# Patient Record
Sex: MALE | Race: WHITE | NOT HISPANIC OR LATINO | Employment: OTHER | URBAN - METROPOLITAN AREA
[De-identification: names, ages, dates, MRNs, and addresses within clinical notes are randomized per-mention and may not be internally consistent; named-entity substitution may affect disease eponyms.]

---

## 2017-03-15 ENCOUNTER — HOSPITAL ENCOUNTER (OUTPATIENT)
Facility: MEDICAL CENTER | Age: 82
End: 2017-03-15
Payer: COMMERCIAL

## 2017-03-15 LAB
ALBUMIN SERPL BCP-MCNC: 4.4 G/DL (ref 3.2–4.9)
ALBUMIN/GLOB SERPL: 1.7 G/DL
ALP SERPL-CCNC: 54 U/L (ref 30–99)
ALT SERPL-CCNC: 20 U/L (ref 2–50)
ANION GAP SERPL CALC-SCNC: 6 MMOL/L (ref 0–11.9)
AST SERPL-CCNC: 25 U/L (ref 12–45)
BILIRUB SERPL-MCNC: 0.9 MG/DL (ref 0.1–1.5)
BUN SERPL-MCNC: 18 MG/DL (ref 8–22)
CALCIUM SERPL-MCNC: 9.4 MG/DL (ref 8.5–10.5)
CHLORIDE SERPL-SCNC: 103 MMOL/L (ref 96–112)
CHOLEST SERPL-MCNC: 119 MG/DL (ref 100–199)
CO2 SERPL-SCNC: 31 MMOL/L (ref 20–33)
CREAT SERPL-MCNC: 0.85 MG/DL (ref 0.5–1.4)
EST. AVERAGE GLUCOSE BLD GHB EST-MCNC: 134 MG/DL
GLOBULIN SER CALC-MCNC: 2.6 G/DL (ref 1.9–3.5)
GLUCOSE SERPL-MCNC: 108 MG/DL (ref 65–99)
HBA1C MFR BLD: 6.3 % (ref 0–5.6)
HDLC SERPL-MCNC: 33 MG/DL
LDLC SERPL CALC-MCNC: 61 MG/DL
POTASSIUM SERPL-SCNC: 4.2 MMOL/L (ref 3.6–5.5)
PROT SERPL-MCNC: 7 G/DL (ref 6–8.2)
SODIUM SERPL-SCNC: 140 MMOL/L (ref 135–145)
TRIGL SERPL-MCNC: 125 MG/DL (ref 0–149)

## 2017-07-19 ENCOUNTER — HOSPITAL ENCOUNTER (OUTPATIENT)
Facility: MEDICAL CENTER | Age: 82
End: 2017-07-19
Payer: COMMERCIAL

## 2017-07-19 LAB
ALBUMIN SERPL BCP-MCNC: 4.3 G/DL (ref 3.2–4.9)
ALBUMIN/GLOB SERPL: 1.7 G/DL
ALP SERPL-CCNC: 43 U/L (ref 30–99)
ALT SERPL-CCNC: 19 U/L (ref 2–50)
ANION GAP SERPL CALC-SCNC: 6 MMOL/L (ref 0–11.9)
AST SERPL-CCNC: 21 U/L (ref 12–45)
BASOPHILS # BLD AUTO: 0.7 % (ref 0–1.8)
BASOPHILS # BLD: 0.04 K/UL (ref 0–0.12)
BILIRUB SERPL-MCNC: 1.9 MG/DL (ref 0.1–1.5)
BUN SERPL-MCNC: 22 MG/DL (ref 8–22)
CALCIUM SERPL-MCNC: 9.9 MG/DL (ref 8.5–10.5)
CHLORIDE SERPL-SCNC: 100 MMOL/L (ref 96–112)
CHOLEST SERPL-MCNC: 92 MG/DL (ref 100–199)
CO2 SERPL-SCNC: 31 MMOL/L (ref 20–33)
CREAT SERPL-MCNC: 0.86 MG/DL (ref 0.5–1.4)
EOSINOPHIL # BLD AUTO: 0.2 K/UL (ref 0–0.51)
EOSINOPHIL NFR BLD: 3.4 % (ref 0–6.9)
ERYTHROCYTE [DISTWIDTH] IN BLOOD BY AUTOMATED COUNT: 39.7 FL (ref 35.9–50)
EST. AVERAGE GLUCOSE BLD GHB EST-MCNC: 131 MG/DL
GFR SERPL CREATININE-BSD FRML MDRD: >60 ML/MIN/1.73 M 2
GLOBULIN SER CALC-MCNC: 2.5 G/DL (ref 1.9–3.5)
GLUCOSE SERPL-MCNC: 106 MG/DL (ref 65–99)
HBA1C MFR BLD: 6.2 % (ref 0–5.6)
HCT VFR BLD AUTO: 43 % (ref 42–52)
HDLC SERPL-MCNC: 31 MG/DL
HGB BLD-MCNC: 14.6 G/DL (ref 14–18)
IMM GRANULOCYTES # BLD AUTO: 0.01 K/UL (ref 0–0.11)
IMM GRANULOCYTES NFR BLD AUTO: 0.2 % (ref 0–0.9)
LDLC SERPL CALC-MCNC: 46 MG/DL
LYMPHOCYTES # BLD AUTO: 1.7 K/UL (ref 1–4.8)
LYMPHOCYTES NFR BLD: 28.6 % (ref 22–41)
MCH RBC QN AUTO: 29.4 PG (ref 27–33)
MCHC RBC AUTO-ENTMCNC: 34 G/DL (ref 33.7–35.3)
MCV RBC AUTO: 86.7 FL (ref 81.4–97.8)
MONOCYTES # BLD AUTO: 0.45 K/UL (ref 0–0.85)
MONOCYTES NFR BLD AUTO: 7.6 % (ref 0–13.4)
NEUTROPHILS # BLD AUTO: 3.54 K/UL (ref 1.82–7.42)
NEUTROPHILS NFR BLD: 59.5 % (ref 44–72)
NRBC # BLD AUTO: 0 K/UL
NRBC BLD AUTO-RTO: 0 /100 WBC
PLATELET # BLD AUTO: 163 K/UL (ref 164–446)
PMV BLD AUTO: 10.1 FL (ref 9–12.9)
POTASSIUM SERPL-SCNC: 4 MMOL/L (ref 3.6–5.5)
PROT SERPL-MCNC: 6.8 G/DL (ref 6–8.2)
RBC # BLD AUTO: 4.96 M/UL (ref 4.7–6.1)
SODIUM SERPL-SCNC: 137 MMOL/L (ref 135–145)
TRIGL SERPL-MCNC: 77 MG/DL (ref 0–149)
WBC # BLD AUTO: 5.9 K/UL (ref 4.8–10.8)

## 2017-10-18 ENCOUNTER — HOSPITAL ENCOUNTER (OUTPATIENT)
Facility: MEDICAL CENTER | Age: 82
End: 2017-10-18
Payer: COMMERCIAL

## 2017-10-18 LAB
ALBUMIN SERPL BCP-MCNC: 4.4 G/DL (ref 3.2–4.9)
ALBUMIN/GLOB SERPL: 1.7 G/DL
ALP SERPL-CCNC: 49 U/L (ref 30–99)
ALT SERPL-CCNC: 25 U/L (ref 2–50)
ANION GAP SERPL CALC-SCNC: 9 MMOL/L (ref 0–11.9)
AST SERPL-CCNC: 22 U/L (ref 12–45)
BASOPHILS # BLD AUTO: 0.8 % (ref 0–1.8)
BASOPHILS # BLD: 0.05 K/UL (ref 0–0.12)
BILIRUB SERPL-MCNC: 1.1 MG/DL (ref 0.1–1.5)
BUN SERPL-MCNC: 23 MG/DL (ref 8–22)
CALCIUM SERPL-MCNC: 9.4 MG/DL (ref 8.5–10.5)
CHLORIDE SERPL-SCNC: 103 MMOL/L (ref 96–112)
CHOLEST SERPL-MCNC: 103 MG/DL (ref 100–199)
CO2 SERPL-SCNC: 29 MMOL/L (ref 20–33)
CREAT SERPL-MCNC: 0.86 MG/DL (ref 0.5–1.4)
EOSINOPHIL # BLD AUTO: 0.23 K/UL (ref 0–0.51)
EOSINOPHIL NFR BLD: 3.9 % (ref 0–6.9)
ERYTHROCYTE [DISTWIDTH] IN BLOOD BY AUTOMATED COUNT: 41.8 FL (ref 35.9–50)
EST. AVERAGE GLUCOSE BLD GHB EST-MCNC: 134 MG/DL
GFR SERPL CREATININE-BSD FRML MDRD: >60 ML/MIN/1.73 M 2
GLOBULIN SER CALC-MCNC: 2.6 G/DL (ref 1.9–3.5)
GLUCOSE SERPL-MCNC: 111 MG/DL (ref 65–99)
HBA1C MFR BLD: 6.3 % (ref 0–5.6)
HCT VFR BLD AUTO: 43.9 % (ref 42–52)
HDLC SERPL-MCNC: 38 MG/DL
HGB BLD-MCNC: 14.6 G/DL (ref 14–18)
IMM GRANULOCYTES # BLD AUTO: 0.01 K/UL (ref 0–0.11)
IMM GRANULOCYTES NFR BLD AUTO: 0.2 % (ref 0–0.9)
LDLC SERPL CALC-MCNC: 50 MG/DL
LYMPHOCYTES # BLD AUTO: 1.64 K/UL (ref 1–4.8)
LYMPHOCYTES NFR BLD: 27.7 % (ref 22–41)
MCH RBC QN AUTO: 29.6 PG (ref 27–33)
MCHC RBC AUTO-ENTMCNC: 33.3 G/DL (ref 33.7–35.3)
MCV RBC AUTO: 89 FL (ref 81.4–97.8)
MONOCYTES # BLD AUTO: 0.47 K/UL (ref 0–0.85)
MONOCYTES NFR BLD AUTO: 7.9 % (ref 0–13.4)
NEUTROPHILS # BLD AUTO: 3.53 K/UL (ref 1.82–7.42)
NEUTROPHILS NFR BLD: 59.5 % (ref 44–72)
NRBC # BLD AUTO: 0 K/UL
NRBC BLD AUTO-RTO: 0 /100 WBC
PLATELET # BLD AUTO: 191 K/UL (ref 164–446)
PMV BLD AUTO: 10.4 FL (ref 9–12.9)
POTASSIUM SERPL-SCNC: 4.4 MMOL/L (ref 3.6–5.5)
PROT SERPL-MCNC: 7 G/DL (ref 6–8.2)
RBC # BLD AUTO: 4.93 M/UL (ref 4.7–6.1)
SODIUM SERPL-SCNC: 141 MMOL/L (ref 135–145)
TRIGL SERPL-MCNC: 77 MG/DL (ref 0–149)
WBC # BLD AUTO: 5.9 K/UL (ref 4.8–10.8)

## 2018-01-12 ENCOUNTER — HOSPITAL ENCOUNTER (OUTPATIENT)
Dept: LAB | Facility: MEDICAL CENTER | Age: 83
End: 2018-01-12
Attending: INTERNAL MEDICINE
Payer: MEDICARE

## 2018-01-12 LAB
ALBUMIN SERPL BCP-MCNC: 4.4 G/DL (ref 3.2–4.9)
ALBUMIN/GLOB SERPL: 1.7 G/DL
ALP SERPL-CCNC: 44 U/L (ref 30–99)
ALT SERPL-CCNC: 17 U/L (ref 2–50)
ANION GAP SERPL CALC-SCNC: 5 MMOL/L (ref 0–11.9)
AST SERPL-CCNC: 20 U/L (ref 12–45)
BASOPHILS # BLD AUTO: 0.8 % (ref 0–1.8)
BASOPHILS # BLD: 0.05 K/UL (ref 0–0.12)
BILIRUB SERPL-MCNC: 1.2 MG/DL (ref 0.1–1.5)
BUN SERPL-MCNC: 19 MG/DL (ref 8–22)
CALCIUM SERPL-MCNC: 9 MG/DL (ref 8.5–10.5)
CHLORIDE SERPL-SCNC: 103 MMOL/L (ref 96–112)
CHOLEST SERPL-MCNC: 96 MG/DL (ref 100–199)
CO2 SERPL-SCNC: 30 MMOL/L (ref 20–33)
CREAT SERPL-MCNC: 0.8 MG/DL (ref 0.5–1.4)
EOSINOPHIL # BLD AUTO: 0.2 K/UL (ref 0–0.51)
EOSINOPHIL NFR BLD: 3.2 % (ref 0–6.9)
ERYTHROCYTE [DISTWIDTH] IN BLOOD BY AUTOMATED COUNT: 40.4 FL (ref 35.9–50)
GLOBULIN SER CALC-MCNC: 2.6 G/DL (ref 1.9–3.5)
GLUCOSE SERPL-MCNC: 114 MG/DL (ref 65–99)
HCT VFR BLD AUTO: 43.8 % (ref 42–52)
HDLC SERPL-MCNC: 32 MG/DL
HGB BLD-MCNC: 14.7 G/DL (ref 14–18)
IMM GRANULOCYTES # BLD AUTO: 0.02 K/UL (ref 0–0.11)
IMM GRANULOCYTES NFR BLD AUTO: 0.3 % (ref 0–0.9)
LDLC SERPL CALC-MCNC: 45 MG/DL
LYMPHOCYTES # BLD AUTO: 1.27 K/UL (ref 1–4.8)
LYMPHOCYTES NFR BLD: 20 % (ref 22–41)
MCH RBC QN AUTO: 29.5 PG (ref 27–33)
MCHC RBC AUTO-ENTMCNC: 33.6 G/DL (ref 33.7–35.3)
MCV RBC AUTO: 87.8 FL (ref 81.4–97.8)
MONOCYTES # BLD AUTO: 0.49 K/UL (ref 0–0.85)
MONOCYTES NFR BLD AUTO: 7.7 % (ref 0–13.4)
NEUTROPHILS # BLD AUTO: 4.31 K/UL (ref 1.82–7.42)
NEUTROPHILS NFR BLD: 68 % (ref 44–72)
NRBC # BLD AUTO: 0 K/UL
NRBC BLD-RTO: 0 /100 WBC
PLATELET # BLD AUTO: 202 K/UL (ref 164–446)
PMV BLD AUTO: 10.2 FL (ref 9–12.9)
POTASSIUM SERPL-SCNC: 4.3 MMOL/L (ref 3.6–5.5)
PROT SERPL-MCNC: 7 G/DL (ref 6–8.2)
RBC # BLD AUTO: 4.99 M/UL (ref 4.7–6.1)
SODIUM SERPL-SCNC: 138 MMOL/L (ref 135–145)
T4 FREE SERPL-MCNC: 0.88 NG/DL (ref 0.53–1.43)
TRIGL SERPL-MCNC: 93 MG/DL (ref 0–149)
TSH SERPL DL<=0.005 MIU/L-ACNC: 1.25 UIU/ML (ref 0.38–5.33)
WBC # BLD AUTO: 6.3 K/UL (ref 4.8–10.8)

## 2018-01-12 PROCEDURE — 84439 ASSAY OF FREE THYROXINE: CPT

## 2018-01-12 PROCEDURE — 80053 COMPREHEN METABOLIC PANEL: CPT

## 2018-01-12 PROCEDURE — 80061 LIPID PANEL: CPT

## 2018-01-12 PROCEDURE — 36415 COLL VENOUS BLD VENIPUNCTURE: CPT

## 2018-01-12 PROCEDURE — 85025 COMPLETE CBC W/AUTO DIFF WBC: CPT

## 2018-01-12 PROCEDURE — 84443 ASSAY THYROID STIM HORMONE: CPT

## 2018-01-16 DIAGNOSIS — I34.0 NON-RHEUMATIC MITRAL REGURGITATION: ICD-10-CM

## 2018-01-17 ENCOUNTER — HOSPITAL ENCOUNTER (OUTPATIENT)
Facility: MEDICAL CENTER | Age: 83
End: 2018-01-17
Payer: COMMERCIAL

## 2018-01-18 LAB — PSA SERPL-MCNC: 0.01 NG/ML (ref 0–4)

## 2018-01-22 ENCOUNTER — HOSPITAL ENCOUNTER (OUTPATIENT)
Dept: CARDIOLOGY | Facility: MEDICAL CENTER | Age: 83
End: 2018-01-22
Attending: INTERNAL MEDICINE
Payer: MEDICARE

## 2018-01-22 DIAGNOSIS — I34.0 NON-RHEUMATIC MITRAL REGURGITATION: ICD-10-CM

## 2018-01-22 PROCEDURE — 93306 TTE W/DOPPLER COMPLETE: CPT

## 2018-01-23 LAB
LV EJECT FRACT  99904: 60
LV EJECT FRACT MOD 2C 99903: 62.47
LV EJECT FRACT MOD 4C 99902: 65.07
LV EJECT FRACT MOD BP 99901: 62.33

## 2018-02-14 ENCOUNTER — OFFICE VISIT (OUTPATIENT)
Dept: CARDIOLOGY | Facility: MEDICAL CENTER | Age: 83
End: 2018-02-14
Payer: MEDICARE

## 2018-02-14 VITALS
WEIGHT: 152 LBS | BODY MASS INDEX: 24.43 KG/M2 | DIASTOLIC BLOOD PRESSURE: 72 MMHG | SYSTOLIC BLOOD PRESSURE: 128 MMHG | HEART RATE: 72 BPM | HEIGHT: 66 IN | OXYGEN SATURATION: 94 %

## 2018-02-14 DIAGNOSIS — I10 ESSENTIAL HYPERTENSION: ICD-10-CM

## 2018-02-14 DIAGNOSIS — I34.0 NON-RHEUMATIC MITRAL REGURGITATION: ICD-10-CM

## 2018-02-14 PROCEDURE — 99204 OFFICE O/P NEW MOD 45 MIN: CPT | Performed by: INTERNAL MEDICINE

## 2018-02-14 RX ORDER — ASPIRIN 325 MG
325 TABLET ORAL EVERY 6 HOURS PRN
COMMUNITY
End: 2019-08-14

## 2018-02-14 RX ORDER — METFORMIN HYDROCHLORIDE 500 MG/1
500 TABLET, EXTENDED RELEASE ORAL 3 TIMES DAILY
Status: ON HOLD | COMMUNITY
Start: 2018-02-03 | End: 2019-11-01

## 2018-02-14 RX ORDER — CHLORTHALIDONE 25 MG/1
25 TABLET ORAL DAILY
Qty: 90 TAB | Refills: 3 | Status: ON HOLD | OUTPATIENT
Start: 2018-02-14 | End: 2019-11-01

## 2018-02-14 RX ORDER — LOSARTAN POTASSIUM 100 MG/1
100 TABLET ORAL DAILY
COMMUNITY
Start: 2018-02-03

## 2018-02-14 RX ORDER — ATORVASTATIN CALCIUM 10 MG/1
10 TABLET, FILM COATED ORAL DAILY
Status: ON HOLD | COMMUNITY
Start: 2018-02-03 | End: 2019-12-04

## 2018-02-14 RX ORDER — HYDROCHLOROTHIAZIDE 12.5 MG/1
CAPSULE, GELATIN COATED ORAL
COMMUNITY
Start: 2018-01-11 | End: 2018-02-14

## 2018-02-14 ASSESSMENT — ENCOUNTER SYMPTOMS
MYALGIAS: 0
BLURRED VISION: 0
COUGH: 0
FEVER: 0
DIZZINESS: 0
NERVOUS/ANXIOUS: 0
HEARTBURN: 0
PND: 0
SHORTNESS OF BREATH: 0
BRUISES/BLEEDS EASILY: 0
PALPITATIONS: 0
DEPRESSION: 0
CHILLS: 0
NAUSEA: 0
HEADACHES: 0
EYE DISCHARGE: 0
BACK PAIN: 1

## 2018-02-14 NOTE — PROGRESS NOTES
Subjective:   Filiberto Jauregui is a 84 y.o. 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  Activity level is limited only by some mild sciatica which comes and goes.  He denies effort dyspnea and chest discomfort or palpitations.  Recent echocardiogram demonstrates at least moderate mitral regurgitation possibly severe but other parameters such as pulmonary artery pressure, left atrial size and left ventricular systolic function and size are all normal.    He reports that her systolic blood pressures running between 130-140.    In 2011 and a stress echo was normal.  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 No alcohol abuse.    No past medical history on file.  No past surgical history on file.  No family history on file.  History   Smoking Status   • Never Smoker   Smokeless Tobacco   • Not on file     Allergies   Allergen Reactions   • Penicillins      Outpatient Encounter Prescriptions as of 2/14/2018   Medication Sig Dispense Refill   • atorvastatin (LIPITOR) 40 MG Tab      • losartan (COZAAR) 100 MG Tab      • metFORMIN ER (GLUCOPHAGE XR) 500 MG TABLET SR 24 HR      • aspirin (ASA) 325 MG Tab Take 325 mg by mouth every 6 hours as needed.     • chlorthalidone (HYGROTON) 25 MG Tab Take 1 Tab by mouth every day. 90 Tab 3   • [DISCONTINUED] hydrochlorothiazide (MICROZIDE) 12.5 MG capsule        No facility-administered encounter medications on file as of 2/14/2018.      Review of Systems   Constitutional: Negative for chills, fever and malaise/fatigue.   Eyes: Negative for blurred vision and discharge.   Respiratory: Negative for cough and shortness of breath.    Cardiovascular: Negative for chest pain, palpitations, leg swelling and PND.   Gastrointestinal:  "Negative for heartburn and nausea.   Genitourinary: Negative for dysuria and urgency.   Musculoskeletal: Positive for back pain. Negative for myalgias.   Skin: Negative for itching and rash.   Neurological: Negative for dizziness and headaches.   Endo/Heme/Allergies: Negative for environmental allergies. Does not bruise/bleed easily.   Psychiatric/Behavioral: Negative for depression. The patient is not nervous/anxious.         Objective:   /72   Pulse 72   Ht 1.676 m (5' 6\")   Wt 68.9 kg (152 lb)   SpO2 94%   BMI 24.53 kg/m²     Physical Exam   Constitutional: He is oriented to person, place, and time. He appears well-developed and well-nourished.   HENT:   Head: Normocephalic and atraumatic.   Eyes: Conjunctivae and EOM are normal. No scleral icterus.   Neck: Neck supple. No JVD present. No thyromegaly present.   Cardiovascular: Normal rate and regular rhythm.  Exam reveals no gallop and no friction rub.    Murmur heard.   Systolic murmur is present with a grade of 3/6   Pulses:       Carotid pulses are 2+ on the right side, and 2+ on the left side.       Radial pulses are 2+ on the right side, and 2+ on the left side.        Femoral pulses are 2+ on the right side, and 2+ on the left side.       Popliteal pulses are 2+ on the right side, and 2+ on the left side.        Dorsalis pedis pulses are 2+ on the right side, and 2+ on the left side.        Posterior tibial pulses are 2+ on the right side, and 2+ on the left side.   Pulmonary/Chest: Effort normal and breath sounds normal. No respiratory distress. He has no wheezes. He has no rales. He exhibits no tenderness.   Abdominal: Soft. Bowel sounds are normal. He exhibits no distension and no mass. There is no tenderness.   Neurological: He is alert and oriented to person, place, and time. Coordination normal.   Skin: Skin is warm and dry. No rash noted. No pallor.   Psychiatric: He has a normal mood and affect. His behavior is normal. Judgment and " thought content normal.       Assessment:     1. Non-rheumatic mitral regurgitation     2. Essential hypertension  BASIC METABOLIC PANEL       Medical Decision Making:  Today's Assessment / Status / Plan:   With respect to mitral regurgitation, continue observation annually or sooner if any symptoms develop.  With respect to hypertension which is marginally controlled, discontinue hydrochlorothiazide.  Prescribed chlorthalidone 25 mg per day.  Nonfasting basic metabolic panel in 2-3 weeks.  Return when he wishes to be seen or annually.

## 2018-02-14 NOTE — LETTER
[facility]   [address]   [address]  [place] [address]  Phone: [phone]  Fax: [phone]              Filiberto Jauregui  7/29/1933    Encounter Date: 2/14/2018    Gee Johnson M.D.          PROGRESS NOTE:  Subjective:   Filiberto Jauregui is a 84 y.o. 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  Activity level is limited only by some mild sciatica which comes and goes.  He denies effort dyspnea and chest discomfort or palpitations.  Recent echocardiogram demonstrates at least moderate mitral regurgitation possibly severe but other parameters such as pulmonary artery pressure, left atrial size and left ventricular systolic function and size are all normal.    He reports that her systolic blood pressures running between 130-140.    In 2011 and a stress echo was normal.  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 No alcohol abuse.    No past medical history on file.  No past surgical history on file.  No family history on file.  History   Smoking Status   • Never Smoker   Smokeless Tobacco   • Not on file     Allergies   Allergen Reactions   • Penicillins      Outpatient Encounter Prescriptions as of 2/14/2018   Medication Sig Dispense Refill   • atorvastatin (LIPITOR) 40 MG Tab      • losartan (COZAAR) 100 MG Tab      • metFORMIN ER (GLUCOPHAGE XR) 500 MG TABLET SR 24 HR      • aspirin (ASA) 325 MG Tab Take 325 mg by mouth every 6 hours as needed.     • chlorthalidone (HYGROTON) 25 MG Tab Take 1 Tab by mouth every day. 90 Tab 3   • [DISCONTINUED] hydrochlorothiazide (MICROZIDE) 12.5 MG capsule        No facility-administered encounter medications on file as of 2/14/2018.      Review  "of Systems   Constitutional: Negative for chills, fever and malaise/fatigue.   Eyes: Negative for blurred vision and discharge.   Respiratory: Negative for cough and shortness of breath.    Cardiovascular: Negative for chest pain, palpitations, leg swelling and PND.   Gastrointestinal: Negative for heartburn and nausea.   Genitourinary: Negative for dysuria and urgency.   Musculoskeletal: Positive for back pain. Negative for myalgias.   Skin: Negative for itching and rash.   Neurological: Negative for dizziness and headaches.   Endo/Heme/Allergies: Negative for environmental allergies. Does not bruise/bleed easily.   Psychiatric/Behavioral: Negative for depression. The patient is not nervous/anxious.         Objective:   /72   Pulse 72   Ht 1.676 m (5' 6\")   Wt 68.9 kg (152 lb)   SpO2 94%   BMI 24.53 kg/m²      Physical Exam   Constitutional: He is oriented to person, place, and time. He appears well-developed and well-nourished.   HENT:   Head: Normocephalic and atraumatic.   Eyes: Conjunctivae and EOM are normal. No scleral icterus.   Neck: Neck supple. No JVD present. No thyromegaly present.   Cardiovascular: Normal rate and regular rhythm.  Exam reveals no gallop and no friction rub.    Murmur heard.   Systolic murmur is present with a grade of 3/6   Pulses:       Carotid pulses are 2+ on the right side, and 2+ on the left side.       Radial pulses are 2+ on the right side, and 2+ on the left side.        Femoral pulses are 2+ on the right side, and 2+ on the left side.       Popliteal pulses are 2+ on the right side, and 2+ on the left side.        Dorsalis pedis pulses are 2+ on the right side, and 2+ on the left side.        Posterior tibial pulses are 2+ on the right side, and 2+ on the left side.   Pulmonary/Chest: Effort normal and breath sounds normal. No respiratory distress. He has no wheezes. He has no rales. He exhibits no tenderness.   Abdominal: Soft. Bowel sounds are normal. He exhibits " no distension and no mass. There is no tenderness.   Neurological: He is alert and oriented to person, place, and time. Coordination normal.   Skin: Skin is warm and dry. No rash noted. No pallor.   Psychiatric: He has a normal mood and affect. His behavior is normal. Judgment and thought content normal.       Assessment:     1. Non-rheumatic mitral regurgitation     2. Essential hypertension  BASIC METABOLIC PANEL       Medical Decision Making:  Today's Assessment / Status / Plan:   With respect to mitral regurgitation, continue observation annually or sooner if any symptoms develop.  With respect to hypertension which is marginally controlled, discontinue hydrochlorothiazide.  Prescribed chlorthalidone 25 mg per day.  Nonfasting basic metabolic panel in 2-3 weeks.  Return when he wishes to be seen or annually.      Mac Miller M.D.  [address]  [address]  [place] [address]  VIA Facsimile: [phone]

## 2018-06-13 ENCOUNTER — HOSPITAL ENCOUNTER (OUTPATIENT)
Dept: LAB | Facility: MEDICAL CENTER | Age: 83
End: 2018-06-13
Attending: INTERNAL MEDICINE
Payer: MEDICARE

## 2018-06-13 LAB
ALBUMIN SERPL BCP-MCNC: 4.5 G/DL (ref 3.2–4.9)
ALBUMIN/GLOB SERPL: 1.7 G/DL
ALP SERPL-CCNC: 40 U/L (ref 30–99)
ALT SERPL-CCNC: 18 U/L (ref 2–50)
ANION GAP SERPL CALC-SCNC: 6 MMOL/L (ref 0–11.9)
AST SERPL-CCNC: 20 U/L (ref 12–45)
BILIRUB SERPL-MCNC: 1.4 MG/DL (ref 0.1–1.5)
BUN SERPL-MCNC: 20 MG/DL (ref 8–22)
CALCIUM SERPL-MCNC: 9.5 MG/DL (ref 8.5–10.5)
CHLORIDE SERPL-SCNC: 103 MMOL/L (ref 96–112)
CO2 SERPL-SCNC: 31 MMOL/L (ref 20–33)
CREAT SERPL-MCNC: 0.91 MG/DL (ref 0.5–1.4)
GLOBULIN SER CALC-MCNC: 2.6 G/DL (ref 1.9–3.5)
GLUCOSE SERPL-MCNC: 97 MG/DL (ref 65–99)
POTASSIUM SERPL-SCNC: 4.1 MMOL/L (ref 3.6–5.5)
PROT SERPL-MCNC: 7.1 G/DL (ref 6–8.2)
SODIUM SERPL-SCNC: 140 MMOL/L (ref 135–145)

## 2018-06-13 PROCEDURE — 36415 COLL VENOUS BLD VENIPUNCTURE: CPT

## 2018-06-13 PROCEDURE — 80053 COMPREHEN METABOLIC PANEL: CPT

## 2018-08-25 ENCOUNTER — HOSPITAL ENCOUNTER (OUTPATIENT)
Dept: LAB | Facility: MEDICAL CENTER | Age: 83
End: 2018-08-25
Attending: INTERNAL MEDICINE
Payer: MEDICARE

## 2018-08-25 LAB
ALBUMIN SERPL BCP-MCNC: 4.6 G/DL (ref 3.2–4.9)
ALBUMIN/GLOB SERPL: 1.8 G/DL
ALP SERPL-CCNC: 50 U/L (ref 30–99)
ALT SERPL-CCNC: 22 U/L (ref 2–50)
ANION GAP SERPL CALC-SCNC: 6 MMOL/L (ref 0–11.9)
APPEARANCE UR: CLEAR
AST SERPL-CCNC: 22 U/L (ref 12–45)
BACTERIA #/AREA URNS HPF: NEGATIVE /HPF
BILIRUB SERPL-MCNC: 1.3 MG/DL (ref 0.1–1.5)
BILIRUB UR QL STRIP.AUTO: NEGATIVE
BUN SERPL-MCNC: 22 MG/DL (ref 8–22)
CALCIUM SERPL-MCNC: 9.3 MG/DL (ref 8.5–10.5)
CHLORIDE SERPL-SCNC: 101 MMOL/L (ref 96–112)
CHOLEST SERPL-MCNC: 104 MG/DL (ref 100–199)
CO2 SERPL-SCNC: 31 MMOL/L (ref 20–33)
COLOR UR: YELLOW
CREAT SERPL-MCNC: 0.98 MG/DL (ref 0.5–1.4)
EPI CELLS #/AREA URNS HPF: ABNORMAL /HPF
EST. AVERAGE GLUCOSE BLD GHB EST-MCNC: 137 MG/DL
GLOBULIN SER CALC-MCNC: 2.5 G/DL (ref 1.9–3.5)
GLUCOSE SERPL-MCNC: 115 MG/DL (ref 65–99)
GLUCOSE UR STRIP.AUTO-MCNC: NEGATIVE MG/DL
HBA1C MFR BLD: 6.4 % (ref 0–5.6)
HDLC SERPL-MCNC: 38 MG/DL
HYALINE CASTS #/AREA URNS LPF: ABNORMAL /LPF
KETONES UR STRIP.AUTO-MCNC: NEGATIVE MG/DL
LDLC SERPL CALC-MCNC: 53 MG/DL
LEUKOCYTE ESTERASE UR QL STRIP.AUTO: ABNORMAL
MICRO URNS: ABNORMAL
NITRITE UR QL STRIP.AUTO: NEGATIVE
PH UR STRIP.AUTO: 6 [PH]
POTASSIUM SERPL-SCNC: 4.3 MMOL/L (ref 3.6–5.5)
PROT SERPL-MCNC: 7.1 G/DL (ref 6–8.2)
PROT UR QL STRIP: NEGATIVE MG/DL
RBC # URNS HPF: ABNORMAL /HPF
RBC UR QL AUTO: NEGATIVE
RENAL EPI CELLS #/AREA URNS HPF: ABNORMAL /HPF
SODIUM SERPL-SCNC: 138 MMOL/L (ref 135–145)
SP GR UR STRIP.AUTO: 1.02
TRIGL SERPL-MCNC: 65 MG/DL (ref 0–149)
UROBILINOGEN UR STRIP.AUTO-MCNC: 0.2 MG/DL
WBC #/AREA URNS HPF: ABNORMAL /HPF

## 2018-08-25 PROCEDURE — 80061 LIPID PANEL: CPT

## 2018-08-25 PROCEDURE — 83036 HEMOGLOBIN GLYCOSYLATED A1C: CPT | Mod: GA

## 2018-08-25 PROCEDURE — 36415 COLL VENOUS BLD VENIPUNCTURE: CPT

## 2018-08-25 PROCEDURE — 81001 URINALYSIS AUTO W/SCOPE: CPT

## 2018-08-25 PROCEDURE — 80053 COMPREHEN METABOLIC PANEL: CPT

## 2018-11-03 ENCOUNTER — HOSPITAL ENCOUNTER (OUTPATIENT)
Dept: LAB | Facility: MEDICAL CENTER | Age: 83
End: 2018-11-03
Attending: INTERNAL MEDICINE
Payer: MEDICARE

## 2018-11-03 LAB
ANION GAP SERPL CALC-SCNC: 5 MMOL/L (ref 0–11.9)
BUN SERPL-MCNC: 20 MG/DL (ref 8–22)
CALCIUM SERPL-MCNC: 10 MG/DL (ref 8.5–10.5)
CHLORIDE SERPL-SCNC: 102 MMOL/L (ref 96–112)
CO2 SERPL-SCNC: 32 MMOL/L (ref 20–33)
CREAT SERPL-MCNC: 0.89 MG/DL (ref 0.5–1.4)
FASTING STATUS PATIENT QL REPORTED: NORMAL
GLUCOSE SERPL-MCNC: 104 MG/DL (ref 65–99)
POTASSIUM SERPL-SCNC: 4.1 MMOL/L (ref 3.6–5.5)
SODIUM SERPL-SCNC: 139 MMOL/L (ref 135–145)

## 2018-11-03 PROCEDURE — 36415 COLL VENOUS BLD VENIPUNCTURE: CPT

## 2018-11-03 PROCEDURE — 80048 BASIC METABOLIC PNL TOTAL CA: CPT

## 2018-12-15 RX ORDER — RIVASTIGMINE TARTRATE 1.5 MG/1
1.5 CAPSULE ORAL 2 TIMES DAILY
Qty: 60 CAP | Refills: 11 | Status: SHIPPED | OUTPATIENT
Start: 2018-12-15 | End: 2019-08-29

## 2019-03-01 ENCOUNTER — HOSPITAL ENCOUNTER (OUTPATIENT)
Dept: LAB | Facility: MEDICAL CENTER | Age: 84
End: 2019-03-01
Attending: INTERNAL MEDICINE
Payer: MEDICARE

## 2019-03-01 LAB
ALBUMIN SERPL BCP-MCNC: 4.4 G/DL (ref 3.2–4.9)
ALBUMIN/GLOB SERPL: 1.6 G/DL
ALP SERPL-CCNC: 42 U/L (ref 30–99)
ALT SERPL-CCNC: 22 U/L (ref 2–50)
ANION GAP SERPL CALC-SCNC: 8 MMOL/L (ref 0–11.9)
AST SERPL-CCNC: 23 U/L (ref 12–45)
BILIRUB SERPL-MCNC: 1.6 MG/DL (ref 0.1–1.5)
BUN SERPL-MCNC: 16 MG/DL (ref 8–22)
CALCIUM SERPL-MCNC: 9.4 MG/DL (ref 8.5–10.5)
CHLORIDE SERPL-SCNC: 102 MMOL/L (ref 96–112)
CHOLEST SERPL-MCNC: 97 MG/DL (ref 100–199)
CO2 SERPL-SCNC: 31 MMOL/L (ref 20–33)
CREAT SERPL-MCNC: 0.92 MG/DL (ref 0.5–1.4)
GLOBULIN SER CALC-MCNC: 2.7 G/DL (ref 1.9–3.5)
GLUCOSE SERPL-MCNC: 109 MG/DL (ref 65–99)
HDLC SERPL-MCNC: 33 MG/DL
LDLC SERPL CALC-MCNC: 41 MG/DL
POTASSIUM SERPL-SCNC: 4.1 MMOL/L (ref 3.6–5.5)
PROT SERPL-MCNC: 7.1 G/DL (ref 6–8.2)
SODIUM SERPL-SCNC: 141 MMOL/L (ref 135–145)
TRIGL SERPL-MCNC: 113 MG/DL (ref 0–149)

## 2019-03-01 PROCEDURE — 36415 COLL VENOUS BLD VENIPUNCTURE: CPT

## 2019-03-01 PROCEDURE — 80053 COMPREHEN METABOLIC PANEL: CPT

## 2019-03-01 PROCEDURE — 80061 LIPID PANEL: CPT

## 2019-03-01 PROCEDURE — 83036 HEMOGLOBIN GLYCOSYLATED A1C: CPT | Mod: GA

## 2019-03-02 LAB
EST. AVERAGE GLUCOSE BLD GHB EST-MCNC: 140 MG/DL
HBA1C MFR BLD: 6.5 % (ref 0–5.6)

## 2019-06-26 ENCOUNTER — HOSPITAL ENCOUNTER (OUTPATIENT)
Dept: LAB | Facility: MEDICAL CENTER | Age: 84
End: 2019-06-26
Attending: INTERNAL MEDICINE
Payer: MEDICARE

## 2019-06-26 LAB
ALBUMIN SERPL BCP-MCNC: 4.4 G/DL (ref 3.2–4.9)
ALBUMIN/GLOB SERPL: 1.8 G/DL
ALP SERPL-CCNC: 39 U/L (ref 30–99)
ALT SERPL-CCNC: 19 U/L (ref 2–50)
ANION GAP SERPL CALC-SCNC: 9 MMOL/L (ref 0–11.9)
AST SERPL-CCNC: 22 U/L (ref 12–45)
BILIRUB SERPL-MCNC: 1.9 MG/DL (ref 0.1–1.5)
BUN SERPL-MCNC: 23 MG/DL (ref 8–22)
CALCIUM SERPL-MCNC: 9.6 MG/DL (ref 8.5–10.5)
CHLORIDE SERPL-SCNC: 102 MMOL/L (ref 96–112)
CHOLEST SERPL-MCNC: 89 MG/DL (ref 100–199)
CO2 SERPL-SCNC: 30 MMOL/L (ref 20–33)
CREAT SERPL-MCNC: 0.89 MG/DL (ref 0.5–1.4)
EST. AVERAGE GLUCOSE BLD GHB EST-MCNC: 140 MG/DL
FASTING STATUS PATIENT QL REPORTED: NORMAL
GLOBULIN SER CALC-MCNC: 2.4 G/DL (ref 1.9–3.5)
GLUCOSE SERPL-MCNC: 102 MG/DL (ref 65–99)
HBA1C MFR BLD: 6.5 % (ref 0–5.6)
HDLC SERPL-MCNC: 29 MG/DL
LDLC SERPL CALC-MCNC: 36 MG/DL
POTASSIUM SERPL-SCNC: 4.2 MMOL/L (ref 3.6–5.5)
PROT SERPL-MCNC: 6.8 G/DL (ref 6–8.2)
SODIUM SERPL-SCNC: 141 MMOL/L (ref 135–145)
TRIGL SERPL-MCNC: 118 MG/DL (ref 0–149)

## 2019-06-26 PROCEDURE — 80061 LIPID PANEL: CPT

## 2019-06-26 PROCEDURE — 36415 COLL VENOUS BLD VENIPUNCTURE: CPT

## 2019-06-26 PROCEDURE — 80053 COMPREHEN METABOLIC PANEL: CPT

## 2019-06-26 PROCEDURE — 83036 HEMOGLOBIN GLYCOSYLATED A1C: CPT

## 2019-07-16 ENCOUNTER — HOSPITAL ENCOUNTER (OUTPATIENT)
Dept: LAB | Facility: MEDICAL CENTER | Age: 84
End: 2019-07-16
Attending: INTERNAL MEDICINE
Payer: MEDICARE

## 2019-07-16 LAB
BASOPHILS # BLD AUTO: 0.6 % (ref 0–1.8)
BASOPHILS # BLD: 0.04 K/UL (ref 0–0.12)
EOSINOPHIL # BLD AUTO: 0.18 K/UL (ref 0–0.51)
EOSINOPHIL NFR BLD: 2.8 % (ref 0–6.9)
ERYTHROCYTE [DISTWIDTH] IN BLOOD BY AUTOMATED COUNT: 42.2 FL (ref 35.9–50)
HCT VFR BLD AUTO: 43.6 % (ref 42–52)
HGB BLD-MCNC: 14.2 G/DL (ref 14–18)
IMM GRANULOCYTES # BLD AUTO: 0.01 K/UL (ref 0–0.11)
IMM GRANULOCYTES NFR BLD AUTO: 0.2 % (ref 0–0.9)
LYMPHOCYTES # BLD AUTO: 2.07 K/UL (ref 1–4.8)
LYMPHOCYTES NFR BLD: 32.2 % (ref 22–41)
MCH RBC QN AUTO: 29 PG (ref 27–33)
MCHC RBC AUTO-ENTMCNC: 32.6 G/DL (ref 33.7–35.3)
MCV RBC AUTO: 89 FL (ref 81.4–97.8)
MONOCYTES # BLD AUTO: 0.55 K/UL (ref 0–0.85)
MONOCYTES NFR BLD AUTO: 8.6 % (ref 0–13.4)
NEUTROPHILS # BLD AUTO: 3.57 K/UL (ref 1.82–7.42)
NEUTROPHILS NFR BLD: 55.6 % (ref 44–72)
NRBC # BLD AUTO: 0 K/UL
NRBC BLD-RTO: 0 /100 WBC
PLATELET # BLD AUTO: 191 K/UL (ref 164–446)
PMV BLD AUTO: 10.2 FL (ref 9–12.9)
RBC # BLD AUTO: 4.9 M/UL (ref 4.7–6.1)
T4 FREE SERPL-MCNC: 0.86 NG/DL (ref 0.53–1.43)
TSH SERPL DL<=0.005 MIU/L-ACNC: 1.39 UIU/ML (ref 0.38–5.33)
WBC # BLD AUTO: 6.4 K/UL (ref 4.8–10.8)

## 2019-07-16 PROCEDURE — 84439 ASSAY OF FREE THYROXINE: CPT

## 2019-07-16 PROCEDURE — 36415 COLL VENOUS BLD VENIPUNCTURE: CPT

## 2019-07-16 PROCEDURE — 84443 ASSAY THYROID STIM HORMONE: CPT

## 2019-07-16 PROCEDURE — 85025 COMPLETE CBC W/AUTO DIFF WBC: CPT

## 2019-07-17 ENCOUNTER — TELEPHONE (OUTPATIENT)
Dept: CARDIOLOGY | Facility: MEDICAL CENTER | Age: 84
End: 2019-07-17

## 2019-07-17 DIAGNOSIS — I34.0 MITRAL VALVE INSUFFICIENCY, UNSPECIFIED ETIOLOGY: ICD-10-CM

## 2019-07-17 NOTE — TELEPHONE ENCOUNTER
Per Dr. Johnson:  Order an echo and get the patient in to see me.    Echo ordered, appt scheduled and patient notified.

## 2019-08-14 ENCOUNTER — HOSPITAL ENCOUNTER (OUTPATIENT)
Dept: CARDIOLOGY | Facility: MEDICAL CENTER | Age: 84
End: 2019-08-14
Attending: INTERNAL MEDICINE
Payer: MEDICARE

## 2019-08-14 ENCOUNTER — OFFICE VISIT (OUTPATIENT)
Dept: CARDIOLOGY | Facility: MEDICAL CENTER | Age: 84
End: 2019-08-14
Payer: MEDICARE

## 2019-08-14 VITALS
HEART RATE: 68 BPM | DIASTOLIC BLOOD PRESSURE: 60 MMHG | HEIGHT: 66 IN | SYSTOLIC BLOOD PRESSURE: 132 MMHG | WEIGHT: 156 LBS | BODY MASS INDEX: 25.07 KG/M2

## 2019-08-14 DIAGNOSIS — I34.0 MITRAL VALVE INSUFFICIENCY, UNSPECIFIED ETIOLOGY: ICD-10-CM

## 2019-08-14 DIAGNOSIS — I10 ESSENTIAL HYPERTENSION: ICD-10-CM

## 2019-08-14 LAB
LV EJECT FRACT  99904: 55
LV EJECT FRACT MOD 2C 99903: 55.87
LV EJECT FRACT MOD 4C 99902: 56.56
LV EJECT FRACT MOD BP 99901: 55.82

## 2019-08-14 PROCEDURE — 93306 TTE W/DOPPLER COMPLETE: CPT | Mod: 26 | Performed by: INTERNAL MEDICINE

## 2019-08-14 PROCEDURE — 99213 OFFICE O/P EST LOW 20 MIN: CPT | Performed by: INTERNAL MEDICINE

## 2019-08-14 PROCEDURE — 93306 TTE W/DOPPLER COMPLETE: CPT

## 2019-08-14 ASSESSMENT — ENCOUNTER SYMPTOMS
DEPRESSION: 0
HEADACHES: 0
FEVER: 0
NAUSEA: 0
BLURRED VISION: 0
NERVOUS/ANXIOUS: 0
HEARTBURN: 0
CHILLS: 0
COUGH: 0
DIZZINESS: 0
MYALGIAS: 0
BACK PAIN: 1
EYE DISCHARGE: 0
SHORTNESS OF BREATH: 0
PND: 0
BRUISES/BLEEDS EASILY: 0
PALPITATIONS: 0

## 2019-08-14 NOTE — LETTER
[facility]   [address]  [place] [address]  Phone: [phone]  Fax: [phone]              Filiberto Jauregui  7/29/1933    Encounter Date: 8/14/2019    Gee Johnson M.D.          PROGRESS NOTE:  No chief complaint on file.      Subjective:   Filiberto Jauregui is a 86 y.o. male who presents today in follow-up for mitral regurgitation due to mitral valve prolapse.    Clinically doing well.  He does physical exertion and is limited by sciatica and chronic low back pain.  He specifically denies any palpitations or shortness of breath.  He does have varicose veins to lower extremities and wears compression hose but has no progressive edema.    Overall doing well.  Recent LDL was 36 with Lipitor therapy.  He is on metformin for prediabetes.    Recent echo continues to demonstrate severe mitral regurgitation with a possible ruptured cord.  Pulmonary artery pressure now is at least 45 mmHg and possibly higher.  Right ventricle is slightly enlarged.  These changes in pulmonary artery pressure and right ventricular diameter were not noted a year and a half ago.    No past medical history on file.  No past surgical history on file.  No family history on file.  Social History     Socioeconomic History   • Marital status:      Spouse name: Not on file   • Number of children: Not on file   • Years of education: Not on file   • Highest education level: Not on file   Occupational History   • Not on file   Social Needs   • Financial resource strain: Not on file   • Food insecurity:     Worry: Not on file     Inability: Not on file   • Transportation needs:     Medical: Not on file     Non-medical: Not on file   Tobacco Use   • Smoking status: Never Smoker   Substance and Sexual Activity   • Alcohol use: Yes     Comment: OCCASIONALLY   • Drug use: No   • Sexual activity: Not on file   Lifestyle   • Physical activity:     Days per week: Not on file    Minutes per session: Not on file   • Stress: Not on file   Relationships   • Social connections:     Talks on phone: Not on file     Gets together: Not on file     Attends Restorationism service: Not on file     Active member of club or organization: Not on file     Attends meetings of clubs or organizations: Not on file     Relationship status: Not on file   • Intimate partner violence:     Fear of current or ex partner: Not on file     Emotionally abused: Not on file     Physically abused: Not on file     Forced sexual activity: Not on file   Other Topics Concern   • Not on file   Social History Narrative   • Not on file     Allergies   Allergen Reactions   • Penicillins      Outpatient Encounter Medications as of 8/14/2019   Medication Sig Dispense Refill   • rivastigmine (EXELON) 1.5 MG Cap Take 1 Cap by mouth 2 times a day. 60 Cap 11   • atorvastatin (LIPITOR) 40 MG Tab      • losartan (COZAAR) 100 MG Tab      • metFORMIN ER (GLUCOPHAGE XR) 500 MG TABLET SR 24 HR      • chlorthalidone (HYGROTON) 25 MG Tab Take 1 Tab by mouth every day. 90 Tab 3   • [DISCONTINUED] aspirin (ASA) 325 MG Tab Take 325 mg by mouth every 6 hours as needed.       No facility-administered encounter medications on file as of 8/14/2019.      Review of Systems   Constitutional: Negative for chills, fever and malaise/fatigue.   Eyes: Negative for blurred vision and discharge.   Respiratory: Negative for cough and shortness of breath.    Cardiovascular: Negative for chest pain, palpitations, leg swelling and PND.   Gastrointestinal: Negative for heartburn and nausea.   Genitourinary: Negative for dysuria and urgency.   Musculoskeletal: Positive for back pain. Negative for myalgias.   Skin: Negative for itching and rash.   Neurological: Negative for dizziness and headaches.   Endo/Heme/Allergies: Negative for environmental allergies. Does not bruise/bleed easily.   Psychiatric/Behavioral: Negative for depression. The patient is not  "nervous/anxious.         Objective:   /60 (BP Location: Left arm, Patient Position: Sitting, BP Cuff Size: Adult)   Pulse 68   Ht 1.676 m (5' 6\")   Wt 70.8 kg (156 lb)   BMI 25.18 kg/m²      Physical Exam   Constitutional: He is oriented to person, place, and time. He appears well-developed and well-nourished.   Neck: No JVD present.   Cardiovascular: Normal rate and regular rhythm.   Murmur heard.   Systolic murmur is present with a grade of 3/6.  Pulmonary/Chest: Effort normal and breath sounds normal.   Abdominal: Bowel sounds are normal.   Musculoskeletal: He exhibits edema.   Neurological: He is alert and oriented to person, place, and time.       Assessment:     1. Mitral valve insufficiency, unspecified etiology  REFERRAL TO CARDIOLOGY   2. Essential hypertension         Medical Decision Making:  Today's Assessment / Status / Plan:   He remains asymptomatic but appears to have a moderate pulmonary hypertension, not noted a year and a half ago.    I will refer to our valve program      Mac Miller M.D.  [address]  [address]  [place] [address]  VIA Facsimile: [phone]                 "

## 2019-08-14 NOTE — PROGRESS NOTES
No chief complaint on file.      Subjective:   Filiberto Jauregui is a 86 y.o. male who presents today in follow-up for mitral regurgitation due to mitral valve prolapse.    Clinically doing well.  He does physical exertion and is limited by sciatica and chronic low back pain.  He specifically denies any palpitations or shortness of breath.  He does have varicose veins to lower extremities and wears compression hose but has no progressive edema.    Overall doing well.  Recent LDL was 36 with Lipitor therapy.  He is on metformin for prediabetes.    Recent echo continues to demonstrate severe mitral regurgitation with a possible ruptured cord.  Pulmonary artery pressure now is at least 45 mmHg and possibly higher.  Right ventricle is slightly enlarged.  These changes in pulmonary artery pressure and right ventricular diameter were not noted a year and a half ago.    No past medical history on file.  No past surgical history on file.  No family history on file.  Social History     Socioeconomic History   • Marital status:      Spouse name: Not on file   • Number of children: Not on file   • Years of education: Not on file   • Highest education level: Not on file   Occupational History   • Not on file   Social Needs   • Financial resource strain: Not on file   • Food insecurity:     Worry: Not on file     Inability: Not on file   • Transportation needs:     Medical: Not on file     Non-medical: Not on file   Tobacco Use   • Smoking status: Never Smoker   Substance and Sexual Activity   • Alcohol use: Yes     Comment: OCCASIONALLY   • Drug use: No   • Sexual activity: Not on file   Lifestyle   • Physical activity:     Days per week: Not on file     Minutes per session: Not on file   • Stress: Not on file   Relationships   • Social connections:     Talks on phone: Not on file     Gets together: Not on file     Attends Anabaptist service: Not on file     Active member of club or organization: Not on file     Attends  "meetings of clubs or organizations: Not on file     Relationship status: Not on file   • Intimate partner violence:     Fear of current or ex partner: Not on file     Emotionally abused: Not on file     Physically abused: Not on file     Forced sexual activity: Not on file   Other Topics Concern   • Not on file   Social History Narrative   • Not on file     Allergies   Allergen Reactions   • Penicillins      Outpatient Encounter Medications as of 8/14/2019   Medication Sig Dispense Refill   • rivastigmine (EXELON) 1.5 MG Cap Take 1 Cap by mouth 2 times a day. 60 Cap 11   • atorvastatin (LIPITOR) 40 MG Tab      • losartan (COZAAR) 100 MG Tab      • metFORMIN ER (GLUCOPHAGE XR) 500 MG TABLET SR 24 HR      • chlorthalidone (HYGROTON) 25 MG Tab Take 1 Tab by mouth every day. 90 Tab 3   • [DISCONTINUED] aspirin (ASA) 325 MG Tab Take 325 mg by mouth every 6 hours as needed.       No facility-administered encounter medications on file as of 8/14/2019.      Review of Systems   Constitutional: Negative for chills, fever and malaise/fatigue.   Eyes: Negative for blurred vision and discharge.   Respiratory: Negative for cough and shortness of breath.    Cardiovascular: Negative for chest pain, palpitations, leg swelling and PND.   Gastrointestinal: Negative for heartburn and nausea.   Genitourinary: Negative for dysuria and urgency.   Musculoskeletal: Positive for back pain. Negative for myalgias.   Skin: Negative for itching and rash.   Neurological: Negative for dizziness and headaches.   Endo/Heme/Allergies: Negative for environmental allergies. Does not bruise/bleed easily.   Psychiatric/Behavioral: Negative for depression. The patient is not nervous/anxious.         Objective:   /60 (BP Location: Left arm, Patient Position: Sitting, BP Cuff Size: Adult)   Pulse 68   Ht 1.676 m (5' 6\")   Wt 70.8 kg (156 lb)   BMI 25.18 kg/m²     Physical Exam   Constitutional: He is oriented to person, place, and time. He appears " well-developed and well-nourished.   Neck: No JVD present.   Cardiovascular: Normal rate and regular rhythm.   Murmur heard.   Systolic murmur is present with a grade of 3/6.  Pulmonary/Chest: Effort normal and breath sounds normal.   Abdominal: Bowel sounds are normal.   Musculoskeletal: He exhibits edema.   Neurological: He is alert and oriented to person, place, and time.       Assessment:     1. Mitral valve insufficiency, unspecified etiology  REFERRAL TO CARDIOLOGY   2. Essential hypertension         Medical Decision Making:  Today's Assessment / Status / Plan:   He remains asymptomatic but appears to have a moderate pulmonary hypertension, not noted a year and a half ago.    I will refer to our valve program

## 2019-08-23 ENCOUNTER — TELEPHONE (OUTPATIENT)
Dept: CARDIOLOGY | Facility: MEDICAL CENTER | Age: 84
End: 2019-08-23

## 2019-08-23 NOTE — TELEPHONE ENCOUNTER
Left voice message explaining we have received referral to valve clinic and have apt available 8/29 @ 1100. Encouraged return call to discuss such in detail and confirm apt scheduling. Provided direct contact information. Awaiting return call.

## 2019-08-26 ENCOUNTER — TELEPHONE (OUTPATIENT)
Dept: CARDIOLOGY | Facility: MEDICAL CENTER | Age: 84
End: 2019-08-26

## 2019-08-26 NOTE — TELEPHONE ENCOUNTER
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 She received voice message left requesting return call to discuss scheduling. 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     Assured patient's wife that Dr. Wilson has apt available 8/29 @1100. Confirmed patient for this time/date. Reviewed location with patient's wife. She reports that she is familiar with the location as the patient's office used to be in the same building prior to him retiring. Reviewed patient will need to bring updated list of medications, including OTCs and supplements to apt 8/29.     Patients wife states no further questions at this time, and thankful for assistance.   "

## 2019-08-29 ENCOUNTER — TELEPHONE (OUTPATIENT)
Dept: CARDIOLOGY | Facility: MEDICAL CENTER | Age: 84
End: 2019-08-29

## 2019-08-29 ENCOUNTER — OFFICE VISIT (OUTPATIENT)
Dept: CARDIOLOGY | Facility: MEDICAL CENTER | Age: 84
End: 2019-08-29
Payer: MEDICARE

## 2019-08-29 VITALS
HEIGHT: 66 IN | DIASTOLIC BLOOD PRESSURE: 80 MMHG | OXYGEN SATURATION: 94 % | HEART RATE: 63 BPM | SYSTOLIC BLOOD PRESSURE: 152 MMHG | BODY MASS INDEX: 23.53 KG/M2 | WEIGHT: 146.39 LBS

## 2019-08-29 DIAGNOSIS — I34.0 MITRAL VALVE INSUFFICIENCY, UNSPECIFIED ETIOLOGY: ICD-10-CM

## 2019-08-29 DIAGNOSIS — I34.1 MVP (MITRAL VALVE PROLAPSE): ICD-10-CM

## 2019-08-29 LAB — EKG IMPRESSION: NORMAL

## 2019-08-29 PROCEDURE — 93000 ELECTROCARDIOGRAM COMPLETE: CPT | Performed by: INTERNAL MEDICINE

## 2019-08-29 PROCEDURE — 99204 OFFICE O/P NEW MOD 45 MIN: CPT | Performed by: INTERNAL MEDICINE

## 2019-08-29 RX ORDER — DONEPEZIL HYDROCHLORIDE 10 MG/1
10 TABLET, FILM COATED ORAL EVERY EVENING
Refills: 3 | COMMUNITY
Start: 2019-07-03 | End: 2021-02-10

## 2019-08-29 ASSESSMENT — ENCOUNTER SYMPTOMS
ABDOMINAL PAIN: 0
SHORTNESS OF BREATH: 0
MYALGIAS: 0
FEVER: 0
ORTHOPNEA: 0
DIZZINESS: 0
PND: 0
WEIGHT LOSS: 1
CONSTIPATION: 1
PALPITATIONS: 0
CLAUDICATION: 1
LOSS OF CONSCIOUSNESS: 0
MEMORY LOSS: 1
BLURRED VISION: 0
INSOMNIA: 0
CHILLS: 0
BACK PAIN: 1

## 2019-08-29 NOTE — TELEPHONE ENCOUNTER
----- Message from Waleska Yoder R.N. sent at 8/26/2019  1:15 PM PDT -----  Regarding: FW: Valve - ASAP per Raecelia  This patient is coming in 8/29 at 1100. Were you informed about him and the need for cath/JOSÉ?    ----- Message -----  From: GABBY Anderson  Sent: 8/15/2019   9:25 AM PDT  To: Waleska Yoder R.N., Damaris Guevara  Subject: FW: Valve - ASAP per Ganleacelia                     We can put him in with JI on 8/29, I held a spot for potential patients needing a spot. He can schedule his cath and JOSÉ then?     The question will be when can we get him to procedure? 9/23?    ----- Message -----  From: Genesis Cooper, Steven Ass't  Sent: 8/14/2019   4:47 PM PDT  To: GABBY Anderson  Subject: Valve - ASAP per TonnyDr. Elizabeth Barba brought this patient (manpreet Mak) and said that he wanted him to be seen in the Valve clinic ASAP - (just an fyi)  Thank you,  Genesis

## 2019-08-29 NOTE — PROGRESS NOTES
Chief Complaint   Patient presents with   • Mitral Valve Prolapse       Subjective:   Filiberto Jauregui is a 86 y.o. male who presents today for interventional consult/MitraClip evaluation requested by Gee Coello for severe symptomatic mitral regurgitation.    Thank you for allowing me to evaluate Dr. Juventino, who as you know is a 86 year old male with mitral valve prolapse with severe mitral regurgitation. He is clinically doing well.He denies fatigue, shortness of breath, dyspnea on exertion, chest pain, dizziness or syncope. 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    Past Medical History:   Diagnosis Date   • Mitral regurgitation    • MVP (mitral valve prolapse)    • Valvular heart disease      History reviewed. No pertinent surgical history.  Family History   Problem Relation Age of Onset   • Heart Attack Father      Social History     Socioeconomic History   • Marital status:      Spouse name: Not on file   • Number of children: Not on file   • Years of education: Not on file   • Highest education level: Not on file   Occupational History   • Not on file   Social Needs   • Financial resource strain: Not on file   • Food insecurity:     Worry: Not on file     Inability: Not on file   • Transportation needs:     Medical: Not on file     Non-medical: Not on file   Tobacco Use   • Smoking status: Never Smoker   • Smokeless tobacco: Never Used   Substance and Sexual Activity   • Alcohol use: Yes     Comment: OCCASIONALLY   • Drug use: No   • Sexual activity: Not on file   Lifestyle   • Physical activity:     Days per week: Not on file     Minutes per session: Not on file   • Stress: Not on file   Relationships   • Social connections:     Talks on phone: Not on file     Gets together: Not on file     Attends Scientologist service: Not on file     Active member of club or organization: Not on file     Attends meetings of clubs or organizations: Not on file     Relationship status: Not on  file   • Intimate partner violence:     Fear of current or ex partner: Not on file     Emotionally abused: Not on file     Physically abused: Not on file     Forced sexual activity: Not on file   Other Topics Concern   • Not on file   Social History Narrative   • Not on file     Allergies   Allergen Reactions   • Penicillins      Medications reviewed.    Outpatient Encounter Medications as of 8/29/2019   Medication Sig Dispense Refill   • donepezil (ARICEPT) 10 MG tablet Take 10 mg by mouth.  3   • aspirin EC (ECOTRIN) 81 MG Tablet Delayed Response Take 81 mg by mouth every day.     • NAPROXEN PO Take  by mouth.     • atorvastatin (LIPITOR) 40 MG Tab 20 mg every day.     • losartan (COZAAR) 100 MG Tab      • metFORMIN ER (GLUCOPHAGE XR) 500 MG TABLET SR 24  mg 3 times a day.     • chlorthalidone (HYGROTON) 25 MG Tab Take 1 Tab by mouth every day. (Patient taking differently: Take 12.5 mg by mouth every day.) 90 Tab 3   • rivastigmine (EXELON) 1.5 MG Cap Take 1 Cap by mouth 2 times a day. (Patient not taking: Reported on 8/29/2019) 60 Cap 11     No facility-administered encounter medications on file as of 8/29/2019.      Review of Systems   Constitutional: Positive for weight loss. Negative for chills and fever.   HENT: Negative for congestion.    Eyes: Negative for blurred vision.   Respiratory: Negative for shortness of breath.    Cardiovascular: Positive for claudication. Negative for chest pain, palpitations, orthopnea, leg swelling and PND.   Gastrointestinal: Positive for constipation. Negative for abdominal pain.   Genitourinary: Positive for dysuria.   Musculoskeletal: Positive for back pain. Negative for joint pain and myalgias.   Skin: Negative for rash.   Neurological: Negative for dizziness and loss of consciousness.   Endo/Heme/Allergies: Positive for environmental allergies.   Psychiatric/Behavioral: Positive for memory loss. The patient does not have insomnia.         Objective:   /80 (BP  "Location: Left arm, Patient Position: Sitting, BP Cuff Size: Adult)   Pulse 63   Ht 1.676 m (5' 6\")   Wt 66.4 kg (146 lb 6.2 oz)   SpO2 94%   BMI 23.63 kg/m²     Physical Exam   Constitutional: He is oriented to person, place, and time. He appears well-developed and well-nourished.   Thin.   HENT:   Head: Normocephalic and atraumatic.   Eyes: EOM are normal.   Neck: No JVD present.   Cardiovascular: Normal rate and regular rhythm.   Murmur heard.  Pulmonary/Chest: Effort normal and breath sounds normal.   Abdominal: Soft. Bowel sounds are normal.   No hepatosplenomegaly.   Musculoskeletal: Normal range of motion.   Lymphadenopathy:     He has no cervical adenopathy.   Neurological: He is alert and oriented to person, place, and time.   Skin: Skin is warm and dry.   Psychiatric: He has a normal mood and affect.     CARDIAC STUDIES/PROCEDURES:    ECHOCARDIOGRAM CONCLUSIONS (08/14/19)  Compared to the images of the prior study done on 01/22/18, MR is   severe, RV further dilated.  Left ventricular ejection fraction is visually estimated to be 55%.  Prolapse of the posterior mitral leaflet was present.  Probably severe, highly eccentric mitral regurgitation due to   generative MV disease, multiple jets.  Possible mobile echo density flowing into the left atrium, best seen in apical 2 C view.  Unable to estimate accurate RVSP, appears to be > 45 mmHg, RAP normal.  Mildly dilated right ventricle.  Findings DW Dr. Johnson at time of report.  Consider MitraClip image JOSÉ if clinically relevant.   (study result reviewed)    ECHOCARDIOGRAM CONCLUSIONS (01/22/19)  Normal left ventricular size, thickness, systolic function, and diastolic function.  Left ventricular ejection fraction is visually estimated to be 60%.  Normal inferior vena cava size and inspiratory collapse.  Prolapse of the posterior mitral leaflet was present. Significant   turbulence is noted across the mitral valve. difficult to accurately   quantify " mitral regurgitation severity. overall appears to have   moderate mitral regurgitation.  Aortic sclerosis without stenosis.  (study result reviewed)    EKG was ordered for mitral valve prolapse and mitral regurgitation, performed on (08/29/19) and reviewed: EKG shows sinus rhythm.    Laboratory results of (06/26/19) were reviewed. Bun of 23 mg/dl, creatinine levels of 0.89 mg/dl noted.    Assessment:     1. MVP (mitral valve prolapse)  EKG   2. Mitral valve insufficiency, unspecified etiology       Medical Decision Making:  Today's Assessment / Status / Plan:     1. Mitral valve prolapse with mitral regurgitation: He is clinically doing well and remains asymptomatic. We will perform an transesophageal echocardiogram.  2. Memory loss: His memory loss has improved on medication.    The risks, benefits, and alternatives to transesophageal echocardiogram (JOSÉ) with IV sedation were discussed with the patient in specific detail, including oropharyngeal and esophageal traumas including hoarseness and dysphagia after the procedure. Rare cases demonstrating serious or fatal complications associated with JOSÉ have been reported in the adult population, including cardiac, pulmonary and bleeding complications in less than 1% of people. Patients with an identified intracardiac thrombus are at increased risk for embolic events (absolute risk uncertain, range 0%-38%), and this appears to be reduced with anticoagulation therapy (absolute risk reduction uncertain). The patient verbalized understanding about these possible complications, and wishes to proceed with this procedure.    We will follow up in two months in valve clinic.    CC Mac Fox

## 2019-08-29 NOTE — TELEPHONE ENCOUNTER
Patient is scheduled on 10-24-19 for a JOSÉ w/Dr. Nelson per Dr. Wilson's request. Patient was told to hold metformin am day of procedure and to check in at 9:00 for an 11:00 procedure. Updated H&P to be done on admit by NP.

## 2019-10-22 ENCOUNTER — TELEPHONE (OUTPATIENT)
Dept: CARDIOLOGY | Facility: MEDICAL CENTER | Age: 84
End: 2019-10-22

## 2019-10-22 DIAGNOSIS — I34.0 MITRAL VALVE INSUFFICIENCY, UNSPECIFIED ETIOLOGY: ICD-10-CM

## 2019-10-22 DIAGNOSIS — R06.02 SHORTNESS OF BREATH: ICD-10-CM

## 2019-10-22 DIAGNOSIS — Z01.810 PRE-OPERATIVE CARDIOVASCULAR EXAMINATION: ICD-10-CM

## 2019-10-22 DIAGNOSIS — I34.1 MVP (MITRAL VALVE PROLAPSE): ICD-10-CM

## 2019-10-22 NOTE — TELEPHONE ENCOUNTER
Left voice message encouraging return call to discuss scheduling valve clinic apts 10/30 and 10/31, in anticipation of JOSÉ results scheduled 10/24. Provided direct contact information and encouraged return call to discuss such in detail. Awaiting return call.

## 2019-10-23 RX ORDER — COVID-19 ANTIGEN TEST
1 KIT MISCELLANEOUS 2 TIMES DAILY PRN
COMMUNITY
End: 2019-11-21

## 2019-10-23 RX ORDER — HYDROCHLOROTHIAZIDE 12.5 MG/1
12.5 TABLET ORAL DAILY
COMMUNITY
End: 2019-12-02

## 2019-10-23 NOTE — PROGRESS NOTES
REFERRING PHYSICIAN: Gary Wilson MD.     CONSULTING PHYSICIAN: Alvaro Robbins MD, FACS.    CHIEF COMPLAINT: Fatigue.    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  He has a history of hypertension, prostate cancer status post prostatectomy, hyperlipidemia, mitral valve prolapse and severe mitral regurgitation.  Today he states that he has been a little more fatigued than normal over the last couple of months.  He is most concerned with his sciatica because he is unable to be active when it is flared.  He has an exercise capacity of 2 to 3 miles when he does not have sciatica.  He denies chest pain/pressure, dyspnea, orthopnea, dizziness, lower extremity edema, syncope, or near syncope.      PAST MEDICAL HISTORY:   Past Medical History:   Diagnosis Date   • Cancer (HCC)     prostate   • Diabetes (HCC)    • High cholesterol    • Hypertension 10/23/2019   • Mitral regurgitation    • MVP (mitral valve prolapse)    • Pneumonia     hx approx 30 years ago   • Valvular heart disease        PAST SURGICAL HISTORY:   Past Surgical History:   Procedure Laterality Date   • PROSTATECTOMY, RADIAL         FAMILY HISTORY:   Family History   Problem Relation Age of Onset   • Heart Attack Father    • Lung Disease Mother         SOCIAL HISTORY:   Social History     Socioeconomic History   • Marital status:      Spouse name: Not on file   • Number of children: Not on file   • Years of education: Not on file   • Highest education level: Not on file   Occupational History   • Not on file   Social Needs   • Financial resource strain: Not on file   • Food insecurity:     Worry: Not on file     Inability: Not on file   • Transportation needs:     Medical: Not on file     Non-medical: Not on file   Tobacco Use   • Smoking status: Former Smoker     Types: Pipe   • Smokeless tobacco: Never Used   Substance and Sexual Activity   • Alcohol use: Yes     Comment: OCCASIONALLY   • Drug  use: No   • Sexual activity: Not on file   Lifestyle   • Physical activity:     Days per week: Not on file     Minutes per session: Not on file   • Stress: Not on file   Relationships   • Social connections:     Talks on phone: Not on file     Gets together: Not on file     Attends Nondenominational service: Not on file     Active member of club or organization: Not on file     Attends meetings of clubs or organizations: Not on file     Relationship status: Not on file   • Intimate partner violence:     Fear of current or ex partner: Not on file     Emotionally abused: Not on file     Physically abused: Not on file     Forced sexual activity: Not on file   Other Topics Concern   • Not on file   Social History Narrative   • Not on file       ALLERGIES:   Allergies   Allergen Reactions   • Penicillins         CURRENT MEDICATIONS:     Current Outpatient Medications:   •  hydroCHLOROthiazide (HYDRODIURIL) 12.5 MG tablet, Take 12.5 mg by mouth every day., Disp: , Rfl:   •  Naproxen Sodium (ALEVE PO), Take  by mouth as needed., Disp: , Rfl:   •  donepezil (ARICEPT) 10 MG tablet, Take 10 mg by mouth., Disp: , Rfl: 3  •  atorvastatin (LIPITOR) 40 MG Tab, 10 mg every day., Disp: , Rfl:   •  losartan (COZAAR) 100 MG Tab, , Disp: , Rfl:   •  metFORMIN ER (GLUCOPHAGE XR) 500 MG TABLET SR 24 HR, 500 mg 3 times a day., Disp: , Rfl:   •  chlorthalidone (HYGROTON) 25 MG Tab, Take 1 Tab by mouth every day. (Patient taking differently: Take 12.5 mg by mouth every day.), Disp: 90 Tab, Rfl: 3     LABS REVIEWED:  Lab Results   Component Value Date/Time    SODIUM 141 06/26/2019 10:42 AM    POTASSIUM 4.2 06/26/2019 10:42 AM    CHLORIDE 102 06/26/2019 10:42 AM    CO2 30 06/26/2019 10:42 AM    GLUCOSE 102 (H) 06/26/2019 10:42 AM    BUN 23 (H) 06/26/2019 10:42 AM    CREATININE 0.89 06/26/2019 10:42 AM      No results found for: PROTHROMBTM, INR   Lab Results   Component Value Date/Time    WBC 6.4 07/16/2019 09:30 AM    RBC 4.90 07/16/2019 09:30 AM     HEMOGLOBIN 14.2 07/16/2019 09:30 AM    HEMATOCRIT 43.6 07/16/2019 09:30 AM    MCV 89.0 07/16/2019 09:30 AM    MCH 29.0 07/16/2019 09:30 AM    MCHC 32.6 (L) 07/16/2019 09:30 AM    MPV 10.2 07/16/2019 09:30 AM    NEUTSPOLYS 55.60 07/16/2019 09:30 AM    LYMPHOCYTES 32.20 07/16/2019 09:30 AM    MONOCYTES 8.60 07/16/2019 09:30 AM    EOSINOPHILS 2.80 07/16/2019 09:30 AM    BASOPHILS 0.60 07/16/2019 09:30 AM    HYPOCHROMIA 1+ 01/22/2014 08:00 AM        IMAGING REVIEWED AND INTERPRETED:    ECHOCARDIOGRAM: 8/14/2019  Compared to the images of the prior study done on 01/22/18, MR is   severe, RV further dilated.  Left ventricular ejection fraction is visually estimated to be 55%.  Prolapse of the posterior mitral leaflet was present.  Probably severe, highly eccentric mitral regurgitation due to   generative MV disease, multiple jets.  Possible mobile echo density flowing into the left atrium, best seen in   apical 2 C view.  Unable to estimate accurate RVSP, appears to be > 45 mmHg, RAP normal.  Mildly dilated right ventricle.  Findings DW Dr. Johnson at time of report.  Consider MitraClip image JOSÉ if clinically relevant.    JOSÉ: 10/24/2019  LV EF visually estimated to be 60%.  Biatrial enlargement.  Neetu class II severe mitral regurgitation, P2, P3 prolapse,   possible very small A3 chordae in the left atrium.  Multiple MR jets, partially central, partially anteriorly directed.  Pulmonary vein systolic flow reversal present.    ANGIOGRAM: none available.     REVIEW OF SYSTEMS:   Review of Systems   Constitutional: Positive for malaise/fatigue.   HENT: Positive for hearing loss.    Eyes: Positive for blurred vision.   Respiratory: Negative.    Cardiovascular: Negative.    Gastrointestinal: Negative.    Genitourinary: Negative.    Musculoskeletal: Positive for back pain.   Skin: Negative.    Neurological: Negative.    Endo/Heme/Allergies: Negative.    Psychiatric/Behavioral: Negative.      PHYSICAL EXAMINATION:  "  Physical Exam  CONSTITUTIONAL:  /74 (BP Location: Left arm, Patient Position: Sitting, BP Cuff Size: Adult)   Pulse 67   Temp 36.9 °C (98.4 °F) (Temporal)   Ht 1.676 m (5' 6\")   Wt 67 kg (147 lb 11.3 oz)   SpO2 95% .    General appearance: No apparent distress, normal development.  HEENT:  Anicteric sclerae, moist conjunctivae, moist mucous membranes, good dentition.  NECK:  Supple without jugular venous distention, without lymphadenopathy.  SKIN:   Normal skin turgor, no stasis dermatitis or ulcers noted.  RESPIRATORY:  Clear to auscultation bilaterally, respirations are regular, symmetrical and unlabored.   CARDIAC:  Regular rate and rhythm, 2/6 systolic ejection murmur. No carotid bruits. Peripheral pulses palpable.   GASTROINTESTINAL:  Soft, non-distended, non-tender with bowel sounds present, no hepatosplenomegaly.  EXTREMITIES:  No clubbing, cyanosis, or changes consistent with peripheral vascular disease. No peripheral edema or varicosities.  NEUROLOGIC/ PSYCHIATRIC: Alert and oriented times three. Mood and affect normal.  MUSCULOSKELETAL:  Normal gait and station.      IMPRESSION:  Severe symptomatic mitral regurgitation (4+, degenerative), posterior mitral leaflet prolapse, frailty, history of prostate cancer.      PLAN:  I do not recommend surgical mitral valve repair or replacement due to excessive operative risk.  I estimate this to be 5 to 8%. The STS mortality risk score is 2.5% and the morbidity and mortality risk score is 11% mitral valve repair.  The STS mortality risk score is 3% and the morbidity and mortality risk score is 16% mitral valve repair.The scores were discussed with patient.  Additionally, I am concerned about his recovery due to his age, frailty and ambulation issues.    He may be a candidate for transcatheter mitral valve repair (MitraClip) and I would recommend proceeding with work-up. The procedure, its risks, benefits, potential complications and alternative " treatments were discussed with the patient in detail.    Findings and recommendations have been discussed with the patient’s cardiologist, Gary Wilson MD.  Thank you for this challenging consultation and participation in the patient’s care.  I will keep you apprised of all future developments.      Sincerely,      Alvaro Robbins MD, FACS.

## 2019-10-24 ENCOUNTER — ANESTHESIA EVENT (OUTPATIENT)
Dept: CARDIOLOGY | Facility: MEDICAL CENTER | Age: 84
End: 2019-10-24
Payer: MEDICARE

## 2019-10-24 ENCOUNTER — TELEPHONE (OUTPATIENT)
Dept: CARDIOLOGY | Facility: MEDICAL CENTER | Age: 84
End: 2019-10-24

## 2019-10-24 ENCOUNTER — APPOINTMENT (OUTPATIENT)
Dept: CARDIOLOGY | Facility: MEDICAL CENTER | Age: 84
End: 2019-10-24
Attending: INTERNAL MEDICINE
Payer: MEDICARE

## 2019-10-24 ENCOUNTER — ANESTHESIA (OUTPATIENT)
Dept: CARDIOLOGY | Facility: MEDICAL CENTER | Age: 84
End: 2019-10-24
Payer: MEDICARE

## 2019-10-24 ENCOUNTER — HOSPITAL ENCOUNTER (OUTPATIENT)
Facility: MEDICAL CENTER | Age: 84
End: 2019-10-24
Attending: INTERNAL MEDICINE | Admitting: INTERNAL MEDICINE
Payer: MEDICARE

## 2019-10-24 VITALS
WEIGHT: 144.84 LBS | DIASTOLIC BLOOD PRESSURE: 58 MMHG | SYSTOLIC BLOOD PRESSURE: 123 MMHG | OXYGEN SATURATION: 97 % | RESPIRATION RATE: 16 BRPM | TEMPERATURE: 98 F | HEART RATE: 50 BPM | HEIGHT: 66 IN | BODY MASS INDEX: 23.28 KG/M2

## 2019-10-24 DIAGNOSIS — I34.1 MVP (MITRAL VALVE PROLAPSE): ICD-10-CM

## 2019-10-24 DIAGNOSIS — I34.0 MITRAL VALVE INSUFFICIENCY, UNSPECIFIED ETIOLOGY: ICD-10-CM

## 2019-10-24 LAB — LV EJECT FRACT  99904: 60

## 2019-10-24 PROCEDURE — 99213 OFFICE O/P EST LOW 20 MIN: CPT | Performed by: INTERNAL MEDICINE

## 2019-10-24 PROCEDURE — 700111 HCHG RX REV CODE 636 W/ 250 OVERRIDE (IP): Performed by: ANESTHESIOLOGY

## 2019-10-24 PROCEDURE — 160002 HCHG RECOVERY MINUTES (STAT)

## 2019-10-24 PROCEDURE — 700105 HCHG RX REV CODE 258: Performed by: ANESTHESIOLOGY

## 2019-10-24 PROCEDURE — 93325 DOPPLER ECHO COLOR FLOW MAPG: CPT

## 2019-10-24 PROCEDURE — 93312 ECHO TRANSESOPHAGEAL: CPT | Mod: 26 | Performed by: INTERNAL MEDICINE

## 2019-10-24 RX ORDER — HALOPERIDOL 5 MG/ML
1 INJECTION INTRAMUSCULAR
Status: DISCONTINUED | OUTPATIENT
Start: 2019-10-24 | End: 2019-10-24 | Stop reason: HOSPADM

## 2019-10-24 RX ORDER — SODIUM CHLORIDE 9 MG/ML
INJECTION, SOLUTION INTRAVENOUS
Status: DISCONTINUED | OUTPATIENT
Start: 2019-10-24 | End: 2019-10-24 | Stop reason: SURG

## 2019-10-24 RX ORDER — SODIUM CHLORIDE 9 MG/ML
INJECTION, SOLUTION INTRAVENOUS CONTINUOUS
Status: DISCONTINUED | OUTPATIENT
Start: 2019-10-24 | End: 2019-10-24 | Stop reason: HOSPADM

## 2019-10-24 RX ORDER — DIPHENHYDRAMINE HYDROCHLORIDE 50 MG/ML
12.5 INJECTION INTRAMUSCULAR; INTRAVENOUS
Status: DISCONTINUED | OUTPATIENT
Start: 2019-10-24 | End: 2019-10-24 | Stop reason: HOSPADM

## 2019-10-24 RX ORDER — PHENYLEPHRINE HYDROCHLORIDE 10 MG/ML
INJECTION, SOLUTION INTRAMUSCULAR; INTRAVENOUS; SUBCUTANEOUS PRN
Status: DISCONTINUED | OUTPATIENT
Start: 2019-10-24 | End: 2019-10-24 | Stop reason: SURG

## 2019-10-24 RX ORDER — ONDANSETRON 2 MG/ML
4 INJECTION INTRAMUSCULAR; INTRAVENOUS
Status: DISCONTINUED | OUTPATIENT
Start: 2019-10-24 | End: 2019-10-24 | Stop reason: HOSPADM

## 2019-10-24 RX ADMIN — PROPOFOL 20 MG: 10 INJECTION, EMULSION INTRAVENOUS at 11:38

## 2019-10-24 RX ADMIN — PROPOFOL 20 MG: 10 INJECTION, EMULSION INTRAVENOUS at 11:32

## 2019-10-24 RX ADMIN — PROPOFOL 20 MG: 10 INJECTION, EMULSION INTRAVENOUS at 11:16

## 2019-10-24 RX ADMIN — PHENYLEPHRINE HYDROCHLORIDE 50 MCG: 10 INJECTION INTRAVENOUS at 11:18

## 2019-10-24 RX ADMIN — SODIUM CHLORIDE: 9 INJECTION, SOLUTION INTRAVENOUS at 11:05

## 2019-10-24 RX ADMIN — PROPOFOL 20 MG: 10 INJECTION, EMULSION INTRAVENOUS at 11:11

## 2019-10-24 RX ADMIN — PROPOFOL 20 MG: 10 INJECTION, EMULSION INTRAVENOUS at 11:22

## 2019-10-24 ASSESSMENT — PAIN SCALES - GENERAL: PAIN_LEVEL: 0

## 2019-10-24 NOTE — TELEPHONE ENCOUNTER
Spoke with patient's wife Phoebe regarding upcoming valve clinic apts. Phoebe states she has written information for such apts and agrees with the plan. Assured that an appointment letter will be sent to home address on file as well. Phoebe states understanding and no further questions at this time.

## 2019-10-24 NOTE — ANESTHESIA PREPROCEDURE EVALUATION
86M hx HTN with pEF but MVP/MR on echo here for JOSÉ for mitraclip eval.  Denies smoking or RAD.  Denies FHX or PHx of anesthesia cxs.  Denies motion sickness.   Denies symptomatic GERD.  Discussed risks/benefits MAC with GA backup. Questions answered.      Relevant Problems   CARDIAC   (+) Essential hypertension   (+) MVP (mitral valve prolapse)   (+) Mitral regurgitation       Physical Exam    Airway   Mallampati: I  TM distance: >3 FB  Neck ROM: full       Cardiovascular - normal exam  Rhythm: regular  Rate: normal  (-) murmur     Dental - normal exam         Pulmonary - normal exam  Breath sounds clear to auscultation     Abdominal    Neurological - normal exam               Anesthesia Plan    ASA 2       Plan - MAC             Induction: intravenous    Postoperative Plan: Postoperative administration of opioids is intended.    Pertinent diagnostic labs and testing reviewed    Informed Consent:    Anesthetic plan and risks discussed with patient.    Use of blood products discussed with: whom consented to blood products.

## 2019-10-24 NOTE — ANESTHESIA POSTPROCEDURE EVALUATION
Patient: Filiberto Jauregui    Procedure Summary     Date:  10/24/19 Room / Location:  [facility] - ECHOCARDIOLOGY [facility]    Anesthesia Start:  1105 Anesthesia Stop:  1202    Procedure:  EC-JOSÉ W/O CONT Diagnosis:       MVP (mitral valve prolapse)      Mitral valve insufficiency, unspecified etiology      Nonrheumatic mitral (valve) prolapse    Scheduled Providers:  Laverne Nelson M.D. Responsible Provider:  Rachel Sosa M.D.    Anesthesia Type:  MAC ASA Status:  2          Final Anesthesia Type: MAC  Last vitals  BP   Blood Pressure : 120/49    Temp   36.7 °C (98 °F)    Pulse   Pulse: (!) 54   Resp   (!) 11    SpO2   100 %      Anesthesia Post Evaluation    Patient location during evaluation: PACU  Patient participation: complete - patient participated  Level of consciousness: awake and alert  Pain score: 0    Airway patency: patent  Anesthetic complications: no  Cardiovascular status: hemodynamically stable  Respiratory status: acceptable  Hydration status: euvolemic    PONV: none

## 2019-10-24 NOTE — DISCHARGE INSTRUCTIONS
ACTIVITY: Rest and take it easy for the first 24 hours.  A responsible adult is recommended to remain with you during that time.  It is normal to feel sleepy.  We encourage you to not do anything that requires balance, judgment or coordination.    MILD FLU-LIKE SYMPTOMS ARE NORMAL. YOU MAY EXPERIENCE GENERALIZED MUSCLE ACHES, THROAT IRRITATION, HEADACHE AND/OR SOME NAUSEA.    FOR 24 HOURS DO NOT:  Drive, operate machinery or run household appliances.  Drink beer or alcoholic beverages.   Make important decisions or sign legal documents.    SPECIAL INSTRUCTIONS: may resume previous activities and medications.     DIET: To avoid nausea, slowly advance diet as tolerated, avoiding spicy or greasy foods for the first day.  Add more substantial food to your diet according to your physician's instructions.  Babies can be fed formula or breast milk as soon as they are hungry.  INCREASE FLUIDS AND FIBER TO AVOID CONSTIPATION.    SURGICAL DRESSING/BATHING: n/a    FOLLOW-UP APPOINTMENT:  A follow-up appointment should be arranged with your doctor ; call to schedule.    You should CALL YOUR PHYSICIAN if you develop:  Fever greater than 101 degrees F.  Pain not relieved by medication, or persistent nausea or vomiting.  Excessive bleeding (blood soaking through dressing) or unexpected drainage from the wound.  Extreme redness or swelling around the incision site, drainage of pus or foul smelling drainage.  Inability to urinate or empty your bladder within 8 hours.  Problems with breathing or chest pain.    You should call 911 if you develop problems with breathing or chest pain.  If you are unable to contact your doctor or surgical center, you should go to the nearest emergency room or urgent care center.      Physician's telephone #: [phone]    If any questions arise, call your doctor.  If your doctor is not available, please feel free to call the Surgical Center at [phone].  The Center is open Monday through Friday  from 7AM to 7PM.  You can also call the HEALTH HOTLINE open 24 hours/day, 7 days/week and speak to a nurse at [phone], or toll free at [phone].    A registered nurse may call you a few days after your surgery to see how you are doing after your procedure.    MEDICATIONS: Resume taking daily medication.  Take prescribed pain medication with food.  If no medication is prescribed, you may take non-aspirin pain medication if needed.  PAIN MEDICATION CAN BE VERY CONSTIPATING.  Take a stool softener or laxative such as senokot, pericolace, or milk of magnesia if needed.      If your physician has prescribed pain medication that includes Acetaminophen (Tylenol), do not take additional Acetaminophen (Tylenol) while taking the prescribed medication.    Depression / Suicide Risk    As you are discharged from this [facility] facility, it is important to learn how to keep safe from harming yourself.    Recognize the warning signs:  · Abrupt changes in personality, positive or negative- including increase in energy   · Giving away possessions  · Change in eating patterns- significant weight changes-  positive or negative  · Change in sleeping patterns- unable to sleep or sleeping all the time   · Unwillingness or inability to communicate  · Depression  · Unusual sadness, discouragement and loneliness  · Talk of wanting to die  · Neglect of personal appearance   · Rebelliousness- reckless behavior  · Withdrawal from people/activities they love  · Confusion- inability to concentrate     If you or a loved one observes any of these behaviors or has concerns about self-harm, here's what you can do:  · Talk about it- your feelings and reasons for harming yourself  · Remove any means that you might use to hurt yourself (examples: pills, rope, extension cords, firearm)  · Get professional help from the community (Mental Health, Substance Abuse, psychological counseling)  · Do not be alone:Call your Safe Contact- someone  whom you trust who will be there for you.  · Call your local CRISIS HOTLINE 110-0435 or 229-317-4182  · Call your local Children's Mobile Crisis Response Team [place] (516) 533-7191 or www.[employer]  · Call the toll free National Suicide Prevention Hotlines   · National Suicide Prevention Lifeline 672-719-JZUN (9590)  · [place] Line Network 800-SUICIDE (822-4463)                                  Transesophageal Echocardiogram    Transesophageal echocardiography (JOSÉ) is a picture test of your heart using sound waves. The pictures taken can give very detailed pictures of your heart. This can help your doctor see if there are problems with your heart. JOSÉ can check:  · If your heart has blood clots in it.  · How well your heart valves are working.  · If you have an infection on the inside of your heart.  · Some of the major arteries of your heart.  · If your heart valve is working after a repair.  · Your heart before a procedure that uses a shock to your heart to get the rhythm back to normal.  What happens before the procedure?  · Do not eat or drink for 6 hours before the procedure or as told by your doctor.  · Make plans to have someone drive you home after the procedure. Do not drive yourself home.  · An IV tube will be put in your arm.  What happens during the procedure?  · You will be given a medicine to help you relax (sedative). It will be given through the IV tube.  · A numbing medicine will be sprayed or gargled in the back of your throat to help numb it.  · The tip of the probe is placed into the back of your mouth. You will be asked to swallow. This helps to pass the probe into your esophagus.  · Once the tip of the probe is in the right place, your doctor can take pictures of your heart.  · You may feel pressure at the back of your throat.  What happens after the procedure?  · You will be taken to a recovery area so the sedative can wear off.  · Your throat may be sore and scratchy.  This will go away slowly over time.  · You will go home when you are fully awake and able to swallow liquids.  · You should have someone stay with you for the next 24 hours.  · Do not drive or operate machinery for the next 24 hours.  This information is not intended to replace advice given to you by your health care provider. Make sure you discuss any questions you have with your health care provider.  Document Released: 10/15/2010 Document Revised: 05/25/2017 Document Reviewed: 06/19/2014  Elsevier Interactive Patient Education © 2017 [employer]

## 2019-10-24 NOTE — H&P
Physician H&P    Patient ID:  Filiberto Jauregui  [id number]  86 y.o. male  7/29/1933    History:  Primary Diagnosis: Severe mitral regurgitation.    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  He is here for evaluation of possible mitral clip.    Denies significant shortness of breath, orthopnea or lower extremity edema but his functional status is limited by sciatica.  He would like to be able to ride his exercise bicycle.    Past Medical History:  has a past medical history of Cancer (HCC), Diabetes (HCC), High cholesterol, Hypertension (10/23/2019), Mitral regurgitation, MVP (mitral valve prolapse), Pneumonia, and Valvular heart disease.  Past Surgical History:  has a past surgical history that includes prostatectomy, radial.  Past Social History:  reports that he has quit smoking. His smoking use included pipe. He has never used smokeless tobacco. He reports that he drinks alcohol. He reports that he does not use drugs.  Past Family History:   Family History   Problem Relation Age of Onset   • Heart Attack Father      Allergies: Penicillins    Current Medications:  Prior to Admission medications    Medication Sig Start Date End Date Taking? Authorizing Provider   hydroCHLOROthiazide (HYDRODIURIL) 12.5 MG tablet Take 12.5 mg by mouth every day.    Physician Outpatient   Naproxen Sodium (ALEVE PO) Take  by mouth as needed.    Physician Outpatient   donepezil (ARICEPT) 10 MG tablet Take 10 mg by mouth. 7/3/19   Physician Outpatient   atorvastatin (LIPITOR) 40 MG Tab 10 mg every day. 2/3/18   Physician Outpatient   losartan (COZAAR) 100 MG Tab  2/3/18   Physician Outpatient   metFORMIN ER (GLUCOPHAGE XR) 500 MG TABLET SR 24  mg 3 times a day. 2/3/18   Physician Outpatient   chlorthalidone (HYGROTON) 25 MG Tab Take 1 Tab by mouth every day.  Patient taking differently: Take 12.5 mg by mouth every day. 2/14/18    "Gee Johnson M.D.       Review of Systems:  Review of Systems   All other systems reviewed and are negative.    BP (!) 174/76   Pulse 68   Temp 36.7 °C (98 °F) (Temporal)   Resp 16   Ht 1.676 m (5' 6\")   Wt 65.7 kg (144 lb 13.5 oz)   SpO2 99%     Physical Examination:  Physical Exam     General: No acute distress. Well nourished.  HEENT: EOM grossly intact, no scleral icterus, no pharyngeal erythema.   Neck:  No JVD, no bruits, trachea midline  CVS: RRR. Normal S1, S2.  3/6 systolic murmur at the apex.  No LE edema.  2+ radial pulses, 2+ PT pulses  Resp: CTAB. No wheezing or crackles/rhonchi. Normal respiratory effort.  Abdomen: Soft, NT, no cally hepatomegaly.  MSK/Ext: No clubbing or cyanosis.  Skin: Warm and dry, no rashes.  Neurological: CN III-XII grossly intact. No focal deficits.   Psych: A&O x 3, appropriate affect, good judgement      Impression:  Severe mitral regurgitation    Plan:  Proceed with JOSÉ for consideration of MitraClip.    Pre Procedure Assessment:  <EXAMSHORT2>      Pre Procedure update:   No changes.    Laverne Nelson  10/24/2019  "

## 2019-10-24 NOTE — ANESTHESIA QCDR
2019 [facility] Clinical Data Registry (for Quality Improvement)     Postoperative nausea/vomiting risk protocol (Adult = 18 yrs and Pediatric 3-17 yrs)- (430 and 463)  General inhalation anesthetic (NOT TIVA) with PONV risk factors: No  Provision of anti-emetic therapy with at least 2 different classes of agents: N/A  Patient DID NOT receive anti-emetic therapy and reason is documented in Medical Record: N/A    Multimodal Pain Management- (AQI59)  Patient undergoing Elective Surgery (i.e. Outpatient, or ASC, or Prescheduled Surgery prior to Hospital Admission): No  Use of Multimodal Pain Management, two or more drugs and/or interventions, NOT including systemic opioids: N/A  Exception: Documented allergy to multiple classes of analgesics: N/A    PACU assessment of acute postoperative pain prior to Anesthesia Care End- Applies to Patients Age = 18- (ABG7)  Initial PACU pain score is which of the following: < 7/10  Patient unable to report pain score: N/A    Post-anesthetic transfer of care checklist/protocol to PACU/ICU- (426 and 427)  Upon conclusion of case, patient transferred to which of the following locations: PACU/Non-ICU  Use of transfer checklist/protocol: Yes  Exclusion: Service Performed in Patient Hospital Room (and thus did not require transfer): N/A    PACU Reintubation- (AQI31)  General anesthesia requiring endotracheal intubation (ETT) along with subsequent extubation in OR or PACU: No  Required reintubation in the PACU: N/A  Extubation was a planned trial documented in the medical record prior to removal of the original airway device: N/A    Unplanned admission to ICU related to anesthesia service up through end of PACU care- (MD51)  Unplanned admission to ICU (not initially anticipated at anesthesia start time): No

## 2019-10-24 NOTE — OR NURSING
1204: Patient from cath lab to PPU via gurney s/p JOSÉ. Patient is sleeping. VSS. Will monitor closely. Vital signs per MD order. Patient's respiration spontaneous and non-labored.  1220: Patient is wide awake. Tolerating sips of water. Denies pain or nausea at this time.   1310: DC instructions given. Questions answered. Family at bedside.. No c/o pain or nausea. Patient wide awake. VSS. Patient met criteria for discharge.

## 2019-10-28 ENCOUNTER — HOSPITAL ENCOUNTER (OUTPATIENT)
Facility: MEDICAL CENTER | Age: 84
End: 2019-10-28
Attending: INTERNAL MEDICINE | Admitting: INTERNAL MEDICINE
Payer: MEDICARE

## 2019-10-30 ENCOUNTER — OFFICE VISIT (OUTPATIENT)
Dept: CARDIOTHORACIC SURGERY | Facility: MEDICAL CENTER | Age: 84
End: 2019-10-30
Payer: MEDICARE

## 2019-10-30 VITALS
OXYGEN SATURATION: 95 % | DIASTOLIC BLOOD PRESSURE: 74 MMHG | BODY MASS INDEX: 23.74 KG/M2 | HEIGHT: 66 IN | TEMPERATURE: 98.4 F | HEART RATE: 67 BPM | SYSTOLIC BLOOD PRESSURE: 124 MMHG | WEIGHT: 147.71 LBS

## 2019-10-30 DIAGNOSIS — I34.0 MITRAL VALVE INSUFFICIENCY, UNSPECIFIED ETIOLOGY: ICD-10-CM

## 2019-10-30 PROCEDURE — 99205 OFFICE O/P NEW HI 60 MIN: CPT | Performed by: THORACIC SURGERY (CARDIOTHORACIC VASCULAR SURGERY)

## 2019-10-30 ASSESSMENT — ENCOUNTER SYMPTOMS
BACK PAIN: 1
GASTROINTESTINAL NEGATIVE: 1
PSYCHIATRIC NEGATIVE: 1
BLURRED VISION: 1
RESPIRATORY NEGATIVE: 1
CARDIOVASCULAR NEGATIVE: 1
NEUROLOGICAL NEGATIVE: 1

## 2019-10-31 ENCOUNTER — APPOINTMENT (OUTPATIENT)
Dept: RADIOLOGY | Facility: MEDICAL CENTER | Age: 84
End: 2019-10-31
Attending: INTERNAL MEDICINE
Payer: MEDICARE

## 2019-10-31 ENCOUNTER — APPOINTMENT (OUTPATIENT)
Dept: RADIOLOGY | Facility: MEDICAL CENTER | Age: 84
End: 2019-10-31
Payer: MEDICARE

## 2019-10-31 ENCOUNTER — TELEPHONE (OUTPATIENT)
Dept: CARDIOLOGY | Facility: MEDICAL CENTER | Age: 84
End: 2019-10-31

## 2019-10-31 NOTE — TELEPHONE ENCOUNTER
Call transferred directly by .  Spoke with Dr. Jones who requested that an urgent message be given to Dr. Wilson.  Patient is symptomatic and is requesting to speak with Dr. Wilson directly about patient's cardiac to undergo surgery for possible bladder tumor removal.  Dr. Jones provided his cell number ([phone]).  Message will be given to Dr. Wilson ASAP when he is done with his patient.  Dr. Jones verbalized understanding.    Message given to Dr. Wilson who stated that he will return Dr. Jones's call immediately.

## 2019-11-01 ENCOUNTER — ANESTHESIA EVENT (OUTPATIENT)
Dept: SURGERY | Facility: MEDICAL CENTER | Age: 84
End: 2019-11-01
Payer: MEDICARE

## 2019-11-01 ENCOUNTER — HOSPITAL ENCOUNTER (OUTPATIENT)
Facility: MEDICAL CENTER | Age: 84
End: 2019-11-01
Attending: UROLOGY | Admitting: UROLOGY
Payer: MEDICARE

## 2019-11-01 ENCOUNTER — ANESTHESIA (OUTPATIENT)
Dept: SURGERY | Facility: MEDICAL CENTER | Age: 84
End: 2019-11-01
Payer: MEDICARE

## 2019-11-01 ENCOUNTER — APPOINTMENT (OUTPATIENT)
Dept: RADIOLOGY | Facility: MEDICAL CENTER | Age: 84
End: 2019-11-01
Attending: UROLOGY
Payer: MEDICARE

## 2019-11-01 VITALS
OXYGEN SATURATION: 91 % | SYSTOLIC BLOOD PRESSURE: 156 MMHG | HEIGHT: 67 IN | BODY MASS INDEX: 22.77 KG/M2 | WEIGHT: 145.06 LBS | TEMPERATURE: 97.1 F | RESPIRATION RATE: 16 BRPM | DIASTOLIC BLOOD PRESSURE: 62 MMHG | HEART RATE: 72 BPM

## 2019-11-01 LAB
ALBUMIN SERPL BCP-MCNC: 4.6 G/DL (ref 3.2–4.9)
ALBUMIN/GLOB SERPL: 2.1 G/DL
ALP SERPL-CCNC: 45 U/L (ref 30–99)
ALT SERPL-CCNC: 18 U/L (ref 2–50)
ANION GAP SERPL CALC-SCNC: 10 MMOL/L (ref 0–11.9)
AST SERPL-CCNC: 22 U/L (ref 12–45)
BILIRUB SERPL-MCNC: 1.9 MG/DL (ref 0.1–1.5)
BUN SERPL-MCNC: 22 MG/DL (ref 8–22)
CALCIUM SERPL-MCNC: 9.2 MG/DL (ref 8.5–10.5)
CHLORIDE SERPL-SCNC: 102 MMOL/L (ref 96–112)
CO2 SERPL-SCNC: 29 MMOL/L (ref 20–33)
CREAT SERPL-MCNC: 0.89 MG/DL (ref 0.5–1.4)
ERYTHROCYTE [DISTWIDTH] IN BLOOD BY AUTOMATED COUNT: 42.3 FL (ref 35.9–50)
GLOBULIN SER CALC-MCNC: 2.2 G/DL (ref 1.9–3.5)
GLUCOSE BLD-MCNC: 104 MG/DL (ref 65–99)
GLUCOSE SERPL-MCNC: 115 MG/DL (ref 65–99)
HCT VFR BLD AUTO: 42.6 % (ref 42–52)
HGB BLD-MCNC: 13.9 G/DL (ref 14–18)
MCH RBC QN AUTO: 29.3 PG (ref 27–33)
MCHC RBC AUTO-ENTMCNC: 32.6 G/DL (ref 33.7–35.3)
MCV RBC AUTO: 89.9 FL (ref 81.4–97.8)
PATHOLOGY CONSULT NOTE: NORMAL
PLATELET # BLD AUTO: 172 K/UL (ref 164–446)
PMV BLD AUTO: 9.8 FL (ref 9–12.9)
POTASSIUM SERPL-SCNC: 3.9 MMOL/L (ref 3.6–5.5)
PROT SERPL-MCNC: 6.8 G/DL (ref 6–8.2)
RBC # BLD AUTO: 4.74 M/UL (ref 4.7–6.1)
SODIUM SERPL-SCNC: 141 MMOL/L (ref 135–145)
WBC # BLD AUTO: 6.6 K/UL (ref 4.8–10.8)

## 2019-11-01 PROCEDURE — 700105 HCHG RX REV CODE 258: Performed by: UROLOGY

## 2019-11-01 PROCEDURE — 160009 HCHG ANES TIME/MIN: Performed by: UROLOGY

## 2019-11-01 PROCEDURE — 160028 HCHG SURGERY MINUTES - 1ST 30 MINS LEVEL 3: Performed by: UROLOGY

## 2019-11-01 PROCEDURE — A9270 NON-COVERED ITEM OR SERVICE: HCPCS | Performed by: UROLOGY

## 2019-11-01 PROCEDURE — 160025 RECOVERY II MINUTES (STATS): Performed by: UROLOGY

## 2019-11-01 PROCEDURE — 85027 COMPLETE CBC AUTOMATED: CPT

## 2019-11-01 PROCEDURE — 501329 HCHG SET, CYSTO IRRIG Y TUR: Performed by: UROLOGY

## 2019-11-01 PROCEDURE — C1758 CATHETER, URETERAL: HCPCS | Performed by: UROLOGY

## 2019-11-01 PROCEDURE — A4338 INDWELLING CATHETER LATEX: HCPCS | Performed by: UROLOGY

## 2019-11-01 PROCEDURE — 160046 HCHG PACU - 1ST 60 MINS PHASE II: Performed by: UROLOGY

## 2019-11-01 PROCEDURE — 500042 HCHG BAG, URINARY DRAINAGE (CLOSED): Performed by: UROLOGY

## 2019-11-01 PROCEDURE — 501138 HCHG PLUG, FOLEY CATH: Performed by: UROLOGY

## 2019-11-01 PROCEDURE — 500879 HCHG PACK, CYSTO: Performed by: UROLOGY

## 2019-11-01 PROCEDURE — 160039 HCHG SURGERY MINUTES - EA ADDL 1 MIN LEVEL 3: Performed by: UROLOGY

## 2019-11-01 PROCEDURE — 160035 HCHG PACU - 1ST 60 MINS PHASE I: Performed by: UROLOGY

## 2019-11-01 PROCEDURE — 700117 HCHG RX CONTRAST REV CODE 255: Performed by: UROLOGY

## 2019-11-01 PROCEDURE — 700111 HCHG RX REV CODE 636 W/ 250 OVERRIDE (IP): Performed by: UROLOGY

## 2019-11-01 PROCEDURE — 88307 TISSUE EXAM BY PATHOLOGIST: CPT | Mod: 59

## 2019-11-01 PROCEDURE — 160048 HCHG OR STATISTICAL LEVEL 1-5: Performed by: UROLOGY

## 2019-11-01 PROCEDURE — 700111 HCHG RX REV CODE 636 W/ 250 OVERRIDE (IP): Performed by: ANESTHESIOLOGY

## 2019-11-01 PROCEDURE — 160002 HCHG RECOVERY MINUTES (STAT): Performed by: UROLOGY

## 2019-11-01 PROCEDURE — 700101 HCHG RX REV CODE 250: Performed by: ANESTHESIOLOGY

## 2019-11-01 PROCEDURE — 80053 COMPREHEN METABOLIC PANEL: CPT

## 2019-11-01 PROCEDURE — 160047 HCHG PACU  - EA ADDL 30 MINS PHASE II: Performed by: UROLOGY

## 2019-11-01 PROCEDURE — 700102 HCHG RX REV CODE 250 W/ 637 OVERRIDE(OP): Performed by: UROLOGY

## 2019-11-01 PROCEDURE — 82962 GLUCOSE BLOOD TEST: CPT

## 2019-11-01 PROCEDURE — 160036 HCHG PACU - EA ADDL 30 MINS PHASE I: Performed by: UROLOGY

## 2019-11-01 RX ORDER — ATROPA BELLADONNA AND OPIUM 16.2; 3 MG/1; MG/1
SUPPOSITORY RECTAL
Status: DISCONTINUED | OUTPATIENT
Start: 2019-11-01 | End: 2019-11-01 | Stop reason: HOSPADM

## 2019-11-01 RX ORDER — CEFAZOLIN SODIUM 1 G/3ML
INJECTION, POWDER, FOR SOLUTION INTRAMUSCULAR; INTRAVENOUS PRN
Status: DISCONTINUED | OUTPATIENT
Start: 2019-11-01 | End: 2019-11-01 | Stop reason: SURG

## 2019-11-01 RX ORDER — HYDROMORPHONE HYDROCHLORIDE 1 MG/ML
0.1 INJECTION, SOLUTION INTRAMUSCULAR; INTRAVENOUS; SUBCUTANEOUS
Status: DISCONTINUED | OUTPATIENT
Start: 2019-11-01 | End: 2019-11-01 | Stop reason: HOSPADM

## 2019-11-01 RX ORDER — SODIUM CHLORIDE, SODIUM LACTATE, POTASSIUM CHLORIDE, CALCIUM CHLORIDE 600; 310; 30; 20 MG/100ML; MG/100ML; MG/100ML; MG/100ML
INJECTION, SOLUTION INTRAVENOUS CONTINUOUS
Status: DISCONTINUED | OUTPATIENT
Start: 2019-11-01 | End: 2019-11-01 | Stop reason: HOSPADM

## 2019-11-01 RX ORDER — DIPHENHYDRAMINE HYDROCHLORIDE 50 MG/ML
12.5 INJECTION INTRAMUSCULAR; INTRAVENOUS
Status: DISCONTINUED | OUTPATIENT
Start: 2019-11-01 | End: 2019-11-01 | Stop reason: HOSPADM

## 2019-11-01 RX ORDER — HYDROMORPHONE HYDROCHLORIDE 1 MG/ML
0.2 INJECTION, SOLUTION INTRAMUSCULAR; INTRAVENOUS; SUBCUTANEOUS
Status: DISCONTINUED | OUTPATIENT
Start: 2019-11-01 | End: 2019-11-01 | Stop reason: HOSPADM

## 2019-11-01 RX ORDER — ONDANSETRON 2 MG/ML
INJECTION INTRAMUSCULAR; INTRAVENOUS PRN
Status: DISCONTINUED | OUTPATIENT
Start: 2019-11-01 | End: 2019-11-01 | Stop reason: SURG

## 2019-11-01 RX ORDER — DOCUSATE SODIUM 100 MG/1
100-200 CAPSULE, LIQUID FILLED ORAL 2 TIMES DAILY PRN
COMMUNITY
End: 2021-01-01

## 2019-11-01 RX ORDER — ONDANSETRON 2 MG/ML
4 INJECTION INTRAMUSCULAR; INTRAVENOUS
Status: DISCONTINUED | OUTPATIENT
Start: 2019-11-01 | End: 2019-11-01 | Stop reason: HOSPADM

## 2019-11-01 RX ORDER — OXYCODONE HCL 5 MG/5 ML
10 SOLUTION, ORAL ORAL
Status: DISCONTINUED | OUTPATIENT
Start: 2019-11-01 | End: 2019-11-01 | Stop reason: HOSPADM

## 2019-11-01 RX ORDER — HALOPERIDOL 5 MG/ML
1 INJECTION INTRAMUSCULAR
Status: DISCONTINUED | OUTPATIENT
Start: 2019-11-01 | End: 2019-11-01 | Stop reason: HOSPADM

## 2019-11-01 RX ORDER — MEPERIDINE HYDROCHLORIDE 25 MG/ML
12.5 INJECTION INTRAMUSCULAR; INTRAVENOUS; SUBCUTANEOUS
Status: DISCONTINUED | OUTPATIENT
Start: 2019-11-01 | End: 2019-11-01 | Stop reason: HOSPADM

## 2019-11-01 RX ORDER — DEXAMETHASONE SODIUM PHOSPHATE 4 MG/ML
INJECTION, SOLUTION INTRA-ARTICULAR; INTRALESIONAL; INTRAMUSCULAR; INTRAVENOUS; SOFT TISSUE PRN
Status: DISCONTINUED | OUTPATIENT
Start: 2019-11-01 | End: 2019-11-01 | Stop reason: SURG

## 2019-11-01 RX ORDER — OXYCODONE HCL 5 MG/5 ML
5 SOLUTION, ORAL ORAL
Status: DISCONTINUED | OUTPATIENT
Start: 2019-11-01 | End: 2019-11-01 | Stop reason: HOSPADM

## 2019-11-01 RX ADMIN — FENTANYL CITRATE 50 MCG: 50 INJECTION, SOLUTION INTRAMUSCULAR; INTRAVENOUS at 14:36

## 2019-11-01 RX ADMIN — ROCURONIUM BROMIDE 30 MG: 10 INJECTION, SOLUTION INTRAVENOUS at 14:23

## 2019-11-01 RX ADMIN — GEMCITABINE HYDROCHLORIDE 1000 MG: 1 INJECTION, POWDER, LYOPHILIZED, FOR SOLUTION INTRAVENOUS at 15:01

## 2019-11-01 RX ADMIN — FENTANYL CITRATE 50 MCG: 50 INJECTION, SOLUTION INTRAMUSCULAR; INTRAVENOUS at 14:13

## 2019-11-01 RX ADMIN — SUGAMMADEX 200 MG: 100 INJECTION, SOLUTION INTRAVENOUS at 15:01

## 2019-11-01 RX ADMIN — DEXAMETHASONE SODIUM PHOSPHATE 4 MG: 4 INJECTION, SOLUTION INTRA-ARTICULAR; INTRALESIONAL; INTRAMUSCULAR; INTRAVENOUS; SOFT TISSUE at 14:28

## 2019-11-01 RX ADMIN — SODIUM CHLORIDE, POTASSIUM CHLORIDE, SODIUM LACTATE AND CALCIUM CHLORIDE: 600; 310; 30; 20 INJECTION, SOLUTION INTRAVENOUS at 14:09

## 2019-11-01 RX ADMIN — ONDANSETRON 4 MG: 2 INJECTION INTRAMUSCULAR; INTRAVENOUS at 14:29

## 2019-11-01 RX ADMIN — EPHEDRINE SULFATE 5 MG: 50 INJECTION INTRAMUSCULAR; INTRAVENOUS; SUBCUTANEOUS at 14:14

## 2019-11-01 RX ADMIN — PROPOFOL 100 MG: 10 INJECTION, EMULSION INTRAVENOUS at 14:13

## 2019-11-01 RX ADMIN — CEFAZOLIN 2 G: 330 INJECTION, POWDER, FOR SOLUTION INTRAMUSCULAR; INTRAVENOUS at 14:09

## 2019-11-01 SDOH — HEALTH STABILITY: MENTAL HEALTH: HOW MANY STANDARD DRINKS CONTAINING ALCOHOL DO YOU HAVE ON A TYPICAL DAY?: 1 OR 2

## 2019-11-01 SDOH — HEALTH STABILITY: MENTAL HEALTH: HOW OFTEN DO YOU HAVE A DRINK CONTAINING ALCOHOL?: MONTHLY OR LESS

## 2019-11-01 ASSESSMENT — PAIN SCALES - GENERAL: PAIN_LEVEL: 1

## 2019-11-01 NOTE — ANESTHESIA PREPROCEDURE EVALUATION
Relevant Problems   CARDIAC   (+) Essential hypertension   (+) MVP (mitral valve prolapse)   (+) Mitral regurgitation       Physical Exam    Airway   Mallampati: II  TM distance: >3 FB  Neck ROM: full       Cardiovascular - normal exam  Rhythm: regular  Rate: normal  (-) murmur     Dental - normal exam         Pulmonary - normal exam  Breath sounds clear to auscultation     Abdominal    Neurological - normal exam                 Anesthesia Plan    ASA 4   ASA physical status 4 criteria: severe valve dysfunction    Plan - general       Airway plan will be LMA                  Informed Consent:

## 2019-11-01 NOTE — PROGRESS NOTES
DX: Bladder Cancer     Cycle 1  Previous treatment = N/A     Regimen: Intravesical Gemcitabine  Gemcitabine 2000 mg in  ml instilled intravesically within 3 hours after TURBT  gemcitabine 1,000 mg in NS 50 mL syringe x 2  Label comments: **Please send in CATH TIPPED SYRINGE** 2 Syringes      Yarelis GOMEZ, et al. Effect of Intravesical Instillation of Gemcitabine vs Saline Immediately Following Resection of Suspected Low-Grade Non-Muscle-Invasive Bladder Cancer on Tumor Recurrence SWOG  Randomized Clinical Trial. BRIDGETTE. 2018;319(18):0255-1844     LABS: not required      Gemcitabine 1000 mg in NS 50ml intravesically via cath tipped syringe x 2 syringes              Fixed dose, no calculation required. To be administered by MD. Allison Martinez, PharmD, BCOP, BCPS

## 2019-11-01 NOTE — OR NURSING
PT from OR w/ LMA in place, d/c LMA by PACU RN at 1525 and placed on2L O2 via NC.  PT w/ two way scott in place, Gemcitabine 2000mg/200ml in place w/ plug, remained in PT bladder till 1600 per order, scott was drained till clear, drainage bag replaced w/ new, resh bag, old one placed in chemo bag on chemo cart.  PT denied pain, nausea, tolerated sips of water.  Stat lock placed by PACU RN.  Report called to phase II once PT fully awake and VS back to baseline.

## 2019-11-01 NOTE — OR SURGEON
Immediate Post OP Note    PreOp Diagnosis: Gross Hematuria                                Bladder Mass    PostOp Diagnosis:As above     Procedure(s):  CYSTOSCOPY  BILATERAL RETROGRADE PYELOGRAMS  TRANSURETHRAL RESECTION OF 5cm BLADDER TUMOR  INTRAVESICAL GEMCITABINE CHEMOTHERAPY - Wound Class: Clean Contaminated    Surgeon(s):  Brad Jones M.D.    Anesthesiologist/Type of Anesthesia:  Anesthesiologist: Chandler Fan M.D./General LMA    Surgical Staff:  Circulator: Rafaela Almeida R.N.; Tamra Salmon R.N.  Relief Scrub: Verónica Liz  Scrub Person: Giancarlo Leon  Radiology Technologist: Catalina Munson    Specimens removed if any:  ID Type Source Tests Collected by Time Destination   A : BLADDER TUMOR Tissue Bladder PATHOLOGY SPECIMEN Brad Jones M.D. 11/1/2019  2:35 PM    B : DEEP BIOPSY Tissue Bladder PATHOLOGY SPECIMEN Brad Jones M.D. 11/1/2019  2:36 PM        Estimated Blood Loss: N/A    Findings: Normal retrograde pyelograms                  5 cm bladder tumor on the floor of the bladder    Complications: None  Drains: 22 Portuguese scott with Gemcitabine placed at 1500.        11/1/2019 3:34 PM Brad Jones M.D.

## 2019-11-01 NOTE — PROGRESS NOTES
"Pharmacy Chemotherapy Verification    Patient Name: Filiberto Jauregui   Dx: Bladder CA  Protocol: Gemcitabine     *Dosing Reference*  Gemcitabine 2000 mg in  ml instilled intravesically within 3 hours after TURBT  Yarelis GOMEZ, et al. Effect of Intravesical Instillation of Gemcitabine vs Saline Immediately Following Resection of Suspected Low-Grade Non-Muscle-Invasive Bladder Cancer on Tumor Recurrence SWOG  Randomized Clinical Trial. BRIDGETTE. 2018;319(18):8131-0116    Allergies:  Penicillins     /66   Pulse 70   Temp 36.9 °C (98.4 °F) (Temporal)   Resp 18   Ht 1.689 m (5' 6.5\")   Wt 65.8 kg (145 lb 1 oz)   SpO2 98%   BMI 23.06 kg/m²  Body surface area is 1.76 meters squared.    No labs required    Drug Order   (Drug name, dose, route, IV Fluid & volume, frequency, number of doses) Cycle: C1      Previous treatment: N/A      Medication = Gemcitabine  Base Dose = 2000 mg total  Calc Dose: No calculation required  Final Dose = 2000 mg total split into 1000 mg x 2 in cath tip syringe  Route = Intravesical  Fluid & Volume = NS 50 mL in cath tip syringe each  Administered by physician    To be administered by MD        <10% difference, okay to treat with final dose     By my signature below, I confirm this process was performed independently with the BSA and all final chemotherapy dosing calculations congruent. I have reviewed the above chemotherapy order and that my calculation of the final dose and BSA (when applicable) corroborate those calculations of the  pharmacist. Discrepancies of 5% or greater in the written dose have been addressed and documented within the [facility] Progress notes.    Signature: Tamra Camara, PharmD, BCOP        "

## 2019-11-01 NOTE — ANESTHESIA POSTPROCEDURE EVALUATION
Patient: Filiberto Jauregui    Procedure Summary     Date:  11/01/19 Room / Location:  [place] [address] / SURGERY [facility]    Anesthesia Start:  1409 Anesthesia Stop:  1513    Procedure:  CYSTOSCOPY, WITH BILATERAL RETROGRADE PYELOGRAM-AND 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 (Bilateral Bladder) Diagnosis:  (HEMATURIA; BLADDER MASS)    Surgeon:  Brad Jones M.D. Responsible Provider:  Chandler Fan M.D.    Anesthesia Type:  general ASA Status:  4          Final Anesthesia Type: general  Last vitals  BP   Blood Pressure : 159/66    Temp   36.9 °C (98.4 °F)    Pulse   Pulse: 70   Resp   18    SpO2   98 %      Anesthesia Post Evaluation    Patient participation: complete - patient participated  Level of consciousness: awake and alert  Pain score: 1    Airway patency: patent  Anesthetic complications: no  Cardiovascular status: adequate and hemodynamically stable  Respiratory status: acceptable  Hydration status: acceptable    PONV: none           Nurse Pain Score: 0 (NPRS)

## 2019-11-01 NOTE — ANESTHESIA PROCEDURE NOTES
Airway  Date/Time: 11/1/2019 2:16 PM  Performed by: Chandler Fan M.D.  Authorized by: Chandler Fan M.D.     Location:  OR  Urgency:  Elective  Indications for Airway Management:  Anesthesia  Spontaneous Ventilation: absent    Sedation Level:  Deep  Preoxygenated: Yes    Final Airway Type:  Supraglottic airway  Final Supraglottic Airway:  Standard LMA  SGA Size:  4  Number of Attempts at Approach:  1

## 2019-11-01 NOTE — DISCHARGE INSTRUCTIONS
ACTIVITY: Rest and take it easy for the first 24 hours.  A responsible adult is recommended to remain with you during that time.  It is normal to feel sleepy.  We encourage you to not do anything that requires balance, judgment or coordination.    MILD FLU-LIKE SYMPTOMS ARE NORMAL. YOU MAY EXPERIENCE GENERALIZED MUSCLE ACHES, THROAT IRRITATION, HEADACHE AND/OR SOME NAUSEA.    FOR 24 HOURS DO NOT:  Drive, operate machinery or run household appliances.  Drink beer or alcoholic beverages.   Make important decisions or sign legal documents.    SPECIAL INSTRUCTIONS:     Diet: Clears ADAT to 2000 ADA diabetic diet.  Activity: No restrictions.  Teach scott catheter care and leg bag use prior to discharge.      DIET: To avoid nausea, slowly advance diet as tolerated, avoiding spicy or greasy foods for the first day.  Add more substantial food to your diet according to your physician's instructions.  INCREASE FLUIDS AND FIBER TO AVOID CONSTIPATION.    SURGICAL DRESSING/BATHING: ok to shower, keep catheter insertion site clean and dry    FOLLOW-UP APPOINTMENT:  A follow-up appointment should be arranged with your doctor in 1-2 weeks or as previously scheduled; call to schedule/confirm.    You should CALL YOUR PHYSICIAN if you develop:  Fever greater than 101 degrees F.  Pain not relieved by medication, or persistent nausea or vomiting.  Excessive bleeding (blood soaking through dressing) or unexpected drainage from the wound.  Extreme redness or swelling around the incision site, drainage of pus or foul smelling drainage.  Inability to urinate or empty your bladder within 8 hours.  Problems with breathing or chest pain.    You should call 911 if you develop problems with breathing or chest pain.  If you are unable to contact your doctor or surgical center, you should go to the nearest emergency room or urgent care center.  Physician's telephone #: Dr. Jones: [phone]    If any questions arise, call your doctor.  If your  doctor is not available, please feel free to call the Surgical Center at [phone].  The Center is open Monday through Friday from 7AM to 7PM.  You can also call the HEALTH HOTLINE open 24 hours/day, 7 days/week and speak to a nurse at [phone], or toll free at [phone].    A registered nurse may call you a few days after your surgery to see how you are doing after your procedure.    MEDICATIONS: Resume taking daily medication.  Take prescribed pain medication with food.  If no medication is prescribed, you may take non-aspirin pain medication if needed.  PAIN MEDICATION CAN BE VERY CONSTIPATING.  Take a stool softener or laxative such as senokot, pericolace, or milk of magnesia if needed.    Prescription given for N/A.  Last pain medication given at N/A.    If your physician has prescribed pain medication that includes Acetaminophen (Tylenol), do not take additional Acetaminophen (Tylenol) while taking the prescribed medication.    Depression / Suicide Risk    As you are discharged from this [facility] facility, it is important to learn how to keep safe from harming yourself.    Recognize the warning signs:  · Abrupt changes in personality, positive or negative- including increase in energy   · Giving away possessions  · Change in eating patterns- significant weight changes-  positive or negative  · Change in sleeping patterns- unable to sleep or sleeping all the time   · Unwillingness or inability to communicate  · Depression  · Unusual sadness, discouragement and loneliness  · Talk of wanting to die  · Neglect of personal appearance   · Rebelliousness- reckless behavior  · Withdrawal from people/activities they love  · Confusion- inability to concentrate     If you or a loved one observes any of these behaviors or has concerns about self-harm, here's what you can do:  · Talk about it- your feelings and reasons for harming yourself  · Remove any means that you might use to hurt yourself  (examples: pills, rope, extension cords, firearm)  · Get professional help from the community (Mental Health, Substance Abuse, psychological counseling)  · Do not be alone:Call your Safe Contact- someone whom you trust who will be there for you.  · Call your local CRISIS HOTLINE 457-1924 or 373-909-2433  · Call your local Children's Mobile Crisis Response Team [place] (183) 548-5456 or www.[employer]  · Call the toll free National Suicide Prevention Hotlines   · National Suicide Prevention Lifeline 265-701-QQBY (2944)  · National Hope Line Network 800-SUICIDE (954-5983)

## 2019-11-02 NOTE — OR NURSING
Pt VSS, pain controlled, tolerating po intake. Pt and family given discharge education/instructions, all questions answered. IV discontinued, site wnl. No surgical incisions. Scott in place, education provided on scott bag and leg bag, all questions answered. Pt discharged home in stable condition with all belongings.

## 2019-11-02 NOTE — OP REPORT
DATE OF SERVICE:  11/01/2019    PREOPERATIVE DIAGNOSES:  1.  Gross hematuria.  2.  Bladder mass.  3.  History of radical retropubic prostatectomy for prostate cancer.    OPERATIONS AND PROCEDURES PERFORMED:  1.  Rigid cystourethroscopy.  2.  Bilateral retrograde ureteropyelograms.  3.  Transurethral resection of 5 cm bladder tumor.  4.  Fulguration of anterior bladder wall dysplasia or low-grade tumor.  5.  Intravesical gemcitabine placement.    SURGEON:  Brad Jones MD.    ANESTHESIA:  General laryngeal mask.    ANESTHESIOLOGIST:  Chandler Fan MD.    POSTOPERATIVE DIAGNOSES:  As above.    COMPLICATIONS:  None.    DRAINS:  A 22-Portuguese Crenshaw with gemcitabine placed with catheter plug at 1500.    SPECIMENS:  A.  Bladder tumor.  B.  Deep bladder wall biopsy.    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  He recently developed gross hematuria, has a history of mitral   regurgitation, and bicuspid mitral valve.  The patient was evaluated yesterday   in the office and found to have a bladder tumor or inflammation of the   posterior bladder wall involving over 5 cm of the bladder floor.  He also has   a large right bladder diverticulum, and with the findings, I recommended   surgical intervention.  He is in the process of a workup for a mitral valve   clipping procedure with Dr. Wilson and Dr. Robbins. Prior to surgery, I   discussed with the patient the plan for cystoscopy, retrograde pyelograms,   transurethral resection of bladder tumor and intravesical gemcitabine.  He is   aware of the risks of the procedure including, but not limited to a risk of   luis-procedural urinary tract infection, risk of urosepsis, risk of urethral   stricture as a delayed complication of the procedure.  He is also aware that   at cystoscopy in the office, he was found to have a bulbar urethral stricture.    I  have also discussed the potential risk of bladder perforation, flank pain   with retrograde, and possible need for ureteroscopy and diagnostic biopsy.    With gemcitabine placement, there is a risk of significant ileus if there is a   perforation as well as significant cystitis and he is aware  after the   procedure that he may experience transient urgency incontinence as he has had   a radical prostate in the past and has a large volume of tumor in the   posterior bladder wall.  In addition, we discussed the perioperative risk of   deep vein thrombosis, pulmonary embolism, aspiration pneumonia, heart attack,   stroke and death.  Informed consent was given to me by the patient to proceed.    DESCRIPTION OF PROCEDURE IN DETAIL:  After informed consent was obtained, the   patient was brought to the operating room and placed supine.  Bilateral   sequential compression devices were in place and operational and a general   laryngeal mask anesthetic was administered by Dr. Chandler Fan in a balanced   fashion.  Patient was positioned in modified lithotomy.  The operative area   was Betadine prepped and draped in usual sterile fashion.  A surgical time-out   was called and all members of the operative team agree as the patient's name   and procedure to be performed without objections and attention was directed   towards procedure.    I calibrated the meatus with Blanca sounds from Anguillan to 26-Anguillan then   passed a 22-Anguillan rigid cystoscope with 30-degree lens per urethra.  The   anterior urethra was normal.  At the level of the bulbar urethra, he has 2   small areas of narrowing and he also has 2 small penile urethral areas of   narrowing, which were less than 1 mm in size each.  The scope dilated to use   without difficulty.  I was able to move through the vesicourethral   anastomosis, which was probably about 22-Anguillan in diameter.  Once I entered   the bladder, serial evaluation showed large right bladder  diverticulum, a   small left bladder diverticulum, some low-grade tumor over the left ureteral   orifice and a large tumor in the posterior bladder wall and some dysplasia   anteriorly.  At this point in time, I cannulated the left ureteral orifice   with a 6-Kyr[place] Rutner catheter and performed a retrograde ureteral pyelogram,   which was performed showing complete opacification of the ureter, caliceal   anatomy, and no evidence of filling defect.  The right side was performed.  A   couple of air bubbles were evaluated with real time fluoroscopy.  These were   noted to move and drain.  There were no filling defects.  At this point in   time, I removed the cystoscope, drained the bladder, and advanced a   resectoscope with a 30-degree lens.  Utilizing monopolar current, I resected   the posterior bladder wall tumor and then did deep biopsies.  Using a   rollerball, I fulgurated the base and surrounding areas, so that the area   treated was about 5.5 cm in diameter and deep biopsies were taken in the   muscle.  After fulgurating the base and normal tissue, attention was directed   towards superficial fulguration of what appeared to be a low-grade tumor over   the left ureteral orifice, approximately a 0.75 cm area was treated.  Care was   taken to avoid involvement of the orifice.  On the left bladder wall   diverticulum, which was small,  there was some low-grade tumor which was   fulgurated and this did extend into the diverticulum.  It was fulgurated with   a rollerball and then entire diverticulum was fulgurated.  The larger   diverticulum on the right side did not have evidence of any significant tumor.    There was a small area of erythema which was fulgurated, and on the dome,   there was an area of what looked to be early dysplasia which was fulgurated   probably 1.5 cm in diameter.  At the end of the fulguration, reevaluation of   all sites showed excellent hemostasis.  Left and right ureters were draining    and patent, and at this point in time, I drained the bladder completely, and   at which point, I went ahead and passed a 22-Citizen of [place] Crenshaw.  I inflated the   balloon with 10 mL of sterile water, left this to gravity, and then I placed 2   g of gemcitabine into the bladder.  After the gemcitabine was positioned, a   catheter plug was placed.  Catheter plug was left in place at 1500 hours.  At   the end the case, the patient tolerated the procedure well without   complication, was awakened in the operating room and transferred to recovery   room where he arrived in stable condition with disposal of the chemotherapy   per protocol orders having been written.       ____________________________________     MD STEPAN Venegas / KIRSTEN    DD:  11/01/2019 17:27:08  DT:  11/01/2019 20:07:34    D#:  [id number]  Job#:  [id number]

## 2019-11-10 ENCOUNTER — HOSPITAL ENCOUNTER (OUTPATIENT)
Dept: RADIOLOGY | Facility: MEDICAL CENTER | Age: 84
End: 2019-11-10
Attending: UROLOGY
Payer: MEDICARE

## 2019-11-10 DIAGNOSIS — R31.0 GROSS HEMATURIA: ICD-10-CM

## 2019-11-10 PROCEDURE — 74178 CT ABD&PLV WO CNTR FLWD CNTR: CPT

## 2019-11-10 PROCEDURE — 700117 HCHG RX CONTRAST REV CODE 255: Performed by: UROLOGY

## 2019-11-10 RX ADMIN — IOHEXOL 100 ML: 350 INJECTION, SOLUTION INTRAVENOUS at 14:04

## 2019-11-12 ENCOUNTER — HOSPITAL ENCOUNTER (OUTPATIENT)
Dept: RADIOLOGY | Facility: MEDICAL CENTER | Age: 84
End: 2019-11-12
Attending: NEUROLOGICAL SURGERY
Payer: MEDICARE

## 2019-11-12 DIAGNOSIS — M54.50 LOW BACK PAIN, UNSPECIFIED BACK PAIN LATERALITY, UNSPECIFIED CHRONICITY, UNSPECIFIED WHETHER SCIATICA PRESENT: ICD-10-CM

## 2019-11-12 PROCEDURE — 72110 X-RAY EXAM L-2 SPINE 4/>VWS: CPT

## 2019-11-18 ENCOUNTER — TELEPHONE (OUTPATIENT)
Dept: CARDIOLOGY | Facility: MEDICAL CENTER | Age: 84
End: 2019-11-18

## 2019-11-18 NOTE — TELEPHONE ENCOUNTER
Wife explains that pt was recommended for MitraClip but then was diagnosed with bladder cancer and subsequent bladder procedures. Since the procedures his back pain and sciatica have flared up. Dr. Melgoza prescribed gabapentin and meloxicam but he has not started taking these meds as he is concerned about his valve and he wants okay from Dr. Wilson first. An appt with Dr. Wilson was scheduled during call on Thursday 11/21 to review plan of care but would like to get advice about new meds ASAP.     To Dr. Wilson

## 2019-11-18 NOTE — TELEPHONE ENCOUNTER
These are okay to take. We will stop the meloxicam pre and post everardo clip but in the interim this is fine. Noted that meloxicam is an NSAID and he should clear this with urologist as well. SC

## 2019-11-18 NOTE — TELEPHONE ENCOUNTER
[employer]/elizabeth  Pt's wife Phoebe calling to ask [employer] to approve meds prescribed by Dr Melgoza for sciatica stenosis.  Please call Phoebe for details

## 2019-11-21 ENCOUNTER — TELEPHONE (OUTPATIENT)
Dept: CARDIOLOGY | Facility: MEDICAL CENTER | Age: 84
End: 2019-11-21

## 2019-11-21 ENCOUNTER — OFFICE VISIT (OUTPATIENT)
Dept: CARDIOLOGY | Facility: MEDICAL CENTER | Age: 84
End: 2019-11-21
Payer: MEDICARE

## 2019-11-21 VITALS
HEIGHT: 66 IN | WEIGHT: 144.4 LBS | HEART RATE: 71 BPM | BODY MASS INDEX: 23.21 KG/M2 | SYSTOLIC BLOOD PRESSURE: 124 MMHG | DIASTOLIC BLOOD PRESSURE: 70 MMHG | OXYGEN SATURATION: 95 %

## 2019-11-21 DIAGNOSIS — I34.1 MVP (MITRAL VALVE PROLAPSE): ICD-10-CM

## 2019-11-21 DIAGNOSIS — Z01.810 PRE-OPERATIVE CARDIOVASCULAR EXAMINATION: ICD-10-CM

## 2019-11-21 DIAGNOSIS — I10 ESSENTIAL HYPERTENSION: ICD-10-CM

## 2019-11-21 DIAGNOSIS — I34.0 NONRHEUMATIC MITRAL VALVE REGURGITATION: ICD-10-CM

## 2019-11-21 PROCEDURE — 99215 OFFICE O/P EST HI 40 MIN: CPT | Performed by: INTERNAL MEDICINE

## 2019-11-21 RX ORDER — GABAPENTIN 100 MG/1
100-300 CAPSULE ORAL DAILY
Refills: 5 | COMMUNITY
Start: 2019-11-14 | End: 2021-01-01

## 2019-11-21 RX ORDER — MELOXICAM 15 MG/1
15 TABLET ORAL
Refills: 3 | Status: ON HOLD | COMMUNITY
Start: 2019-11-14 | End: 2019-12-03

## 2019-11-21 ASSESSMENT — ENCOUNTER SYMPTOMS
NAUSEA: 0
RESPIRATORY NEGATIVE: 1
EYES NEGATIVE: 1
NEUROLOGICAL NEGATIVE: 1
HEADACHES: 0
DEPRESSION: 0
VOMITING: 0
DIZZINESS: 0
BRUISES/BLEEDS EASILY: 0
CHILLS: 0
BLURRED VISION: 0
CARDIOVASCULAR NEGATIVE: 1
PALPITATIONS: 0
MYALGIAS: 0
WEIGHT LOSS: 0
MUSCULOSKELETAL NEGATIVE: 1
FOCAL WEAKNESS: 0
GASTROINTESTINAL NEGATIVE: 1
PSYCHIATRIC NEGATIVE: 1
WEAKNESS: 0
CLAUDICATION: 0
DOUBLE VISION: 0
SHORTNESS OF BREATH: 0
FEVER: 0
CONSTITUTIONAL NEGATIVE: 1
COUGH: 0
ABDOMINAL PAIN: 0
NERVOUS/ANXIOUS: 0

## 2019-11-21 NOTE — PROGRESS NOTES
Chief Complaint   Patient presents with   • Mitral Valve Prolapse       Subjective:   Filiberto Jauregui is a 86 y.o. male who presents today for follow up of mitral valve prolapse with severe mitral regurgitation.    Since the patient's last visit on 08/29/19, he has been doing well clinically. He denies fatigue, shortness of breath, dyspnea on exertion, chest pain, dizziness or syncope. His bladder tumor maybe all resolved.    Past Medical History:   Diagnosis Date   • Cancer (HCC)     prostate   • Diabetes (HCC)    • High cholesterol    • Hypertension 10/23/2019   • Mitral regurgitation    • MVP (mitral valve prolapse)    • Pneumonia     hx approx 30 years ago   • Valvular heart disease      Past Surgical History:   Procedure Laterality Date   • PB CYSTOURETHROSCOPY,URETER CATHETER Bilateral 11/1/2019    Procedure: CYSTOSCOPY, WITH BILATERAL RETROGRADE PYELOGRAM-AND 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;  Surgeon: Brad Jones M.D.;  Location: SURGERY [facility];  Service: Urology   • PROSTATECTOMY, RADIAL       Family History   Problem Relation Age of Onset   • Heart Attack Father    • Lung Disease Mother      Social History     Socioeconomic History   • Marital status:      Spouse name: Not on file   • Number of children: Not on file   • Years of education: Not on file   • Highest education level: Not on file   Occupational History   • Not on file   Social Needs   • Financial resource strain: Not on file   • Food insecurity:     Worry: Not on file     Inability: Not on file   • Transportation needs:     Medical: Not on file     Non-medical: Not on file   Tobacco Use   • Smoking status: Former Smoker     Types: Pipe   • Smokeless tobacco: Never Used   Substance and Sexual Activity   • Alcohol use: Yes     Alcohol/week: 0.6 oz     Types: 1 Glasses of wine per week     Frequency: Monthly or less     Drinks per session: 1 or 2     Comment: OCCASIONALLY   • Drug use: No   • Sexual  activity: Not on file   Lifestyle   • Physical activity:     Days per week: Not on file     Minutes per session: Not on file   • Stress: Not on file   Relationships   • Social connections:     Talks on phone: Not on file     Gets together: Not on file     Attends [employer] service: Not on file     Active member of club or organization: Not on file     Attends meetings of clubs or organizations: Not on file     Relationship status: Not on file   • Intimate partner violence:     Fear of current or ex partner: Not on file     Emotionally abused: Not on file     Physically abused: Not on file     Forced sexual activity: Not on file   Other Topics Concern   • Not on file   Social History Narrative   • Not on file     Allergies   Allergen Reactions   • Iodide      Per pt broke out in rash   • Penicillins Rash     .     (Medications reviewed.)  Outpatient Encounter Medications as of 11/21/2019   Medication Sig Dispense Refill   • gabapentin (NEURONTIN) 100 MG Cap TAKE 1 CAPSULE BY MOUTH TWICE DAILY MAY INCREASE AS NEEDED EVERY 5 DAYS UP TO MAXIMUM OF 18 CAPSULES PER DAY  5   • meloxicam (MOBIC) 15 MG tablet Take 15 mg by mouth.  3   • metFORMIN (GLUCOPHAGE) 500 MG Tab Take 500-1,000 mg by mouth 2 times a day, with meals. 1000 mg in am and 500 mg in pm  3   • docusate sodium (COLACE) 100 MG Cap Take 100 mg by mouth 2 times a day as needed for Constipation.     • hydroCHLOROthiazide (HYDRODIURIL) 12.5 MG tablet Take 12.5 mg by mouth every day.     • donepezil (ARICEPT) 10 MG tablet Take 10 mg by mouth every evening.  3   • atorvastatin (LIPITOR) 10 MG Tab Take 10 mg by mouth every day.     • losartan (COZAAR) 100 MG Tab Take 100 mg by mouth every day.     • Naproxen Sodium (ALEVE) 220 MG Cap Take 1 Cap by mouth 2 times a day as needed.       No facility-administered encounter medications on file as of 11/21/2019.      Review of Systems   Constitutional: Negative.  Negative for chills, fever, malaise/fatigue and weight loss.  "  HENT: Negative.  Negative for hearing loss.    Eyes: Negative.  Negative for blurred vision and double vision.   Respiratory: Negative.  Negative for cough and shortness of breath.    Cardiovascular: Negative.  Negative for chest pain, palpitations, claudication and leg swelling.   Gastrointestinal: Negative.  Negative for abdominal pain, nausea and vomiting.   Genitourinary: Negative.  Negative for dysuria and urgency.   Musculoskeletal: Negative.  Negative for joint pain and myalgias.   Skin: Negative.  Negative for itching and rash.   Neurological: Negative.  Negative for dizziness, focal weakness, weakness and headaches.   Endo/Heme/Allergies: Negative.  Does not bruise/bleed easily.   Psychiatric/Behavioral: Negative.  Negative for depression. The patient is not nervous/anxious.         Objective:   /70 (BP Location: Right arm, Patient Position: Sitting, BP Cuff Size: Adult)   Pulse 71   Ht 1.676 m (5' 6\")   Wt 65.5 kg (144 lb 6.4 oz)   SpO2 95%   BMI 23.31 kg/m²     Physical Exam   Constitutional: He is oriented to person, place, and time. He appears well-developed and well-nourished.   HENT:   Head: Normocephalic and atraumatic.   Eyes: EOM are normal.   Neck: No JVD present.   Cardiovascular: Normal rate and regular rhythm.   Murmur heard.  Pulmonary/Chest: Effort normal and breath sounds normal.   Abdominal: Soft. Bowel sounds are normal.   No hepatosplenomegaly.   Musculoskeletal: Normal range of motion.   Lymphadenopathy:     He has no cervical adenopathy.   Neurological: He is alert and oriented to person, place, and time.   Skin: Skin is warm and dry.   Psychiatric: He has a normal mood and affect.     CARDIAC STUDIES/PROCEDURES:     ECHOCARDIOGRAM CONCLUSIONS (08/14/19)  Compared to the images of the prior study done on 01/22/18, MR is   severe, RV further dilated.  Left ventricular ejection fraction is visually estimated to be 55%.  Prolapse of the posterior mitral leaflet was " present.  Probably severe, highly eccentric mitral regurgitation due to   generative MV disease, multiple jets.  Possible mobile echo density flowing into the left atrium, best seen in apical 2 C view.  Unable to estimate accurate RVSP, appears to be > 45 mmHg, RAP normal.  Mildly dilated right ventricle.  Findings DW Dr. Johnson at time of report.  Consider MitraClip image JOSÉ if clinically relevant.   (study result reviewed)     ECHOCARDIOGRAM CONCLUSIONS (01/22/19)  Normal left ventricular size, thickness, systolic function, and diastolic function.  Left ventricular ejection fraction is visually estimated to be 60%.  Normal inferior vena cava size and inspiratory collapse.  Prolapse of the posterior mitral leaflet was present. Significant   turbulence is noted across the mitral valve. difficult to accurately   quantify mitral regurgitation severity. overall appears to have   moderate mitral regurgitation.  Aortic sclerosis without stenosis.     EKG performed on (08/29/19) EKG shows sinus rhythm.     Laboratory results of (11/01/19) were reviewed. Bun of 22 mg/dl, creatinine levels of 0.89 mg/dl noted.    TRANSESOPHAGEAL ECHOCARDIOGRAM CONCLUSIONS by Laverne Garces (10/24/19)  LV EF visually estimated to be 60%.  Biatrial enlargement.  Neetu class II severe mitral regurgitation, P2, P3 prolapse, possible very small A3 chordae in the left atrium.  Multiple MR jets, partially central, partially anteriorly directed.  Pulmonary vein systolic flow reversal present.  (study result reviewed)    Assessment:     1. MVP (mitral valve prolapse)     2. Nonrheumatic mitral valve regurgitation     3. Essential hypertension       Medical Decision Making:  Today's Assessment / Status / Plan:     1. Mitral regurgitation: His mitral regurgitation has reached severe status on his recent echocardiogram and he is  relatively asymptomatic, however, he is inactive, NYHA class I. He is excessive risk per Dr. Robbins with STS  score of 5-8%. We will proceed with MitraClip. He understands the risks and benefits and agrees with plan.  2. Memory loss: His memory loss has improved on medication.  3. Hematuria: His hematuria has resolved.    The risks, benefits, and alternatives to coronary angiography with IV sedation were discussed in great detail. Specific risks mentioned include bleeding, infection, kidney damage, allergic reaction, cardiac perforation with possible tamponade requiring luis-cardiocentesis or possible open heart surgery. In addition, we discussed that 10% of patients will experience small to moderate bruising at the side of the arterial puncture. Lastly the risks of heart attack, stroke, and death were discussed; the risks of major complications such as heart attack or stroke caused by the angiogram is less than 1%; the risk of death is approximately 1 in 1000. The patient verbalized understanding of these potential complications and wishes to proceed with this procedure.    The risks, benefits, and alternatives to MitraClip, general anesthesia and transesophageal echocardiogram were discussed in great detail. Specific risks mentioned include bleeding, infection, kidney damage, allergic reaction, cardiac perforation with possible tamponade requiring luis-cardiocentesis or possible open heart surgery if the patient is a candidate. Lastly the risks of heart attack, stroke, and death were discussed; the risks of major complications including  all cause mortality of 2.2%, stroke 0.9%, major bleeding complication is 7.4, pericardial tamponade 1.9%, clip specific complications (embolization 0%, partial clip detachment was 1.9%). The patient verbalized understanding of these potential complications and wishes to proceed with this procedure. (TRAMI registry 2015).  The procedure will be performed completely in collaboration with cardiac surgery.    CC Mac Fox

## 2019-11-21 NOTE — TELEPHONE ENCOUNTER
Patient is scheduled on 12-3-19 for a Pre mitral clip R&L hrt cath w/poss with Dr. Wilson. Patient was told to hold metformin day of procedure and 48hrs after. Patient to check in at 11:30 for a 1:30 procedure. H&P was done on 11-21-19 by .Pre admit is scheduled on 12-2-19 at 2:15.

## 2019-11-22 DIAGNOSIS — Z88.8 ALLERGY TO IODINE: ICD-10-CM

## 2019-11-22 RX ORDER — PREDNISONE 20 MG/1
TABLET ORAL
Qty: 3 TAB | Refills: 0 | Status: SHIPPED
Start: 2019-11-22 | End: 2019-12-30

## 2019-11-22 RX ORDER — DIPHENHYDRAMINE HCL 25 MG
50 TABLET ORAL
Qty: 2 TABLET | Refills: 0 | Status: ON HOLD
Start: 2019-11-22 | End: 2020-01-08

## 2019-11-22 NOTE — PROGRESS NOTES
Patient has upcoming cath procedure.  Has allergy to iodine.   Reviewed protocol with Dr. Wilson.  Pre-med orders sent to preferred pharmacy.    Called patient and spoke to spouse.  Spouse verbalized understanding of instructions.  Reviewed dates and times for pre-admit and day of procedure.  Spouse verbalized appreciation for the phone call.

## 2019-11-25 ENCOUNTER — HOSPITAL ENCOUNTER (OUTPATIENT)
Dept: LAB | Facility: MEDICAL CENTER | Age: 84
End: 2019-11-25
Attending: UROLOGY
Payer: MEDICARE

## 2019-11-25 PROCEDURE — 87086 URINE CULTURE/COLONY COUNT: CPT

## 2019-11-27 ENCOUNTER — HOSPITAL ENCOUNTER (OUTPATIENT)
Dept: RADIOLOGY | Facility: MEDICAL CENTER | Age: 84
End: 2019-11-27
Attending: NEUROLOGICAL SURGERY
Payer: MEDICARE

## 2019-11-27 DIAGNOSIS — M48.062 SPINAL STENOSIS, LUMBAR REGION, WITH NEUROGENIC CLAUDICATION: ICD-10-CM

## 2019-11-27 DIAGNOSIS — M48.00 SPINAL STENOSIS, UNSPECIFIED SPINAL REGION: ICD-10-CM

## 2019-11-27 DIAGNOSIS — M48.062 SPINAL STENOSIS OF LUMBAR REGION WITH NEUROGENIC CLAUDICATION: ICD-10-CM

## 2019-11-27 PROCEDURE — 72148 MRI LUMBAR SPINE W/O DYE: CPT

## 2019-11-28 LAB
BACTERIA UR CULT: NORMAL
SIGNIFICANT IND 70042: NORMAL
SITE SITE: NORMAL
SOURCE SOURCE: NORMAL

## 2019-12-02 ENCOUNTER — TELEPHONE (OUTPATIENT)
Dept: CARDIOLOGY | Facility: MEDICAL CENTER | Age: 84
End: 2019-12-02

## 2019-12-02 DIAGNOSIS — Z01.810 PRE-OPERATIVE CARDIOVASCULAR EXAMINATION: ICD-10-CM

## 2019-12-02 DIAGNOSIS — Z01.812 PRE-OPERATIVE LABORATORY EXAMINATION: ICD-10-CM

## 2019-12-02 LAB
ALBUMIN SERPL BCP-MCNC: 4.4 G/DL (ref 3.2–4.9)
ALBUMIN/GLOB SERPL: 1.8 G/DL
ALP SERPL-CCNC: 60 U/L (ref 30–99)
ALT SERPL-CCNC: 20 U/L (ref 2–50)
ANION GAP SERPL CALC-SCNC: 7 MMOL/L (ref 0–11.9)
APTT PPP: 28.3 SEC (ref 24.7–36)
AST SERPL-CCNC: 20 U/L (ref 12–45)
BILIRUB SERPL-MCNC: 1.3 MG/DL (ref 0.1–1.5)
BUN SERPL-MCNC: 19 MG/DL (ref 8–22)
CALCIUM SERPL-MCNC: 9.2 MG/DL (ref 8.5–10.5)
CHLORIDE SERPL-SCNC: 104 MMOL/L (ref 96–112)
CO2 SERPL-SCNC: 30 MMOL/L (ref 20–33)
CREAT SERPL-MCNC: 1.02 MG/DL (ref 0.5–1.4)
EKG IMPRESSION: NORMAL
ERYTHROCYTE [DISTWIDTH] IN BLOOD BY AUTOMATED COUNT: 43.3 FL (ref 35.9–50)
GLOBULIN SER CALC-MCNC: 2.4 G/DL (ref 1.9–3.5)
GLUCOSE SERPL-MCNC: 129 MG/DL (ref 65–99)
HCT VFR BLD AUTO: 42.4 % (ref 42–52)
HGB BLD-MCNC: 13.9 G/DL (ref 14–18)
INR PPP: 0.95 (ref 0.87–1.13)
MCH RBC QN AUTO: 29.6 PG (ref 27–33)
MCHC RBC AUTO-ENTMCNC: 32.8 G/DL (ref 33.7–35.3)
MCV RBC AUTO: 90.4 FL (ref 81.4–97.8)
PLATELET # BLD AUTO: 176 K/UL (ref 164–446)
PMV BLD AUTO: 9.8 FL (ref 9–12.9)
POTASSIUM SERPL-SCNC: 4.2 MMOL/L (ref 3.6–5.5)
PROT SERPL-MCNC: 6.8 G/DL (ref 6–8.2)
PROTHROMBIN TIME: 12.9 SEC (ref 12–14.6)
RBC # BLD AUTO: 4.69 M/UL (ref 4.7–6.1)
SODIUM SERPL-SCNC: 141 MMOL/L (ref 135–145)
WBC # BLD AUTO: 6.6 K/UL (ref 4.8–10.8)

## 2019-12-02 PROCEDURE — 80053 COMPREHEN METABOLIC PANEL: CPT

## 2019-12-02 PROCEDURE — 85730 THROMBOPLASTIN TIME PARTIAL: CPT

## 2019-12-02 PROCEDURE — 85027 COMPLETE CBC AUTOMATED: CPT

## 2019-12-02 PROCEDURE — 93010 ELECTROCARDIOGRAM REPORT: CPT | Performed by: INTERNAL MEDICINE

## 2019-12-02 PROCEDURE — 93005 ELECTROCARDIOGRAM TRACING: CPT

## 2019-12-02 PROCEDURE — 36415 COLL VENOUS BLD VENIPUNCTURE: CPT

## 2019-12-02 PROCEDURE — 85610 PROTHROMBIN TIME: CPT

## 2019-12-02 NOTE — TELEPHONE ENCOUNTER
Called patient, now answer.     Left voice message explaining that GABBY Longoria would like to have patient arranged for follow up in valve clinic with her 12/30/19 in preparation for tentative TMVR 1/7/19. Privided direct contact information and encouraged return call to discuss such in detail. Awaiting return call.

## 2019-12-03 ENCOUNTER — HOSPITAL ENCOUNTER (OUTPATIENT)
Facility: MEDICAL CENTER | Age: 84
End: 2019-12-04
Attending: INTERNAL MEDICINE | Admitting: INTERNAL MEDICINE
Payer: MEDICARE

## 2019-12-03 ENCOUNTER — APPOINTMENT (OUTPATIENT)
Dept: CARDIOLOGY | Facility: MEDICAL CENTER | Age: 84
End: 2019-12-03
Attending: INTERNAL MEDICINE
Payer: MEDICARE

## 2019-12-03 ENCOUNTER — TELEPHONE (OUTPATIENT)
Dept: CARDIOLOGY | Facility: MEDICAL CENTER | Age: 84
End: 2019-12-03

## 2019-12-03 DIAGNOSIS — I34.1 MVP (MITRAL VALVE PROLAPSE): ICD-10-CM

## 2019-12-03 DIAGNOSIS — Z01.810 PRE-OPERATIVE CARDIOVASCULAR EXAMINATION: ICD-10-CM

## 2019-12-03 LAB
GLUCOSE BLD-MCNC: 124 MG/DL (ref 65–99)
GLUCOSE BLD-MCNC: 191 MG/DL (ref 65–99)

## 2019-12-03 PROCEDURE — 96372 THER/PROPH/DIAG INJ SC/IM: CPT | Mod: XU

## 2019-12-03 PROCEDURE — 700101 HCHG RX REV CODE 250

## 2019-12-03 PROCEDURE — G0378 HOSPITAL OBSERVATION PER HR: HCPCS

## 2019-12-03 PROCEDURE — 700111 HCHG RX REV CODE 636 W/ 250 OVERRIDE (IP)

## 2019-12-03 PROCEDURE — 700105 HCHG RX REV CODE 258: Performed by: INTERNAL MEDICINE

## 2019-12-03 PROCEDURE — 92928 PRQ TCAT PLMT NTRAC ST 1 LES: CPT | Mod: LC | Performed by: INTERNAL MEDICINE

## 2019-12-03 PROCEDURE — 700117 HCHG RX CONTRAST REV CODE 255: Performed by: INTERNAL MEDICINE

## 2019-12-03 PROCEDURE — 99153 MOD SED SAME PHYS/QHP EA: CPT

## 2019-12-03 PROCEDURE — 700102 HCHG RX REV CODE 250 W/ 637 OVERRIDE(OP): Performed by: NURSE PRACTITIONER

## 2019-12-03 PROCEDURE — 700102 HCHG RX REV CODE 250 W/ 637 OVERRIDE(OP)

## 2019-12-03 PROCEDURE — A9270 NON-COVERED ITEM OR SERVICE: HCPCS | Performed by: NURSE PRACTITIONER

## 2019-12-03 PROCEDURE — 160002 HCHG RECOVERY MINUTES (STAT)

## 2019-12-03 PROCEDURE — A9270 NON-COVERED ITEM OR SERVICE: HCPCS

## 2019-12-03 PROCEDURE — 99152 MOD SED SAME PHYS/QHP 5/>YRS: CPT | Performed by: INTERNAL MEDICINE

## 2019-12-03 PROCEDURE — 82962 GLUCOSE BLOOD TEST: CPT

## 2019-12-03 PROCEDURE — 93460 R&L HRT ART/VENTRICLE ANGIO: CPT | Mod: 26,59 | Performed by: INTERNAL MEDICINE

## 2019-12-03 RX ORDER — HEPARIN SODIUM,PORCINE 1000/ML
VIAL (ML) INJECTION
Status: COMPLETED
Start: 2019-12-03 | End: 2019-12-03

## 2019-12-03 RX ORDER — LIDOCAINE HYDROCHLORIDE 20 MG/ML
INJECTION, SOLUTION INFILTRATION; PERINEURAL
Status: DISPENSED
Start: 2019-12-03 | End: 2019-12-04

## 2019-12-03 RX ORDER — ASPIRIN 81 MG/1
TABLET, CHEWABLE ORAL
Status: COMPLETED
Start: 2019-12-03 | End: 2019-12-03

## 2019-12-03 RX ORDER — SODIUM CHLORIDE 9 MG/ML
1000 INJECTION, SOLUTION INTRAVENOUS
Status: DISCONTINUED | OUTPATIENT
Start: 2019-12-03 | End: 2019-12-04 | Stop reason: HOSPADM

## 2019-12-03 RX ORDER — DOCUSATE SODIUM 100 MG/1
100 CAPSULE, LIQUID FILLED ORAL 2 TIMES DAILY PRN
Status: DISCONTINUED | OUTPATIENT
Start: 2019-12-03 | End: 2019-12-04 | Stop reason: HOSPADM

## 2019-12-03 RX ORDER — VERAPAMIL HYDROCHLORIDE 2.5 MG/ML
INJECTION, SOLUTION INTRAVENOUS
Status: DISPENSED
Start: 2019-12-03 | End: 2019-12-04

## 2019-12-03 RX ORDER — POLYETHYLENE GLYCOL 3350 17 G/17G
1 POWDER, FOR SOLUTION ORAL
Status: DISCONTINUED | OUTPATIENT
Start: 2019-12-03 | End: 2019-12-04 | Stop reason: HOSPADM

## 2019-12-03 RX ORDER — DIPHENHYDRAMINE HYDROCHLORIDE 50 MG/ML
INJECTION INTRAMUSCULAR; INTRAVENOUS
Status: DISPENSED
Start: 2019-12-03 | End: 2019-12-04

## 2019-12-03 RX ORDER — ONDANSETRON 2 MG/ML
4 INJECTION INTRAMUSCULAR; INTRAVENOUS EVERY 4 HOURS PRN
Status: DISCONTINUED | OUTPATIENT
Start: 2019-12-03 | End: 2019-12-04 | Stop reason: HOSPADM

## 2019-12-03 RX ORDER — CLOPIDOGREL 300 MG/1
TABLET, FILM COATED ORAL
Status: COMPLETED
Start: 2019-12-03 | End: 2019-12-03

## 2019-12-03 RX ORDER — HYDROCODONE BITARTRATE AND ACETAMINOPHEN 5; 325 MG/1; MG/1
1-2 TABLET ORAL EVERY 6 HOURS PRN
Status: DISCONTINUED | OUTPATIENT
Start: 2019-12-03 | End: 2019-12-04 | Stop reason: HOSPADM

## 2019-12-03 RX ORDER — AMOXICILLIN 250 MG
2 CAPSULE ORAL 2 TIMES DAILY
Status: DISCONTINUED | OUTPATIENT
Start: 2019-12-03 | End: 2019-12-04 | Stop reason: HOSPADM

## 2019-12-03 RX ORDER — BIVALIRUDIN 250 MG/5ML
INJECTION, POWDER, LYOPHILIZED, FOR SOLUTION INTRAVENOUS
Status: COMPLETED
Start: 2019-12-03 | End: 2019-12-03

## 2019-12-03 RX ORDER — LOSARTAN POTASSIUM 50 MG/1
100 TABLET ORAL DAILY
Status: DISCONTINUED | OUTPATIENT
Start: 2019-12-04 | End: 2019-12-04 | Stop reason: HOSPADM

## 2019-12-03 RX ORDER — HEPARIN SODIUM,PORCINE 1000/ML
VIAL (ML) INJECTION
Status: DISPENSED
Start: 2019-12-03 | End: 2019-12-04

## 2019-12-03 RX ORDER — BISACODYL 10 MG
10 SUPPOSITORY, RECTAL RECTAL
Status: DISCONTINUED | OUTPATIENT
Start: 2019-12-03 | End: 2019-12-04 | Stop reason: HOSPADM

## 2019-12-03 RX ORDER — MIDAZOLAM HYDROCHLORIDE 1 MG/ML
INJECTION INTRAMUSCULAR; INTRAVENOUS
Status: COMPLETED
Start: 2019-12-03 | End: 2019-12-03

## 2019-12-03 RX ORDER — DONEPEZIL HYDROCHLORIDE 5 MG/1
10 TABLET, FILM COATED ORAL EVERY EVENING
Status: DISCONTINUED | OUTPATIENT
Start: 2019-12-03 | End: 2019-12-04 | Stop reason: HOSPADM

## 2019-12-03 RX ORDER — DIPHENHYDRAMINE HYDROCHLORIDE 50 MG/ML
INJECTION INTRAMUSCULAR; INTRAVENOUS
Status: COMPLETED
Start: 2019-12-03 | End: 2019-12-03

## 2019-12-03 RX ORDER — CLOPIDOGREL BISULFATE 75 MG/1
75 TABLET ORAL DAILY
Status: DISCONTINUED | OUTPATIENT
Start: 2019-12-04 | End: 2019-12-04 | Stop reason: HOSPADM

## 2019-12-03 RX ORDER — ATORVASTATIN CALCIUM 40 MG/1
40 TABLET, FILM COATED ORAL DAILY
Status: DISCONTINUED | OUTPATIENT
Start: 2019-12-03 | End: 2019-12-04 | Stop reason: HOSPADM

## 2019-12-03 RX ORDER — ENALAPRILAT 1.25 MG/ML
1.25 INJECTION INTRAVENOUS EVERY 6 HOURS PRN
Status: DISCONTINUED | OUTPATIENT
Start: 2019-12-03 | End: 2019-12-04 | Stop reason: HOSPADM

## 2019-12-03 RX ORDER — LIDOCAINE HYDROCHLORIDE 20 MG/ML
INJECTION, SOLUTION INFILTRATION; PERINEURAL
Status: COMPLETED
Start: 2019-12-03 | End: 2019-12-03

## 2019-12-03 RX ORDER — ONDANSETRON 4 MG/1
4 TABLET, ORALLY DISINTEGRATING ORAL EVERY 4 HOURS PRN
Status: DISCONTINUED | OUTPATIENT
Start: 2019-12-03 | End: 2019-12-04 | Stop reason: HOSPADM

## 2019-12-03 RX ORDER — ACETAMINOPHEN 325 MG/1
650 TABLET ORAL EVERY 6 HOURS PRN
Status: DISCONTINUED | OUTPATIENT
Start: 2019-12-03 | End: 2019-12-04 | Stop reason: HOSPADM

## 2019-12-03 RX ORDER — GABAPENTIN 100 MG/1
100 CAPSULE ORAL 2 TIMES DAILY
Status: DISCONTINUED | OUTPATIENT
Start: 2019-12-03 | End: 2019-12-04 | Stop reason: HOSPADM

## 2019-12-03 RX ADMIN — DIPHENHYDRAMINE HYDROCHLORIDE 50 MG: 50 INJECTION INTRAMUSCULAR; INTRAVENOUS at 14:29

## 2019-12-03 RX ADMIN — BIVALIRUDIN 250 MG: 250 INJECTION, POWDER, LYOPHILIZED, FOR SOLUTION INTRAVENOUS at 14:44

## 2019-12-03 RX ADMIN — SENNOSIDES AND DOCUSATE SODIUM 2 TABLET: 8.6; 5 TABLET ORAL at 18:01

## 2019-12-03 RX ADMIN — ATORVASTATIN CALCIUM 40 MG: 40 TABLET, FILM COATED ORAL at 18:00

## 2019-12-03 RX ADMIN — GABAPENTIN 100 MG: 100 CAPSULE ORAL at 18:00

## 2019-12-03 RX ADMIN — IOHEXOL 165 ML: 350 INJECTION, SOLUTION INTRAVENOUS at 14:58

## 2019-12-03 RX ADMIN — HEPARIN SODIUM: 1000 INJECTION, SOLUTION INTRAVENOUS; SUBCUTANEOUS at 14:44

## 2019-12-03 RX ADMIN — SODIUM CHLORIDE 1000 ML: 9 INJECTION, SOLUTION INTRAVENOUS at 13:00

## 2019-12-03 RX ADMIN — MIDAZOLAM HYDROCHLORIDE 2 MG: 1 INJECTION, SOLUTION INTRAMUSCULAR; INTRAVENOUS at 14:59

## 2019-12-03 RX ADMIN — FENTANYL CITRATE 75 MCG: 0.05 INJECTION, SOLUTION INTRAMUSCULAR; INTRAVENOUS at 14:59

## 2019-12-03 RX ADMIN — DONEPEZIL HYDROCHLORIDE 10 MG: 5 TABLET, FILM COATED ORAL at 18:01

## 2019-12-03 RX ADMIN — CLOPIDOGREL BISULFATE 600 MG: 300 TABLET, FILM COATED ORAL at 14:58

## 2019-12-03 RX ADMIN — NITROGLYCERIN 10 ML: 20 INJECTION INTRAVENOUS at 14:44

## 2019-12-03 RX ADMIN — ASPIRIN 81 MG 324 MG: 81 TABLET ORAL at 14:58

## 2019-12-03 RX ADMIN — LIDOCAINE HYDROCHLORIDE: 20 INJECTION, SOLUTION INFILTRATION; PERINEURAL at 14:35

## 2019-12-03 RX ADMIN — INSULIN HUMAN 1 UNITS: 100 INJECTION, SOLUTION PARENTERAL at 20:46

## 2019-12-03 ASSESSMENT — LIFESTYLE VARIABLES
TOTAL SCORE: 0
HAVE PEOPLE ANNOYED YOU BY CRITICIZING YOUR DRINKING: NO
HAVE YOU EVER FELT YOU SHOULD CUT DOWN ON YOUR DRINKING: NO
EVER_SMOKED: NEVER
AVERAGE NUMBER OF DAYS PER WEEK YOU HAVE A DRINK CONTAINING ALCOHOL: 0
CONSUMPTION TOTAL: NEGATIVE
TOTAL SCORE: 0
EVER FELT BAD OR GUILTY ABOUT YOUR DRINKING: NO
EVER HAD A DRINK FIRST THING IN THE MORNING TO STEADY YOUR NERVES TO GET RID OF A HANGOVER: NO
HOW MANY TIMES IN THE PAST YEAR HAVE YOU HAD 5 OR MORE DRINKS IN A DAY: 0
DOES PATIENT WANT TO STOP DRINKING: NO
TOTAL SCORE: 0
ALCOHOL_USE: YES
ON A TYPICAL DAY WHEN YOU DRINK ALCOHOL HOW MANY DRINKS DO YOU HAVE: 1

## 2019-12-03 ASSESSMENT — PATIENT HEALTH QUESTIONNAIRE - PHQ9
2. FEELING DOWN, DEPRESSED, IRRITABLE, OR HOPELESS: NOT AT ALL
SUM OF ALL RESPONSES TO PHQ9 QUESTIONS 1 AND 2: 0
1. LITTLE INTEREST OR PLEASURE IN DOING THINGS: NOT AT ALL

## 2019-12-03 ASSESSMENT — COGNITIVE AND FUNCTIONAL STATUS - GENERAL
MOVING FROM LYING ON BACK TO SITTING ON SIDE OF FLAT BED: A LITTLE
HELP NEEDED FOR BATHING: A LITTLE
CLIMB 3 TO 5 STEPS WITH RAILING: A LITTLE
SUGGESTED CMS G CODE MODIFIER DAILY ACTIVITY: CI
DAILY ACTIVITIY SCORE: 23
MOBILITY SCORE: 21
SUGGESTED CMS G CODE MODIFIER MOBILITY: CJ
TURNING FROM BACK TO SIDE WHILE IN FLAT BAD: A LITTLE

## 2019-12-03 NOTE — PROCEDURES
DATE OF SERVICE:  12/03/2019    REFERRING PHYSICIAN:  Gee Johnson MD    PROCEDURES:  1.  Right heart catheterization.  2.  Left heart catheterization.  3.  Coronary angiography.  4.  PTCA/stent placement of the proximal circumflex artery.  5.  Left ventriculogram.  6.  Monitor of conscious sedation.    PREPROCEDURE DIAGNOSIS:  Mitral valve prolapse with severe mitral   regurgitation, pending MitraClip.    POSTPROCEDURE DIAGNOSES:  1.  A 4+ mitral regurgitation.  2.  Single-vessel coronary artery disease with high-grade proximal circumflex   artery stenosis.  3.  Successful percutaneous transluminal coronary angioplasty/stent placement   of the proximal circumflex artery with 3.0x16 mm Synergy drug-eluting stent.  4.  Normal left ventricular systolic function with ejection fraction of 60%.  5.  Elevated left ventricular end-diastolic pressure.  6.  Elevated right heart pressure with pulmonary artery systolic pressure of   55 mmHg.    INDICATION:  The patient is an 86-year-old male with past medical history   significant for mitral valve prolapse and severe mitral regurgitation.  He is   pending transcatheter mitral valve repair.  He was scheduled for cardiac   catheterization.    DESCRIPTION OF PROCEDURE:  After informed consent was signed by the   patient, the patient was brought to the cardiac catheterization laboratory.  He   was prepped and draped in usual sterile manner.  The right brachial area was   anesthetized with 2% Xylocaine.  A 6-Yemeni sheath was inserted into the right   brachial vein over an existent IV.  A [place] catheter was positioned at   the pulmonary artery.  Right heart pressure measurements were obtained.    [place] catheter was removed.  The right inguinal area was anesthetized   with 2% Xylocaine.  A 4-Yemeni sheath was inserted into the right femoral artery   using the modified Seldinger technique under ultrasound guidance.  A 4-Yemeni   pigtail catheter was positioned into the  left ventricle.  Left angiography was   performed.  This was exchanged for a JL4 catheter, which was positioned into   the left main coronary artery.  Coronary angiography was performed.  This was   exchanged for a 4-[place]an 3DRC catheter, which was positioned into right   coronary artery.  Coronary angiography was performed.  This catheter was   removed.  The sheath was upgraded to 6-V[place]an.  IV Angiomax was started.    An EBU 3.5 guide catheter was positioned into the left main coronary artery.    Prowater wire was used to cross the identified stenosis.  The stenosis was   predilated with 2.5x12 mm TREK balloon.  A 3.0x16 mm Synergy drug-eluting   stent was successfully positioned and deployed.  The stent was postdilated   with 3.0x8 mm NC Emerge balloon.  The patient tolerated the procedure well.    At the end of procedure, all wires, balloons, guide, and sheaths were removed.    The right femoral arteriotomy site was closed via the Angio-Seal system.  He   was given oral Plavix and transferred to PPU in stable condition.    HEMODYNAMIC DATA:  Hemodynamic data shows aortic pressures of 100/70 with mean   of 85 mmHg and /0 with LVEDP of 20 mmHg.    AORTIC VALVE:  There is no significant gradient noted.    LEFT VENTRICULOGRAM:  A 10 mL of contrast was delivered for 3 seconds.    Ejection fraction was estimated to be 60%.  There was 4+ mitral regurgitation   noted.    ANGIOGRAM:  1.  Left main coronary artery:  Left main coronary artery is a moderate   length, very large-caliber vessel free of disease.  2.  Left anterior descending artery:  Left anterior descending artery is a   long moderate-caliber vessel, which wraps around the apex.  There is a long   moderate-caliber diagonal branches noted.  Left anterior descending artery and   its branches contain diffuse luminal irregularities of 10% to 20%.  3.  Left circumflex artery:  Left circumflex artery is a nondominant   large-caliber vessel with proximal  eccentric moderately calcified 80%   stenosis.  It then becomes a moderate caliber bifurcating first obtuse   marginal branch and large trifurcating second obtuse marginal branch.  The   obtuse marginal branches are free of disease.  4.  Right coronary artery:  Right coronary artery is a dominant   moderate-caliber vessel with luminal irregularities of 10% to 20%.  There is a   moderate length, small-caliber posterior descending artery and short   small-caliber posterolateral branches noted free of disease.    PERCUTANEOUS INTERVENTION:    Proximal eccentric 80% stenosis with 0% residual.   ISRRAEL-3 flow pre and post-PCI.    Predilatation with 2.5x20 mm TREK balloon.    Stent with 3.0x16 mm Synergy drug-eluting stent.  Postdilatation with 3.0x8 mm NC Emerge balloon.    IMPRESSION:  1.  A 4+ mitral regurgitation.  2.  Single-vessel coronary artery disease with high-grade proximal circumflex   artery stenosis.  3.  Successful percutaneous transluminal coronary angioplasty/stent placement   of the proximal circumflex artery with 3.0x16 mm Synergy drug-eluting stent.  4.  Normal left ventricular systolic function with ejection fraction of 60%.  5.  Elevated left ventricular end-diastolic pressure.  6.  Elevated right heart pressure with pulmonary artery systolic pressure of   55 mmHg.    RECOMMENDATIONS:  Recommend to treat coronary artery disease with additional   Plavix and proceed with MitraClip procedure.    SEDATION TIME: The patient's sedation was managed by myself with continuous   face to face time with the patient for 15 minutes from 14:12 to 14:27.           ____________________________________     MD KAM SANTOS / KIRSTEN    DD:  12/03/2019 15:12:34  DT:  12/03/2019 15:29:25    D#:  [id number]  Job#:  [id number]

## 2019-12-03 NOTE — TELEPHONE ENCOUNTER
Spoke with patient's wife, whom reports that she received voice message regarding follow up visit with GABBY Longoria prior to [facility] tentative for 1/7/19.     Assisted in arranging patient 12/30 f/u with [facility]. Assured patient's wife that appointment information will be printed on AVS prior to hospital discharge, as well as notification via [employer]. Wife states understanding and very thankful for assistance.

## 2019-12-04 ENCOUNTER — TELEPHONE (OUTPATIENT)
Dept: VASCULAR LAB | Facility: MEDICAL CENTER | Age: 84
End: 2019-12-04

## 2019-12-04 VITALS
WEIGHT: 146.39 LBS | HEIGHT: 66 IN | RESPIRATION RATE: 17 BRPM | SYSTOLIC BLOOD PRESSURE: 155 MMHG | TEMPERATURE: 97.5 F | DIASTOLIC BLOOD PRESSURE: 65 MMHG | HEART RATE: 60 BPM | OXYGEN SATURATION: 95 % | BODY MASS INDEX: 23.53 KG/M2

## 2019-12-04 DIAGNOSIS — Z98.890 HISTORY OF PERCUTANEOUS ANGIOPLASTY: ICD-10-CM

## 2019-12-04 PROBLEM — I25.10 CORONARY ARTERY DISEASE INVOLVING NATIVE CORONARY ARTERY OF NATIVE HEART WITHOUT ANGINA PECTORIS: Status: ACTIVE | Noted: 2019-12-04

## 2019-12-04 PROBLEM — Z95.820 S/P ANGIOPLASTY WITH STENT: Status: ACTIVE | Noted: 2019-12-04

## 2019-12-04 LAB
ANION GAP SERPL CALC-SCNC: 6 MMOL/L (ref 0–11.9)
BUN SERPL-MCNC: 17 MG/DL (ref 8–22)
CALCIUM SERPL-MCNC: 9.5 MG/DL (ref 8.5–10.5)
CHLORIDE SERPL-SCNC: 105 MMOL/L (ref 96–112)
CO2 SERPL-SCNC: 30 MMOL/L (ref 20–33)
CREAT SERPL-MCNC: 1.03 MG/DL (ref 0.5–1.4)
EKG IMPRESSION: NORMAL
ERYTHROCYTE [DISTWIDTH] IN BLOOD BY AUTOMATED COUNT: 43.4 FL (ref 35.9–50)
GLUCOSE BLD-MCNC: 107 MG/DL (ref 65–99)
GLUCOSE SERPL-MCNC: 113 MG/DL (ref 65–99)
HCT VFR BLD AUTO: 41.4 % (ref 42–52)
HGB BLD-MCNC: 13.5 G/DL (ref 14–18)
MCH RBC QN AUTO: 29.3 PG (ref 27–33)
MCHC RBC AUTO-ENTMCNC: 32.6 G/DL (ref 33.7–35.3)
MCV RBC AUTO: 89.8 FL (ref 81.4–97.8)
PLATELET # BLD AUTO: 170 K/UL (ref 164–446)
PMV BLD AUTO: 9.7 FL (ref 9–12.9)
POTASSIUM SERPL-SCNC: 4.5 MMOL/L (ref 3.6–5.5)
RBC # BLD AUTO: 4.61 M/UL (ref 4.7–6.1)
SODIUM SERPL-SCNC: 141 MMOL/L (ref 135–145)
WBC # BLD AUTO: 9.5 K/UL (ref 4.8–10.8)

## 2019-12-04 PROCEDURE — 85027 COMPLETE CBC AUTOMATED: CPT

## 2019-12-04 PROCEDURE — 93010 ELECTROCARDIOGRAM REPORT: CPT | Performed by: INTERNAL MEDICINE

## 2019-12-04 PROCEDURE — 93005 ELECTROCARDIOGRAM TRACING: CPT | Performed by: INTERNAL MEDICINE

## 2019-12-04 PROCEDURE — A9270 NON-COVERED ITEM OR SERVICE: HCPCS | Performed by: INTERNAL MEDICINE

## 2019-12-04 PROCEDURE — G0378 HOSPITAL OBSERVATION PER HR: HCPCS

## 2019-12-04 PROCEDURE — 99217 PR OBSERVATION CARE DISCHARGE: CPT | Performed by: NURSE PRACTITIONER

## 2019-12-04 PROCEDURE — 80048 BASIC METABOLIC PNL TOTAL CA: CPT

## 2019-12-04 PROCEDURE — A9270 NON-COVERED ITEM OR SERVICE: HCPCS | Performed by: NURSE PRACTITIONER

## 2019-12-04 PROCEDURE — 700102 HCHG RX REV CODE 250 W/ 637 OVERRIDE(OP): Performed by: NURSE PRACTITIONER

## 2019-12-04 PROCEDURE — 36415 COLL VENOUS BLD VENIPUNCTURE: CPT

## 2019-12-04 PROCEDURE — 82962 GLUCOSE BLOOD TEST: CPT

## 2019-12-04 PROCEDURE — 700102 HCHG RX REV CODE 250 W/ 637 OVERRIDE(OP): Performed by: INTERNAL MEDICINE

## 2019-12-04 RX ORDER — CLOPIDOGREL BISULFATE 75 MG/1
75 TABLET ORAL DAILY
Qty: 30 TAB | Refills: 11 | Status: SHIPPED
Start: 2019-12-04 | End: 2020-01-02 | Stop reason: SDUPTHER

## 2019-12-04 RX ORDER — ASPIRIN 81 MG/1
81 TABLET ORAL DAILY
Qty: 30 TAB | Refills: 11 | Status: SHIPPED | OUTPATIENT
Start: 2019-12-05 | End: 2021-01-12

## 2019-12-04 RX ORDER — ATORVASTATIN CALCIUM 40 MG/1
40 TABLET, FILM COATED ORAL DAILY
Qty: 30 TAB | Refills: 11 | Status: SHIPPED | OUTPATIENT
Start: 2019-12-05 | End: 2021-04-05

## 2019-12-04 RX ADMIN — SENNOSIDES AND DOCUSATE SODIUM 2 TABLET: 8.6; 5 TABLET ORAL at 05:59

## 2019-12-04 RX ADMIN — GABAPENTIN 100 MG: 100 CAPSULE ORAL at 05:55

## 2019-12-04 RX ADMIN — LOSARTAN POTASSIUM 100 MG: 50 TABLET ORAL at 05:55

## 2019-12-04 RX ADMIN — ATORVASTATIN CALCIUM 40 MG: 40 TABLET, FILM COATED ORAL at 05:55

## 2019-12-04 RX ADMIN — CLOPIDOGREL BISULFATE 75 MG: 75 TABLET ORAL at 05:54

## 2019-12-04 RX ADMIN — ASPIRIN 81 MG: 81 TABLET, COATED ORAL at 05:55

## 2019-12-04 NOTE — DISCHARGE INSTRUCTIONS
Discharge Instructions    Discharged to home by car with relative. Discharged via walking, hospital escort: Refused.  Special equipment needed: Not Applicable    Be sure to schedule a follow-up appointment with your primary care doctor or any specialists as instructed.     Discharge Plan:   Influenza Vaccine Indication: Not indicated: Previously immunized this influenza season and > 8 years of age    I understand that a diet low in cholesterol, fat, and sodium is recommended for good health. Unless I have been given specific instructions below for another diet, I accept this instruction as my diet prescription.   Other diet: DM cardiac    Special Instructions: None    · Is patient discharged on Warfarin / Coumadin?   No     Depression / Suicide Risk    As you are discharged from this [facility] facility, it is important to learn how to keep safe from harming yourself.    Recognize the warning signs:  · Abrupt changes in personality, positive or negative- including increase in energy   · Giving away possessions  · Change in eating patterns- significant weight changes-  positive or negative  · Change in sleeping patterns- unable to sleep or sleeping all the time   · Unwillingness or inability to communicate  · Depression  · Unusual sadness, discouragement and loneliness  · Talk of wanting to die  · Neglect of personal appearance   · Rebelliousness- reckless behavior  · Withdrawal from people/activities they love  · Confusion- inability to concentrate     If you or a loved one observes any of these behaviors or has concerns about self-harm, here's what you can do:  · Talk about it- your feelings and reasons for harming yourself  · Remove any means that you might use to hurt yourself (examples: pills, rope, extension cords, firearm)  · Get professional help from the community (Mental Health, Substance Abuse, psychological counseling)  · Do not be alone:Call your Safe Contact- someone whom you trust who will be there  "for you.  · Call your local CRISIS HOTLINE 718-5505 or 356-864-3133  · Call your local Children's Mobile Crisis Response Team [place] (100) 100-3964 or www.FORMTEK  · Call the toll free National Suicide Prevention Hotlines   · National Suicide Prevention Lifeline 935-369-TIJI (9058)  · [facility] Line Network 800-SUICIDE (032-4032)    Post Angiogram Groin Care Instructions     INSTRUCTIONS  2. Examine (look and feel) the site of your incision site TODAY so you can recognize changes that should be called to your doctor (see below).  3. Avoid straining either by lifting or pulling objects for 4-5 days. Avoid lifting over 5 pounds.   4. For at least 72 hours, if you should sneeze or cough, please hold pressure over your groin area.  5. If you should begin to have oozing from the catheterization site, please hold firm pressure and call your doctor's office immediately.  6. If profuse bleeding occurs from the catheterization site, hold firm pressure and call \"[id number]\" immediately for assistance.  7. Remove bandage after 24 hours.     ACTIVITY  2. Limit activity as instructed by your doctor.  3. No driving or very limited driving with frequent stops for one week.   4. If you must take a long car ride, stop every hour and walk around the car.   5. Warm showers or baths are permitted after the bandage is removed. Avoid hot showers, baths, hot tubs, and swimming for one week.    PLEASE CALL YOUR DOCTOR IF:  2. Temperature elevation occurs.  3. Catheterization site becomes reddened or begins to drain.   4. Bruising appears to be new or not resolving. The bruise may move down your leg. This is normal.  5. The small round lump in the groin increases in size.  6. Any leg numbness, aching, or discomfort (immediately).  7. Increasing discomfort in the leg at the insertion site.  8. Chest pains, even if relieved by Nitroglycerin.    MISCELLANEOUS INSTRUCTIONS  1. Bruising may occur as a result of heart " catheterization. Some of the discoloration may travel down the leg, going from blue to green in color.  2. A small round lump under the catheterization site will remain for up to six weeks.  3. If any questions arise call your physician's office. You can also call the HEALTH HOTLINE open 24 hours/day, 7 days/week and speak to a nurse at [phone], or toll free at [phone].   4. You should call 911 if you develop problems with breathing or chest pain.    FOR PROBLEMS CALL BERKLEY Wilson AT: [phone]    I acknowledge receipt and understanding of these Home Care instructions.      Mitral Valve Regurgitation  Mitral valve regurgitation, also called mitral regurgitation, is a condition in which blood leaks from the mitral valve in the heart. The mitral valve is located between the upper left chamber (left atrium) and the lower left chamber (left ventricle) of the heart. Normally, this valve opens when the atrium pumps blood into the ventricle, and it closes when the ventricle pumps blood out to the body.  Mitral valve regurgitation happens when the mitral valve does not close properly. As a result, blood in the ventricle leaks back into the atrium. Mitral valve regurgitation causes the heart to work harder to pump blood. If the condition is mild, a person may not have symptoms. However, over time, this can lead to heart failure.  What are the causes?  This condition may be caused by:  · A condition in which the mitral valves do not close completely when the heart pumps blood (mitral valve prolapse).  · Infection, such as endocarditis or rheumatic fever.  · Damage to the mitral valve, such as from injury (trauma) to the heart, a problem present at birth (birth defect), or a heart attack.  · Certain medicines.  What increases the risk?  This condition is more likely to develop in people who have:  · Certain forms of heart disease.  · A family history of heart valve disease.  · Certain conditions that are present at  birth (congenital).  You are also more likely to develop this condition if you have taken certain diet pills in the past.  What are the signs or symptoms?  Symptoms of this condition include:  · Shortness of breath with physical activity, like climbing stairs.  · Fast or irregular heartbeat.  · Cough.  · Suddenly waking up at night with difficulty breathing or needing to urinate.  · Heavy breathing.  · Extreme tiredness.  · Swelling in the lower legs, ankles, and feet.  In some cases of mild to moderate mitral regurgitation, there are no symptoms.  How is this diagnosed?  This condition may be diagnosed based on the results of a physical exam. Your health care provider will listen to your heart for an abnormal heart sound (murmur). You may also have other tests, including:  · An echocardiogram. This test creates ultrasound images of the heart that allow your health care provider to see how the heart valves work while your heart is beating.  · Chest X-ray.  · Electrocardiogram (ECG). This is a test that records the electrical impulses of the heart.  · Cardiac catheterization. This test is used to look at the structure and function of the heart. A thin tube (catheter) is passed through the blood vessels and into the heart. Dye is injected into the blood vessels so the cardiac system can be seen on images that are taken.  How is this treated?  This condition may be treated with:  · Medicines. These may be given to treat symptoms and prevent complications.  · Surgery to repair or replace the mitral valve in severe, long-term (chronic) cases.  Follow these instructions at home:  Lifestyle  · Limit alcohol intake to no more than 1 drink a day for nonpregnant women and 2 drinks a day for men. One drink equals 12 oz of beer, 5 oz of wine, or 1½ oz of hard liquor.  · Do not use any products that contain nicotine or tobacco, such as cigarettes and e-cigarettes. If you need help quitting, ask your health care provider.  · Eat  a heart-healthy diet that includes plenty of fresh fruits and vegetables, whole grains, low-fat (lean) protein, and low-fat dairy products. Consider working with a diet and nutrition specialist (dietitian) to help you make healthy food choices.  · Limit the amount of salt (sodium) in your diet. Avoid adding salt to foods, and avoid foods that are high in salt, such as:  ¨ Pickles.  ¨ Smoked and cured meats.  ¨ Processed foods.  · Maintain a healthy weight and stay physically active. Ask your health care provider to recommend activities that are safe for you.  · Try to get 7 or more hours of sleep each night.  · Find ways to manage stress. If you need help with this, ask your health care provider.  General instructions  · Take over-the-counter and prescription medicines only as told by your health care provider.  · Work closely with your health care provider to manage any other health conditions you have, such as diabetes or high blood pressure.  · If you plan to become pregnant, talk with your health care provider first.  · Keep all follow-up visits as told by your health care provider. This is important.  Contact a health care provider if:  · You have a fever.  · You feel more tired than usual when doing physical activity.  · You have a dry cough.  Get help right away if:  · You have shortness of breath.  · You develop chest pain.  · You have swelling in your hands, feet, ankles, or abdomen that is getting worse.  · You have trouble staying awake or you faint.  · You feel dizzy or unsteady.  · You suddenly gain weight.  · You feel confused.  · Any of your symptoms begin to get worse.  These symptoms may represent a serious problem that is an emergency. Do not wait to see if the symptoms will go away. Get medical help right away. Call your local emergency services (911 in the [place] Do not drive yourself to the hospital.   Summary  · Mitral valve regurgitation, also called mitral regurgitation, is a condition in  which blood leaks from a valve between two chambers of the heart (mitral valve).  · Depending on how severe your condition is, you may be treated with medicines or surgery.  · Practice heart-healthy habits to manage this condition. These include limiting alcohol, avoiding nicotine and tobacco, and eating a balanced diet that is low in salt (sodium).  This information is not intended to replace advice given to you by your health care provider. Make sure you discuss any questions you have with your health care provider.  Document Released: 03/07/2006 Document Revised: 09/29/2017 Document Reviewed: 09/29/2017  Elsevier Interactive Patient Education © 2017 [employer]vier Inc.

## 2019-12-04 NOTE — PROGRESS NOTES
Received report from ERIK Chan, PPU. Patient slid onto bed with slide board. Patient placed on monitor, monitor room notified. Patient SB 52. Patient oriented to room and call light. Assessment completed. No complaints of pain at this time. Groin site soft, no signs on bleeding.

## 2019-12-04 NOTE — PROGRESS NOTES
· 2 RN skin check complete with ERIK Carter.  · Devices in place: None  · Skin assessed under devices: NA  · Confirmed pressure ulcers found on: None  · New potential pressure ulcers noted on:   ·  elbows pink and blanching, white scattered spots on feet, pt says this is normal per him, sacrum pink and blanching, will continue to monitor   · The following interventions in place: Encouraged frequent turning

## 2019-12-04 NOTE — PROGRESS NOTES
Bedside report received. Patient A&O x4. No complaints of pain. Pt's groin site is clean dry and intact, intact peripheral CSM's, soft around the site, will continue to monitor. Pt is no longer on bedrest.      POC discussed with patient. Patient verbalized understanding. Call light and belongings within reach. Bed locked and in lowest position, alarm and fall precautions in place.

## 2019-12-04 NOTE — OR NURSING
1527 Pt report received from cath lab RN, pt brought over on a gurney by cath lab RN, pt placed on Tele monitor, VSS, no C/O pain at this. Right going site is clean, dry and soft, dressing intact. Family at beside, pt given water and a snack.     1545 Pt groin site clean, dry and soft, no C/O pain     1600 Pt groin site clean, dry and soft, no C/O pain     1615 Pt groin site clean, dry and soft, no C/O pain     1630 Pt groin site clean, dry and soft, no C/O pain     1640 Pt report given to accepting RN on tele floor, pt and family updated on transfer to room. Pt taken up to tele floor with RN on monitor. Groin site reviewed with accepting RN.

## 2019-12-04 NOTE — PROGRESS NOTES
Patient being discharged. Pt educated on discharge instructions and new prescriptions, verbalized understanding. Follow up appointment made with cardiology. PIV removed, monitor checked in. Patient going home via car with wife. Patient sent with meds to beds and all belongings.

## 2019-12-04 NOTE — PROGRESS NOTES
Bedside report received from night RN, pt care assumed. Pt is A&Ox4, pain 0/10. Updated on POC, questions answered. Bed in lowest, locked position, treaded socks on, call light and belongings within reach.

## 2019-12-04 NOTE — CARE PLAN
Problem: Safety  Goal: Will remain free from injury  Outcome: PROGRESSING AS EXPECTED     Problem: Communication  Goal: The ability to communicate needs accurately and effectively will improve  Outcome: PROGRESSING AS EXPECTED   Pt's whiteboard updated. Pt has been updated on POC. All questions have been answered.

## 2019-12-04 NOTE — DISCHARGE SUMMARY
Discharge Summary    CHIEF COMPLAINT ON ADMISSION  No chief complaint on file.      Reason for Admission  Elective cardiac catheterization for preoperative work up for non-rheumatic mitral (valve) prolapse.     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 Past medical history significant for hypertension, DM, HLD and prostatectomy.      Cardiac catheterization showed single vessel coronary artery disease with high-grade proximal circumflex artery stenosis, he underwent successful percutaneous transluminal coronary angioplasty/stent placement of the proximal circumflex artery with 3.0x16 mm Synergy drug-eluting stent    Post-cath, the patient recovered well with no complications.The right brachial cath site was clean, dry, and intact with no signs of hematoma, bleeding, or infection. Discharge instructions related to lifting precautions with brachial site for one week was discussed. Patient was able to ambulate the halls without any exertional angina. Vitals and laboratory workup remained unremarkable. DAPT and statin adherence was also discussed in detail and he was reminded to hold Metformin for 48 hours post cath. Patient was discharged to home with family with no further questions/concerns, their outpatient follow up has been made by our office as below.       PROCEDURES  LEFT CARDIAC CATH on 12/4/19  POST-OPERATIVE DIAGNOSES:   1.  A 4+ mitral regurgitation.  2.  Single-vessel coronary artery disease with high-grade proximal circumflex artery stenosis.  3.  Successful percutaneous transluminal coronary angioplasty/stent placement of the proximal circumflex artery with 3.0x16 mm Synergy drug-eluting stent.  4.  Normal left ventricular systolic function with ejection fraction of 60%.  5.  Elevated left  ventricular end-diastolic pressure.  6.  Elevated right heart pressure with pulmonary artery systolic pressure of 55 mmHg.    FOLLOW UP  Future Appointments   Date Time Provider Department Center   12/30/2019 11:15 AM GABBY Anderson [facility] None       MEDICATIONS ON DISCHARGE     Medication List      START taking these medications      Instructions   aspirin 81 MG EC tablet  Start taking on:  December 5, 2019   Take 1 Tab by mouth every day.  Dose:  81 mg     clopidogrel 75 MG Tabs  Commonly known as:  PLAVIX   Doctor's comments:  meds to bed, discharge today. This rx was submitted by a pharmacist working under a collaborative practice agreement.  Take 1 Tab by mouth every day.  Dose:  75 mg        CHANGE how you take these medications      Instructions   atorvastatin 40 MG Tabs  Start taking on:  December 5, 2019  What changed:    · medication strength  · how much to take  Commonly known as:  LIPITOR   Take 1 Tab by mouth every day.  Dose:  40 mg        CONTINUE taking these medications      Instructions   diphenhydrAMINE 25 MG Tabs  Commonly known as:  BENADRYL ALLERGY   Take 2 tablet by mouth 1 time daily as needed (Pre-procedural.  Take Benadryl if instructed by cath lab.).  Dose:  50 mg     docusate sodium 100 MG Caps  Commonly known as:  COLACE   Take 100 mg by mouth 2 times a day as needed for Constipation.  Dose:  100 mg     donepezil 10 MG tablet  Commonly known as:  ARICEPT   Take 10 mg by mouth every evening.  Dose:  10 mg     gabapentin 100 MG Caps  Commonly known as:  NEURONTIN   TAKE 1 CAPSULE BY MOUTH TWICE DAILY MAY INCREASE AS NEEDED EVERY 5 DAYS UP TO MAXIMUM OF 18 CAPSULES PER DAY     losartan 100 MG Tabs  Commonly known as:  COZAAR   Take 100 mg by mouth every day.  Dose:  100 mg     metFORMIN 500 MG Tabs  Commonly known as:  GLUCOPHAGE   Take 500-1,000 mg by mouth 2 times a day, with meals. 1000 mg in am and 500 mg in pm  Dose:  500-1,000 mg     predniSONE 20 MG Tabs  Commonly known  as:  DELTASONE   Doctor's comments:  Pre-procedural medication  Take first dose approximately 20 hours before procedure.  Take last dose two hours prior to procedure.            LABORATORY  Lab Results   Component Value Date    SODIUM 141 12/04/2019    POTASSIUM 4.5 12/04/2019    CHLORIDE 105 12/04/2019    CO2 30 12/04/2019    GLUCOSE 113 (H) 12/04/2019    BUN 17 12/04/2019    CREATININE 1.03 12/04/2019        Lab Results   Component Value Date    WBC 9.5 12/04/2019    HEMOGLOBIN 13.5 (L) 12/04/2019    HEMATOCRIT 41.4 (L) 12/04/2019    PLATELETCT 170 12/04/2019

## 2019-12-04 NOTE — TELEPHONE ENCOUNTER
Meds to Bed  Per Dr. Pam Best plavix 75mg take 1 tab a day. #30 RF11  Pt to be discharged today.   Sent to  pharmacy for processing.     Verónica Browning, PharmD

## 2019-12-04 NOTE — DISCHARGE PLANNING
[facility]    Delivered clopidogrel 75 mg #30t at pt's bedside via [facility] Meds-to-Beds program. Provided counseling and informed pt importance of compliance. Pt understands this medication is for use only after discharge from the hospital. Answered pt's questions.    Sarah Chapa, MarichuyD

## 2019-12-09 ENCOUNTER — HOSPITAL ENCOUNTER (OUTPATIENT)
Dept: LAB | Facility: MEDICAL CENTER | Age: 84
End: 2019-12-09
Attending: UROLOGY
Payer: MEDICARE

## 2019-12-09 PROCEDURE — 87086 URINE CULTURE/COLONY COUNT: CPT

## 2019-12-09 PROCEDURE — 87186 SC STD MICRODIL/AGAR DIL: CPT

## 2019-12-17 DIAGNOSIS — Z00.6 EXAMINATION OF PARTICIPANT IN CLINICAL TRIAL: ICD-10-CM

## 2019-12-17 DIAGNOSIS — I34.0 NONRHEUMATIC MITRAL VALVE REGURGITATION: ICD-10-CM

## 2019-12-17 LAB
BACTERIA UR CULT: ABNORMAL
BACTERIA UR CULT: ABNORMAL
SIGNIFICANT IND 70042: ABNORMAL
SITE SITE: ABNORMAL
SOURCE SOURCE: ABNORMAL

## 2019-12-27 ENCOUNTER — HOSPITAL ENCOUNTER (OUTPATIENT)
Dept: RADIOLOGY | Facility: MEDICAL CENTER | Age: 84
End: 2019-12-27
Attending: NEUROLOGICAL SURGERY
Payer: MEDICARE

## 2019-12-27 DIAGNOSIS — I73.9 PERIPHERAL VASCULAR DISEASE, UNSPECIFIED (HCC): ICD-10-CM

## 2019-12-27 PROCEDURE — 93922 UPR/L XTREMITY ART 2 LEVELS: CPT

## 2019-12-27 PROCEDURE — 93922 UPR/L XTREMITY ART 2 LEVELS: CPT | Mod: 26 | Performed by: INTERNAL MEDICINE

## 2019-12-30 ENCOUNTER — TELEPHONE (OUTPATIENT)
Dept: CARDIOLOGY | Facility: MEDICAL CENTER | Age: 84
End: 2019-12-30

## 2019-12-30 ENCOUNTER — OFFICE VISIT (OUTPATIENT)
Dept: CARDIOLOGY | Facility: MEDICAL CENTER | Age: 84
End: 2019-12-30
Payer: MEDICARE

## 2019-12-30 VITALS
HEIGHT: 66 IN | DIASTOLIC BLOOD PRESSURE: 72 MMHG | OXYGEN SATURATION: 95 % | HEART RATE: 66 BPM | SYSTOLIC BLOOD PRESSURE: 142 MMHG | BODY MASS INDEX: 23.93 KG/M2 | WEIGHT: 148.92 LBS

## 2019-12-30 DIAGNOSIS — Z88.8 ALLERGY TO IODINE: ICD-10-CM

## 2019-12-30 DIAGNOSIS — I34.0 SEVERE MITRAL REGURGITATION: ICD-10-CM

## 2019-12-30 DIAGNOSIS — I25.10 CORONARY ARTERY DISEASE INVOLVING NATIVE CORONARY ARTERY OF NATIVE HEART WITHOUT ANGINA PECTORIS: ICD-10-CM

## 2019-12-30 DIAGNOSIS — I10 ESSENTIAL HYPERTENSION: ICD-10-CM

## 2019-12-30 DIAGNOSIS — Z95.820 S/P ANGIOPLASTY WITH STENT: ICD-10-CM

## 2019-12-30 PROBLEM — I34.1 MVP (MITRAL VALVE PROLAPSE): Status: RESOLVED | Noted: 2019-08-29 | Resolved: 2019-12-30

## 2019-12-30 PROCEDURE — 99214 OFFICE O/P EST MOD 30 MIN: CPT | Performed by: NURSE PRACTITIONER

## 2019-12-30 RX ORDER — PREDNISONE 50 MG/1
TABLET ORAL
Qty: 4 TAB | Refills: 0 | Status: ON HOLD
Start: 2019-12-30 | End: 2020-01-08

## 2019-12-30 ASSESSMENT — ENCOUNTER SYMPTOMS
FEVER: 0
DIZZINESS: 0
ORTHOPNEA: 0
CLAUDICATION: 0
SHORTNESS OF BREATH: 0
COUGH: 0
PALPITATIONS: 0
PND: 0
ABDOMINAL PAIN: 0
MYALGIAS: 0

## 2019-12-30 NOTE — PROGRESS NOTES
"Chief Complaint   Patient presents with   • Follow-Up     Subjective:   Duy Jauregui is a 86 y.o. male who presents today for pre-everardo clip procedure instructions.    He is a patient of Dr. Wilson in our office.    Hx of prostate CA, DM (oral medications), HLD, HTN, and now with mitral valve regurgitation requiring everardo clip procedure.    He presents today with his wife. He is glad to finally be ready for this procedure.     We went over pre-operative instructions today.    He is slightly fatigued but otherwise feels good.    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;  Surgeon: Brad Jones M.D.;  Location: SURGERY [facility];  Service: Urology   • PROSTATECTOMY, RADIAL     • TONSILLECTOMY       Family History   Problem Relation Age of Onset   • Heart Attack Father    • Lung Disease Mother      Social History     Socioeconomic History   • Marital status:      Spouse name: Not on file   • Number of children: Not on file   • Years of education: Not on file   • Highest education level: Not on file   Occupational History   • Not on file   Social Needs   • Financial resource strain: Not on file   • Food insecurity:     Worry: Not on file     Inability: Not on file   • Transportation needs:     Medical: Not on file     Non-medical: Not on file   Tobacco Use   • Smoking status: Former Smoker     Packs/day: 0.00     Types: Pipe   • Smokeless tobacco: Never Used   Substance and Sexual Activity   • Alcohol use: Yes     Alcohol/week: 0.6 " oz     Types: 1 Glasses of wine per week     Frequency: Monthly or less     Drinks per session: 1 or 2     Comment: OCCASIONALLY   • Drug use: No   • Sexual activity: Not on file   Lifestyle   • Physical activity:     Days per week: Not on file     Minutes per session: Not on file   • Stress: Not on file   Relationships   • Social connections:     Talks on phone: Not on file     Gets together: Not on file     Attends Hoahaoism service: Not on file     Active member of club or organization: Not on file     Attends meetings of clubs or organizations: Not on file     Relationship status: Not on file   • Intimate partner violence:     Fear of current or ex partner: Not on file     Emotionally abused: Not on file     Physically abused: Not on file     Forced sexual activity: Not on file   Other Topics Concern   • Not on file   Social History Narrative   • Not on file     Allergies   Allergen Reactions   • Iodide      Per pt broke out in rash   • Penicillins Rash     .     Outpatient Encounter Medications as of 12/30/2019   Medication Sig Dispense Refill   • predniSONE (DELTASONE) 50 MG Tab Take pre-procedurally as noted. 4 Tab 0   • clopidogrel (PLAVIX) 75 MG Tab Take 1 Tab by mouth every day. 30 Tab 11   • atorvastatin (LIPITOR) 40 MG Tab Take 1 Tab by mouth every day. 30 Tab 11   • aspirin EC 81 MG EC tablet Take 1 Tab by mouth every day. 30 Tab 11   • diphenhydrAMINE (BENADRYL ALLERGY) 25 MG Tab Take 2 tablet by mouth 1 time daily as needed (Pre-procedural.  Take Benadryl if instructed by cath lab.). 2 tablet 0   • gabapentin (NEURONTIN) 100 MG Cap TAKE 1 CAPSULE BY MOUTH TWICE DAILY MAY INCREASE AS NEEDED EVERY 5 DAYS UP TO MAXIMUM OF 18 CAPSULES PER DAY  5   • metFORMIN (GLUCOPHAGE) 500 MG Tab Take 500-1,000 mg by mouth 2 times a day, with meals. 1000 mg in am and 500 mg in pm  3   • docusate sodium (COLACE) 100 MG Cap Take 100 mg by mouth 2 times a day as needed for Constipation.     • donepezil (ARICEPT) 10 MG  "tablet Take 10 mg by mouth every evening.  3   • losartan (COZAAR) 100 MG Tab Take 100 mg by mouth every day.     • [DISCONTINUED] predniSONE (DELTASONE) 20 MG Tab Take first dose approximately 20 hours before procedure.  Take last dose two hours prior to procedure. (Patient not taking: Reported on 12/30/2019) 3 Tab 0     No facility-administered encounter medications on file as of 12/30/2019.      Review of Systems   Constitutional: Positive for malaise/fatigue. Negative for fever.   Respiratory: Negative for cough and shortness of breath.    Cardiovascular: Negative for chest pain, palpitations, orthopnea, claudication, leg swelling and PND.   Gastrointestinal: Negative for abdominal pain.   Musculoskeletal: Negative for myalgias.   Neurological: Negative for dizziness.        Objective:   /72 (BP Location: Left arm, Patient Position: Sitting, BP Cuff Size: Adult)   Pulse 66   Ht 1.676 m (5' 6\")   Wt 67.5 kg (148 lb 14.7 oz)   SpO2 95%   BMI 24.04 kg/m²     Physical Exam   Constitutional: He appears well-developed and well-nourished.   Eyes: EOM are normal.   Pulmonary/Chest: Effort normal.   Musculoskeletal: Normal range of motion.   Nursing note and vitals reviewed.      Assessment:     1. Severe mitral regurgitation  US-CAROTID DOPPLER BILAT   2. Essential hypertension  US-CAROTID DOPPLER BILAT   3. S/P angioplasty with stent  US-CAROTID DOPPLER BILAT   4. Coronary artery disease involving native coronary artery of native heart without angina pectoris  US-CAROTID DOPPLER BILAT   5. 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 Severe MR  -pending everardo clip on 1/7/20  -pre-op instructions given to patient today  -hold metformin 5 days prior  -benadryl/prednisone course prior to procedure for iodine allergy, sent to pharmacy  -needs carotid duplex prior to procedure, ordered today  -cont asa    2. CAD with recent stent " placement  -no angina or TAVERSA  -cont asa, plavix (don't stop prior to procedure)  -cont statin    3. HTN  -good control at home but white coat HTN in office  -follow at home prior to procedure, call for SBP >140  -cont losartan 100 mg QD    FU in clinic in 1 week post everardo clip    Patient verbalizes understanding and agrees with the plan of care.     Collaborating MD: Jean Paul PARISH

## 2020-01-02 ENCOUNTER — HOSPITAL ENCOUNTER (OUTPATIENT)
Dept: RADIOLOGY | Facility: MEDICAL CENTER | Age: 85
End: 2020-01-02
Attending: NURSE PRACTITIONER
Payer: MEDICARE

## 2020-01-02 DIAGNOSIS — I10 ESSENTIAL HYPERTENSION: ICD-10-CM

## 2020-01-02 DIAGNOSIS — Z95.820 S/P ANGIOPLASTY WITH STENT: ICD-10-CM

## 2020-01-02 DIAGNOSIS — I34.0 SEVERE MITRAL REGURGITATION: ICD-10-CM

## 2020-01-02 DIAGNOSIS — I25.10 CORONARY ARTERY DISEASE INVOLVING NATIVE CORONARY ARTERY OF NATIVE HEART WITHOUT ANGINA PECTORIS: ICD-10-CM

## 2020-01-02 DIAGNOSIS — Z98.890 HISTORY OF PERCUTANEOUS ANGIOPLASTY: ICD-10-CM

## 2020-01-02 PROCEDURE — 93880 EXTRACRANIAL BILAT STUDY: CPT

## 2020-01-02 PROCEDURE — 93880 EXTRACRANIAL BILAT STUDY: CPT | Mod: 26 | Performed by: INTERNAL MEDICINE

## 2020-01-02 RX ORDER — CLOPIDOGREL BISULFATE 75 MG/1
75 TABLET ORAL DAILY
Qty: 90 TAB | Refills: 3 | Status: SHIPPED | OUTPATIENT
Start: 2020-01-02 | End: 2021-01-05

## 2020-01-03 ENCOUNTER — TELEPHONE (OUTPATIENT)
Dept: CARDIOLOGY | Facility: MEDICAL CENTER | Age: 85
End: 2020-01-03

## 2020-01-03 DIAGNOSIS — Z88.8 ALLERGY TO IODINE: ICD-10-CM

## 2020-01-03 DIAGNOSIS — Z29.9 NEED FOR PROPHYLACTIC MEASURE: ICD-10-CM

## 2020-01-03 RX ORDER — PREDNISONE 50 MG/1
50 TABLET ORAL 2 TIMES DAILY
Qty: 4 TAB | Refills: 0 | Status: SHIPPED | OUTPATIENT
Start: 2020-01-05 | End: 2020-01-07

## 2020-01-03 NOTE — TELEPHONE ENCOUNTER
Spoke with patient and wife calling to request APRN SC to complete a letter for patient's updated living will saying that patient is mentally competent to make his own medical and personal decisions.     Explained to patient that APRN SC is out of office, however this type of request would be best to facilitate via primary care provide. Verified that patient does have primary care provider, and patient agrees to follow up with PCP for such request.     Reviewed pre-op instructions for TMVR 1/7/19 including: continue holding metformin, NPO midnight prior, and check in 0530 at [facility] check in desk.     Reviewed also that due to patient's iodide allergy premedication will be ordered. Per JI patient to take prednisone 50 mg PO and benadryl 50 mg PO x4 doses pre-op. First dose 1/5 PM, second 1/6 AM, third 1/6 PM, and last 1/7 one hour prior to procedure. Verified patient's preferred pharmacy and Rx sent to such.     Patient and wife state understanding of all instructions and agree with the plan. Patient and wife state no further questions at this time and very thankful for assistance today.

## 2020-01-06 ENCOUNTER — TELEPHONE (OUTPATIENT)
Dept: CARDIOLOGY | Facility: MEDICAL CENTER | Age: 85
End: 2020-01-06

## 2020-01-06 NOTE — TELEPHONE ENCOUNTER
Spoke with patient's wife who has questions regarding patient's urology procedure.     She states that Thursday patient underwent bladder irrigation. She states that patient is having scant hematuria and is wanting to inform our heart team to assure this will no pose any risks with proceeding with TMVR tomorrow 1/7/20.     Reviewed also that per our conversation on Friday, patient's carotid results are in. Reviewed per APRN SC, carotid results look good with only minimal plaques.     Wife states understanding of all information. Patient and wife state no further questions at this time and thankful for assistance today.

## 2020-01-06 NOTE — TELEPHONE ENCOUNTER
SC      Patient is scheduled for a mitral valve repair tomorrow, his wife Phoebe has a few questions. She is asking for a call back from Keke. She can be reached at [phone].

## 2020-01-07 ENCOUNTER — APPOINTMENT (OUTPATIENT)
Dept: RADIOLOGY | Facility: MEDICAL CENTER | Age: 85
DRG: 267 | End: 2020-01-07
Attending: NURSE PRACTITIONER
Payer: MEDICARE

## 2020-01-07 ENCOUNTER — HOSPITAL ENCOUNTER (INPATIENT)
Facility: MEDICAL CENTER | Age: 85
LOS: 1 days | DRG: 267 | End: 2020-01-08
Attending: INTERNAL MEDICINE | Admitting: INTERNAL MEDICINE
Payer: MEDICARE

## 2020-01-07 ENCOUNTER — APPOINTMENT (OUTPATIENT)
Dept: CARDIOLOGY | Facility: MEDICAL CENTER | Age: 85
DRG: 267 | End: 2020-01-07
Attending: ANESTHESIOLOGY
Payer: MEDICARE

## 2020-01-07 ENCOUNTER — ANESTHESIA (OUTPATIENT)
Dept: SURGERY | Facility: MEDICAL CENTER | Age: 85
DRG: 267 | End: 2020-01-07
Payer: MEDICARE

## 2020-01-07 ENCOUNTER — ANESTHESIA EVENT (OUTPATIENT)
Dept: SURGERY | Facility: MEDICAL CENTER | Age: 85
DRG: 267 | End: 2020-01-07
Payer: MEDICARE

## 2020-01-07 PROBLEM — Z95.4: Status: ACTIVE | Noted: 2019-07-17

## 2020-01-07 PROBLEM — Z95.818 S/P MITRAL VALVE CLIP IMPLANTATION: Status: ACTIVE | Noted: 2019-07-17

## 2020-01-07 PROBLEM — Z98.890 S/P MITRAL VALVE CLIP IMPLANTATION: Status: ACTIVE | Noted: 2019-07-17

## 2020-01-07 LAB
ABO + RH BLD: NORMAL
ABO GROUP BLD: NORMAL
ACT BLD: 241 SEC (ref 74–137)
ACT BLD: 252 SEC (ref 74–137)
ACT BLD: 252 SEC (ref 74–137)
ALBUMIN SERPL BCP-MCNC: 3.6 G/DL (ref 3.2–4.9)
ALBUMIN SERPL BCP-MCNC: 4.2 G/DL (ref 3.2–4.9)
ALBUMIN/GLOB SERPL: 1.6 G/DL
ALBUMIN/GLOB SERPL: 1.6 G/DL
ALP SERPL-CCNC: 49 U/L (ref 30–99)
ALP SERPL-CCNC: 58 U/L (ref 30–99)
ALT SERPL-CCNC: 17 U/L (ref 2–50)
ALT SERPL-CCNC: 21 U/L (ref 2–50)
ANION GAP SERPL CALC-SCNC: 8 MMOL/L (ref 0–11.9)
ANION GAP SERPL CALC-SCNC: 9 MMOL/L (ref 0–11.9)
AST SERPL-CCNC: 17 U/L (ref 12–45)
AST SERPL-CCNC: 19 U/L (ref 12–45)
BASOPHILS # BLD AUTO: 0.1 % (ref 0–1.8)
BASOPHILS # BLD: 0.01 K/UL (ref 0–0.12)
BILIRUB SERPL-MCNC: 0.8 MG/DL (ref 0.1–1.5)
BILIRUB SERPL-MCNC: 0.9 MG/DL (ref 0.1–1.5)
BLD GP AB SCN SERPL QL: NORMAL
BUN SERPL-MCNC: 13 MG/DL (ref 8–22)
BUN SERPL-MCNC: 15 MG/DL (ref 8–22)
CALCIUM SERPL-MCNC: 8.5 MG/DL (ref 8.5–10.5)
CALCIUM SERPL-MCNC: 9.1 MG/DL (ref 8.5–10.5)
CHLORIDE SERPL-SCNC: 106 MMOL/L (ref 96–112)
CHLORIDE SERPL-SCNC: 109 MMOL/L (ref 96–112)
CO2 SERPL-SCNC: 25 MMOL/L (ref 20–33)
CO2 SERPL-SCNC: 30 MMOL/L (ref 20–33)
CREAT SERPL-MCNC: 0.8 MG/DL (ref 0.5–1.4)
CREAT SERPL-MCNC: 0.86 MG/DL (ref 0.5–1.4)
EKG IMPRESSION: NORMAL
EOSINOPHIL # BLD AUTO: 0 K/UL (ref 0–0.51)
EOSINOPHIL NFR BLD: 0 % (ref 0–6.9)
ERYTHROCYTE [DISTWIDTH] IN BLOOD BY AUTOMATED COUNT: 42.2 FL (ref 35.9–50)
ERYTHROCYTE [DISTWIDTH] IN BLOOD BY AUTOMATED COUNT: 42.5 FL (ref 35.9–50)
GLOBULIN SER CALC-MCNC: 2.3 G/DL (ref 1.9–3.5)
GLOBULIN SER CALC-MCNC: 2.6 G/DL (ref 1.9–3.5)
GLUCOSE BLD-MCNC: 117 MG/DL (ref 65–99)
GLUCOSE BLD-MCNC: 153 MG/DL (ref 65–99)
GLUCOSE BLD-MCNC: 183 MG/DL (ref 65–99)
GLUCOSE SERPL-MCNC: 156 MG/DL (ref 65–99)
GLUCOSE SERPL-MCNC: 158 MG/DL (ref 65–99)
HCT VFR BLD AUTO: 34.4 % (ref 42–52)
HCT VFR BLD AUTO: 36.7 % (ref 42–52)
HGB BLD-MCNC: 11.5 G/DL (ref 14–18)
HGB BLD-MCNC: 12.4 G/DL (ref 14–18)
IMM GRANULOCYTES # BLD AUTO: 0.06 K/UL (ref 0–0.11)
IMM GRANULOCYTES NFR BLD AUTO: 0.6 % (ref 0–0.9)
INR PPP: 1.01 (ref 0.87–1.13)
LYMPHOCYTES # BLD AUTO: 0.73 K/UL (ref 1–4.8)
LYMPHOCYTES NFR BLD: 7.5 % (ref 22–41)
MCH RBC QN AUTO: 30 PG (ref 27–33)
MCH RBC QN AUTO: 30 PG (ref 27–33)
MCHC RBC AUTO-ENTMCNC: 33.4 G/DL (ref 33.7–35.3)
MCHC RBC AUTO-ENTMCNC: 33.8 G/DL (ref 33.7–35.3)
MCV RBC AUTO: 88.9 FL (ref 81.4–97.8)
MCV RBC AUTO: 89.8 FL (ref 81.4–97.8)
MONOCYTES # BLD AUTO: 0.38 K/UL (ref 0–0.85)
MONOCYTES NFR BLD AUTO: 3.9 % (ref 0–13.4)
NEUTROPHILS # BLD AUTO: 8.51 K/UL (ref 1.82–7.42)
NEUTROPHILS NFR BLD: 87.9 % (ref 44–72)
NRBC # BLD AUTO: 0 K/UL
NRBC BLD-RTO: 0 /100 WBC
NT-PROBNP SERPL IA-MCNC: 245 PG/ML (ref 0–125)
NT-PROBNP SERPL IA-MCNC: 289 PG/ML (ref 0–125)
PLATELET # BLD AUTO: 171 K/UL (ref 164–446)
PLATELET # BLD AUTO: 200 K/UL (ref 164–446)
PMV BLD AUTO: 9.5 FL (ref 9–12.9)
PMV BLD AUTO: 9.7 FL (ref 9–12.9)
POTASSIUM SERPL-SCNC: 4.1 MMOL/L (ref 3.6–5.5)
POTASSIUM SERPL-SCNC: 4.6 MMOL/L (ref 3.6–5.5)
PROT SERPL-MCNC: 5.9 G/DL (ref 6–8.2)
PROT SERPL-MCNC: 6.8 G/DL (ref 6–8.2)
PROTHROMBIN TIME: 13.5 SEC (ref 12–14.6)
RBC # BLD AUTO: 3.83 M/UL (ref 4.7–6.1)
RBC # BLD AUTO: 4.13 M/UL (ref 4.7–6.1)
RH BLD: NORMAL
SODIUM SERPL-SCNC: 143 MMOL/L (ref 135–145)
SODIUM SERPL-SCNC: 144 MMOL/L (ref 135–145)
WBC # BLD AUTO: 11.3 K/UL (ref 4.8–10.8)
WBC # BLD AUTO: 9.7 K/UL (ref 4.8–10.8)

## 2020-01-07 PROCEDURE — 160002 HCHG RECOVERY MINUTES (STAT): Performed by: INTERNAL MEDICINE

## 2020-01-07 PROCEDURE — 700111 HCHG RX REV CODE 636 W/ 250 OVERRIDE (IP): Performed by: INTERNAL MEDICINE

## 2020-01-07 PROCEDURE — 82962 GLUCOSE BLOOD TEST: CPT | Mod: 91

## 2020-01-07 PROCEDURE — 71045 X-RAY EXAM CHEST 1 VIEW: CPT

## 2020-01-07 PROCEDURE — 700105 HCHG RX REV CODE 258: Performed by: NURSE PRACTITIONER

## 2020-01-07 PROCEDURE — 85027 COMPLETE CBC AUTOMATED: CPT

## 2020-01-07 PROCEDURE — 700111 HCHG RX REV CODE 636 W/ 250 OVERRIDE (IP): Performed by: ANESTHESIOLOGY

## 2020-01-07 PROCEDURE — 85025 COMPLETE CBC W/AUTO DIFF WBC: CPT

## 2020-01-07 PROCEDURE — 770020 HCHG ROOM/CARE - TELE (206)

## 2020-01-07 PROCEDURE — C1725 CATH, TRANSLUMIN NON-LASER: HCPCS | Performed by: INTERNAL MEDICINE

## 2020-01-07 PROCEDURE — 85610 PROTHROMBIN TIME: CPT

## 2020-01-07 PROCEDURE — 700105 HCHG RX REV CODE 258: Performed by: INTERNAL MEDICINE

## 2020-01-07 PROCEDURE — B24BZZZ ULTRASONOGRAPHY OF HEART WITH AORTA: ICD-10-PCS | Performed by: ANESTHESIOLOGY

## 2020-01-07 PROCEDURE — 700101 HCHG RX REV CODE 250: Performed by: ANESTHESIOLOGY

## 2020-01-07 PROCEDURE — 33418 REPAIR TCAT MITRAL VALVE: CPT | Mod: 62,Q0 | Performed by: INTERNAL MEDICINE

## 2020-01-07 PROCEDURE — 160009 HCHG ANES TIME/MIN: Performed by: INTERNAL MEDICINE

## 2020-01-07 PROCEDURE — C1894 INTRO/SHEATH, NON-LASER: HCPCS | Performed by: INTERNAL MEDICINE

## 2020-01-07 PROCEDURE — 36415 COLL VENOUS BLD VENIPUNCTURE: CPT

## 2020-01-07 PROCEDURE — 33419 REPAIR TCAT MITRAL VALVE: CPT | Mod: 62,Q0 | Performed by: INTERNAL MEDICINE

## 2020-01-07 PROCEDURE — 502240 HCHG MISC OR SUPPLY RC 0272: Performed by: INTERNAL MEDICINE

## 2020-01-07 PROCEDURE — 86850 RBC ANTIBODY SCREEN: CPT

## 2020-01-07 PROCEDURE — A6402 STERILE GAUZE <= 16 SQ IN: HCPCS | Performed by: INTERNAL MEDICINE

## 2020-01-07 PROCEDURE — 160031 HCHG SURGERY MINUTES - 1ST 30 MINS LEVEL 5: Performed by: INTERNAL MEDICINE

## 2020-01-07 PROCEDURE — 501789 HCHG NRG RF TRANSSEPTAL NEEDLE BAYLIS: Performed by: INTERNAL MEDICINE

## 2020-01-07 PROCEDURE — 33418 REPAIR TCAT MITRAL VALVE: CPT | Mod: 62,Q0 | Performed by: THORACIC SURGERY (CARDIOTHORACIC VASCULAR SURGERY)

## 2020-01-07 PROCEDURE — 93005 ELECTROCARDIOGRAM TRACING: CPT | Performed by: NURSE PRACTITIONER

## 2020-01-07 PROCEDURE — 86901 BLOOD TYPING SEROLOGIC RH(D): CPT

## 2020-01-07 PROCEDURE — 160036 HCHG PACU - EA ADDL 30 MINS PHASE I: Performed by: INTERNAL MEDICINE

## 2020-01-07 PROCEDURE — 160035 HCHG PACU - 1ST 60 MINS PHASE I: Performed by: INTERNAL MEDICINE

## 2020-01-07 PROCEDURE — 160048 HCHG OR STATISTICAL LEVEL 1-5: Performed by: INTERNAL MEDICINE

## 2020-01-07 PROCEDURE — 93010 ELECTROCARDIOGRAM REPORT: CPT | Performed by: INTERNAL MEDICINE

## 2020-01-07 PROCEDURE — 93355 ECHO TRANSESOPHAGEAL (TEE): CPT

## 2020-01-07 PROCEDURE — 86900 BLOOD TYPING SEROLOGIC ABO: CPT

## 2020-01-07 PROCEDURE — 33419 REPAIR TCAT MITRAL VALVE: CPT | Mod: 62,Q0 | Performed by: THORACIC SURGERY (CARDIOTHORACIC VASCULAR SURGERY)

## 2020-01-07 PROCEDURE — 700102 HCHG RX REV CODE 250 W/ 637 OVERRIDE(OP): Performed by: NURSE PRACTITIONER

## 2020-01-07 PROCEDURE — 502000 HCHG MISC OR IMPLANTS RC 0278: Performed by: INTERNAL MEDICINE

## 2020-01-07 PROCEDURE — A9270 NON-COVERED ITEM OR SERVICE: HCPCS | Performed by: NURSE PRACTITIONER

## 2020-01-07 PROCEDURE — 700101 HCHG RX REV CODE 250: Performed by: INTERNAL MEDICINE

## 2020-01-07 PROCEDURE — 02UG3JZ SUPPLEMENT MITRAL VALVE WITH SYNTHETIC SUBSTITUTE, PERCUTANEOUS APPROACH: ICD-10-PCS | Performed by: INTERNAL MEDICINE

## 2020-01-07 PROCEDURE — 80053 COMPREHEN METABOLIC PANEL: CPT | Mod: 91

## 2020-01-07 PROCEDURE — 700111 HCHG RX REV CODE 636 W/ 250 OVERRIDE (IP)

## 2020-01-07 PROCEDURE — 83880 ASSAY OF NATRIURETIC PEPTIDE: CPT | Mod: 91

## 2020-01-07 PROCEDURE — 160042 HCHG SURGERY MINUTES - EA ADDL 1 MIN LEVEL 5: Performed by: INTERNAL MEDICINE

## 2020-01-07 PROCEDURE — 85347 COAGULATION TIME ACTIVATED: CPT | Mod: 91

## 2020-01-07 PROCEDURE — C1893 INTRO/SHEATH, FIXED,NON-PEEL: HCPCS | Performed by: INTERNAL MEDICINE

## 2020-01-07 PROCEDURE — 500002 HCHG ADHESIVE, DERMABOND: Performed by: INTERNAL MEDICINE

## 2020-01-07 DEVICE — IMPLANTABLE DEVICE: Type: IMPLANTABLE DEVICE | Status: FUNCTIONAL

## 2020-01-07 RX ORDER — SODIUM CHLORIDE, SODIUM LACTATE, POTASSIUM CHLORIDE, CALCIUM CHLORIDE 600; 310; 30; 20 MG/100ML; MG/100ML; MG/100ML; MG/100ML
INJECTION, SOLUTION INTRAVENOUS CONTINUOUS
Status: DISCONTINUED | OUTPATIENT
Start: 2020-01-07 | End: 2020-01-07 | Stop reason: HOSPADM

## 2020-01-07 RX ORDER — HYDROMORPHONE HYDROCHLORIDE 1 MG/ML
0.1 INJECTION, SOLUTION INTRAMUSCULAR; INTRAVENOUS; SUBCUTANEOUS
Status: DISCONTINUED | OUTPATIENT
Start: 2020-01-07 | End: 2020-01-07 | Stop reason: HOSPADM

## 2020-01-07 RX ORDER — BISACODYL 10 MG
10 SUPPOSITORY, RECTAL RECTAL
Status: DISCONTINUED | OUTPATIENT
Start: 2020-01-07 | End: 2020-01-08 | Stop reason: HOSPADM

## 2020-01-07 RX ORDER — ACETAMINOPHEN 325 MG/1
650 TABLET ORAL EVERY 6 HOURS PRN
Status: DISCONTINUED | OUTPATIENT
Start: 2020-01-07 | End: 2020-01-08 | Stop reason: HOSPADM

## 2020-01-07 RX ORDER — ONDANSETRON 2 MG/ML
INJECTION INTRAMUSCULAR; INTRAVENOUS PRN
Status: DISCONTINUED | OUTPATIENT
Start: 2020-01-07 | End: 2020-01-07 | Stop reason: SURG

## 2020-01-07 RX ORDER — OXYCODONE HCL 5 MG/5 ML
5 SOLUTION, ORAL ORAL
Status: DISCONTINUED | OUTPATIENT
Start: 2020-01-07 | End: 2020-01-07 | Stop reason: HOSPADM

## 2020-01-07 RX ORDER — HYDROMORPHONE HYDROCHLORIDE 1 MG/ML
0.4 INJECTION, SOLUTION INTRAMUSCULAR; INTRAVENOUS; SUBCUTANEOUS
Status: DISCONTINUED | OUTPATIENT
Start: 2020-01-07 | End: 2020-01-07 | Stop reason: HOSPADM

## 2020-01-07 RX ORDER — SODIUM CHLORIDE, SODIUM LACTATE, POTASSIUM CHLORIDE, CALCIUM CHLORIDE 600; 310; 30; 20 MG/100ML; MG/100ML; MG/100ML; MG/100ML
INJECTION, SOLUTION INTRAVENOUS CONTINUOUS
Status: ACTIVE | OUTPATIENT
Start: 2020-01-07 | End: 2020-01-07

## 2020-01-07 RX ORDER — DEXAMETHASONE SODIUM PHOSPHATE 4 MG/ML
INJECTION, SOLUTION INTRA-ARTICULAR; INTRALESIONAL; INTRAMUSCULAR; INTRAVENOUS; SOFT TISSUE PRN
Status: DISCONTINUED | OUTPATIENT
Start: 2020-01-07 | End: 2020-01-07 | Stop reason: SURG

## 2020-01-07 RX ORDER — CLOPIDOGREL BISULFATE 75 MG/1
75 TABLET ORAL DAILY
Status: DISCONTINUED | OUTPATIENT
Start: 2020-01-07 | End: 2020-01-08 | Stop reason: HOSPADM

## 2020-01-07 RX ORDER — DIPHENHYDRAMINE HCL 25 MG
25 TABLET ORAL NIGHTLY PRN
Status: DISCONTINUED | OUTPATIENT
Start: 2020-01-07 | End: 2020-01-08 | Stop reason: HOSPADM

## 2020-01-07 RX ORDER — LOSARTAN POTASSIUM 50 MG/1
100 TABLET ORAL DAILY
Status: DISCONTINUED | OUTPATIENT
Start: 2020-01-07 | End: 2020-01-08 | Stop reason: HOSPADM

## 2020-01-07 RX ORDER — ROCURONIUM BROMIDE 10 MG/ML
INJECTION, SOLUTION INTRAVENOUS PRN
Status: DISCONTINUED | OUTPATIENT
Start: 2020-01-07 | End: 2020-01-07 | Stop reason: SURG

## 2020-01-07 RX ORDER — CEFAZOLIN SODIUM 1 G/3ML
INJECTION, POWDER, FOR SOLUTION INTRAMUSCULAR; INTRAVENOUS PRN
Status: DISCONTINUED | OUTPATIENT
Start: 2020-01-07 | End: 2020-01-07 | Stop reason: SURG

## 2020-01-07 RX ORDER — ONDANSETRON 2 MG/ML
4 INJECTION INTRAMUSCULAR; INTRAVENOUS
Status: DISCONTINUED | OUTPATIENT
Start: 2020-01-07 | End: 2020-01-07 | Stop reason: HOSPADM

## 2020-01-07 RX ORDER — MEPERIDINE HYDROCHLORIDE 25 MG/ML
6.25 INJECTION INTRAMUSCULAR; INTRAVENOUS; SUBCUTANEOUS
Status: DISCONTINUED | OUTPATIENT
Start: 2020-01-07 | End: 2020-01-07 | Stop reason: HOSPADM

## 2020-01-07 RX ORDER — LIDOCAINE HYDROCHLORIDE 20 MG/ML
INJECTION, SOLUTION INFILTRATION; PERINEURAL
Status: DISCONTINUED | OUTPATIENT
Start: 2020-01-07 | End: 2020-01-07 | Stop reason: HOSPADM

## 2020-01-07 RX ORDER — DIPHENHYDRAMINE HYDROCHLORIDE 50 MG/ML
25 INJECTION INTRAMUSCULAR; INTRAVENOUS EVERY 6 HOURS PRN
Status: DISCONTINUED | OUTPATIENT
Start: 2020-01-07 | End: 2020-01-08 | Stop reason: HOSPADM

## 2020-01-07 RX ORDER — OXYCODONE HCL 5 MG/5 ML
10 SOLUTION, ORAL ORAL
Status: DISCONTINUED | OUTPATIENT
Start: 2020-01-07 | End: 2020-01-07 | Stop reason: HOSPADM

## 2020-01-07 RX ORDER — GABAPENTIN 100 MG/1
200 CAPSULE ORAL 2 TIMES DAILY
Status: DISCONTINUED | OUTPATIENT
Start: 2020-01-07 | End: 2020-01-08 | Stop reason: HOSPADM

## 2020-01-07 RX ORDER — ATORVASTATIN CALCIUM 40 MG/1
40 TABLET, FILM COATED ORAL DAILY
Status: DISCONTINUED | OUTPATIENT
Start: 2020-01-07 | End: 2020-01-08 | Stop reason: HOSPADM

## 2020-01-07 RX ORDER — DONEPEZIL HYDROCHLORIDE 5 MG/1
10 TABLET, FILM COATED ORAL EVERY EVENING
Status: DISCONTINUED | OUTPATIENT
Start: 2020-01-07 | End: 2020-01-08 | Stop reason: HOSPADM

## 2020-01-07 RX ORDER — AMOXICILLIN 250 MG
2 CAPSULE ORAL 2 TIMES DAILY PRN
Status: DISCONTINUED | OUTPATIENT
Start: 2020-01-07 | End: 2020-01-08 | Stop reason: HOSPADM

## 2020-01-07 RX ORDER — PROTAMINE SULFATE 10 MG/ML
INJECTION, SOLUTION INTRAVENOUS PRN
Status: DISCONTINUED | OUTPATIENT
Start: 2020-01-07 | End: 2020-01-07 | Stop reason: SURG

## 2020-01-07 RX ORDER — BUPIVACAINE HYDROCHLORIDE 2.5 MG/ML
INJECTION, SOLUTION INFILTRATION; PERINEURAL
Status: DISCONTINUED | OUTPATIENT
Start: 2020-01-07 | End: 2020-01-07 | Stop reason: HOSPADM

## 2020-01-07 RX ORDER — HYDRALAZINE HYDROCHLORIDE 20 MG/ML
10 INJECTION INTRAMUSCULAR; INTRAVENOUS
Status: DISCONTINUED | OUTPATIENT
Start: 2020-01-07 | End: 2020-01-08 | Stop reason: HOSPADM

## 2020-01-07 RX ORDER — DIPHENHYDRAMINE HYDROCHLORIDE 50 MG/ML
12.5 INJECTION INTRAMUSCULAR; INTRAVENOUS
Status: DISCONTINUED | OUTPATIENT
Start: 2020-01-07 | End: 2020-01-07 | Stop reason: HOSPADM

## 2020-01-07 RX ORDER — SODIUM CHLORIDE 9 MG/ML
INJECTION, SOLUTION INTRAVENOUS CONTINUOUS
Status: DISPENSED | OUTPATIENT
Start: 2020-01-07 | End: 2020-01-07

## 2020-01-07 RX ORDER — HEPARIN SODIUM,PORCINE 1000/ML
VIAL (ML) INJECTION PRN
Status: DISCONTINUED | OUTPATIENT
Start: 2020-01-07 | End: 2020-01-07 | Stop reason: SURG

## 2020-01-07 RX ORDER — HYDROMORPHONE HYDROCHLORIDE 1 MG/ML
0.2 INJECTION, SOLUTION INTRAMUSCULAR; INTRAVENOUS; SUBCUTANEOUS
Status: DISCONTINUED | OUTPATIENT
Start: 2020-01-07 | End: 2020-01-07 | Stop reason: HOSPADM

## 2020-01-07 RX ORDER — LIDOCAINE HYDROCHLORIDE 40 MG/ML
SOLUTION TOPICAL PRN
Status: DISCONTINUED | OUTPATIENT
Start: 2020-01-07 | End: 2020-01-07 | Stop reason: SURG

## 2020-01-07 RX ORDER — DOCUSATE SODIUM 100 MG/1
100 CAPSULE, LIQUID FILLED ORAL 2 TIMES DAILY PRN
Status: DISCONTINUED | OUTPATIENT
Start: 2020-01-07 | End: 2020-01-08 | Stop reason: HOSPADM

## 2020-01-07 RX ORDER — HALOPERIDOL 5 MG/ML
1 INJECTION INTRAMUSCULAR
Status: DISCONTINUED | OUTPATIENT
Start: 2020-01-07 | End: 2020-01-07 | Stop reason: HOSPADM

## 2020-01-07 RX ORDER — LIDOCAINE HYDROCHLORIDE 10 MG/ML
INJECTION, SOLUTION EPIDURAL; INFILTRATION; INTRACAUDAL; PERINEURAL
Status: DISPENSED
Start: 2020-01-07 | End: 2020-01-07

## 2020-01-07 RX ORDER — ONDANSETRON 2 MG/ML
4 INJECTION INTRAMUSCULAR; INTRAVENOUS EVERY 4 HOURS PRN
Status: DISCONTINUED | OUTPATIENT
Start: 2020-01-07 | End: 2020-01-08 | Stop reason: HOSPADM

## 2020-01-07 RX ORDER — POLYETHYLENE GLYCOL 3350 17 G/17G
1 POWDER, FOR SOLUTION ORAL
Status: DISCONTINUED | OUTPATIENT
Start: 2020-01-07 | End: 2020-01-08 | Stop reason: HOSPADM

## 2020-01-07 RX ADMIN — SODIUM CHLORIDE: 9 INJECTION, SOLUTION INTRAVENOUS at 15:45

## 2020-01-07 RX ADMIN — DONEPEZIL HYDROCHLORIDE 10 MG: 5 TABLET, FILM COATED ORAL at 17:00

## 2020-01-07 RX ADMIN — HEPARIN SODIUM 7000 UNITS: 1000 INJECTION, SOLUTION INTRAVENOUS; SUBCUTANEOUS at 10:35

## 2020-01-07 RX ADMIN — DEXAMETHASONE SODIUM PHOSPHATE 4 MG: 4 INJECTION, SOLUTION INTRA-ARTICULAR; INTRALESIONAL; INTRAMUSCULAR; INTRAVENOUS; SOFT TISSUE at 10:10

## 2020-01-07 RX ADMIN — LIDOCAINE HYDROCHLORIDE 4 ML: 40 SOLUTION TOPICAL at 10:03

## 2020-01-07 RX ADMIN — CEFAZOLIN 2 G: 330 INJECTION, POWDER, FOR SOLUTION INTRAMUSCULAR; INTRAVENOUS at 10:09

## 2020-01-07 RX ADMIN — HEPARIN SODIUM 4000 UNITS: 1000 INJECTION, SOLUTION INTRAVENOUS; SUBCUTANEOUS at 11:01

## 2020-01-07 RX ADMIN — ASPIRIN 81 MG: 81 TABLET, COATED ORAL at 16:58

## 2020-01-07 RX ADMIN — SODIUM CHLORIDE, POTASSIUM CHLORIDE, SODIUM LACTATE AND CALCIUM CHLORIDE: 600; 310; 30; 20 INJECTION, SOLUTION INTRAVENOUS at 11:34

## 2020-01-07 RX ADMIN — PROTAMINE SULFATE 30 MG: 10 INJECTION, SOLUTION INTRAVENOUS at 12:15

## 2020-01-07 RX ADMIN — CLOPIDOGREL BISULFATE 75 MG: 75 TABLET ORAL at 16:57

## 2020-01-07 RX ADMIN — ONDANSETRON 4 MG: 2 INJECTION INTRAMUSCULAR; INTRAVENOUS at 10:10

## 2020-01-07 RX ADMIN — LOSARTAN POTASSIUM 100 MG: 50 TABLET, FILM COATED ORAL at 16:54

## 2020-01-07 RX ADMIN — ROCURONIUM BROMIDE 60 MG: 10 INJECTION, SOLUTION INTRAVENOUS at 10:01

## 2020-01-07 RX ADMIN — SODIUM CHLORIDE, POTASSIUM CHLORIDE, SODIUM LACTATE AND CALCIUM CHLORIDE: 600; 310; 30; 20 INJECTION, SOLUTION INTRAVENOUS at 09:57

## 2020-01-07 RX ADMIN — HEPARIN SODIUM 1000 UNITS: 1000 INJECTION, SOLUTION INTRAVENOUS; SUBCUTANEOUS at 11:33

## 2020-01-07 RX ADMIN — PROPOFOL 150 MG: 10 INJECTION, EMULSION INTRAVENOUS at 10:01

## 2020-01-07 RX ADMIN — HEPARIN SODIUM 3000 UNITS: 1000 INJECTION, SOLUTION INTRAVENOUS; SUBCUTANEOUS at 10:27

## 2020-01-07 RX ADMIN — PROPOFOL 50 MG: 10 INJECTION, EMULSION INTRAVENOUS at 12:06

## 2020-01-07 RX ADMIN — GABAPENTIN 200 MG: 100 CAPSULE ORAL at 16:59

## 2020-01-07 RX ADMIN — ATORVASTATIN CALCIUM 40 MG: 40 TABLET, FILM COATED ORAL at 16:56

## 2020-01-07 ASSESSMENT — COGNITIVE AND FUNCTIONAL STATUS - GENERAL
SUGGESTED CMS G CODE MODIFIER MOBILITY: CH
MOBILITY SCORE: 24
DAILY ACTIVITIY SCORE: 24
SUGGESTED CMS G CODE MODIFIER DAILY ACTIVITY: CH

## 2020-01-07 ASSESSMENT — ENCOUNTER SYMPTOMS
PALPITATIONS: 0
VOMITING: 0
NAUSEA: 0
MUSCULOSKELETAL NEGATIVE: 1
NEUROLOGICAL NEGATIVE: 1
FEVER: 0
SHORTNESS OF BREATH: 1
DIZZINESS: 0
ABDOMINAL PAIN: 0
GASTROINTESTINAL NEGATIVE: 1
DOUBLE VISION: 0
PSYCHIATRIC NEGATIVE: 1
EYES NEGATIVE: 1
FOCAL WEAKNESS: 0
BLURRED VISION: 0
CLAUDICATION: 0
WEIGHT LOSS: 0
COUGH: 0
WEAKNESS: 0
DEPRESSION: 0
MYALGIAS: 0
NERVOUS/ANXIOUS: 0
HEADACHES: 0
CARDIOVASCULAR NEGATIVE: 1
BRUISES/BLEEDS EASILY: 0
CHILLS: 0

## 2020-01-07 ASSESSMENT — LIFESTYLE VARIABLES
TOTAL SCORE: 0
TOTAL SCORE: 0
HAVE PEOPLE ANNOYED YOU BY CRITICIZING YOUR DRINKING: NO
HOW MANY TIMES IN THE PAST YEAR HAVE YOU HAD 5 OR MORE DRINKS IN A DAY: 0
HAVE YOU EVER FELT YOU SHOULD CUT DOWN ON YOUR DRINKING: NO
EVER FELT BAD OR GUILTY ABOUT YOUR DRINKING: NO
AVERAGE NUMBER OF DAYS PER WEEK YOU HAVE A DRINK CONTAINING ALCOHOL: 0
EVER_SMOKED: NEVER
EVER HAD A DRINK FIRST THING IN THE MORNING TO STEADY YOUR NERVES TO GET RID OF A HANGOVER: NO
ALCOHOL_USE: NO
TOTAL SCORE: 0
CONSUMPTION TOTAL: NEGATIVE
ON A TYPICAL DAY WHEN YOU DRINK ALCOHOL HOW MANY DRINKS DO YOU HAVE: 0

## 2020-01-07 ASSESSMENT — PAIN SCALES - GENERAL: PAIN_LEVEL: 0

## 2020-01-07 ASSESSMENT — PATIENT HEALTH QUESTIONNAIRE - PHQ9
1. LITTLE INTEREST OR PLEASURE IN DOING THINGS: NOT AT ALL
2. FEELING DOWN, DEPRESSED, IRRITABLE, OR HOPELESS: NOT AT ALL
SUM OF ALL RESPONSES TO PHQ9 QUESTIONS 1 AND 2: 0

## 2020-01-07 NOTE — H&P
Physician H&P    Patient ID:  Filiberto Jauregui  [id number]  86 y.o. male  7/29/1933    History:  Primary Diagnosis: Outpatient MitraClip    HPI:  86 year old male with mitral valve prolapse with severe mitral regurgitation, coronary artery disease status post stent placement, hematuria. Due to his symptoms he was scheduled for MitraClip.      Past Medical History:  has a past medical history of Cancer (HCC), Diabetes (HCC), High cholesterol, Hypertension (10/23/2019), Mitral regurgitation, MVP (mitral valve prolapse), Pneumonia, and Valvular heart disease.  Past Surgical History:  has a past surgical history that includes prostatectomy, radial; pr cystourethroscopy,ureter catheter (Bilateral, 11/1/2019); and tonsillectomy.  Past Social History:  reports that he has quit smoking. His smoking use included pipe. He smoked 0.00 packs per day. He has never used smokeless tobacco. He reports current alcohol use of about 0.6 oz of alcohol per week. He reports that he does not use drugs.  Past Family History:   Family History   Problem Relation Age of Onset   • Heart Attack Father    • Lung Disease Mother      Allergies: Iodide and Penicillins    (Medications reviewed.)  Current Medications:  Prior to Admission medications    Medication Sig Start Date End Date Taking? Authorizing Provider   diphenhydrAMINE (BENADRYL) 50 MG tablet Take 1 Tab by mouth 2 times a day for 4 doses. First dose 1/5 PM, second 1/6 AM, third 1/6 PM, and last 1/7 one hour prior to procedure. 1/5/20 1/7/20  Gary Wilson M.D.   predniSONE (DELTASONE) 50 MG Tab Take 1 Tab by mouth 2 times a day for 4 doses. First dose 1/5 PM, second 1/6 AM, third 1/6 PM, and last 1/7 one hour prior to procedure. 1/5/20 1/7/20  Gary Wilson M.D.   clopidogrel (PLAVIX) 75 MG Tab Take 1 Tab by mouth every day. 1/2/20   ASHLEY AndersonP.R.NBarb   predniSONE (DELTASONE) 50 MG Tab Take pre-procedurally as noted. 12/30/19   YANELIS Anderson.P.R.N.   atorvastatin  (LIPITOR) 40 MG Tab Take 1 Tab by mouth every day. 12/5/19   CHELSI DiazRDAVID   aspirin EC 81 MG EC tablet Take 1 Tab by mouth every day. 12/5/19   ASHLEY DiazP.RROGER.   diphenhydrAMINE (BENADRYL ALLERGY) 25 MG Tab Take 2 tablet by mouth 1 time daily as needed (Pre-procedural.  Take Benadryl if instructed by cath lab.). 11/22/19   Gary Wilson M.D.   gabapentin (NEURONTIN) 100 MG Cap TAKE 1 CAPSULE BY MOUTH TWICE DAILY MAY INCREASE AS NEEDED EVERY 5 DAYS UP TO MAXIMUM OF 18 CAPSULES PER DAY 11/14/19   Physician Outpatient   metFORMIN (GLUCOPHAGE) 500 MG Tab Take 500-1,000 mg by mouth 2 times a day, with meals. 1000 mg in am and 500 mg in pm 10/7/19   Physician Outpatient   docusate sodium (COLACE) 100 MG Cap Take 100 mg by mouth 2 times a day as needed for Constipation.    Physician Outpatient   donepezil (ARICEPT) 10 MG tablet Take 10 mg by mouth every evening. 7/3/19   Physician Outpatient   losartan (COZAAR) 100 MG Tab Take 100 mg by mouth every day. 2/3/18   Physician Outpatient       Review of Systems:  Review of Systems   Constitutional: Positive for malaise/fatigue. Negative for chills, fever and weight loss.   HENT: Negative.  Negative for hearing loss.    Eyes: Negative.  Negative for blurred vision and double vision.   Respiratory: Positive for shortness of breath. Negative for cough.    Cardiovascular: Negative.  Negative for chest pain, palpitations, claudication and leg swelling.   Gastrointestinal: Negative.  Negative for abdominal pain, nausea and vomiting.   Genitourinary: Positive for hematuria. Negative for dysuria and urgency.   Musculoskeletal: Negative.  Negative for joint pain and myalgias.   Skin: Negative.  Negative for itching and rash.   Neurological: Negative.  Negative for dizziness, focal weakness, weakness and headaches.   Endo/Heme/Allergies: Negative.  Does not bruise/bleed easily.   Psychiatric/Behavioral: Negative.  Negative for depression. The patient is  not nervous/anxious.      /70   Pulse 65   Temp 36.3 °C (97.4 °F) (Temporal)   Resp 18   SpO2 95%     Physical Examination:  Physical Exam  Constitutional:       Appearance: He is well-developed.   HENT:      Head: Normocephalic and atraumatic.   Eyes:      Conjunctiva/sclera: Conjunctivae normal.      Pupils: Pupils are equal, round, and reactive to light.   Neck:      Musculoskeletal: Normal range of motion and neck supple.   Cardiovascular:      Rate and Rhythm: Normal rate and regular rhythm.      Heart sounds: Murmur present.   Pulmonary:      Effort: Pulmonary effort is normal.      Breath sounds: Normal breath sounds.   Abdominal:      General: Bowel sounds are normal.      Palpations: Abdomen is soft.   Musculoskeletal: Normal range of motion.   Skin:     General: Skin is warm and dry.   Neurological:      Mental Status: He is alert and oriented to person, place, and time.       CARDIAC STUDIES/PROCEDURES:    CARDIAC CATHETERIZATION CONCLUSIONS (12/03/19)  1.  A 4+ mitral regurgitation.  2.  Single-vessel coronary artery disease with high-grade proximal circumflex   artery stenosis.  3.  Successful percutaneous transluminal coronary angioplasty/stent placement   of the proximal circumflex artery with 3.0x16 mm Synergy drug-eluting stent.  4.  Normal left ventricular systolic function with ejection fraction of 60%.  5.  Elevated left ventricular end-diastolic pressure.  6.  Elevated right heart pressure with pulmonary artery systolic pressure of   55 mmHg.  (study result reviewed)    CAROTID ULTRASOUND (01/02/20)  Mild bilateral internal carotid artery stenosis (<50%).   Antegrade flow, bilateral vertebral arteries.   (study result reviewed)     ECHOCARDIOGRAM CONCLUSIONS (08/14/19)  Compared to the images of the prior study done on 01/22/18, MR is   severe, RV further dilated.  Left ventricular ejection fraction is visually estimated to be 55%.  Prolapse of the posterior mitral leaflet was  present.  Probably severe, highly eccentric mitral regurgitation due to   generative MV disease, multiple jets.  Possible mobile echo density flowing into the left atrium, best seen in apical 2 C view.  Unable to estimate accurate RVSP, appears to be > 45 mmHg, RAP normal.  Mildly dilated right ventricle.  Findings DW Dr. Johnson at time of report.  Consider MitraClip image JOSÉ if clinically relevant.      ECHOCARDIOGRAM CONCLUSIONS (01/22/19)  Normal left ventricular size, thickness, systolic function, and diastolic function.  Left ventricular ejection fraction is visually estimated to be 60%.  Normal inferior vena cava size and inspiratory collapse.  Prolapse of the posterior mitral leaflet was present. Significant   turbulence is noted across the mitral valve. difficult to accurately   quantify mitral regurgitation severity. overall appears to have   moderate mitral regurgitation.  Aortic sclerosis without stenosis.     EKG performed on (12/04/19) was reviewed: EKG personally interpreted shows sinus bradycardia.  EKG performed on (08/29/19) EKG shows sinus rhythm.     Laboratory results of (12/04/19) were reviewed. Bun of 17 mg/dl, creatinine levels of 1.03 mg/dl noted.     TRANSESOPHAGEAL ECHOCARDIOGRAM CONCLUSIONS by Laverne Garces (10/24/19)  LV EF visually estimated to be 60%.  Biatrial enlargement.  Neetu class II severe mitral regurgitation, P2, P3 prolapse, possible very small A3 chordae in the left atrium.  Multiple MR jets, partially central, partially anteriorly directed.  Pulmonary vein systolic flow reversal present.    Impression/Plan:    1. Mitral regurgitation: His mitral regurgitation has reached severe status on his recent echocardiogram and he is  relatively asymptomatic, however, he is inactive, NYHA class I. He is excessive risk per Dr. Robbins with STS score of 5-8%. We will proceed with MitraClip. He understands the risks and benefits and agrees with plan.  2. Coronary artery  disease with stent placement: He is clinically doing well without recurrence of his angina.   3. Memory loss: His memory loss has improved on medication.  4. Hematuria: His hematuria has resolved.    CC Mac Fox       Pre Procedure Assessment:  <EXAMSHORT2>      Pre Procedure update:   No changes.    Gary Wilson  1/7/2020

## 2020-01-07 NOTE — ANESTHESIA PROCEDURE NOTES
Arterial Line  Performed by: Fred Dupont M.D.  Authorized by: Fred Dupont M.D.     Start Time:  1/7/2020 10:05 AM  End Time:  1/7/2020 10:07 AM  Localization: surface landmarks    Patient Location:  OR  Indication: continuous blood pressure monitoring    Catheter Size:  20 G  Seldinger Technique?: Yes    Laterality:  Right  Site:  Radial artery  Line Secured:  Antimicrobial disc, tape and transparent dressing  Events: patient tolerated procedure well with no complications

## 2020-01-07 NOTE — ANESTHESIA POSTPROCEDURE EVALUATION
Patient: Filiberto Jauregui    Procedure Summary     Date:  01/07/20 Room / Location:  [facility] 19 / SURGERY [facility]    Anesthesia Start:  0957 Anesthesia Stop:  1224    Procedures:       REPAIR, MITRAL VALVE, TRANSCATHETER (Groin)      ECHOCARDIOGRAM, TRANSESOPHAGEAL- POTENTIALLY (Mouth) Diagnosis:  (SEVERE MITRAL REGURGITATION)    Surgeon:  Gary Wilson M.D. Responsible Provider:  Fred Dupont M.D.    Anesthesia Type:  general ASA Status:  4          Final Anesthesia Type: general  Last vitals  BP   Blood Pressure : 153/65, Arterial BP: (!) 168/66    Temp   36.2 °C (97.2 °F)    Pulse   Pulse: 60   Resp   (!) 21    SpO2   98 %      Anesthesia Post Evaluation    Patient location during evaluation: PACU  Patient participation: complete - patient participated  Level of consciousness: awake and alert  Pain score: 0    Airway patency: patent  Anesthetic complications: no  Cardiovascular status: hemodynamically stable  Respiratory status: acceptable  Hydration status: euvolemic    PONV: none           Nurse Pain Score: 0 (NPRS)

## 2020-01-07 NOTE — OR SURGEON
Immediate Post OP Note    PreOp Diagnosis: Severe symptomatic mitral regurgitation, coronary artery disease    PostOp Diagnosis: Same    Procedure(s):  REPAIR, MITRAL VALVE, TRANSCATHETER (MitraClip XTR x2)  JOSÉ    Surgeon(s):  TEOFILO Babb M.D.    Assistant:  Benny Briceño M.D.    Anesthesiologist/Type of Anesthesia:  Anesthesiologist: Fred Dupont M.D.  Anesthesia Technician: John Soriano/General    Surgical Staff:  Circulator: Viviane Robertson R.N.; Vladimir Brooks R.N.  Relief Circulator: Cielo Lee R.N.  Scrub Person: Silvano Moncada  Radiology Technologist: Luigi Ramires    Specimens removed if any: None    Estimated Blood Loss: Minimal    Findings: Severe mitral regurgitation, posterior mitral leaflet prolapse    Complications: None        1/7/2020 12:30 PM Alvaro Robbins M.D.

## 2020-01-07 NOTE — OP REPORT
DATE OF PROCEDURE: 01/07/20    REFERRING PHYSICIAN: Gee Coello    PROCEDURES:  1. Transcatheter mitral valve repair (MitraClip) with 2 clips.  2. Transseptal puncture.  3. Ultrasound guided femoral vein access.  4. PerClose closure.    PRE PROCEDURAL DIAGNOSIS:  1. Severe symptomatic mitral regurgitation, NYHA III.    POST PROCEDURAL DIAGNOSIS:  1. Successful transcatheter percutaneous mitral valve repair (MitraClip) with 2 XTr clips, under general anesthesia.  2. Successful Preclose closure.    INDICATION:    86 year old male with mitral valve prolapse with severe mitral regurgitation, coronary artery disease status post stent placement, hematuria. Due to his symptoms he was scheduled for MitraClip.      DESCRIPTION OF PROCEDURE:  After informed consent was signed by the   patient, the patient was brought into the OR 19.  He was prepped and draped in   usual sterile manner.  Prior to the procedure, right radial arterial insertion, intubation   and transesophageal echocardiogram were performed by Fred Tamayo.    The initial timeout was performed.     A 6-Beninese venous sheath was placed in the right femoral vein by Modified Seldinger   technique under ultrasound guidance by Benny Anderson. A PerClose devices   was delivered.An 8.5-Beninese TorFlex venous sheath was advanced. IV heparin was   given. A [place] needle was inserted into the catheter and both the needle and the sheath   were placed in the right atrium. Under transesophageal echocardiogram guidance, the  [place] needle was used to puncture the septum and the catheter was advanced into the   left atrium by Dr. Briceño. IV Heparin was readjusted. A Protrack pigtail wire was positioned  into the left atrium. The sheath was removed and the femoral access site was dilated with   multiple dialators. The 22/24-Beninese steerable guide catheter handle was inserted into  the left atrium under transesophageal echocardiogram guidance and the  dilator was   removed by Alvaro Doll.  The Clip attached to the delivery catheter handle was   inserted in to the left atrium. Again, under transesophageal echocardiogram guidance, the   clip was opened, advanced into the left ventricle and pulled up to the mitral valve leaflets at   the A2/P2 position. The leaflets were grasped and the clip was partially closed. The steerable   device was manipulated by Dr. Briceño. The severity of mitral regurgitation and the mitral   valve gradient were measured. The clip was then closed fully and released by Dr. Briceño.  The delivery catheter was removed.    Due to residual mitral regurgitation a second clip was deployed lateral to the first clip at A2/P2  position.    The mitral regurgitation was reduced from severe to mild, the mean gradient was 3 mmHg.    The patient tolerated the procedure well. At the end of procedure hemodynamics were   again obtained. The steerable guide catheter was removed and the right femoral venous   access site was closed via PerClose closure.  The patient was then extubated and transferred to PACU in stable condition.    The entire procedure was performed with collaboration with Alvaro Doll and Benny Anderson.    IMPRESSION:  1. Successful transcatheter percutaneous mitral valve repair (MitraClip) with 2 XTr clips, under general anesthesia.  2. Successful Preclose closure.    RECOMMENDATION: Medical therapy with continuation of aspirin.

## 2020-01-07 NOTE — ANESTHESIA PREPROCEDURE EVALUATION
Relevant Problems   CARDIAC   (+) Coronary artery disease involving native coronary artery of native heart without angina pectoris   (+) Essential hypertension   (+) Severe mitral regurgitation     Vitals:    01/07/20 0559   BP: 155/70   Pulse:    Resp:    Temp:    SpO2:          Physical Exam    Airway   Mallampati: II  TM distance: >3 FB  Neck ROM: full       Cardiovascular - normal exam  Rhythm: regular  Rate: normal  (-) murmur     Dental - normal exam         Pulmonary - normal exam  Breath sounds clear to auscultation     Abdominal    Neurological - normal exam                 Anesthesia Plan    ASA 4   ASA physical status 4 criteria: severe valve dysfunction    Plan - general       Airway plan will be ETT        Induction: intravenous    Postoperative Plan: Postoperative administration of opioids is intended.    Pertinent diagnostic labs and testing reviewed    Informed Consent:    Anesthetic plan and risks discussed with patient.    Use of blood products discussed with: patient whom consented to blood products.

## 2020-01-07 NOTE — ANESTHESIA QCDR
2019 [facility] Clinical Data Registry (for Quality Improvement)     Postoperative nausea/vomiting risk protocol (Adult = 18 yrs and Pediatric 3-17 yrs)- (430 and 463)  General inhalation anesthetic (NOT TIVA) with PONV risk factors: Yes  Provision of anti-emetic therapy with at least 2 different classes of agents: Yes   Patient DID NOT receive anti-emetic therapy and reason is documented in Medical Record:  N/A    Multimodal Pain Management- (477)  Non-emergent surgery AND patient age >= 18: No  Use of Multimodal Pain Management, two or more drugs and/or interventions, NOT including systemic opioids:   Exception: Documented allergy to multiple classes of analgesics:     Smoking Abstinence (404)  Patient is current smoker (cigarette, pipe, e-cig, marijuanna): No  Elective Surgery:   Abstinence instructions provided prior to day of surgery:   Patient abstained from smoking on day of surgery:     Pre-Op Beta-Blocker in Isolated CABG (44)  Isolated CABG AND patient age >= 18: No  Beta-blocker admin within 24 hours of surgical incision:   Exception:of medical reason(s) for not administering beta blocker within 24 hours prior to surgical incision (e.g., not  indicated,other medical reason):     PACU assessment of acute postoperative pain prior to Anesthesia Care End- Applies to Patients Age = 18- (ABG7)  Initial PACU pain score is which of the following: < 7/10  Patient unable to report pain score: N/A    Post-anesthetic transfer of care checklist/protocol to PACU/ICU- (426 and 427)  Upon conclusion of case, patient transferred to which of the following locations: PACU/Non-ICU  Use of transfer checklist/protocol: Yes  Exclusion: Service Performed in Patient Hospital Room (and thus did not require transfer): N/A  Unplanned admission to ICU related to anesthesia service up through end of PACU care- (MD51)  Unplanned admission to ICU (not initially anticipated at anesthesia start time): No

## 2020-01-07 NOTE — OP REPORT
DATE OF SERVICE:  01/07/2020    REFERRING PHYSICIAN:  Mac Miller MD    PREOPERATIVE DIAGNOSES:  1.  Severe symptomatic mitral regurgitation (4+, degenerative).  2.  Posterior mitral valve leaflet prolapse.  3.  Coronary artery disease.    POSTOPERATIVE DIAGNOSES:  1.  Severe symptomatic mitral regurgitation (4+, degenerative).  2.  Posterior mitral valve leaflet prolapse.  3.  Coronary artery disease.    PROCEDURES:  1.  Transcatheter mitral valve repair (MitraClip XTR x2).  2.  Intraoperative transesophageal echocardiography.    SURGEONS:  1.  Alvaro Robbins MD   2.  Gary Wilson MD    ASSISTANT:  Benny Briceño MD    ANESTHESIOLOGIST:  Fred Dupont MD    ANESTHESIA:  General endotracheal.    ESTIMATED BLOOD LOSS:  Minimal.    COMPLICATIONS:  None.    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  Echocardiography showed   severe mitral regurgitation and prolapse of the posterior mitral valve   leaflet.  His left ventricular ejection fraction was normal at approximately   60%.    DESCRIPTION OF PROCEDURE:  The  patient was brought to the operating room and   placed on the operating room table in the supine position.  After successful   induction of general anesthesia and endotracheal intubation, the patient was   prepped and draped in the usual sterile fashion.  Dr. Briceño assisted   during the operation.  In collaboration with Dr. Wilson, right femoral   ultrasound-guided venous access was obtained.  A Perclose suture was deployed.    A small 1-cm incision was made in the right groin.  A Wabasso catheter was   placed in the superior vena cava and slowly pulled down in the superior   posterior position in the interatrial septum.  Transseptal puncture was   performed and a coil tip wire was introduced into the left atrium.  The Wabasso   catheter was removed.  A steerable guide catheter was then placed into the   left  atrium over the wire, which was then removed.  A clip delivery system was   then placed through the steerable guide into the left atrium and aimed   towards the mitral valve.  It was then steered into a proper position on the   medial side of A2 P2.  The leaflets were grasped.    Intraoperative transesophageal echocardiography showed significant reduction   of the mitral regurgitant jet.  There was a mild regurgitant jet more lateral,   but the main more medial regurgitant jet was significantly reduced in severity.  We   felt that the clip positioning was appropriate and it was released.  A second   XTR clip was then placed in the usual 5 fashion slightly more lateral to the   original clip and this completely eliminated the medial regurgitant jets.  The   final outcome was a mild regurgitant jet and a mean mitral valve gradient of   3 mmHg.  The clip delivery system and guide handle were removed.  The Perclose   suture was tightened down.  Dermabond was applied over the right groin   incision.    The remainder of the operation will be dictated by Dr. Wilson.    There were no apparent complications.  The patient tolerated the procedure   well and left the operating room in stable condition.       ____________________________________     Alvaro CURTIS    DD:  01/07/2020 12:40:50  DT:  01/07/2020 12:58:47    D#:  [id number]  Job#:  [id number]    cc: DIANA ALCANTAR MD

## 2020-01-07 NOTE — ANESTHESIA TIME REPORT
Anesthesia Start and Stop Event Times     Date Time Event    1/7/2020 0933 Ready for Procedure     0957 Anesthesia Start     1224 Anesthesia Stop        Responsible Staff  01/07/20    Name Role Begin End    Fred Dupont M.D. Anesth 0957 1224        Preop Diagnosis (Free Text):  Pre-op Diagnosis     SEVERE MITRAL REGURGITATION        Preop Diagnosis (Codes):    Post op Diagnosis  Severe mitral regurgitation      Premium Reason  Non-Premium    Comments: art line, catina

## 2020-01-07 NOTE — ANESTHESIA PROCEDURE NOTES
Airway  Date/Time: 1/7/2020 10:03 AM  Performed by: Fred Dupont M.D.  Authorized by: Fred Dupont M.D.     Location:  OR  Urgency:  Elective  Difficult Airway: No    Indications for Airway Management:  Anesthesia  Spontaneous Ventilation: absent    Sedation Level:  Deep  Preoxygenated: Yes    Patient Position:  Sniffing  Mask Difficulty Assessment:  2 - vent by mask + OA or adjuvant +/- NMBA  Final Airway Type:  Endotracheal airway  Final Endotracheal Airway:  ETT  Cuffed: Yes    Technique Used for Successful ETT Placement:  Direct laryngoscopy  Insertion Site:  Oral  Blade Type:  Mavis  Laryngoscope Blade/Videolaryngoscope Blade Size:  3  ETT Size (mm):  7.0  Measured from:  Teeth  ETT to Teeth (cm):  22  Placement Verified by: auscultation and capnometry    Cormack-Lehane Classification:  Grade I - full view of glottis  Number of Attempts at Approach:  1

## 2020-01-08 ENCOUNTER — APPOINTMENT (OUTPATIENT)
Dept: CARDIOLOGY | Facility: MEDICAL CENTER | Age: 85
DRG: 267 | End: 2020-01-08
Attending: NURSE PRACTITIONER
Payer: MEDICARE

## 2020-01-08 VITALS
SYSTOLIC BLOOD PRESSURE: 129 MMHG | OXYGEN SATURATION: 91 % | HEIGHT: 66 IN | TEMPERATURE: 98.2 F | DIASTOLIC BLOOD PRESSURE: 55 MMHG | HEART RATE: 65 BPM | RESPIRATION RATE: 15 BRPM | BODY MASS INDEX: 23.84 KG/M2 | WEIGHT: 148.37 LBS

## 2020-01-08 LAB
ALBUMIN SERPL BCP-MCNC: 3.5 G/DL (ref 3.2–4.9)
ALBUMIN/GLOB SERPL: 1.7 G/DL
ALP SERPL-CCNC: 43 U/L (ref 30–99)
ALT SERPL-CCNC: 18 U/L (ref 2–50)
ANION GAP SERPL CALC-SCNC: 7 MMOL/L (ref 0–11.9)
AST SERPL-CCNC: 19 U/L (ref 12–45)
BILIRUB SERPL-MCNC: 0.9 MG/DL (ref 0.1–1.5)
BUN SERPL-MCNC: 16 MG/DL (ref 8–22)
CALCIUM SERPL-MCNC: 8.5 MG/DL (ref 8.5–10.5)
CHLORIDE SERPL-SCNC: 108 MMOL/L (ref 96–112)
CO2 SERPL-SCNC: 30 MMOL/L (ref 20–33)
CREAT SERPL-MCNC: 0.82 MG/DL (ref 0.5–1.4)
EKG IMPRESSION: NORMAL
ERYTHROCYTE [DISTWIDTH] IN BLOOD BY AUTOMATED COUNT: 42.8 FL (ref 35.9–50)
GLOBULIN SER CALC-MCNC: 2.1 G/DL (ref 1.9–3.5)
GLUCOSE BLD-MCNC: 166 MG/DL (ref 65–99)
GLUCOSE BLD-MCNC: 97 MG/DL (ref 65–99)
GLUCOSE SERPL-MCNC: 115 MG/DL (ref 65–99)
HCT VFR BLD AUTO: 33.4 % (ref 42–52)
HGB BLD-MCNC: 11.1 G/DL (ref 14–18)
LV EJECT FRACT  99904: 55
LV EJECT FRACT  99904: 65
LV EJECT FRACT MOD 2C 99903: 66.93
LV EJECT FRACT MOD 4C 99902: 69.87
LV EJECT FRACT MOD BP 99901: 67.98
MCH RBC QN AUTO: 29.9 PG (ref 27–33)
MCHC RBC AUTO-ENTMCNC: 33.2 G/DL (ref 33.7–35.3)
MCV RBC AUTO: 90 FL (ref 81.4–97.8)
NT-PROBNP SERPL IA-MCNC: 664 PG/ML (ref 0–125)
PLATELET # BLD AUTO: 168 K/UL (ref 164–446)
PMV BLD AUTO: 9.9 FL (ref 9–12.9)
POTASSIUM SERPL-SCNC: 4.1 MMOL/L (ref 3.6–5.5)
PROT SERPL-MCNC: 5.6 G/DL (ref 6–8.2)
RBC # BLD AUTO: 3.71 M/UL (ref 4.7–6.1)
SODIUM SERPL-SCNC: 145 MMOL/L (ref 135–145)
WBC # BLD AUTO: 10.1 K/UL (ref 4.8–10.8)

## 2020-01-08 PROCEDURE — 82962 GLUCOSE BLOOD TEST: CPT

## 2020-01-08 PROCEDURE — 93005 ELECTROCARDIOGRAM TRACING: CPT | Performed by: NURSE PRACTITIONER

## 2020-01-08 PROCEDURE — 36415 COLL VENOUS BLD VENIPUNCTURE: CPT

## 2020-01-08 PROCEDURE — 99024 POSTOP FOLLOW-UP VISIT: CPT | Performed by: INTERNAL MEDICINE

## 2020-01-08 PROCEDURE — A9270 NON-COVERED ITEM OR SERVICE: HCPCS | Performed by: NURSE PRACTITIONER

## 2020-01-08 PROCEDURE — 93010 ELECTROCARDIOGRAM REPORT: CPT | Performed by: INTERNAL MEDICINE

## 2020-01-08 PROCEDURE — 93306 TTE W/DOPPLER COMPLETE: CPT

## 2020-01-08 PROCEDURE — 700102 HCHG RX REV CODE 250 W/ 637 OVERRIDE(OP): Performed by: NURSE PRACTITIONER

## 2020-01-08 PROCEDURE — 97161 PT EVAL LOW COMPLEX 20 MIN: CPT

## 2020-01-08 PROCEDURE — 83880 ASSAY OF NATRIURETIC PEPTIDE: CPT

## 2020-01-08 PROCEDURE — 93306 TTE W/DOPPLER COMPLETE: CPT | Mod: 26 | Performed by: INTERNAL MEDICINE

## 2020-01-08 PROCEDURE — 80053 COMPREHEN METABOLIC PANEL: CPT

## 2020-01-08 PROCEDURE — 85027 COMPLETE CBC AUTOMATED: CPT

## 2020-01-08 RX ADMIN — GABAPENTIN 200 MG: 100 CAPSULE ORAL at 04:42

## 2020-01-08 ASSESSMENT — COGNITIVE AND FUNCTIONAL STATUS - GENERAL
SUGGESTED CMS G CODE MODIFIER MOBILITY: CH
MOBILITY SCORE: 24

## 2020-01-08 ASSESSMENT — ENCOUNTER SYMPTOMS
PSYCHIATRIC NEGATIVE: 1
MYALGIAS: 0
DIZZINESS: 0
NEUROLOGICAL NEGATIVE: 1
CHILLS: 0
NAUSEA: 0
SHORTNESS OF BREATH: 0
PALPITATIONS: 0
EYES NEGATIVE: 1
RESPIRATORY NEGATIVE: 1
WEAKNESS: 0
BRUISES/BLEEDS EASILY: 0
COUGH: 0
NERVOUS/ANXIOUS: 0
CONSTITUTIONAL NEGATIVE: 1
HEADACHES: 0
MUSCULOSKELETAL NEGATIVE: 1
GASTROINTESTINAL NEGATIVE: 1
ABDOMINAL PAIN: 0
FEVER: 0
VOMITING: 0
CARDIOVASCULAR NEGATIVE: 1

## 2020-01-08 ASSESSMENT — GAIT ASSESSMENTS
DISTANCE (FEET): 300
GAIT LEVEL OF ASSIST: SUPERVISED

## 2020-01-08 NOTE — PROGRESS NOTES
Cardiology Follow Up Progress Note    Date of Service  1/8/2020    Attending Physician  Gary Wilson M.D.    Chief Complaint   Status post MitraClip    HPI  Filiberto Jauregui is a 86 y.o. male admitted 1/7/2020 with above.    Interim Events  No significant changes noted from cardiac standpoint within the past 24 hours.    Review of Systems  Review of Systems   Constitutional: Negative.  Negative for chills and fever.   HENT: Negative.  Negative for hearing loss.    Eyes: Negative.    Respiratory: Negative.  Negative for cough and shortness of breath.    Cardiovascular: Negative.  Negative for chest pain, palpitations and leg swelling.   Gastrointestinal: Negative.  Negative for abdominal pain, nausea and vomiting.   Genitourinary: Negative.  Negative for dysuria and urgency.   Musculoskeletal: Negative.  Negative for myalgias.   Skin: Negative.  Negative for rash.   Neurological: Negative.  Negative for dizziness, weakness and headaches.   Hematological: Does not bruise/bleed easily.   Psychiatric/Behavioral: Negative.  The patient is not nervous/anxious.        Vital signs in last 24 hours  Temp:  [36.2 °C (97.2 °F)-37.4 °C (99.3 °F)] 36.7 °C (98 °F)  Pulse:  [51-70] 60  Resp:  [12-29] 16  BP: (115-168)/(49-77) 127/59  SpO2:  [92 %-100 %] 98 %    Physical Exam  Physical Exam  Constitutional:       Appearance: He is well-developed.   HENT:      Head: Normocephalic and atraumatic.   Eyes:      Conjunctiva/sclera: Conjunctivae normal.      Pupils: Pupils are equal, round, and reactive to light.   Neck:      Musculoskeletal: Normal range of motion and neck supple.   Cardiovascular:      Rate and Rhythm: Normal rate and regular rhythm.   Pulmonary:      Effort: Pulmonary effort is normal.      Breath sounds: Normal breath sounds.   Abdominal:      General: Bowel sounds are normal.      Palpations: Abdomen is soft.   Musculoskeletal: Normal range of motion.   Skin:     General: Skin is warm and dry.   Neurological:       Mental Status: He is alert and oriented to person, place, and time.         Lab Review  Lab Results   Component Value Date/Time    WBC 10.1 01/08/2020 02:37 AM    RBC 3.71 (L) 01/08/2020 02:37 AM    HEMOGLOBIN 11.1 (L) 01/08/2020 02:37 AM    HEMATOCRIT 33.4 (L) 01/08/2020 02:37 AM    MCV 90.0 01/08/2020 02:37 AM    MCH 29.9 01/08/2020 02:37 AM    MCHC 33.2 (L) 01/08/2020 02:37 AM    MPV 9.9 01/08/2020 02:37 AM      Lab Results   Component Value Date/Time    SODIUM 145 01/08/2020 02:37 AM    POTASSIUM 4.1 01/08/2020 02:37 AM    CHLORIDE 108 01/08/2020 02:37 AM    CO2 30 01/08/2020 02:37 AM    GLUCOSE 115 (H) 01/08/2020 02:37 AM    BUN 16 01/08/2020 02:37 AM    CREATININE 0.82 01/08/2020 02:37 AM      Lab Results   Component Value Date/Time    ASTSGOT 19 01/08/2020 02:37 AM    ALTSGPT 18 01/08/2020 02:37 AM     Lab Results   Component Value Date/Time    CHOLSTRLTOT 89 (L) 06/26/2019 10:42 AM    LDL 36 06/26/2019 10:42 AM    HDL 29 (A) 06/26/2019 10:42 AM    TRIGLYCERIDE 118 06/26/2019 10:42 AM       Recent Labs     01/07/20  0745 01/07/20  1242 01/08/20  0237   NTPROBNP 245* 289* 664*   (Above labs reviewed.)       Current Facility-Administered Medications:   •  aspirin EC (ECOTRIN) tablet 81 mg, 81 mg, Oral, DAILY, Keke Longoria, A.P.R.N., Stopped at 01/08/20 0600  •  atorvastatin (LIPITOR) tablet 40 mg, 40 mg, Oral, DAILY, Keke Longoria, A.P.R.N., Stopped at 01/08/20 0600  •  clopidogrel (PLAVIX) tablet 75 mg, 75 mg, Oral, DAILY, Keke Longoria, A.P.R.N., Stopped at 01/08/20 0600  •  docusate sodium (COLACE) capsule 100 mg, 100 mg, Oral, BID PRN, Keke Longoria, A.P.R.N.  •  donepezil (ARICEPT) tablet 10 mg, 10 mg, Oral, Q EVENING, Keke Longoria, A.P.R.N., 10 mg at 01/07/20 1700  •  gabapentin (NEURONTIN) capsule 200 mg, 200 mg, Oral, BID, Keke Longoria, A.P.R.N., 200 mg at 01/08/20 0442  •  losartan (COZAAR) tablet 100 mg, 100 mg, Oral, DAILY, Keke Longoria, A.P.R.N.,  Stopped at 01/08/20 0600  •  ondansetron (ZOFRAN) syringe/vial injection 4 mg, 4 mg, Intravenous, Q4HRS PRN, Keke Longoria, A.P.R.N.  •  diphenhydrAMINE (BENADRYL) injection 25 mg, 25 mg, Intravenous, Q6HRS PRN, Keke Longoria, A.P.R.N.  •  hydrALAZINE (APRESOLINE) injection 10 mg, 10 mg, Intravenous, Q30 MIN PRN, Keke Longoria, A.P.R.N.  •  acetaminophen (TYLENOL) tablet 650 mg, 650 mg, Oral, Q6HRS PRN, Keke Longoria, A.P.R.N.  •  diphenhydrAMINE (BENADRYL) tablet/capsule 25 mg, 25 mg, Oral, HS PRN, Keke Longoria, A.P.R.N.  •  senna-docusate (PERICOLACE or SENOKOT S) 8.6-50 MG per tablet 2 Tab, 2 Tab, Oral, BID PRN **AND** polyethylene glycol/lytes (MIRALAX) PACKET 1 Packet, 1 Packet, Oral, QDAY PRN **AND** magnesium hydroxide (MILK OF MAGNESIA) suspension 30 mL, 30 mL, Oral, QDAY PRN **AND** bisacodyl (DULCOLAX) suppository 10 mg, 10 mg, Rectal, QDAY PRN, Keke Longoria, A.P.R.N.  •  insulin regular (HUMULIN R) injection 2-9 Units, 2-9 Units, Subcutaneous, 4X/DAY ACHS, Stopped at 01/07/20 1700 **AND** Accu-Chek ACHS, , , Q AC AND BEDTIME(S) **AND** NOTIFY MD and PharmD, , , Once **AND** glucose 4 g chewable tablet 16 g, 16 g, Oral, Q15 MIN PRN **AND** DEXTROSE 10% BOLUS 250 mL, 250 mL, Intravenous, Q15 MIN PRN, Keke Longoria, A.P.R.N.  (Medications reviewed.)    Cardiac Imaging and Procedures Review  CARDIAC STUDIES/PROCEDURES:    CARDIAC CATHETERIZATION CONCLUSIONS (12/03/19)  1.  A 4+ mitral regurgitation.  2.  Single-vessel coronary artery disease with high-grade proximal circumflex   artery stenosis.  3.  Successful percutaneous transluminal coronary angioplasty/stent placement   of the proximal circumflex artery with 3.0x16 mm Synergy drug-eluting stent.  4.  Normal left ventricular systolic function with ejection fraction of 60%.  5.  Elevated left ventricular end-diastolic pressure.  6.  Elevated right heart pressure with pulmonary artery systolic pressure of   55  mmHg.    CAROTID ULTRASOUND (01/02/20)  Mild bilateral internal carotid artery stenosis (<50%).   Antegrade flow, bilateral vertebral arteries.      ECHOCARDIOGRAM CONCLUSIONS (08/14/19)  Compared to the images of the prior study done on 01/22/18, MR is   severe, RV further dilated.  Left ventricular ejection fraction is visually estimated to be 55%.  Prolapse of the posterior mitral leaflet was present.  Probably severe, highly eccentric mitral regurgitation due to   generative MV disease, multiple jets.  Possible mobile echo density flowing into the left atrium, best seen in apical 2 C view.  Unable to estimate accurate RVSP, appears to be > 45 mmHg, RAP normal.  Mildly dilated right ventricle.  Findings DW Dr. Johnson at time of report.  Consider MitraClip image JOSÉ if clinically relevant.      ECHOCARDIOGRAM CONCLUSIONS (01/22/19)  Normal left ventricular size, thickness, systolic function, and diastolic function.  Left ventricular ejection fraction is visually estimated to be 60%.  Normal inferior vena cava size and inspiratory collapse.  Prolapse of the posterior mitral leaflet was present. Significant   turbulence is noted across the mitral valve. difficult to accurately   quantify mitral regurgitation severity. overall appears to have   moderate mitral regurgitation.  Aortic sclerosis without stenosis.     EKG performed on (01/08/20) was reviewed: EKG personally interpreted shows sinus bradycardia.  EKG performed on (12/04/19) EKG personally interpreted shows sinus bradycardia.  EKG performed on (08/29/19) EKG shows sinus rhythm.     Laboratory results of (12/04/19) were reviewed. Bun of 17 mg/dl, creatinine levels of 1.03 mg/dl noted.     TRANSESOPHAGEAL ECHOCARDIOGRAM CONCLUSIONS by Laverne Garces (10/24/19)  LV EF visually estimated to be 60%.  Biatrial enlargement.  Neetu class II severe mitral regurgitation, P2, P3 prolapse, possible very small A3 chordae in the left atrium.  Multiple MR jets,  partially central, partially anteriorly directed.  Pulmonary vein systolic flow reversal present.    TRANSESOPHAGEAL ECHOCARDIOGRAM CONCLUSIONS by Dr. Dupont (01/07/20)  Results pending.  (study result reviewed)    Assessment/Plan    1. Successful transcatheter percutaneous mitral valve repair (MitraClip) with 2 XTr clips, under general anesthesia (01/07/20): He is clinically doing well. I started discussions of discharge instructions including but not excluded to limitation of activity, medications, follow ups as well as answering questions or concerns.  2. Coronary artery disease with stent placement: He is clinically doing well without recurrence of his angina.   3. Memory loss: His memory loss has improved on medication.  4. Hematuria: His hematuria has resolved.    CC Mac Fox       Thank you for allowing me to participate in the care of this patient.  I will continue to follow this patient    Please contact me with any questions.    Gary Wilson M.D.   Cardiologist, [facility]  [phone]

## 2020-01-08 NOTE — PROGRESS NOTES
2 RN skin check completed with Cole VALENCIA   Patient has a right femoral cath site CDI   Otherwise skin is intact.

## 2020-01-08 NOTE — PROGRESS NOTES
Received pt from PACU. Patient A&O x 4. VS'S. RA. SB with a fist degree heart block on telemetry monitor. Right groin site CDI with small firm hematoma. Right pedal pulse 2 +. Patient instructed on importance of bedrest until 1630. POC discussed with patient. Pt verbalizes understanding. Call light and belongings with in reach. Bed locked and in lowest position, alarm and fall precautions in place.

## 2020-01-08 NOTE — PROGRESS NOTES
Pt dc'dajuan tello. IV and monitor removed; monitor room notified. Pt left unit via wheelchair with Lubna VALENCIA and spouse. Personal belongings with pt when leaving unit including mitral clip card. Pt given discharge instructions prior to leaving unit including where to  prescription and when to visit with physician; verbalizes understanding. Copy of discharge instructions with pt and in the chart.

## 2020-01-08 NOTE — NON-PROVIDER
[facility] Registry ID for MitraClip procedure  ID #[id number]

## 2020-01-08 NOTE — PROGRESS NOTES
Monitor Summary: .24/.22/.36 SB / BBB/ 1st degree heart block in the 50's   Frequent couplets   5-6 bts of V.tach

## 2020-01-08 NOTE — THERAPY
"Pt s/p mitral clip.  He was indep PTA.  Today, he is able to mobilize w/o need of physical/mechanical assist.  No TAVERAS.  No loss of balance.  No acute PT needs.  Recommend oob/amb prn per nsg OK.  No acute PT needs.     Physical Therapy Evaluation completed.   Bed Mobility:   spv  Transfers:  spv  Gait:   with No Equipment Needed       Plan of Care: Patient with no further skilled PT needs in the acute care setting at this time  Discharge Recommendations: Equipment: No Equipment Needed. Post-acute therapy   Anticipate that the patient will have no further physical therapy needs after discharge from the hospital.    See \"Rehab Therapy-Acute\" Patient Summary Report for complete documentation.     "

## 2020-01-08 NOTE — DISCHARGE SUMMARY
PRIMARY DISCHARGE DIAGNOSIS: Status post MitraClip    PROCEDURES:    1. Successful MitraClip X2, transfemoral approach under general anesthesia on 1/7/20  2. Intraoperative transesophageal echocardiogram showing results pending  3. Echocardiogram on 1/8/20 showing:  Compared to the images of the study done 8/14/19 - there has been   interval placement of MITRACLIP.  Normal left ventricular systolic function.  Left ventricular ejection fraction is visually estimated to be 65%.  The right ventricle was normal in size and function.  Status post MITRACLIP x2.  Moderate, highly eccentric mitral regurgitation.  Flow noted across the interatrial septum with color Doppler indicative   of left-to-right shunt.  4. CXR on 1/8/20 showing no acute disease noted  5. EKG on 1/8/20 showing sinus bradycardia rate of 55    HOSPITAL COURSE: The patient is a pleasant 86 year old male with severe symptomatic mitral regurgitation, coronary artery disease with recent coronary stent placement, HLD, HTN, neuropathy, memory loss, type II diabetes on oral medications, and bladder cancer with current treatment. Due to the patient's symptoms, the patient underwent successful MitraClip described as above. Post-procedure, the patient did well. They didn't require IV diuresis during their stay. They were able to ambulate without difficulty. No further events were noted during their stay. They are now off oxygen and are to be discharged to home with his wife.    DISCHARGE MEDICATIONS:      Medication List      CONTINUE taking these medications      Instructions   aspirin 81 MG EC tablet   Take 1 Tab by mouth every day.  Dose:  81 mg     atorvastatin 40 MG Tabs  Commonly known as:  LIPITOR   Take 1 Tab by mouth every day.  Dose:  40 mg     clopidogrel 75 MG Tabs  Commonly known as:  PLAVIX   Take 1 Tab by mouth every day.  Dose:  75 mg     docusate sodium 100 MG Caps  Commonly known as:  COLACE   Take 100 mg by mouth 2 times a day as needed for  Constipation.  Dose:  100 mg     donepezil 10 MG tablet  Commonly known as:  ARICEPT   Take 10 mg by mouth every evening.  Dose:  10 mg     gabapentin 100 MG Caps  Commonly known as:  NEURONTIN  Notes to patient:  Please pay special attention to administration instructions.    TAKE 1 CAPSULE BY MOUTH TWICE DAILY MAY INCREASE AS NEEDED EVERY 5 DAYS UP TO MAXIMUM OF 18 CAPSULES PER DAY     losartan 100 MG Tabs  Commonly known as:  COZAAR   Take 100 mg by mouth every day.  Dose:  100 mg     metFORMIN 500 MG Tabs  Commonly known as:  GLUCOPHAGE   Doctor's comments:  Restart Friday morning.  Take 1-2 Tabs by mouth 2 times a day, with meals. 1000 mg in am and 500 mg in pm  Dose:  500-1,000 mg        STOP taking these medications    diphenhydrAMINE 25 MG Tabs  Commonly known as:  BENADRYL ALLERGY  Notes to patient:  DO NOT TAKE     diphenhydrAMINE 50 MG tablet  Commonly known as:  BENADRYL  Notes to patient:  DO NOT TAKE     predniSONE 50 MG Tabs  Commonly known as:  DELTASONE  Notes to patient:  DO NOT TAKE        ASK your doctor about these medications      Instructions   predniSONE 50 MG Tabs  Commonly known as:  DELTASONE  Ask about: Should I take this medication?  Notes to patient:  DO NOT TAKE   Take 1 Tab by mouth 2 times a day for 4 doses. First dose 1/5 PM, second 1/6 AM, third 1/6 PM, and last 1/7 one hour prior to procedure.  Dose:  50 mg          DISCHARGE INSTRUCTIONS: They are given discharge instructions on potential post-operative complications and symptoms to watch out for. Their groin sites were checked and were clean, dry, and intact. Patient or family to notify us for any complications noted on the discharge instructions. They will follow up with myself, Keke PIERRE, on Friday in our cardiology office. They will not get labs before their follow up appointment. They will then follow up with Dr. Wilson with a repeat echocardiogram in one month for post MitraClip assessment.     FOLLOW  UP  Future Appointments   Date Time Provider Department Center   1/10/2020 10:45 AM GABBY [facility] None

## 2020-01-08 NOTE — DISCHARGE INSTRUCTIONS
Discharge Instructions    Discharged to home by car with relative. Discharged via walking, hospital escort: Yes.  Special equipment needed: Not Applicable    Be sure to schedule a follow-up appointment with your primary care doctor or any specialists as instructed.     Discharge Plan:   Diet Plan: Discussed  Confirmed Follow up Appointment: Appointment Scheduled  Confirmed Symptoms Management: Discussed  Medication Reconciliation Updated: Yes  Influenza Vaccine Indication: Patient Refuses    I understand that a diet low in cholesterol, fat, and sodium is recommended for good health. Unless I have been given specific instructions below for another diet, I accept this instruction as my diet prescription.   Other diet: Heart Healthy    Special Instructions: None    · Is patient discharged on Warfarin / Coumadin?   No     Depression / Suicide Risk    As you are discharged from this [facility] facility, it is important to learn how to keep safe from harming yourself.    Recognize the warning signs:  · Abrupt changes in personality, positive or negative- including increase in energy   · Giving away possessions  · Change in eating patterns- significant weight changes-  positive or negative  · Change in sleeping patterns- unable to sleep or sleeping all the time   · Unwillingness or inability to communicate  · Depression  · Unusual sadness, discouragement and loneliness  · Talk of wanting to die  · Neglect of personal appearance   · Rebelliousness- reckless behavior  · Withdrawal from people/activities they love  · Confusion- inability to concentrate     If you or a loved one observes any of these behaviors or has concerns about self-harm, here's what you can do:  · Talk about it- your feelings and reasons for harming yourself  · Remove any means that you might use to hurt yourself (examples: pills, rope, extension cords, firearm)  · Get professional help from the community (Mental Health, Substance Abuse, psychological  counseling)  · Do not be alone:Call your Safe Contact- someone whom you trust who will be there for you.  · Call your local CRISIS HOTLINE 175-2772 or 752-584-0999  · Call your local Children's Mobile Crisis Response Team [place] (149) 140-0070 or www.Monaeo  · Call the toll free National Suicide Prevention Hotlines   · National Suicide Prevention Lifeline 356-236-TDHT (8546)  · [employer] 800-SUICIDE (996-6560)      Transcatheter Mitral Valve Repair (TMVR)    General Instructions:  · Take medication as directed. Do not ever stop taking any medications without talking to your doctor first.     Incision Care Instructions:  · Look and feel the site(s) of your TMVR TODAY, so you can recognize changes that should be called to your doctor (see below).  · It's normal for your incision to be bruised, itchy, or sore while it's healing. Your incision may take a week or more to heal.   · Bruising may occur.  Some of the discoloration may travel down the leg. As it resolves, the color should change from blue to green to yellow-brown.  · A small, round lump under the TMVR site may remain for up to 6 weeks  · Wash your incision site every day with warm water and soap. Gently pat it dry. Don't put powder, lotion, or ointment on the incision until it's healed.     Activity:  · No driving for one week.  · No lifting or pulling objects over 10 pounds for 5 days.  · Warm showers are permitted after the bandage is removed.  · Walk regularly. One of the best ways to get stronger is to walk. Walk a little more each day.      When to call your health care provider:  · You develop a fever.  · Redness, swelling, bleeding, warmth, or fluid draining at the incision site.  · Bruising appears to be new or does not look like it is getting better.  · The small, round lump in the groin in the area of the TMVR site increases in size.     Seek immediate medical help if you have any of the following symptoms:  · You  experience any leg numbness, aching, or discomfort.  · Chest pain or trouble breathing (call 911).  · Sudden numbness or weakness in your face, arms, or legs (call 911).  · Bowel movement that is bright red.   · Dizziness or fainting.  · Weight gain of more than 2 pounds in 24 hours or more than 5 pounds in 1 week.  · Swelling in your hands, feet, or ankles.  · Shortness of breath that doesn't get better when you rest.  · Pain that gets worse or doesn't go away.  · Fast or irregular pulse.

## 2020-01-10 ENCOUNTER — OFFICE VISIT (OUTPATIENT)
Dept: CARDIOLOGY | Facility: MEDICAL CENTER | Age: 85
End: 2020-01-10
Payer: MEDICARE

## 2020-01-10 VITALS
SYSTOLIC BLOOD PRESSURE: 130 MMHG | HEART RATE: 78 BPM | DIASTOLIC BLOOD PRESSURE: 62 MMHG | OXYGEN SATURATION: 96 % | BODY MASS INDEX: 23.88 KG/M2 | WEIGHT: 148.59 LBS | HEIGHT: 66 IN

## 2020-01-10 DIAGNOSIS — I25.10 CORONARY ARTERY DISEASE INVOLVING NATIVE CORONARY ARTERY OF NATIVE HEART WITHOUT ANGINA PECTORIS: ICD-10-CM

## 2020-01-10 DIAGNOSIS — I10 ESSENTIAL HYPERTENSION: ICD-10-CM

## 2020-01-10 DIAGNOSIS — Z95.820 S/P ANGIOPLASTY WITH STENT: ICD-10-CM

## 2020-01-10 DIAGNOSIS — I34.0 MITRAL VALVE INSUFFICIENCY, UNSPECIFIED ETIOLOGY: ICD-10-CM

## 2020-01-10 DIAGNOSIS — Z95.818 S/P MITRAL VALVE CLIP IMPLANTATION: ICD-10-CM

## 2020-01-10 DIAGNOSIS — Z98.890 S/P MITRAL VALVE CLIP IMPLANTATION: ICD-10-CM

## 2020-01-10 LAB — EKG IMPRESSION: NORMAL

## 2020-01-10 PROCEDURE — 93000 ELECTROCARDIOGRAM COMPLETE: CPT | Performed by: INTERNAL MEDICINE

## 2020-01-10 PROCEDURE — 99214 OFFICE O/P EST MOD 30 MIN: CPT | Mod: 24 | Performed by: NURSE PRACTITIONER

## 2020-01-10 RX ORDER — PREDNISONE 50 MG/1
50 TABLET ORAL DAILY
Status: ON HOLD | COMMUNITY
End: 2020-03-20

## 2020-01-10 RX ORDER — CLOPIDOGREL BISULFATE 75 MG/1
TABLET ORAL
COMMUNITY
End: 2020-01-10

## 2020-01-10 ASSESSMENT — ENCOUNTER SYMPTOMS
MYALGIAS: 0
PALPITATIONS: 0
DIZZINESS: 0
ABDOMINAL PAIN: 0
COUGH: 0
SHORTNESS OF BREATH: 0
ORTHOPNEA: 0
PND: 0
FEVER: 0
CLAUDICATION: 0

## 2020-01-10 NOTE — PROGRESS NOTES
"Chief Complaint   Patient presents with   • Mitral Valve Prolapse     Subjective:   Duy Jauregui is a 86 y.o. male who presents today for hospital follow up S/P Susanne Clip X2.    He is a patient of Dr. Wilson in our office.    Hx of prostate CA, DM (oral medications), HLD, HTN, and now S/P Susanne Clip X2 for severe MR.    He presents today with his wife.     He is doing well post-operatively.    His groin site is intact with only mild bruising.    He is interested in cardiac rehab.    Past Medical History:   Diagnosis Date   • Cancer (HCC)     prostate   • Diabetes (HCC)     oral medication \"borderline\"    • High cholesterol    • Hypertension 10/23/2019   • Mitral regurgitation    • MVP (mitral valve prolapse)    • Pneumonia     hx x1    • Valvular heart disease      Past Surgical History:   Procedure Laterality Date   • TRANSCATHETER MITRAL VALVE REPAIR  1/7/2020    Procedure: REPAIR, MITRAL VALVE, TRANSCATHETER;  Surgeon: Gary Wilson M.D.;  Location: [facility];  Service: Cardiac   • JOSÉ  1/7/2020    Procedure: ECHOCARDIOGRAM, TRANSESOPHAGEAL- POTENTIALLY;  Surgeon: Gary Wilson M.D.;  Location: [facility];  Service: Cardiac   • PB CYSTOURETHROSCOPY,URETER CATHETER Bilateral 11/1/2019    Procedure: CYSTOSCOPY, WITH BILATERAL RETROGRADE PYELOGRAM-AND 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;  Surgeon: Brad Jones M.D.;  Location: [facility];  Service: Urology   • PROSTATECTOMY, RADIAL     • TONSILLECTOMY       Family History   Problem Relation Age of Onset   • Heart Attack Father    • Lung Disease Mother      Social History     Socioeconomic History   • Marital status:      Spouse name: Not on file   • Number of children: Not on file   • Years of education: Not on file   • Highest education level: Not on file   Occupational History   • Not on file   Social Needs   • Financial resource strain: Not on file   • Food insecurity:     Worry: Not on file "     Inability: Not on file   • Transportation needs:     Medical: Not on file     Non-medical: Not on file   Tobacco Use   • Smoking status: Never Smoker   • Smokeless tobacco: Never Used   Substance and Sexual Activity   • Alcohol use: Yes     Alcohol/week: 0.6 oz     Types: 1 Glasses of wine per week     Frequency: Monthly or less     Drinks per session: 1 or 2     Comment: OCCASIONALLY   • Drug use: No   • Sexual activity: Not on file   Lifestyle   • Physical activity:     Days per week: Not on file     Minutes per session: Not on file   • Stress: Not on file   Relationships   • Social connections:     Talks on phone: Not on file     Gets together: Not on file     Attends Scientology service: Not on file     Active member of club or organization: Not on file     Attends meetings of clubs or organizations: Not on file     Relationship status: Not on file   • Intimate partner violence:     Fear of current or ex partner: Not on file     Emotionally abused: Not on file     Physically abused: Not on file     Forced sexual activity: Not on file   Other Topics Concern   • Not on file   Social History Narrative   • Not on file     Allergies   Allergen Reactions   • Iodide      Per pt broke out in rash   • Penicillins Rash     .     Outpatient Encounter Medications as of 1/10/2020   Medication Sig Dispense Refill   • predniSONE (DELTASONE) 50 MG Tab prednisone 50 mg tablet     • metFORMIN (GLUCOPHAGE) 500 MG Tab Take 1-2 Tabs by mouth 2 times a day, with meals. 1000 mg in am and 500 mg in pm 60 Tab 3   • clopidogrel (PLAVIX) 75 MG Tab Take 1 Tab by mouth every day. 90 Tab 3   • atorvastatin (LIPITOR) 40 MG Tab Take 1 Tab by mouth every day. 30 Tab 11   • aspirin EC 81 MG EC tablet Take 1 Tab by mouth every day. 30 Tab 11   • gabapentin (NEURONTIN) 100 MG Cap TAKE 1 CAPSULE BY MOUTH TWICE DAILY MAY INCREASE AS NEEDED EVERY 5 DAYS UP TO MAXIMUM OF 18 CAPSULES PER DAY  5   • docusate sodium (COLACE) 100 MG Cap Take 100 mg by  "mouth 2 times a day as needed for Constipation.     • donepezil (ARICEPT) 10 MG tablet Take 10 mg by mouth every evening.  3   • losartan (COZAAR) 100 MG Tab Take 100 mg by mouth every day.     • [DISCONTINUED] clopidogrel (PLAVIX) 75 MG Tab clopidogrel 75 mg tablet       No facility-administered encounter medications on file as of 1/10/2020.      Review of Systems   Constitutional: Negative for fever and malaise/fatigue.   Respiratory: Negative for cough and shortness of breath.    Cardiovascular: Negative for chest pain, palpitations, orthopnea, claudication, leg swelling and PND.   Gastrointestinal: Negative for abdominal pain.   Musculoskeletal: Negative for myalgias.   Neurological: Negative for dizziness.        Objective:   /62 (BP Location: Left arm, Patient Position: Sitting, BP Cuff Size: Adult)   Pulse 78   Ht 1.676 m (5' 6\")   Wt 67.4 kg (148 lb 9.4 oz)   SpO2 96%   BMI 23.98 kg/m²     Physical Exam   Constitutional: He appears well-developed and well-nourished.   HENT:   Head: Normocephalic and atraumatic.   Eyes: EOM are normal.   Neck: No JVD present.   Cardiovascular: Normal rate, regular rhythm, normal heart sounds and intact distal pulses.   Pulmonary/Chest: Effort normal and breath sounds normal.   Musculoskeletal: Normal range of motion.         General: No edema.   Skin: Skin is warm and dry.   Psychiatric: He has a normal mood and affect.   Nursing note and vitals reviewed.      Assessment:     1. Mitral valve insufficiency, unspecified etiology  EKG   2. S/P angioplasty with stent     3. S/P mitral valve clip implantation     4. Essential hypertension     5. Coronary artery disease involving native coronary artery of native heart without angina pectoris       Medical Decision Making:  Today's Assessment / Status / Plan:     1. S/P Susanne Clip X2  -doing well post-op  -groin CDI with mild bruising  -restart metformin today  -cont asa lifelong    2. CAD with recent stent placement  -no " angina or TAVERAS  -cont asa, statin, and plavix (1 year post stent placement)    3. HTN  -good control  -cont losartan 100 mg QD    FU in clinic in 1 month with JI in valve clinic with echo    Patient verbalizes understanding and agrees with the plan of care.     Collaborating MD: Omar PARISH

## 2020-02-07 ENCOUNTER — OFFICE VISIT (OUTPATIENT)
Dept: CARDIOLOGY | Facility: MEDICAL CENTER | Age: 85
End: 2020-02-07
Payer: MEDICARE

## 2020-02-07 ENCOUNTER — TELEPHONE (OUTPATIENT)
Dept: CARDIOLOGY | Facility: MEDICAL CENTER | Age: 85
End: 2020-02-07

## 2020-02-07 VITALS
HEART RATE: 62 BPM | SYSTOLIC BLOOD PRESSURE: 132 MMHG | OXYGEN SATURATION: 98 % | RESPIRATION RATE: 12 BRPM | DIASTOLIC BLOOD PRESSURE: 72 MMHG | BODY MASS INDEX: 23.97 KG/M2 | HEIGHT: 66 IN | WEIGHT: 149.14 LBS

## 2020-02-07 DIAGNOSIS — I25.10 CORONARY ARTERY DISEASE INVOLVING NATIVE CORONARY ARTERY OF NATIVE HEART WITHOUT ANGINA PECTORIS: ICD-10-CM

## 2020-02-07 DIAGNOSIS — Z98.890 S/P MITRAL VALVE CLIP IMPLANTATION: ICD-10-CM

## 2020-02-07 DIAGNOSIS — Z95.818 S/P MITRAL VALVE CLIP IMPLANTATION: ICD-10-CM

## 2020-02-07 DIAGNOSIS — I10 ESSENTIAL HYPERTENSION: ICD-10-CM

## 2020-02-07 DIAGNOSIS — Z95.820 S/P ANGIOPLASTY WITH STENT: ICD-10-CM

## 2020-02-07 PROCEDURE — 99214 OFFICE O/P EST MOD 30 MIN: CPT | Mod: 24 | Performed by: INTERNAL MEDICINE

## 2020-02-07 RX ORDER — AMOXICILLIN 500 MG/1
2000 TABLET, FILM COATED ORAL
Qty: 8 TAB | Refills: 1 | Status: SHIPPED
Start: 2020-02-07 | End: 2020-02-07 | Stop reason: CLARIF

## 2020-02-07 RX ORDER — CLINDAMYCIN HYDROCHLORIDE 300 MG/1
600 CAPSULE ORAL
Qty: 4 CAP | Refills: 1 | Status: ON HOLD
Start: 2020-02-07 | End: 2020-03-20

## 2020-02-07 ASSESSMENT — ENCOUNTER SYMPTOMS
FOCAL WEAKNESS: 0
VOMITING: 0
HEADACHES: 0
GASTROINTESTINAL NEGATIVE: 1
WEIGHT LOSS: 0
NAUSEA: 0
DIZZINESS: 0
CONSTITUTIONAL NEGATIVE: 1
BRUISES/BLEEDS EASILY: 0
SHORTNESS OF BREATH: 0
MYALGIAS: 0
MUSCULOSKELETAL NEGATIVE: 1
DEPRESSION: 0
RESPIRATORY NEGATIVE: 1
PALPITATIONS: 0
FEVER: 0
CARDIOVASCULAR NEGATIVE: 1
DOUBLE VISION: 0
ABDOMINAL PAIN: 0
NERVOUS/ANXIOUS: 0
COUGH: 0
NEUROLOGICAL NEGATIVE: 1
CHILLS: 0
WEAKNESS: 0
EYES NEGATIVE: 1
PSYCHIATRIC NEGATIVE: 1
CLAUDICATION: 0
BLURRED VISION: 0

## 2020-02-07 NOTE — TELEPHONE ENCOUNTER
"Dr. Wilson requested assistance in sending prescription for appropriate antibiotic pre-med per AHA guidelines with hx of bioprosthetic valve.      Per chart, pt has allergy to amoxicillin with reaction as \"rash\". S/w pt and his wife regarding the reaction and which antibiotic they preferred pt to take prior to dental work. Pt said he hasn't taken amoxicillin since he was 2 years old and doesn't remember if there was even a reaction or not. Offered alterative antibiotic avoid potential for reaction but pt and his wife wanted to try amoxicillin again.    S/w Dr. Wilson to confirm the above decision however he would prefer pt be prescribed clindamycin. Called pt on home phone and left message explaining Dr. Wilson's preference. Prescription sent for clindamycin 600mg.    "

## 2020-02-07 NOTE — PROGRESS NOTES
"Chief Complaint   Patient presents with   • Mitral Valve Prolapse     s/p TMVR- 1 month       Subjective:   Filiberto Jauregui is a 86 y.o. male who presents today for hospital follow up.    Since the discharge from [facility] on 01/08/20 status post MitraClip, he has been doing well. He admits to . He denies fatigue, shortness of breath, dyspnea on exertion, chest pain, dizziness or syncope. He and his wife went to a wedding in [place] and walked a lot.    Past Medical History:   Diagnosis Date   • Cancer (HCC)     prostate   • Diabetes (HCC)     oral medication \"borderline\"    • High cholesterol    • Hypertension 10/23/2019   • Mitral regurgitation    • MVP (mitral valve prolapse)    • Pneumonia     hx x1    • Valvular heart disease      Past Surgical History:   Procedure Laterality Date   • TRANSCATHETER MITRAL VALVE REPAIR  1/7/2020    Procedure: REPAIR, MITRAL VALVE, TRANSCATHETER;  Surgeon: Gary Wilson M.D.;  Location: [facility];  Service: Cardiac   • JOSÉ  1/7/2020    Procedure: ECHOCARDIOGRAM, TRANSESOPHAGEAL- POTENTIALLY;  Surgeon: Gary Wilson M.D.;  Location: [facility];  Service: Cardiac   • PB CYSTOURETHROSCOPY,URETER CATHETER Bilateral 11/1/2019    Procedure: CYSTOSCOPY, WITH BILATERAL RETROGRADE PYELOGRAM-AND 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;  Surgeon: Brad Jones M.D.;  Location: [facility];  Service: Urology   • PROSTATECTOMY, RADIAL     • TONSILLECTOMY       Family History   Problem Relation Age of Onset   • Heart Attack Father    • Lung Disease Mother      Social History     Socioeconomic History   • Marital status:      Spouse name: Not on file   • Number of children: Not on file   • Years of education: Not on file   • Highest education level: Not on file   Occupational History   • Not on file   Social Needs   • Financial resource strain: Not on file   • Food insecurity:     Worry: Not on file     Inability: Not " on file   • Transportation needs:     Medical: Not on file     Non-medical: Not on file   Tobacco Use   • Smoking status: Never Smoker   • Smokeless tobacco: Never Used   Substance and Sexual Activity   • Alcohol use: Yes     Alcohol/week: 0.6 oz     Types: 1 Glasses of wine per week     Frequency: Monthly or less     Drinks per session: 1 or 2     Comment: OCCASIONALLY   • Drug use: No   • Sexual activity: Not on file   Lifestyle   • Physical activity:     Days per week: Not on file     Minutes per session: Not on file   • Stress: Not on file   Relationships   • Social connections:     Talks on phone: Not on file     Gets together: Not on file     Attends Yazidi service: Not on file     Active member of club or organization: Not on file     Attends meetings of clubs or organizations: Not on file     Relationship status: Not on file   • Intimate partner violence:     Fear of current or ex partner: Not on file     Emotionally abused: Not on file     Physically abused: Not on file     Forced sexual activity: Not on file   Other Topics Concern   • Not on file   Social History Narrative   • Not on file     Allergies   Allergen Reactions   • Iodide      Per pt broke out in rash   • Penicillins Rash     .     (Medications reviewed.)  Outpatient Encounter Medications as of 2/7/2020   Medication Sig Dispense Refill   • predniSONE (DELTASONE) 50 MG Tab prednisone 50 mg tablet     • metFORMIN (GLUCOPHAGE) 500 MG Tab Take 1-2 Tabs by mouth 2 times a day, with meals. 1000 mg in am and 500 mg in pm 60 Tab 3   • clopidogrel (PLAVIX) 75 MG Tab Take 1 Tab by mouth every day. 90 Tab 3   • atorvastatin (LIPITOR) 40 MG Tab Take 1 Tab by mouth every day. 30 Tab 11   • aspirin EC 81 MG EC tablet Take 1 Tab by mouth every day. 30 Tab 11   • gabapentin (NEURONTIN) 100 MG Cap TAKE 1 CAPSULE BY MOUTH TWICE DAILY MAY INCREASE AS NEEDED EVERY 5 DAYS UP TO MAXIMUM OF 18 CAPSULES PER DAY  5   • docusate sodium (COLACE) 100 MG Cap Take 100 mg  "by mouth 2 times a day as needed for Constipation.     • donepezil (ARICEPT) 10 MG tablet Take 10 mg by mouth every evening.  3   • losartan (COZAAR) 100 MG Tab Take 100 mg by mouth every day.       No facility-administered encounter medications on file as of 2/7/2020.      Review of Systems   Constitutional: Negative.  Negative for chills, fever, malaise/fatigue and weight loss.   HENT: Negative.  Negative for hearing loss.    Eyes: Negative.  Negative for blurred vision and double vision.   Respiratory: Negative.  Negative for cough and shortness of breath.    Cardiovascular: Negative.  Negative for chest pain, palpitations, claudication and leg swelling.   Gastrointestinal: Negative.  Negative for abdominal pain, nausea and vomiting.   Genitourinary: Negative.  Negative for dysuria and urgency.   Musculoskeletal: Negative.  Negative for joint pain and myalgias.   Skin: Negative.  Negative for itching and rash.   Neurological: Negative.  Negative for dizziness, focal weakness, weakness and headaches.   Endo/Heme/Allergies: Negative.  Does not bruise/bleed easily.   Psychiatric/Behavioral: Negative.  Negative for depression. The patient is not nervous/anxious.         Objective:   /72 (BP Location: Right arm, Patient Position: Sitting, BP Cuff Size: Adult)   Pulse 62   Resp 12   Ht 1.676 m (5' 6\")   Wt 67.7 kg (149 lb 2.3 oz)   SpO2 98%   BMI 24.07 kg/m²     Physical Exam   Constitutional: He is oriented to person, place, and time. He appears well-developed and well-nourished.   HENT:   Head: Normocephalic and atraumatic.   Eyes: EOM are normal.   Neck: No JVD present.   Cardiovascular: Normal rate and regular rhythm.   Murmur heard.  Pulmonary/Chest: Effort normal and breath sounds normal.   Abdominal: Soft. Bowel sounds are normal.   No hepatosplenomegaly.   Musculoskeletal: Normal range of motion.   Lymphadenopathy:     He has no cervical adenopathy.   Neurological: He is alert and oriented to " person, place, and time.   Skin: Skin is warm and dry.   Psychiatric: He has a normal mood and affect.     CARDIAC STUDIES/PROCEDURES:     CARDIAC CATHETERIZATION CONCLUSIONS (12/03/19)  1.  A 4+ mitral regurgitation.  2.  Single-vessel coronary artery disease with high-grade proximal circumflex   artery stenosis.  3.  Successful percutaneous transluminal coronary angioplasty/stent placement   of the proximal circumflex artery with 3.0x16 mm Synergy drug-eluting stent.  4.  Normal left ventricular systolic function with ejection fraction of 60%.  5.  Elevated left ventricular end-diastolic pressure.  6.  Elevated right heart pressure with pulmonary artery systolic pressure of   55 mmHg.     CAROTID ULTRASOUND (01/02/20)  Mild bilateral internal carotid artery stenosis (<50%).   Antegrade flow, bilateral vertebral arteries.     ECHOCARDIOGRAM CONCLUSIONS (01/08/20)  Compared to the images of the study done 8/14/19 - there has been   interval placement of MITRACLIP.  Normal left ventricular systolic function.  Left ventricular ejection fraction is visually estimated to be 65%.  The right ventricle was normal in size and function.  Status post MITRACLIP x2.  Moderate, highly eccentric mitral regurgitation.  Flow noted across the interatrial septum with color Doppler indicative   of left-to-right shunt.  (study result reviewed)     ECHOCARDIOGRAM CONCLUSIONS (08/14/19)  Compared to the images of the prior study done on 01/22/18, MR is   severe, RV further dilated.  Left ventricular ejection fraction is visually estimated to be 55%.  Prolapse of the posterior mitral leaflet was present.  Probably severe, highly eccentric mitral regurgitation due to   generative MV disease, multiple jets.  Possible mobile echo density flowing into the left atrium, best seen in apical 2 C view.  Unable to estimate accurate RVSP, appears to be > 45 mmHg, RAP normal.  Mildly dilated right ventricle.  Findings DW Dr. Johnson at time of  report.  Consider MitraClip image JOSÉ if clinically relevant.      ECHOCARDIOGRAM CONCLUSIONS (01/22/19)  Normal left ventricular size, thickness, systolic function, and diastolic function.  Left ventricular ejection fraction is visually estimated to be 60%.  Normal inferior vena cava size and inspiratory collapse.  Prolapse of the posterior mitral leaflet was present. Significant   turbulence is noted across the mitral valve. difficult to accurately   quantify mitral regurgitation severity. overall appears to have   moderate mitral regurgitation.  Aortic sclerosis without stenosis.     EKG performed on (01/10/20) was reviewed: EKG personally interpreted shows sinus rhythm.  EKG performed on (01/08/20) EKG personally interpreted shows sinus bradycardia.  EKG performed on (12/04/19) EKG personally interpreted shows sinus bradycardia.  EKG performed on (08/29/19) EKG shows sinus rhythm.     Laboratory results of (01/08/20) were reviewed. Bun of 16 mg/dl, creatinine levels of 0.82 mg/dl noted.     TRANSESOPHAGEAL ECHOCARDIOGRAM CONCLUSIONS by Laverne Garces (10/24/19)  LV EF visually estimated to be 60%.  Biatrial enlargement.  Neetu class II severe mitral regurgitation, P2, P3 prolapse, possible very small A3 chordae in the left atrium.  Multiple MR jets, partially central, partially anteriorly directed.  Pulmonary vein systolic flow reversal present.     TRANSESOPHAGEAL ECHOCARDIOGRAM CONCLUSIONS by Dr. Dupont (01/07/20)  Intraoperative JOSÉ during mitraclip procedure.  Normal biventricular   systolic function.  P2 posterior mitral leaflet prolapse with medial P2   flail and P2 cleft seen.  Severe mitral regurgitation with anteriorly   directed jet.  Two Mitraclips placed at the medial A2/P2 position with   reduction in MR to mild.  Normalization of pulmonary venous inflow to   systolic dominant.  Mean gradient of 3 mm Hg.  No evidence of   Complication.    Assessment:     1. S/P mitral valve clip  implantation     2. Coronary artery disease involving native coronary artery of native heart without angina pectoris     3. S/P angioplasty with stent     4. Essential hypertension         Medical Decision Making:  Today's Assessment / Status / Plan:     1. Status post transcatheter percutaneous mitral valve repair (MitraClip) with 2 XTr clips, under general anesthesia (01/07/20): He is clinically doing well, NYHA class II. We will repeat an echocardiogram now and in one year.  2. Coronary artery disease with stent placement: He is clinically doing well without recurrence of his angina.  We will continue with current medical care including aspirin, clopidogrel, losartan and atorvastatin.  3. Hypertension: Blood pressure is well controlled. We will continue with angiotensin receptor blockade.  4. Memory loss: His memory loss has improved on medication.  5. Hematuria: His hematuria has resolved. He is pending another surgery.    We will follow up with him in one year from MitraClip standpoint.     CC Mac Fox

## 2020-02-12 ENCOUNTER — TELEPHONE (OUTPATIENT)
Dept: CARDIOLOGY | Facility: MEDICAL CENTER | Age: 85
End: 2020-02-12

## 2020-02-12 NOTE — TELEPHONE ENCOUNTER
1 month post TMVR Valve Program Functional Assessment:     KCCQ12   1a) Showering/bathin  1b) Walking 1 block on ground: 5  1c) Hurrying or joggin  2) Swellin  3) Fatigue: 6  4) Shortness of breath: 7  5) Sleep sitting up: 5  6) Limited enjoyment of life: 5  7) Spend the rest of your life with HF: 5  8a) Hobbies, recreational activities: 4  8b) Working or doing household chores:4  8c) Visiting family or friends: 5    6 minute walk test  NOT DONE     Strength   1) 28 kg  2) 28 kg  3) 28 kg    SAENZ ADLs  Patient independently preforms...   - Bathing: Yes No  - Dressing: Yes No  - Toileting: Yes No  - Transferring: Yes No  - Continence: Yes No  - Feeding: Yes No    Living Situation  Patient lives: with spouse    Mobility Aids   Patient uses: none

## 2020-02-14 ENCOUNTER — HOSPITAL ENCOUNTER (OUTPATIENT)
Dept: CARDIOLOGY | Facility: MEDICAL CENTER | Age: 85
End: 2020-02-14
Attending: INTERNAL MEDICINE
Payer: MEDICARE

## 2020-02-14 DIAGNOSIS — Z95.818 S/P MITRAL VALVE CLIP IMPLANTATION: ICD-10-CM

## 2020-02-14 DIAGNOSIS — Z98.890 S/P MITRAL VALVE CLIP IMPLANTATION: ICD-10-CM

## 2020-02-14 PROCEDURE — 93306 TTE W/DOPPLER COMPLETE: CPT

## 2020-02-14 PROCEDURE — 93306 TTE W/DOPPLER COMPLETE: CPT | Mod: 26 | Performed by: INTERNAL MEDICINE

## 2020-02-17 LAB
LV EJECT FRACT  99904: 60
LV EJECT FRACT MOD 2C 99903: 76.76
LV EJECT FRACT MOD 4C 99902: 47.63
LV EJECT FRACT MOD BP 99901: 63.54

## 2020-02-19 ENCOUNTER — TELEPHONE (OUTPATIENT)
Dept: CARDIOLOGY | Facility: MEDICAL CENTER | Age: 85
End: 2020-02-19

## 2020-02-19 NOTE — TELEPHONE ENCOUNTER
Result Notes for EC-ECHOCARDIOGRAM COMPLETE W/O CONT     Notes recorded by GABBY Anderson on 2/18/2020 at 8:29 AM PST  Please tell patient know that JI says echo looks fine. Not to worry, Jean Paul called him about it. SC     -------------------------------------------------------------------------------------------------------------------------------------    Spoke with patient's wife.  She verbalized understanding and was appreciative for the information.    She also reported that the patient's urologist would like for the patient to possibly undergo another procedure, but there are concerns about stopping the patient's Plavix.  Patient's wife requested that this information be routed to Dr. Wilson stating the Dr. Jones's office may reach out to us when a decision is made by Urology.    RAPHAEL to Dr. Wilson.

## 2020-02-19 NOTE — TELEPHONE ENCOUNTER
Thanks for the heads up.      He had a coronary stent in December 2019, would prefer no interruption of stopping plavix until then, but if needing procedure in urgent manner-this can be reviewed by [facility]AMA in the future if needed. SC

## 2020-02-21 ENCOUNTER — TELEPHONE (OUTPATIENT)
Dept: CARDIOLOGY | Facility: MEDICAL CENTER | Age: 85
End: 2020-02-21

## 2020-02-21 NOTE — TELEPHONE ENCOUNTER
Reviewed echocardiogram results with patients wife who will relay the message to him. [facility]AMA personally reviewed and stated the echocardiogram looks good. We also discussed his potential need for upcoming bladder cancer surgery with Dr Barba. He is unable to stop his plavix for one year post coronary stent placement. They will discuss this with Dr. barba and weight out the risks/benefits of stopping plavix alongside needing the further clearance from Dr. Wilson when the time comes. She was very thankful for the call. SC

## 2020-02-21 NOTE — TELEPHONE ENCOUNTER
----- Message from GABBY Anderson sent at 2/19/2020 10:57 AM PST -----  We still need to call him. Jean Paul called Gary only. SC  ----- Message -----  From: Lina Morin R.N.  Sent: 2/19/2020   9:46 AM PST  To: GABBY Anderson    I'm confused... do we need to call the patient about the results or did Dr. Reaves already do that?  ----- Message -----  From: GABBY Anderson  Sent: 2/18/2020   8:54 AM PST  To: Juanita Reyes R.N.      ----- Message -----  From: GABBY Anderson  Sent: 2/18/2020   8:29 AM PST  To: Lina Morin R.N.    Please tell patient know that JI says echo looks fine. Not to worry, Jean Paul called him about it. SC

## 2020-03-02 ENCOUNTER — TELEPHONE (OUTPATIENT)
Dept: CARDIOLOGY | Facility: MEDICAL CENTER | Age: 85
End: 2020-03-02

## 2020-03-02 NOTE — TELEPHONE ENCOUNTER
"Received cardiac clearance request from [facility] for pt to proceed with cystoscopy, transurethral resection of bladder tumor and instravesical gemcitabine and hold anticoagulation for 7 days prior to surgery on 3/20/20. Pt is currently on DAPT: plavix and ASA.    Note from Keke PIERRE 2/21/20 states, \"He is unable to stop his plavix for one year post coronary stent placement. They will discuss this with Dr. barba and weight out the risks/benefits of stopping plavix alongside needing the further clearance from Dr. Wilson when the time comes.\"   Stent placement took place on 12/04/2019.    To Keke/Dr. Wilson to confirm that is correct information to send to [facility]  "

## 2020-03-02 NOTE — LETTER
PROCEDURE/SURGERY CLEARANCE FORM      Encounter Date: 3/2/2020    Patient: Filiberto Jauregui  YOB: 1933    CARDIOLOGIST:  Dr. Gary Wilson M.D.     REFERRING DOCTOR:  Dr. Brad Jones M.D.       Procedure/surgery: Cystoscopy, transurethral resection of bladder tumor and intravesical gemcitabine.  Request to hold anticoagulants 7 days prior to surgery.                                           Comments from Cardiologist: Patient may hold dual antiplatelet therapy (DAPT) if procedure needs to be performed urgently.  However, there is a higher risk of instent thrombosis if DAPT is held.      Sincerely,  Dr. Gary Wilson M.D.  Electronic signature

## 2020-03-02 NOTE — TELEPHONE ENCOUNTER
Check with [facility] as well. I told the family that [facility]AMA and Dr. Jones will need to discuss the case together to come up with the best plan of care. SC

## 2020-03-03 NOTE — TELEPHONE ENCOUNTER
Patient Call     GABBY Anderson routed conversation to You 3 hours ago (10:11 AM)      TEOFILO Reyes R.N. 22 hours ago (3:43 PM)     He may hold DAPT if the procedure needs to be urgently performed, however, there is higher risk of instent thrombosis and that the patient should be aware of.     Thanks.  USAMA.      ---------------------------------------------------------------------------------------------------------------------------------------    let patient and wife know. send clearance to Robert's office, should be paper form by elizabeth's desk. SC   Received: Today   Message Contents   GABBY Anderson R.N.   Caller: Unspecified (Yesterday, 10:24 AM     ---------------------------------------------------------------------------------------------------------------------------------------    Attempted to reach patient and his wife.  Left voicemail requesting return phone call.    Letter created and faxed to Dr. Jones at [facility] at [phone].  Completed fax confirmation received.

## 2020-03-05 NOTE — TELEPHONE ENCOUNTER
Pts wife Phoebe called back. Discussed the surgery clearance recommendations per Dr. Wilson and the risk of instent thrombosis. Both wife and patient are aware of this risk but know that he needs the cystoscopy for due to bladder cancer. Wife explains that they have another pre procedure consultation with Dr. Jones on the 13th but heard that Dr. Jones may use lovenox and have pt hold plavix 5 days prior and would like Dr. Wilson to weigh in on these decisions. She requested that Dr. Wilson speak with the patient about this. She also asked if there were any special treatment considerations after the procedure. Advised that we wait until his clinic visit with Dr. Jones on the 13th to confirm the specific plan and then will have Dr. Wilson and Dr. Jones discuss and/or contact patientregarding plan. Wife agreed with this.     Dr. Wilson notified of conversation

## 2020-03-10 DIAGNOSIS — Z01.812 PRE-PROCEDURAL LABORATORY EXAMINATION: ICD-10-CM

## 2020-03-10 DIAGNOSIS — Z01.810 PRE-OPERATIVE CARDIOVASCULAR EXAMINATION: ICD-10-CM

## 2020-03-10 LAB
ALBUMIN SERPL BCP-MCNC: 4.5 G/DL (ref 3.2–4.9)
ALBUMIN/GLOB SERPL: 1.9 G/DL
ALP SERPL-CCNC: 57 U/L (ref 30–99)
ALT SERPL-CCNC: 21 U/L (ref 2–50)
ANION GAP SERPL CALC-SCNC: 7 MMOL/L (ref 0–11.9)
AST SERPL-CCNC: 30 U/L (ref 12–45)
BASOPHILS # BLD AUTO: 1 % (ref 0–1.8)
BASOPHILS # BLD: 0.06 K/UL (ref 0–0.12)
BILIRUB SERPL-MCNC: 1 MG/DL (ref 0.1–1.5)
BUN SERPL-MCNC: 21 MG/DL (ref 8–22)
CALCIUM SERPL-MCNC: 9.7 MG/DL (ref 8.5–10.5)
CHLORIDE SERPL-SCNC: 105 MMOL/L (ref 96–112)
CO2 SERPL-SCNC: 28 MMOL/L (ref 20–33)
CREAT SERPL-MCNC: 0.93 MG/DL (ref 0.5–1.4)
EKG IMPRESSION: NORMAL
EOSINOPHIL # BLD AUTO: 0.09 K/UL (ref 0–0.51)
EOSINOPHIL NFR BLD: 1.5 % (ref 0–6.9)
ERYTHROCYTE [DISTWIDTH] IN BLOOD BY AUTOMATED COUNT: 43.3 FL (ref 35.9–50)
EST. AVERAGE GLUCOSE BLD GHB EST-MCNC: 123 MG/DL
GLOBULIN SER CALC-MCNC: 2.4 G/DL (ref 1.9–3.5)
GLUCOSE SERPL-MCNC: 107 MG/DL (ref 65–99)
HBA1C MFR BLD: 5.9 % (ref 0–5.6)
HCT VFR BLD AUTO: 36.3 % (ref 42–52)
HGB BLD-MCNC: 11.6 G/DL (ref 14–18)
IMM GRANULOCYTES # BLD AUTO: 0.02 K/UL (ref 0–0.11)
IMM GRANULOCYTES NFR BLD AUTO: 0.3 % (ref 0–0.9)
LYMPHOCYTES # BLD AUTO: 1.17 K/UL (ref 1–4.8)
LYMPHOCYTES NFR BLD: 19 % (ref 22–41)
MCH RBC QN AUTO: 28.7 PG (ref 27–33)
MCHC RBC AUTO-ENTMCNC: 32 G/DL (ref 33.7–35.3)
MCV RBC AUTO: 89.9 FL (ref 81.4–97.8)
MONOCYTES # BLD AUTO: 0.44 K/UL (ref 0–0.85)
MONOCYTES NFR BLD AUTO: 7.1 % (ref 0–13.4)
NEUTROPHILS # BLD AUTO: 4.39 K/UL (ref 1.82–7.42)
NEUTROPHILS NFR BLD: 71.1 % (ref 44–72)
NRBC # BLD AUTO: 0 K/UL
NRBC BLD-RTO: 0 /100 WBC
PLATELET # BLD AUTO: 203 K/UL (ref 164–446)
PMV BLD AUTO: 10.2 FL (ref 9–12.9)
POTASSIUM SERPL-SCNC: 4.6 MMOL/L (ref 3.6–5.5)
PROT SERPL-MCNC: 6.9 G/DL (ref 6–8.2)
RBC # BLD AUTO: 4.04 M/UL (ref 4.7–6.1)
SODIUM SERPL-SCNC: 140 MMOL/L (ref 135–145)
WBC # BLD AUTO: 6.2 K/UL (ref 4.8–10.8)

## 2020-03-10 PROCEDURE — 93010 ELECTROCARDIOGRAM REPORT: CPT | Performed by: INTERNAL MEDICINE

## 2020-03-10 PROCEDURE — 36415 COLL VENOUS BLD VENIPUNCTURE: CPT

## 2020-03-10 PROCEDURE — 87086 URINE CULTURE/COLONY COUNT: CPT

## 2020-03-10 PROCEDURE — 83036 HEMOGLOBIN GLYCOSYLATED A1C: CPT

## 2020-03-10 PROCEDURE — 85025 COMPLETE CBC W/AUTO DIFF WBC: CPT

## 2020-03-10 PROCEDURE — 93005 ELECTROCARDIOGRAM TRACING: CPT

## 2020-03-10 PROCEDURE — 80053 COMPREHEN METABOLIC PANEL: CPT

## 2020-03-10 RX ORDER — CETIRIZINE HYDROCHLORIDE 10 MG/1
10 TABLET ORAL PRN
COMMUNITY
End: 2021-02-01

## 2020-03-10 ASSESSMENT — FIBROSIS 4 INDEX: FIB4 SCORE: 2.29

## 2020-03-12 LAB
BACTERIA UR CULT: NORMAL
SIGNIFICANT IND 70042: NORMAL
SITE SITE: NORMAL
SOURCE SOURCE: NORMAL

## 2020-03-20 ENCOUNTER — ANESTHESIA EVENT (OUTPATIENT)
Dept: SURGERY | Facility: MEDICAL CENTER | Age: 85
End: 2020-03-20
Payer: MEDICARE

## 2020-03-20 ENCOUNTER — HOSPITAL ENCOUNTER (OUTPATIENT)
Facility: MEDICAL CENTER | Age: 85
End: 2020-03-20
Attending: UROLOGY | Admitting: UROLOGY
Payer: MEDICARE

## 2020-03-20 ENCOUNTER — ANESTHESIA (OUTPATIENT)
Dept: SURGERY | Facility: MEDICAL CENTER | Age: 85
End: 2020-03-20
Payer: MEDICARE

## 2020-03-20 ENCOUNTER — APPOINTMENT (OUTPATIENT)
Dept: RADIOLOGY | Facility: MEDICAL CENTER | Age: 85
End: 2020-03-20
Attending: UROLOGY
Payer: MEDICARE

## 2020-03-20 VITALS
SYSTOLIC BLOOD PRESSURE: 143 MMHG | DIASTOLIC BLOOD PRESSURE: 46 MMHG | RESPIRATION RATE: 16 BRPM | WEIGHT: 147.71 LBS | BODY MASS INDEX: 23.74 KG/M2 | OXYGEN SATURATION: 98 % | HEIGHT: 66 IN | TEMPERATURE: 97.4 F | HEART RATE: 61 BPM

## 2020-03-20 LAB
GLUCOSE BLD-MCNC: 100 MG/DL (ref 65–99)
PATHOLOGY CONSULT NOTE: NORMAL

## 2020-03-20 PROCEDURE — 700111 HCHG RX REV CODE 636 W/ 250 OVERRIDE (IP)

## 2020-03-20 PROCEDURE — 160002 HCHG RECOVERY MINUTES (STAT): Performed by: UROLOGY

## 2020-03-20 PROCEDURE — 700101 HCHG RX REV CODE 250: Performed by: UROLOGY

## 2020-03-20 PROCEDURE — 160025 RECOVERY II MINUTES (STATS): Performed by: UROLOGY

## 2020-03-20 PROCEDURE — 82962 GLUCOSE BLOOD TEST: CPT

## 2020-03-20 PROCEDURE — 160046 HCHG PACU - 1ST 60 MINS PHASE II: Performed by: UROLOGY

## 2020-03-20 PROCEDURE — 160028 HCHG SURGERY MINUTES - 1ST 30 MINS LEVEL 3: Performed by: UROLOGY

## 2020-03-20 PROCEDURE — 700105 HCHG RX REV CODE 258: Performed by: UROLOGY

## 2020-03-20 PROCEDURE — C1769 GUIDE WIRE: HCPCS | Performed by: UROLOGY

## 2020-03-20 PROCEDURE — 160009 HCHG ANES TIME/MIN: Performed by: UROLOGY

## 2020-03-20 PROCEDURE — A4357 BEDSIDE DRAINAGE BAG: HCPCS | Performed by: UROLOGY

## 2020-03-20 PROCEDURE — 501138 HCHG PLUG, FOLEY CATH: Performed by: UROLOGY

## 2020-03-20 PROCEDURE — 160035 HCHG PACU - 1ST 60 MINS PHASE I: Performed by: UROLOGY

## 2020-03-20 PROCEDURE — 500879 HCHG PACK, CYSTO: Performed by: UROLOGY

## 2020-03-20 PROCEDURE — A4346 CATH INDW FOLEY 3 WAY: HCPCS | Performed by: UROLOGY

## 2020-03-20 PROCEDURE — 700117 HCHG RX CONTRAST REV CODE 255: Performed by: UROLOGY

## 2020-03-20 PROCEDURE — 700111 HCHG RX REV CODE 636 W/ 250 OVERRIDE (IP): Performed by: UROLOGY

## 2020-03-20 PROCEDURE — 501329 HCHG SET, CYSTO IRRIG Y TUR: Performed by: UROLOGY

## 2020-03-20 PROCEDURE — 160036 HCHG PACU - EA ADDL 30 MINS PHASE I: Performed by: UROLOGY

## 2020-03-20 PROCEDURE — 160039 HCHG SURGERY MINUTES - EA ADDL 1 MIN LEVEL 3: Performed by: UROLOGY

## 2020-03-20 PROCEDURE — 88305 TISSUE EXAM BY PATHOLOGIST: CPT | Mod: 59

## 2020-03-20 PROCEDURE — 160048 HCHG OR STATISTICAL LEVEL 1-5: Performed by: UROLOGY

## 2020-03-20 RX ORDER — CEFAZOLIN SODIUM 1 G/3ML
INJECTION, POWDER, FOR SOLUTION INTRAMUSCULAR; INTRAVENOUS PRN
Status: DISCONTINUED | OUTPATIENT
Start: 2020-03-20 | End: 2020-03-20 | Stop reason: SURG

## 2020-03-20 RX ORDER — DIPHENHYDRAMINE HYDROCHLORIDE 50 MG/ML
12.5 INJECTION INTRAMUSCULAR; INTRAVENOUS
Status: DISCONTINUED | OUTPATIENT
Start: 2020-03-20 | End: 2020-03-20 | Stop reason: HOSPADM

## 2020-03-20 RX ORDER — ONDANSETRON 2 MG/ML
4 INJECTION INTRAMUSCULAR; INTRAVENOUS
Status: DISCONTINUED | OUTPATIENT
Start: 2020-03-20 | End: 2020-03-20 | Stop reason: HOSPADM

## 2020-03-20 RX ORDER — GENTAMICIN SULFATE 40 MG/ML
INJECTION, SOLUTION INTRAMUSCULAR; INTRAVENOUS PRN
Status: DISCONTINUED | OUTPATIENT
Start: 2020-03-20 | End: 2020-03-20 | Stop reason: SURG

## 2020-03-20 RX ORDER — HALOPERIDOL 5 MG/ML
1 INJECTION INTRAMUSCULAR
Status: DISCONTINUED | OUTPATIENT
Start: 2020-03-20 | End: 2020-03-20 | Stop reason: HOSPADM

## 2020-03-20 RX ORDER — SODIUM CHLORIDE, SODIUM LACTATE, POTASSIUM CHLORIDE, CALCIUM CHLORIDE 600; 310; 30; 20 MG/100ML; MG/100ML; MG/100ML; MG/100ML
INJECTION, SOLUTION INTRAVENOUS CONTINUOUS
Status: DISCONTINUED | OUTPATIENT
Start: 2020-03-20 | End: 2020-03-20 | Stop reason: HOSPADM

## 2020-03-20 RX ADMIN — GEMCITABINE HYDROCHLORIDE 1000 MG: 1 INJECTION, POWDER, LYOPHILIZED, FOR SOLUTION INTRAVENOUS at 08:00

## 2020-03-20 RX ADMIN — SODIUM CHLORIDE, POTASSIUM CHLORIDE, SODIUM LACTATE AND CALCIUM CHLORIDE: 600; 310; 30; 20 INJECTION, SOLUTION INTRAVENOUS at 07:39

## 2020-03-20 RX ADMIN — LIDOCAINE HYDROCHLORIDE 0.5 ML: 10 INJECTION, SOLUTION EPIDURAL; INFILTRATION; INTRACAUDAL at 00:00

## 2020-03-20 RX ADMIN — SODIUM CHLORIDE, POTASSIUM CHLORIDE, SODIUM LACTATE AND CALCIUM CHLORIDE: 600; 310; 30; 20 INJECTION, SOLUTION INTRAVENOUS at 06:26

## 2020-03-20 RX ADMIN — FENTANYL CITRATE 100 MCG: 50 INJECTION, SOLUTION INTRAMUSCULAR; INTRAVENOUS at 07:40

## 2020-03-20 RX ADMIN — GENTAMICIN SULFATE 120 MG: 40 INJECTION, SOLUTION INTRAMUSCULAR; INTRAVENOUS at 07:39

## 2020-03-20 RX ADMIN — CEFAZOLIN 2 G: 330 INJECTION, POWDER, FOR SOLUTION INTRAMUSCULAR; INTRAVENOUS at 07:39

## 2020-03-20 ASSESSMENT — FIBROSIS 4 INDEX: FIB4 SCORE: 2.77

## 2020-03-20 NOTE — OR SURGEON
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    Anesthesiologist/Type of Anesthesia:  Anesthesiologist: Alon Alfonso M.D./General LMA    Surgical Staff:  Circulator: Alon Nash R.N.; Radha Stover R.N.  Scrub Person: Giancarlo Leon    Specimens removed if any:  ID Type Source Tests Collected by Time Destination   A : BLADDER SCAR Tissue Bladder PATHOLOGY SPECIMEN Brad Jones M.D. 3/20/2020  8:09 AM    B : DEEP MUSCLE BIOPSY Tissue Bladder PATHOLOGY SPECIMEN Brad Jones M.D. 3/20/2020  8:10 AM        Estimated Blood Loss: 10ml    Findings: Normal retrograde pyelograms.                  Erythema on the left bladder wall near prior resection site.    Complications: None  Drains: [address] scott with Gemcitabine placement at 0830 AM        3/20/2020 8:31 AM Brad Jones M.D.

## 2020-03-20 NOTE — OR NURSING
Pre op admission completed. Patient and wife educated on surgical plan of care. All questions answered, bed in low position and call light within reach. Hourly rounding in place.

## 2020-03-20 NOTE — PROGRESS NOTES
"Pharmacy Chemotherapy Verification    Patient Name: Filiberto Jauregui   DX: Bladder Cancer     Cycle 2  Previous treatment = 11/1/19     Regimen: Intravesical Gemcitabine  Gemcitabine 2000 mg in  ml instilled intravesically within 3 hours after TURBT  Yarelis GOMEZ, et al. Effect of Intravesical Instillation of Gemcitabine vs Saline Immediately Following Resection of Suspected Low-Grade Non-Muscle-Invasive Bladder Cancer on Tumor Recurrence SWOG  Randomized Clinical Trial. BRIDGETTE. 2018;319(18):2506-7117    Allergies:Contrast media with iodine [iodine]; Iodide; and Penicillins  /62   Pulse 68   Temp 36.2 °C (97.1 °F) (Temporal)   Resp 16   Ht 1.676 m (5' 6\")   Wt 67 kg (147 lb 11.3 oz)   SpO2 93%   BMI 23.84 kg/m²   Body surface area is 1.77 meters squared.    LABS: not required      Gemcitabine 1000 mg in NS 50 ml intravesically via cath tipped syringe x 2 syringes              Fixed dose, no calculation required. To be administered by MD. Tamra Camara, PharmD, BCOP    "

## 2020-03-20 NOTE — OR NURSING
0844 Pt to PACU from OR. Report from anesthesia and OR RN. Pt arouses on calling, on 2L NC O2. Respirations even and unlabored. VSS. Scott in place per MD order with plug, will drain chem at 0930 as ordered. Pt declines pain/nausea at this time.     0915 Pt sitting up in bed, tolerating PO sips water, updated on plan of care.     0942 Attached scott catheter bag for drainage.     1020 Changed drainage bag to leg bag, draining pink tinged urine.     1035 Report called to Amna phase II recovery RN. Meets phase II criteria, on RA, minimal pain. Pt transferred to T 22 recovery. Transferred to chair on arrival, ambulates with steady gait.

## 2020-03-20 NOTE — ANESTHESIA POSTPROCEDURE EVALUATION
Patient: Filiberto Jauregui    Procedure Summary     Date:  03/20/20 Room / Location:  [place] [address] / SURGERY [facility]    Anesthesia Start:  0739 Anesthesia Stop:  0846    Procedures:       CYSTOSCOPY WITH RETROGRADES (Bilateral Bladder)      TURBT (TRANSURETHRAL RESECTION OF BLADDER TUMOR)- INTRACESICAL GEMCITABINE (Bladder) Diagnosis:  (HISTORY OF MALIGNANT NEOPLASM OF BLADDER)    Surgeon:  Brad Jones M.D. Responsible Provider:  Alon Alfonso M.D.    Anesthesia Type:  general ASA Status:  4          Final Anesthesia Type: general  Last vitals  BP   Blood Pressure : 155/62    Temp   36.2 °C (97.1 °F)    Pulse   Pulse: 68   Resp   16    SpO2   93 %      Anesthesia Post Evaluation    Patient location during evaluation: PACU  Patient participation: complete - patient participated  Level of consciousness: awake and alert    Airway patency: patent  Anesthetic complications: no  Cardiovascular status: hemodynamically stable  Respiratory status: acceptable  Hydration status: euvolemic    PONV: none           Nurse Pain Score: 0 (NPRS)

## 2020-03-20 NOTE — ANESTHESIA QCDR
2019 [facility] Clinical Data Registry (for Quality Improvement)     Postoperative nausea/vomiting risk protocol (Adult = 18 yrs and Pediatric 3-17 yrs)- (430 and 463)  General inhalation anesthetic (NOT TIVA) with PONV risk factors: Yes  Provision of anti-emetic therapy with at least 2 different classes of agents: Yes   Patient DID NOT receive anti-emetic therapy and reason is documented in Medical Record:  N/A    Multimodal Pain Management- (477)  Non-emergent surgery AND patient age >= 18: Yes  Use of Multimodal Pain Management, two or more drugs and/or interventions, NOT including systemic opioids:   Exception: Documented allergy to multiple classes of analgesics:     Smoking Abstinence (404)  Patient is current smoker (cigarette, pipe, e-cig, marijuanna): Yes  Elective Surgery:   Abstinence instructions provided prior to day of surgery:   Patient abstained from smoking on day of surgery:     Pre-Op Beta-Blocker in Isolated CABG (44)  Isolated CABG AND patient age >= 18:   Beta-blocker admin within 24 hours of surgical incision:   Exception:of medical reason(s) for not administering beta blocker within 24 hours prior to surgical incision (e.g., not  indicated,other medical reason):     PACU assessment of acute postoperative pain prior to Anesthesia Care End- Applies to Patients Age = 18- (ABG7)  Initial PACU pain score is which of the following: < 7/10  Patient unable to report pain score: N/A    Post-anesthetic transfer of care checklist/protocol to PACU/ICU- (426 and 427)  Upon conclusion of case, patient transferred to which of the following locations:   Use of transfer checklist/protocol: Yes  Exclusion: Service Performed in Patient Hospital Room (and thus did not require transfer): N/A  Unplanned admission to ICU related to anesthesia service up through end of PACU care- (MD51)  Unplanned admission to ICU (not initially anticipated at anesthesia start time): No

## 2020-03-20 NOTE — ANESTHESIA PREPROCEDURE EVALUATION
Relevant Problems   CARDIAC   (+) Coronary artery disease involving native coronary artery of native heart without angina pectoris   (+) Essential hypertension      Other   (+) S/P angioplasty with stent   (+) S/P mitral valve clip implantation       Physical Exam    Airway   Mallampati: II  TM distance: >3 FB  Neck ROM: full       Cardiovascular - normal exam  Rhythm: regular  Rate: normal  (-) murmur     Dental - normal exam         Pulmonary - normal exam  Breath sounds clear to auscultation     Abdominal    Neurological - normal exam                 Anesthesia Plan    ASA 4       Plan - general             Induction: intravenous    Postoperative Plan: Postoperative administration of opioids is intended.    Pertinent diagnostic labs and testing reviewed    Informed Consent:    Anesthetic plan and risks discussed with patient.    Use of blood products discussed with: patient whom consented to blood products.

## 2020-03-20 NOTE — ANESTHESIA TIME REPORT
Anesthesia Start and Stop Event Times     Date Time Event    3/20/2020 0724 Ready for Procedure     0739 Anesthesia Start     0846 Anesthesia Stop        Responsible Staff  03/20/20    Name Role Begin End    Alon Alfonso M.D. Anesth 0746 0846        Preop Diagnosis (Free Text):  Pre-op Diagnosis     HISTORY OF MALIGNANT NEOPLASM OF BLADDER        Preop Diagnosis (Codes):    Post op Diagnosis  Bladder tumor      Premium Reason  Non-Premium    Comments:

## 2020-03-20 NOTE — PROGRESS NOTES
"Pharmacy Chemotherapy calculation:    Pt Name: Filiberto Jauregui  DX: Bladder Cancer, localized    Tx: transurethral resection of bladder tumor and intravesical gemcitabine     Regimen: Intravesical Gemcitabine  Gemcitabine 2000 mg in  ml instilled intravesically within 3 hours after TURBT  Yarelis GOMEZ, et al. Effect of Intravesical Instillation of Gemcitabine vs Saline Immediately Following Resection of Suspected Low-Grade Non-Muscle-Invasive Bladder Cancer on Tumor Recurrence SWOG  Randomized Clinical Trial. BRIDGETTE. 2018;319(18):4448-5083       /62   Pulse 68   Temp 36.2 °C (97.1 °F) (Temporal)   Resp 16   Ht 1.676 m (5' 6\")   Wt 67 kg (147 lb 11.3 oz)   SpO2 93%   BMI 23.84 kg/m²   Body surface area is 1.77 meters squared.  LABS: not required       Gemcitabine 1000 mg in NS 50ml intravesically via cath tipped syringe x 2 syringes   Fixed dose, no calculation required. To be administered by MD. Wai Almonte, PharmD    "

## 2020-03-20 NOTE — OR NURSING
Pt to phase 2, aaox4, vss. Leg bag to scott catheter. Pink, clear urine in bag. Call to dr. barba to verify discharge status at pt's wife's request. Per dr. barba ok to discharge home. Pt and wife educated on scott care.

## 2020-03-20 NOTE — ANESTHESIA PROCEDURE NOTES
Airway  Performed by: Alon Alfonso M.D.  Authorized by: Alon Alfonso M.D.     Location:  OR  Urgency:  Elective  Indications for Airway Management:  Anesthesia  Spontaneous Ventilation: absent    Sedation Level:  Deep  Preoxygenated: Yes    Final Airway Type:  Supraglottic airway  Final Supraglottic Airway:  Standard LMA  SGA Size:  4  Number of Attempts at Approach:  1

## 2020-03-20 NOTE — DISCHARGE INSTRUCTIONS
ACTIVITY: Rest and take it easy for the first 24 hours.  A responsible adult is recommended to remain with you during that time.  It is normal to feel sleepy.  We encourage you to not do anything that requires balance, judgment or coordination.    MILD FLU-LIKE SYMPTOMS ARE NORMAL. YOU MAY EXPERIENCE GENERALIZED MUSCLE ACHES, THROAT IRRITATION, HEADACHE AND/OR SOME NAUSEA.    FOR 24 HOURS DO NOT:  Drive, operate machinery or run household appliances.  Drink beer or alcoholic beverages.   Make important decisions or sign legal documents.    SPECIAL INSTRUCTIONS:     Diet: Clears advance diet as tolerated to regular  Use leg bag and keep catheter plug in the inflow port of 3 way scott   Follow-up in office on 3/24 for voiding trial.    DIET: To avoid nausea, slowly advance diet as tolerated, avoiding spicy or greasy foods for the first day.  Add more substantial food to your diet according to your physician's instructions.  Babies can be fed formula or breast milk as soon as they are hungry.  INCREASE FLUIDS AND FIBER TO AVOID CONSTIPATION.    FOLLOW-UP APPOINTMENT:  A follow-up appointment should be arranged with your doctor on 3/24; call to schedule.    You should CALL YOUR PHYSICIAN if you develop:  Fever greater than 101 degrees F.  Pain not relieved by medication, or persistent nausea or vomiting.  Excessive bleeding (blood soaking through dressing) or unexpected drainage from the wound.  Extreme redness or swelling around the incision site, drainage of pus or foul smelling drainage.  Inability to urinate or empty your bladder within 8 hours.  Problems with breathing or chest pain.    You should call 911 if you develop problems with breathing or chest pain.  If you are unable to contact your doctor or surgical center, you should go to the nearest emergency room or urgent care center.  Physician's telephone #: Dr. Jones [phone]    If any questions arise, call your doctor.  If your doctor is not available,  please feel free to call the Surgical Center at [phone].  The Center is open Monday through Friday from 7AM to 7PM.  You can also call the HEALTH HOTLINE open 24 hours/day, 7 days/week and speak to a nurse at [phone], or toll free at [phone].    A registered nurse may call you a few days after your surgery to see how you are doing after your procedure.    MEDICATIONS: Resume taking daily medication.  Take prescribed pain medication with food.  If no medication is prescribed, you may take non-aspirin pain medication if needed.  PAIN MEDICATION CAN BE VERY CONSTIPATING.  Take a stool softener or laxative such as senokot, pericolace, or milk of magnesia if needed.    Prescription given for Norco (pain).  Last pain medication given at none in recovery room.    If your physician has prescribed pain medication that includes Acetaminophen (Tylenol), do not take additional Acetaminophen (Tylenol) while taking the prescribed medication.    Depression / Suicide Risk    As you are discharged from this [facility] facility, it is important to learn how to keep safe from harming yourself.    Recognize the warning signs:  · Abrupt changes in personality, positive or negative- including increase in energy   · Giving away possessions  · Change in eating patterns- significant weight changes-  positive or negative  · Change in sleeping patterns- unable to sleep or sleeping all the time   · Unwillingness or inability to communicate  · Depression  · Unusual sadness, discouragement and loneliness  · Talk of wanting to die  · Neglect of personal appearance   · Rebelliousness- reckless behavior  · Withdrawal from people/activities they love  · Confusion- inability to concentrate     If you or a loved one observes any of these behaviors or has concerns about self-harm, here's what you can do:  · Talk about it- your feelings and reasons for harming yourself  · Remove any means that you might use to hurt yourself  (examples: pills, rope, extension cords, firearm)  · Get professional help from the community (Mental Health, Substance Abuse, psychological counseling)  · Do not be alone:Call your Safe Contact- someone whom you trust who will be there for you.  · Call your local CRISIS HOTLINE 922-9087 or 070-937-6690  · Call your local Children's Mobile Crisis Response Team [place] (093) 846-7993 or www.TakeCare  · Call the toll free National Suicide Prevention Hotlines   · National Suicide Prevention Lifeline 292-189-WXAT (8361)  · National Hope Line Network 800-SUICIDE (072-9009)

## 2020-03-22 NOTE — OP REPORT
DATE OF SERVICE:  03/20/2020    PREOPERATIVE DIAGNOSES:  1.  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  2.  Recent positive urine cytology for high-grade cancer.    PROCEDURES PERFORMED:  1.  Rigid cystourethroscopy.  2.  Bilateral retrograde ureteral pyelograms.  3.  Transurethral resection of bladder tumor and left bladder wall scar site   with deep muscle biopsies.  4.  Intravesical gemcitabine placement.    SURGEON:  Brad Jones MD    ANESTHESIA:  General laryngeal mask.    ANESTHESIOLOGIST:  Alon Alfonso MD    POSTOPERATIVE DIAGNOSES:  1.  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  2.  Recent positive urine cytology for high-grade cancer.    COMPLICATIONS:  None.    DRAINS:  A 22-Dutch 3-way Crenshaw with catheter plug and inflow port and   gemcitabine 2 g placed at 8:30 a.m.    SPECIMENS:  A.  Bladder tumor and surgical scar to pathology for permanent section.  B.  Deep muscle biopsy.    INDICATIONS:  The patient is a pleasant gentleman with a history of muscle   invasive bladder cancer.  He is status post a transurethral resection, found   to have lamina propria invasion, received gemcitabine and intravesical BCG.    At surveillance cystoscopy, he was found to have erythema on the posterior   bladder wall and positive cytology.  I therefore recommended surgical   treatment with cystoscopy, transurethral resection of the bladder scar and   tumor as well as retrograde ureteral pyelography.  Prior to the surgery, we   discussed the perioperative risk including but not limited to the risk of   urinary tract infection, risk of urosepsis, risk of urethral stricture as a   delayed complication of instrumentation, risk of bladder perforation.  There   is associated risk of urgency, frequency postoperatively, as well as bleeding,   which is at increased risk as he is on blood thinners due to  cardiovascular   disease.  In addition, we discussed the potential need for additional therapy   with either chemotherapy or surgery as well as the perioperative risk of deep   vein thrombosis, pulmonary embolism, aspiration pneumonia, heart attack,   stroke and death.  Informed consent was given to me by the patient to proceed.    DESCRIPTION OF PROCEDURE IN DETAIL:  After informed consent was obtained, the   patient was brought to the operating room and placed supine.  Bilateral   sequential compression devices were in place and operational.  General   laryngeal mask anesthetic administered in a balanced fashion.  The patient   received intravenous Ancef, was positioned in modified lithotomy where the   operative area was Betadine prepped and draped in the usual sterile fashion.    A surgical time-out was called.  All members of the operative team agree as   the patient's name, procedure to be performed.  Without objections, attention   was directed towards the procedure.  I began the procedure by calibrating the   urethra with Blanca sounds from 22-Uzbek to 26-Uzbek, then passed a   25-Uzbek rigid cystoscope per urethra with 30-degree lens.  Evaluation of the   urethra was normal.  The prostate is surgically absent.  Upon entering the   bladder, serial evaluation shows 2+ trabeculation.  Both ureteral orifices   were identified.  There is a surgical scar in the left bladder wall.  There is   a small, less than 1 cm area of what appears to be high-grade tumor.  No   other abnormalities are seen.  I cannulated the left ureteral orifice with a   6-Uzbek ureteral catheter, did a retrograde ureteral pyelogram, which showed   an air bubble in the mid ureter, which was mobile.  No other filling defects   were seen and there was no evidence of hydroureteronephrosis.  On the right   side, retrograde ureteral pyelogram was performed and there was no evidence of   abnormality with good drainage noted on both sides.   At this point in time, I   removed the cystoscope after draining the bladder and passed the 26-Dominican   resectoscope with the loop and monopolar current, I resected the tumor and the   surgical scar.  This resection site was about 4.5-5 cm in size.  I then   proceeded to do deep muscle biopsies, were submitted as a separate section and   I then used a rollerball to cauterize the base of the area resected as well   as the surrounding 0.5 cm normal tissue so that the total area treated was   about 5-5.5 cm in size.  At the end of the case, there was good hemostasis.    The ureteral orifices were uninvolved with the fulguration or resection and   there was no bladder perforation, so I drained the bladder, advanced a   22-Dominican 3-way Crenshaw into the bladder.  Then, I instilled 2 g of gemcitabine   chemotherapy with appropriate chemotherapy technique and a catheter plug was   placed in the inflow port and the chemotherapy placed at 8:30 with   postoperative instructions to remove at 9:30 a.m.  At the end the case, the   patient tolerated the procedure well.  His surgical specimen was submitted to   pathology.  He was awakened in the operating room and transferred to recovery   room where he arrived in stable condition.       ____________________________________     MD STEPAN Venegas / KIRSTEN    DD:  03/21/2020 23:13:45  DT:  03/22/2020 01:04:51    D#:  [id number]  Job#:  [id number]

## 2020-03-26 ENCOUNTER — TELEPHONE (OUTPATIENT)
Dept: CARDIOLOGY | Facility: MEDICAL CENTER | Age: 85
End: 2020-03-26

## 2020-03-26 NOTE — TELEPHONE ENCOUNTER
Patient wife calling back, she is very concerned as he still producing a lot of red blood when he urinates. She would like to a call back at  [phone]

## 2020-03-26 NOTE — TELEPHONE ENCOUNTER
USAMA Sandhu, PT wife, called to advise that since the PT is on Plavix and they are bleeding more frequently, would the Dr want to have the PT's hemoglobin checked, or what would the want to do?  Both times the Pt urinating, there a lot of blood in their urine, it was not dark blood it was bright red, that started this morning. Pt had bladder surgery on 3/20/2020 removed a tumour. Pt is feeling very tired and Phoebe states that is not normal for the PT. You can contact Phoebe at [phone].    Thank you,  Effie REYNOLDS

## 2020-03-26 NOTE — TELEPHONE ENCOUNTER
Spoke with patients wife and explained recommendations per Keke PIERRE for urology to manage post op hematuria and order appropriate labs. Explained to wife why this would be most appropriate, given the procedure was only 6 days agoa and that at this point the urology team is the experts.     Wife explained that the urinary catheter was removed 2 days ago but noticeable hematuria did not start until today. Pt has been back on plavix and ASA since Saturday, 5 days ago.     Pts wife agrees to contact pts urologist now and will invite them to contact our office if any concerns about continuing the plavix.

## 2020-04-02 ENCOUNTER — HOSPITAL ENCOUNTER (OUTPATIENT)
Dept: LAB | Facility: MEDICAL CENTER | Age: 85
End: 2020-04-02
Attending: INTERNAL MEDICINE
Payer: MEDICARE

## 2020-04-02 LAB
BASOPHILS # BLD AUTO: 0.6 % (ref 0–1.8)
BASOPHILS # BLD: 0.04 K/UL (ref 0–0.12)
EOSINOPHIL # BLD AUTO: 0.09 K/UL (ref 0–0.51)
EOSINOPHIL NFR BLD: 1.4 % (ref 0–6.9)
ERYTHROCYTE [DISTWIDTH] IN BLOOD BY AUTOMATED COUNT: 41.9 FL (ref 35.9–50)
HCT VFR BLD AUTO: 33.2 % (ref 42–52)
HGB BLD-MCNC: 10.8 G/DL (ref 14–18)
IMM GRANULOCYTES # BLD AUTO: 0.01 K/UL (ref 0–0.11)
IMM GRANULOCYTES NFR BLD AUTO: 0.2 % (ref 0–0.9)
LYMPHOCYTES # BLD AUTO: 1.39 K/UL (ref 1–4.8)
LYMPHOCYTES NFR BLD: 21.9 % (ref 22–41)
MCH RBC QN AUTO: 28.4 PG (ref 27–33)
MCHC RBC AUTO-ENTMCNC: 32.5 G/DL (ref 33.7–35.3)
MCV RBC AUTO: 87.4 FL (ref 81.4–97.8)
MONOCYTES # BLD AUTO: 0.57 K/UL (ref 0–0.85)
MONOCYTES NFR BLD AUTO: 9 % (ref 0–13.4)
NEUTROPHILS # BLD AUTO: 4.24 K/UL (ref 1.82–7.42)
NEUTROPHILS NFR BLD: 66.9 % (ref 44–72)
NRBC # BLD AUTO: 0 K/UL
NRBC BLD-RTO: 0 /100 WBC
PLATELET # BLD AUTO: 234 K/UL (ref 164–446)
PMV BLD AUTO: 9.6 FL (ref 9–12.9)
RBC # BLD AUTO: 3.8 M/UL (ref 4.7–6.1)
WBC # BLD AUTO: 6.3 K/UL (ref 4.8–10.8)

## 2020-04-02 PROCEDURE — 36415 COLL VENOUS BLD VENIPUNCTURE: CPT

## 2020-04-02 PROCEDURE — 85025 COMPLETE CBC W/AUTO DIFF WBC: CPT

## 2020-04-24 ENCOUNTER — TELEPHONE (OUTPATIENT)
Dept: CARDIOLOGY | Facility: MEDICAL CENTER | Age: 85
End: 2020-04-24

## 2020-04-24 NOTE — TELEPHONE ENCOUNTER
USAMA/elizabeth    Pt's wife Phoebe calling to report pt became distracted this morning and does not remember if he took his plavix or not....  should pt just take one now, or not.    Please call Phoebe .

## 2020-04-24 NOTE — TELEPHONE ENCOUNTER
Contacted pt and s/w wife. Advised that pt does not need to take another plavix just in case he didn't take it this morning and that it's ok to just resume 1 tablet as prescribed tomorrow morning. Pt then told wife that he already went ahead and took another one just incase. Advised that pt watch for signs and symptoms of bleeding if this was an extra dose.     Wife talked about pts bladder cancer progression and unfortunately some not so good results. They are seeing another oncologist for further evaluation/treatment. Emotional support given to wife.

## 2020-05-08 ENCOUNTER — HOSPITAL ENCOUNTER (OUTPATIENT)
Dept: RADIOLOGY | Facility: MEDICAL CENTER | Age: 85
End: 2020-05-08
Attending: UROLOGY
Payer: MEDICARE

## 2020-05-08 DIAGNOSIS — Z85.51 PERSONAL HISTORY OF MALIGNANT NEOPLASM OF BLADDER: ICD-10-CM

## 2020-05-08 PROCEDURE — A9552 F18 FDG: HCPCS

## 2020-05-11 ENCOUNTER — HOSPITAL ENCOUNTER (OUTPATIENT)
Dept: LAB | Facility: MEDICAL CENTER | Age: 85
End: 2020-05-11
Attending: INTERNAL MEDICINE
Payer: MEDICARE

## 2020-05-11 LAB
ALBUMIN SERPL BCP-MCNC: 4.2 G/DL (ref 3.2–4.9)
ALBUMIN/GLOB SERPL: 1.7 G/DL
ALP SERPL-CCNC: 59 U/L (ref 30–99)
ALT SERPL-CCNC: 24 U/L (ref 2–50)
ANION GAP SERPL CALC-SCNC: 10 MMOL/L (ref 7–16)
AST SERPL-CCNC: 29 U/L (ref 12–45)
BASOPHILS # BLD AUTO: 0.6 % (ref 0–1.8)
BASOPHILS # BLD: 0.04 K/UL (ref 0–0.12)
BILIRUB SERPL-MCNC: 1.1 MG/DL (ref 0.1–1.5)
BUN SERPL-MCNC: 20 MG/DL (ref 8–22)
CALCIUM SERPL-MCNC: 9.2 MG/DL (ref 8.5–10.5)
CHLORIDE SERPL-SCNC: 103 MMOL/L (ref 96–112)
CO2 SERPL-SCNC: 26 MMOL/L (ref 20–33)
CREAT SERPL-MCNC: 0.94 MG/DL (ref 0.5–1.4)
EOSINOPHIL # BLD AUTO: 0.13 K/UL (ref 0–0.51)
EOSINOPHIL NFR BLD: 2 % (ref 0–6.9)
ERYTHROCYTE [DISTWIDTH] IN BLOOD BY AUTOMATED COUNT: 44.6 FL (ref 35.9–50)
GLOBULIN SER CALC-MCNC: 2.5 G/DL (ref 1.9–3.5)
GLUCOSE SERPL-MCNC: 105 MG/DL (ref 65–99)
HCT VFR BLD AUTO: 33.9 % (ref 42–52)
HGB BLD-MCNC: 11.2 G/DL (ref 14–18)
IMM GRANULOCYTES # BLD AUTO: 0.01 K/UL (ref 0–0.11)
IMM GRANULOCYTES NFR BLD AUTO: 0.2 % (ref 0–0.9)
LYMPHOCYTES # BLD AUTO: 1.41 K/UL (ref 1–4.8)
LYMPHOCYTES NFR BLD: 21.2 % (ref 22–41)
MCH RBC QN AUTO: 29.2 PG (ref 27–33)
MCHC RBC AUTO-ENTMCNC: 33 G/DL (ref 33.7–35.3)
MCV RBC AUTO: 88.3 FL (ref 81.4–97.8)
MONOCYTES # BLD AUTO: 0.44 K/UL (ref 0–0.85)
MONOCYTES NFR BLD AUTO: 6.6 % (ref 0–13.4)
NEUTROPHILS # BLD AUTO: 4.62 K/UL (ref 1.82–7.42)
NEUTROPHILS NFR BLD: 69.4 % (ref 44–72)
NRBC # BLD AUTO: 0 K/UL
NRBC BLD-RTO: 0 /100 WBC
PLATELET # BLD AUTO: 260 K/UL (ref 164–446)
PMV BLD AUTO: 10.1 FL (ref 9–12.9)
POTASSIUM SERPL-SCNC: 4.3 MMOL/L (ref 3.6–5.5)
PROT SERPL-MCNC: 6.7 G/DL (ref 6–8.2)
RBC # BLD AUTO: 3.84 M/UL (ref 4.7–6.1)
SODIUM SERPL-SCNC: 139 MMOL/L (ref 135–145)
WBC # BLD AUTO: 6.7 K/UL (ref 4.8–10.8)

## 2020-05-11 PROCEDURE — 85025 COMPLETE CBC W/AUTO DIFF WBC: CPT

## 2020-05-11 PROCEDURE — 80053 COMPREHEN METABOLIC PANEL: CPT

## 2020-05-11 PROCEDURE — 36415 COLL VENOUS BLD VENIPUNCTURE: CPT

## 2020-05-18 ENCOUNTER — HOSPITAL ENCOUNTER (OUTPATIENT)
Dept: RADIATION ONCOLOGY | Facility: MEDICAL CENTER | Age: 85
End: 2020-05-31
Attending: RADIOLOGY
Payer: MEDICARE

## 2020-05-18 VITALS
WEIGHT: 140.5 LBS | TEMPERATURE: 98.1 F | BODY MASS INDEX: 22.58 KG/M2 | HEART RATE: 71 BPM | OXYGEN SATURATION: 97 % | DIASTOLIC BLOOD PRESSURE: 80 MMHG | HEIGHT: 66 IN | SYSTOLIC BLOOD PRESSURE: 126 MMHG

## 2020-05-18 DIAGNOSIS — C67.3 MALIGNANT NEOPLASM OF ANTERIOR WALL OF URINARY BLADDER (HCC): ICD-10-CM

## 2020-05-18 PROCEDURE — 99205 OFFICE O/P NEW HI 60 MIN: CPT | Performed by: RADIOLOGY

## 2020-05-18 PROCEDURE — 99214 OFFICE O/P EST MOD 30 MIN: CPT | Performed by: RADIOLOGY

## 2020-05-18 SDOH — HEALTH STABILITY: MENTAL HEALTH: HOW MANY STANDARD DRINKS CONTAINING ALCOHOL DO YOU HAVE ON A TYPICAL DAY?: NOT ASKED

## 2020-05-18 ASSESSMENT — PAIN SCALES - GENERAL
PAINLEVEL: NO PAIN
PAINLEVEL: NO PAIN

## 2020-05-18 ASSESSMENT — FIBROSIS 4 INDEX: FIB4 SCORE: 1.96

## 2020-05-18 NOTE — NON-PROVIDER
"Patient was seen today in clinic with Dr. Gilson Dempsey for consultation.  Vitals signs and weight were obtained and pain assessment was completed.  Allergies and medications were reviewed with the patient.  Review of systems completed.     Vitals/Pain:  Vitals:    05/18/20 1338   BP: 126/80   Pulse: 71   Temp: 36.7 °C (98.1 °F)   SpO2: 97%   Weight: 63.7 kg (140 lb 8 oz)   Height: 1.676 m (5' 6\")   Pain Score: No pain        Allergies:   Contrast media with iodine [iodine]; Iodide; and Penicillins    Current Medications:  Current Outpatient Medications   Medication Sig Dispense Refill   • FERROUS SULFATE PO Take  by mouth.     • cetirizine (ZYRTEC) 10 MG Tab Take 10 mg by mouth as needed.     • metFORMIN (GLUCOPHAGE) 500 MG Tab Take 1-2 Tabs by mouth 2 times a day, with meals. 1000 mg in am and 500 mg in pm 60 Tab 3   • clopidogrel (PLAVIX) 75 MG Tab Take 1 Tab by mouth every day. 90 Tab 3   • atorvastatin (LIPITOR) 40 MG Tab Take 1 Tab by mouth every day. 30 Tab 11   • aspirin EC 81 MG EC tablet Take 1 Tab by mouth every day. 30 Tab 11   • gabapentin (NEURONTIN) 100 MG Cap TAKE 1 CAPSULE BY MOUTH TWICE DAILY MAY INCREASE AS NEEDED EVERY 5 DAYS UP TO MAXIMUM OF 18 CAPSULES PER DAY  5   • docusate sodium (COLACE) 100 MG Cap Take 100 mg by mouth 2 times a day as needed for Constipation.     • donepezil (ARICEPT) 10 MG tablet Take 10 mg by mouth every evening.  3   • losartan (COZAAR) 100 MG Tab Take 100 mg by mouth every day.       No current facility-administered medications for this encounter.          PCP:  Paul Sheikh R.N.  "

## 2020-05-18 NOTE — CONSULTS
"RADIATION ONCOLOGY CONSULT    DATE OF SERVICE: 5/18/2020    IDENTIFICATION: A 86 y.o. male with  Visit Diagnoses     ICD-10-CM   1. Malignant neoplasm of anterior wall of urinary bladder (HCC)  C67.3      Malignant neoplasm of anterior wall of urinary bladder (HCC)  Staging form: Urinary Bladder, AJCC 8th Edition  - Clinical stage from 5/18/2020: Stage II (cT2, cN0, cM0) - Signed by Gilson Dempsey M.D. on 5/18/2020     He is here at the kind request of Dr. Hamilton    HISTORY OF PRESENT ILLNESS:   Patient originally presented with gross hematuria and underwent TURBT with instillation of gemcitabine on March 20, 2020 and was found to have an anterior bladder wall mass muscle invasive urothelial cancer.  Patient had a PET CT scan on May 8, 2020 which showed no evidence of distant metastatic disease although there was some uptake in the ascending colon.  Patient will have last BCG installation on May 28, 2020.  Patient currently doing well he does have a little bit of weight loss and fatigue and occasional pain with urination as well as waking up at night.    PAST MEDICAL HISTORY:   Past Medical History:   Diagnosis Date   • Bowel habit changes 03/10/2020    Constipation   • Cancer (HCC)     prostate cancer s/p prostatectomy (30 years ago)   • Cancer (HCC)     Bladder CA DX 2019   • Cataract 03/10/2020    OU   • Diabetes (HCC)     oral medication \"borderline\"    • Diabetes (HCC) 03/10/2020    Takes Oral Medication   • Hemorrhagic disorder (HCC) 03/10/2020    Takes Plavix   • High cholesterol    • Hypertension 10/23/2019   • Mitral regurgitation    • MVP (mitral valve prolapse)    • Pneumonia     hx x1    • Sciatica 03/10/2020   • Seasonal allergies    • Snoring 03/10/2020    Has not had a sleep study.   • Valvular heart disease        PAST SURGICAL HISTORY:  Past Surgical History:   Procedure Laterality Date   • TURBT (TRANSURETHRAL RESECTION OF BLADDER TUMOR)  3/20/2020    Procedure: TURBT (TRANSURETHRAL RESECTION OF " "BLADDER TUMOR)- INTRACESICAL GEMCITABINE;  Surgeon: Brad Jones M.D.;  Location: [facility];  Service: Urology   • CYSTOSCOPY Bilateral 3/20/2020    Procedure: CYSTOSCOPY WITH RETROGRADES;  Surgeon: Brad Jones M.D.;  Location: [facility];  Service: Urology   • OTHER CARDIAC SURGERY  03/10/2020    \"Mitral Valve Clip\"   • TRANSCATHETER MITRAL VALVE REPAIR  1/7/2020    Procedure: REPAIR, MITRAL VALVE, TRANSCATHETER;  Surgeon: Gary Wilson M.D.;  Location: [facility];  Service: Cardiac   • JOSÉ  1/7/2020    Procedure: ECHOCARDIOGRAM, TRANSESOPHAGEAL- POTENTIALLY;  Surgeon: Gary Wilson M.D.;  Location: [facility];  Service: Cardiac   • PB CYSTOURETHROSCOPY,URETER CATHETER Bilateral 11/1/2019    Procedure: CYSTOSCOPY, WITH BILATERAL RETROGRADE PYELOGRAM-AND 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;  Surgeon: Brad Jones M.D.;  Location: [facility];  Service: Urology   • ANGIOGRAM      With Stent Placement   • PROSTATECTOMY, RADIAL     • TONSILLECTOMY         CURRENT MEDICATIONS:  Current Outpatient Medications   Medication Sig Dispense Refill   • FERROUS SULFATE PO Take  by mouth.     • cetirizine (ZYRTEC) 10 MG Tab Take 10 mg by mouth as needed.     • metFORMIN (GLUCOPHAGE) 500 MG Tab Take 1-2 Tabs by mouth 2 times a day, with meals. 1000 mg in am and 500 mg in pm 60 Tab 3   • clopidogrel (PLAVIX) 75 MG Tab Take 1 Tab by mouth every day. 90 Tab 3   • atorvastatin (LIPITOR) 40 MG Tab Take 1 Tab by mouth every day. 30 Tab 11   • aspirin EC 81 MG EC tablet Take 1 Tab by mouth every day. 30 Tab 11   • gabapentin (NEURONTIN) 100 MG Cap TAKE 1 CAPSULE BY MOUTH TWICE DAILY MAY INCREASE AS NEEDED EVERY 5 DAYS UP TO MAXIMUM OF 18 CAPSULES PER DAY  5   • docusate sodium (COLACE) 100 MG Cap Take 100 mg by mouth 2 times a day as needed for Constipation.     • donepezil (ARICEPT) 10 MG tablet Take 10 mg by mouth every evening.  3   • losartan " "(COZAAR) 100 MG Tab Take 100 mg by mouth every day.       No current facility-administered medications for this encounter.        ALLERGIES:    Contrast media with iodine [iodine]; Iodide; and Penicillins    FAMILY HISTORY:    Sister - ovarian cancer.    SOCIAL HISTORY:     reports that he has never smoked. He has never used smokeless tobacco. He reports current alcohol use of about 0.6 oz of alcohol per week. He reports that he does not use drugs.   Patient is  and lives in [place], [place]. Patient is a retired physician.    REVIEW OF SYSTEMS:  Review of systems for today's date of service was reviewed and uploaded into the electronic medical record.     PAIN ASSESSMENT:  Pain Assessment 5/18/2020 5/18/2020   Pain Assessment Denies Pain -   Pain Score 0 0   Some recent data might be hidden         PHYSICAL EXAM:   PERFORMANCE STATUS:  Karnofsky Score 5/18/2020   Karnofsky Score 100   Some recent data might be hidden     ECOG Performance Review 5/18/2020   ECOG Performance Status Fully active, able to carry on all pre-disease performance without restriction   Some recent data might be hidden     /80   Pulse 71   Temp 36.7 °C (98.1 °F)   Ht 1.676 m (5' 6\")   Wt 63.7 kg (140 lb 8 oz)   SpO2 97%   BMI 22.68 kg/m²   Physical Exam  Vitals signs reviewed.   Constitutional:       Appearance: Normal appearance.   HENT:      Head: Normocephalic and atraumatic.      Nose: Nose normal.      Mouth/Throat:      Mouth: Mucous membranes are moist.   Eyes:      Pupils: Pupils are equal, round, and reactive to light.   Neck:      Musculoskeletal: Normal range of motion.   Cardiovascular:      Rate and Rhythm: Normal rate.   Pulmonary:      Effort: Pulmonary effort is normal.   Musculoskeletal: Normal range of motion.   Neurological:      General: No focal deficit present.      Mental Status: He is alert.   Psychiatric:         Mood and Affect: Mood normal.            LABORATORY DATA:  Lab Results   Component Value " Date/Time    WBC 6.7 05/11/2020 11:25 AM    RBC 3.84 (L) 05/11/2020 11:25 AM    HEMOGLOBIN 11.2 (L) 05/11/2020 11:25 AM    HEMATOCRIT 33.9 (L) 05/11/2020 11:25 AM    MCV 88.3 05/11/2020 11:25 AM    MCH 29.2 05/11/2020 11:25 AM    MCHC 33.0 (L) 05/11/2020 11:25 AM    RDW 44.6 05/11/2020 11:25 AM    PLATELETCT 260 05/11/2020 11:25 AM    MPV 10.1 05/11/2020 11:25 AM    NEUTSPOLYS 69.40 05/11/2020 11:25 AM    LYMPHOCYTES 21.20 (L) 05/11/2020 11:25 AM    MONOCYTES 6.60 05/11/2020 11:25 AM    EOSINOPHILS 2.00 05/11/2020 11:25 AM    BASOPHILS 0.60 05/11/2020 11:25 AM    HYPOCHROMIA 1+ 01/22/2014 08:00 AM      Lab Results   Component Value Date/Time    SODIUM 139 05/11/2020 11:25 AM    POTASSIUM 4.3 05/11/2020 11:25 AM    CHLORIDE 103 05/11/2020 11:25 AM    CO2 26 05/11/2020 11:25 AM    GLUCOSE 105 (H) 05/11/2020 11:25 AM    BUN 20 05/11/2020 11:25 AM    CREATININE 0.94 05/11/2020 11:25 AM         RADIOLOGY DATA:  Dx-cysto Fluoro < 1 Hour    Result Date: 3/20/2020  Intraoperative image as above described. INTERPRETING LOCATION: [address] [place] [address], [address]    Yr-[id number] Base To Mid-thigh    Result Date: 5/8/2020  1.  There is diffuse uptake in the bladder which would be expected for PET/CT. However, the CT images also demonstrate diffuse bladder wall thickening possibly due to chronic cystitis with malignancy not excluded. There is also air within the bladder of uncertain etiology. 2.  There is diffuse colonic uptake with questionable diffuse bowel wall thickening of ascending colon just above the level the iliac crest. This is suspicious for an area of malignancy. 3.  There is no evidence of metastatic lymphadenopathy in the abdomen or pelvis. 4.  There is no evidence of intrathoracic metastases.      IMPRESSION:    A 86 y.o. with  Visit Diagnoses     ICD-10-CM   1. Malignant neoplasm of anterior wall of urinary bladder (HCC)  C67.3     Malignant neoplasm of anterior wall of urinary bladder (HCC)  Staging form: Urinary  Bladder, AJCC 8th Edition  - Clinical stage from 5/18/2020: Stage II (cT2, cN0, cM0) - Signed by Gilson Dempsey M.D. on 5/18/2020    RECOMMENDATIONS:   I discussed the role of chemoradiation therapy in the treatment of bladder preservation forward muscle invasive bladder cancer.  I discussed that radiation is given 64 Gy in 32 fractions with weekly chemotherapy per Dr. Hamilton.  We discussed risks and benefits and patient is amenable to treatment.  Patient is not eligible for the NRG atezolizumab immunotherapy trial.  We are waiting for his GI referral to evaluate his ascending colon area and PET CT scan.  We are also waiting for his last BCG instillation May 28, 2020 and will plan on CT mapping scan June 8, 2020 with treatment to start the following week Constanza 15, 2020.  I discussed that radiation will be most effective if he can undergo TURBT within 60 days of starting chemoradiation therapy.    Thank you for the opportunity to participate in his care.  If any questions or comments, please do not hesitate in calling.    Orders Placed This Encounter   • FERROUS SULFATE PO     CC: Dr. Hamilton, Dr. Jones

## 2020-05-19 ENCOUNTER — TELEPHONE (OUTPATIENT)
Dept: CARDIOLOGY | Facility: MEDICAL CENTER | Age: 85
End: 2020-05-19

## 2020-05-19 NOTE — TELEPHONE ENCOUNTER
USAMA    Pt's wife is requesting a refill for metFORMIN (GLUCOPHAGE) 500 MG Tab. She states pharmacy told her to call our office for refills.    Pharmacy: [id number] - [place], [place] - [address]

## 2020-06-01 ENCOUNTER — HOSPITAL ENCOUNTER (OUTPATIENT)
Dept: RADIATION ONCOLOGY | Facility: MEDICAL CENTER | Age: 85
End: 2020-06-30
Attending: RADIOLOGY
Payer: MEDICARE

## 2020-06-08 ENCOUNTER — HOSPITAL ENCOUNTER (OUTPATIENT)
Dept: RADIATION ONCOLOGY | Facility: MEDICAL CENTER | Age: 85
End: 2020-06-08
Payer: MEDICARE

## 2020-06-08 PROCEDURE — 77470 SPECIAL RADIATION TREATMENT: CPT | Mod: 26 | Performed by: RADIOLOGY

## 2020-06-08 PROCEDURE — 77334 RADIATION TREATMENT AID(S): CPT | Performed by: RADIOLOGY

## 2020-06-08 PROCEDURE — 77290 THER RAD SIMULAJ FIELD CPLX: CPT | Performed by: RADIOLOGY

## 2020-06-08 PROCEDURE — 77263 THER RADIOLOGY TX PLNG CPLX: CPT | Performed by: RADIOLOGY

## 2020-06-08 PROCEDURE — 77334 RADIATION TREATMENT AID(S): CPT | Mod: 26 | Performed by: RADIOLOGY

## 2020-06-08 PROCEDURE — 77470 SPECIAL RADIATION TREATMENT: CPT | Performed by: RADIOLOGY

## 2020-06-11 ENCOUNTER — PATIENT OUTREACH (OUTPATIENT)
Dept: OTHER | Facility: MEDICAL CENTER | Age: 85
End: 2020-06-11

## 2020-06-12 PROCEDURE — 77300 RADIATION THERAPY DOSE PLAN: CPT | Mod: 26 | Performed by: RADIOLOGY

## 2020-06-12 PROCEDURE — 77301 RADIOTHERAPY DOSE PLAN IMRT: CPT | Performed by: RADIOLOGY

## 2020-06-12 PROCEDURE — 77338 DESIGN MLC DEVICE FOR IMRT: CPT | Mod: 26 | Performed by: RADIOLOGY

## 2020-06-12 PROCEDURE — 77338 DESIGN MLC DEVICE FOR IMRT: CPT | Performed by: RADIOLOGY

## 2020-06-12 PROCEDURE — 77301 RADIOTHERAPY DOSE PLAN IMRT: CPT | Mod: 26 | Performed by: RADIOLOGY

## 2020-06-12 PROCEDURE — 77300 RADIATION THERAPY DOSE PLAN: CPT | Performed by: RADIOLOGY

## 2020-06-16 ENCOUNTER — HOSPITAL ENCOUNTER (OUTPATIENT)
Dept: LAB | Facility: MEDICAL CENTER | Age: 85
End: 2020-06-16
Attending: NURSE PRACTITIONER
Payer: MEDICARE

## 2020-06-16 PROCEDURE — 80053 COMPREHEN METABOLIC PANEL: CPT

## 2020-06-17 ENCOUNTER — HOSPITAL ENCOUNTER (OUTPATIENT)
Dept: RADIATION ONCOLOGY | Facility: MEDICAL CENTER | Age: 85
End: 2020-06-17
Payer: MEDICARE

## 2020-06-17 VITALS
SYSTOLIC BLOOD PRESSURE: 152 MMHG | WEIGHT: 142.97 LBS | DIASTOLIC BLOOD PRESSURE: 60 MMHG | HEART RATE: 64 BPM | TEMPERATURE: 98.4 F | BODY MASS INDEX: 23.08 KG/M2 | OXYGEN SATURATION: 99 %

## 2020-06-17 LAB
CHEMOTHERAPY INFUSION START DATE: NORMAL
CHEMOTHERAPY RECORDS: 2
CHEMOTHERAPY RECORDS: 5000
CHEMOTHERAPY RECORDS: NORMAL
CHEMOTHERAPY RX CANCER: NORMAL
DATE 1ST CHEMO CANCER: NORMAL
RAD ONC ARIA COURSE LAST TREATMENT DATE: NORMAL
RAD ONC ARIA COURSE TREATMENT ELAPSED DAYS: NORMAL
RAD ONC ARIA REFERENCE POINT DOSAGE GIVEN TO DATE: 2
RAD ONC ARIA REFERENCE POINT DOSAGE GIVEN TO DATE: 2.06
RAD ONC ARIA REFERENCE POINT ID: NORMAL
RAD ONC ARIA REFERENCE POINT ID: NORMAL
RAD ONC ARIA REFERENCE POINT SESSION DOSAGE GIVEN: 2
RAD ONC ARIA REFERENCE POINT SESSION DOSAGE GIVEN: 2.06

## 2020-06-17 PROCEDURE — 77280 THER RAD SIMULAJ FIELD SMPL: CPT | Performed by: RADIOLOGY

## 2020-06-17 PROCEDURE — 77386 HCHG IMRT DELIVERY COMPLEX: CPT | Performed by: RADIOLOGY

## 2020-06-17 ASSESSMENT — PAIN SCALES - GENERAL: PAINLEVEL: NO PAIN

## 2020-06-17 ASSESSMENT — FIBROSIS 4 INDEX: FIB4 SCORE: 1.96

## 2020-06-17 NOTE — ON TREATMENT VISIT
ON TREATMENT  NOTE  RADIATION ONCOLOGY DEPARTMENT    Patient name:  Filiberto Jauregui    Primary Physician:  Mac iMller M.D. MRN: [id number]  CSN: [id number]   Referring physician:  Nazario Hamilton M.D. : 1933, 86 y.o.     ENCOUNTER DATE:  20    DIAGNOSIS:  Malignant neoplasm of anterior wall of urinary bladder (HCC)  Staging form: Urinary Bladder, AJCC 8th Edition  - Clinical stage from 2020: Stage II (cT2, cN0, cM0) - Signed by Gilson Dempsey M.D. on 2020      TREATMENT SUMMARY:  [facility] Treatment Information        Some values may be hidden. Unless noted otherwise, only the newest values recorded on each date are displayed.         [facility]ria Treatment Summary 20   Course First Treatment Date 2020   Course Last Treatment Date 2020   Bladder Plan from Course C1_bladder   Fraction 1 of 25   Elapsed Course Days 0 @    Prescribed Fraction Dose 200 cGy   Prescribed Total Dose 5,000 cGy   Bladder Reference Point from Course C1_bladder   Elapsed Course Days 0 @ [id number]   Session Dose 200 cGy   Total Dose 200 cGy   Bladder CP Reference Point from Course C1_bladder   Elapsed Course Days 0 @ [id number]   Session Dose 206 cGy   Total Dose 206 cGy             SUBJECTIVE:  Well appearing, starting chemo monday      VITAL SIGNS:  KPS: 100, Fully active, able to carry on all pre-disease performed without restriction (ECOG equivalent 0)  Encounter Vitals  Temperature: 36.9 °C (98.4 °F)  Blood Pressure : 152/60  Pulse: 64  Pulse Oximetry: 99 %  Weight: 64.8 kg (142 lb 15.5 oz)  Pain Score: No pain  Pain Assessment 2020   Pain Assessment Denies Pain Denies Pain -   Pain Score 0 0 0   Some recent data might be hidden          PHYSICAL EXAM:    Physical Exam  Constitutional:       Appearance: Normal appearance.   Abdominal:      General: There is no distension.      Palpations: Abdomen is soft.   Skin:     Findings: No erythema.   Neurological:       Mental Status: He is alert.          Toxicity Assessment 6/17/2020   Toxicity Assessment Male Pelvis   Fatigue (lethargy, malaise, asthenia) Increased fatigue over baseline, but not altering normal activities   Radiation Dermatitis None   Anorexia None   Colitis None   Constipation Requiring stool softener or dietary modification   Dehydration None   Diarrhea w/o Colostomy None   Flatulence None   Nausea None   Proctitis None   Vomiting None   RT - Pain due to RT None   Tumor Pain (onset or exacerbation of tumor pain due to treatment) None   Dysuria (painful urination) None   Urinary Frequency Normal   Urinary Urgency None   Bladder Spasms Absent   Incontinence None   Urinary Retention Normal       CURRENT MEDICATIONS:    Current Outpatient Medications:   •  Multiple Vitamins-Minerals (CENTRUM SILVER 50+MEN PO), Take 1 tablet by mouth., Disp: , Rfl:   •  metFORMIN (GLUCOPHAGE) 500 MG Tab, Take 1-2 Tabs by mouth 2 times a day, with meals. 1000 mg in am and 500 mg in pm, Disp: 90 Tab, Rfl: 0  •  FERROUS SULFATE PO, Take  by mouth., Disp: , Rfl:   •  cetirizine (ZYRTEC) 10 MG Tab, Take 10 mg by mouth as needed., Disp: , Rfl:   •  clopidogrel (PLAVIX) 75 MG Tab, Take 1 Tab by mouth every day., Disp: 90 Tab, Rfl: 3  •  atorvastatin (LIPITOR) 40 MG Tab, Take 1 Tab by mouth every day., Disp: 30 Tab, Rfl: 11  •  gabapentin (NEURONTIN) 100 MG Cap, TAKE 1 CAPSULE BY MOUTH TWICE DAILY MAY INCREASE AS NEEDED EVERY 5 DAYS UP TO MAXIMUM OF 18 CAPSULES PER DAY, Disp: , Rfl: 5  •  docusate sodium (COLACE) 100 MG Cap, Take 100 mg by mouth 2 times a day as needed for Constipation., Disp: , Rfl:   •  donepezil (ARICEPT) 10 MG tablet, Take 10 mg by mouth every evening., Disp: , Rfl: 3  •  losartan (COZAAR) 100 MG Tab, Take 100 mg by mouth every day., Disp: , Rfl:   •  aspirin EC 81 MG EC tablet, Take 1 Tab by mouth every day. (Patient not taking: Reported on 6/17/2020), Disp: 30 Tab, Rfl: 11    LABORATORY DATA:   Lab Results    Component Value Date/Time    SODIUM 139 05/11/2020 11:25 AM    POTASSIUM 4.3 05/11/2020 11:25 AM    CHLORIDE 103 05/11/2020 11:25 AM    CO2 26 05/11/2020 11:25 AM    GLUCOSE 105 (H) 05/11/2020 11:25 AM    BUN 20 05/11/2020 11:25 AM    CREATININE 0.94 05/11/2020 11:25 AM         Lab Results   Component Value Date/Time    WBC 6.7 05/11/2020 11:25 AM    HEMOGLOBIN 11.2 (L) 05/11/2020 11:25 AM    HEMATOCRIT 33.9 (L) 05/11/2020 11:25 AM    MCV 88.3 05/11/2020 11:25 AM    PLATELETCT 260 05/11/2020 11:25 AM        RADIOLOGY DATA:  No results found.    IMPRESSION:  Malignant neoplasm of anterior wall of urinary bladder (HCC)  Staging form: Urinary Bladder, AJCC 8th Edition  - Clinical stage from 5/18/2020: Stage II (cT2, cN0, cM0) - Signed by Gilson Dempsey M.D. on 5/18/2020      PLAN:  No change in treatment plan    Disposition:  Treatment plan reviewed. Questions answered. Continue therapy outlined.     Gilson Dempsey M.D.    Orders Placed This Encounter   • Multiple Vitamins-Minerals (CENTRUM SILVER 50+MEN PO)

## 2020-06-18 ENCOUNTER — HOSPITAL ENCOUNTER (OUTPATIENT)
Dept: RADIATION ONCOLOGY | Facility: MEDICAL CENTER | Age: 85
End: 2020-06-18
Payer: MEDICARE

## 2020-06-18 LAB
CHEMOTHERAPY INFUSION START DATE: NORMAL
CHEMOTHERAPY RECORDS: 2
CHEMOTHERAPY RECORDS: 5000
CHEMOTHERAPY RECORDS: NORMAL
CHEMOTHERAPY RX CANCER: NORMAL
DATE 1ST CHEMO CANCER: NORMAL
RAD ONC ARIA COURSE LAST TREATMENT DATE: NORMAL
RAD ONC ARIA COURSE TREATMENT ELAPSED DAYS: NORMAL
RAD ONC ARIA REFERENCE POINT DOSAGE GIVEN TO DATE: 4
RAD ONC ARIA REFERENCE POINT DOSAGE GIVEN TO DATE: 4.13
RAD ONC ARIA REFERENCE POINT ID: NORMAL
RAD ONC ARIA REFERENCE POINT ID: NORMAL
RAD ONC ARIA REFERENCE POINT SESSION DOSAGE GIVEN: 2
RAD ONC ARIA REFERENCE POINT SESSION DOSAGE GIVEN: 2.06

## 2020-06-18 PROCEDURE — 77386 HCHG IMRT DELIVERY COMPLEX: CPT | Performed by: RADIOLOGY

## 2020-06-18 PROCEDURE — 77014 PR CT GUIDANCE PLACEMENT RAD THERAPY FIELDS: CPT | Mod: 26 | Performed by: RADIOLOGY

## 2020-06-19 ENCOUNTER — HOSPITAL ENCOUNTER (OUTPATIENT)
Dept: RADIATION ONCOLOGY | Facility: MEDICAL CENTER | Age: 85
End: 2020-06-19
Payer: MEDICARE

## 2020-06-19 LAB
CHEMOTHERAPY INFUSION START DATE: NORMAL
CHEMOTHERAPY RECORDS: 2
CHEMOTHERAPY RECORDS: 5000
CHEMOTHERAPY RECORDS: NORMAL
CHEMOTHERAPY RX CANCER: NORMAL
DATE 1ST CHEMO CANCER: NORMAL
RAD ONC ARIA COURSE LAST TREATMENT DATE: NORMAL
RAD ONC ARIA COURSE TREATMENT ELAPSED DAYS: NORMAL
RAD ONC ARIA REFERENCE POINT DOSAGE GIVEN TO DATE: 6
RAD ONC ARIA REFERENCE POINT DOSAGE GIVEN TO DATE: 6.19
RAD ONC ARIA REFERENCE POINT ID: NORMAL
RAD ONC ARIA REFERENCE POINT ID: NORMAL
RAD ONC ARIA REFERENCE POINT SESSION DOSAGE GIVEN: 2
RAD ONC ARIA REFERENCE POINT SESSION DOSAGE GIVEN: 2.06

## 2020-06-19 PROCEDURE — 77336 RADIATION PHYSICS CONSULT: CPT | Performed by: RADIOLOGY

## 2020-06-19 PROCEDURE — 77014 PR CT GUIDANCE PLACEMENT RAD THERAPY FIELDS: CPT | Mod: 26 | Performed by: RADIOLOGY

## 2020-06-19 PROCEDURE — 77386 HCHG IMRT DELIVERY COMPLEX: CPT | Performed by: RADIOLOGY

## 2020-06-22 ENCOUNTER — HOSPITAL ENCOUNTER (OUTPATIENT)
Dept: RADIATION ONCOLOGY | Facility: MEDICAL CENTER | Age: 85
End: 2020-06-22
Payer: MEDICARE

## 2020-06-22 LAB
CHEMOTHERAPY INFUSION START DATE: NORMAL
CHEMOTHERAPY RECORDS: 2
CHEMOTHERAPY RECORDS: 5000
CHEMOTHERAPY RECORDS: NORMAL
CHEMOTHERAPY RX CANCER: NORMAL
DATE 1ST CHEMO CANCER: NORMAL
RAD ONC ARIA COURSE LAST TREATMENT DATE: NORMAL
RAD ONC ARIA COURSE TREATMENT ELAPSED DAYS: NORMAL
RAD ONC ARIA REFERENCE POINT DOSAGE GIVEN TO DATE: 8
RAD ONC ARIA REFERENCE POINT DOSAGE GIVEN TO DATE: 8.25
RAD ONC ARIA REFERENCE POINT ID: NORMAL
RAD ONC ARIA REFERENCE POINT ID: NORMAL
RAD ONC ARIA REFERENCE POINT SESSION DOSAGE GIVEN: 2
RAD ONC ARIA REFERENCE POINT SESSION DOSAGE GIVEN: 2.06

## 2020-06-22 PROCEDURE — 77386 HCHG IMRT DELIVERY COMPLEX: CPT | Performed by: RADIOLOGY

## 2020-06-22 PROCEDURE — 77014 PR CT GUIDANCE PLACEMENT RAD THERAPY FIELDS: CPT | Mod: 26 | Performed by: RADIOLOGY

## 2020-06-23 ENCOUNTER — HOSPITAL ENCOUNTER (OUTPATIENT)
Dept: RADIATION ONCOLOGY | Facility: MEDICAL CENTER | Age: 85
End: 2020-06-23
Payer: MEDICARE

## 2020-06-23 LAB
CHEMOTHERAPY INFUSION START DATE: NORMAL
CHEMOTHERAPY RECORDS: 2
CHEMOTHERAPY RECORDS: 5000
CHEMOTHERAPY RECORDS: NORMAL
CHEMOTHERAPY RX CANCER: NORMAL
DATE 1ST CHEMO CANCER: NORMAL
RAD ONC ARIA COURSE LAST TREATMENT DATE: NORMAL
RAD ONC ARIA COURSE TREATMENT ELAPSED DAYS: NORMAL
RAD ONC ARIA REFERENCE POINT DOSAGE GIVEN TO DATE: 10
RAD ONC ARIA REFERENCE POINT DOSAGE GIVEN TO DATE: 10.31
RAD ONC ARIA REFERENCE POINT ID: NORMAL
RAD ONC ARIA REFERENCE POINT ID: NORMAL
RAD ONC ARIA REFERENCE POINT SESSION DOSAGE GIVEN: 2
RAD ONC ARIA REFERENCE POINT SESSION DOSAGE GIVEN: 2.06

## 2020-06-23 PROCEDURE — 77014 PR CT GUIDANCE PLACEMENT RAD THERAPY FIELDS: CPT | Mod: 26 | Performed by: RADIOLOGY

## 2020-06-23 PROCEDURE — 77386 HCHG IMRT DELIVERY COMPLEX: CPT | Performed by: RADIOLOGY

## 2020-06-23 PROCEDURE — 77427 RADIATION TX MANAGEMENT X5: CPT | Performed by: RADIOLOGY

## 2020-06-24 ENCOUNTER — HOSPITAL ENCOUNTER (OUTPATIENT)
Dept: RADIATION ONCOLOGY | Facility: MEDICAL CENTER | Age: 85
End: 2020-06-24
Payer: MEDICARE

## 2020-06-24 VITALS
SYSTOLIC BLOOD PRESSURE: 121 MMHG | WEIGHT: 143.63 LBS | BODY MASS INDEX: 23.18 KG/M2 | TEMPERATURE: 98.7 F | DIASTOLIC BLOOD PRESSURE: 56 MMHG | OXYGEN SATURATION: 95 % | HEART RATE: 63 BPM

## 2020-06-24 LAB
CHEMOTHERAPY INFUSION START DATE: NORMAL
CHEMOTHERAPY RECORDS: 2
CHEMOTHERAPY RECORDS: 5000
CHEMOTHERAPY RECORDS: NORMAL
CHEMOTHERAPY RX CANCER: NORMAL
DATE 1ST CHEMO CANCER: NORMAL
RAD ONC ARIA COURSE LAST TREATMENT DATE: NORMAL
RAD ONC ARIA COURSE TREATMENT ELAPSED DAYS: NORMAL
RAD ONC ARIA REFERENCE POINT DOSAGE GIVEN TO DATE: 12
RAD ONC ARIA REFERENCE POINT DOSAGE GIVEN TO DATE: 12.38
RAD ONC ARIA REFERENCE POINT ID: NORMAL
RAD ONC ARIA REFERENCE POINT ID: NORMAL
RAD ONC ARIA REFERENCE POINT SESSION DOSAGE GIVEN: 2
RAD ONC ARIA REFERENCE POINT SESSION DOSAGE GIVEN: 2.06

## 2020-06-24 PROCEDURE — 77014 PR CT GUIDANCE PLACEMENT RAD THERAPY FIELDS: CPT | Mod: 26 | Performed by: RADIOLOGY

## 2020-06-24 PROCEDURE — 77386 HCHG IMRT DELIVERY COMPLEX: CPT | Performed by: RADIOLOGY

## 2020-06-24 ASSESSMENT — FIBROSIS 4 INDEX: FIB4 SCORE: 1.96

## 2020-06-24 ASSESSMENT — PAIN SCALES - GENERAL: PAINLEVEL: NO PAIN

## 2020-06-24 NOTE — ON TREATMENT VISIT
ON TREATMENT  NOTE  RADIATION ONCOLOGY DEPARTMENT    Patient name:  Filiberto Jauregui    Primary Physician:  Mac Miller M.D. MRN: [id number]  CSN: [id number]   Referring physician:  Nazario Hamilton M.D. : 1933, 86 y.o.     ENCOUNTER DATE:  2020      DIAGNOSIS:  Malignant neoplasm of anterior wall of urinary bladder (HCC)  Staging form: Urinary Bladder, AJCC 8th Edition  - Clinical stage from 2020: Stage II (cT2, cN0, cM0) - Signed by Gilson Dempsey M.D. on 2020      TREATMENT SUMMARY:  [facility] Treatment Information        Some values may be hidden. Unless noted otherwise, only the newest values recorded on each date are displayed.         [facility] Treatment Summary 20   Course First Treatment Date 2020   Course Last Treatment Date 2020   Bladder Plan from Course C1_bladder   Fraction    Elapsed Course Days 7 @    Prescribed Fraction Dose 200 cGy   Prescribed Total Dose 5,000 cGy   Bladder Reference Point from Course C1_bladder   Elapsed Course Days 7 @ [id number]   Session Dose 200 cGy   Total Dose 1,200 cGy   Bladder CP Reference Point from Course C1_bladder   Elapsed Course Days 7 @    Session Dose 206 cGy   Total Dose 1,238 cGy              SUBJECTIVE:  Mild dysuria if he hold urine too long. Getting Gemzar twice/week.      VITAL SIGNS:  KPS: 70, Cares for self; unable to carry on normal activity or to do active work (ECOG equivalent 1)  Encounter Vitals  Temperature: 37.1 °C (98.7 °F)  Blood Pressure : 121/56  Pulse: 63  Pulse Oximetry: 95 %  Weight: 65.2 kg (143 lb 10.1 oz)  Pain Score: No pain  Pain Assessment 2020   Pain Assessment Denies Pain Denies Pain Denies Pain -   Pain Score 0 0 0 0   Some recent data might be hidden          PHYSICAL EXAM:  Physical Exam  Vitals signs and nursing note reviewed.   Constitutional:       General: He is not in acute distress.     Appearance: He is well-developed.    HENT:      Head: Normocephalic.   Skin:     General: Skin is warm and dry.      Findings: No erythema.   Neurological:      Mental Status: He is alert and oriented to person, place, and time.   Psychiatric:         Behavior: Behavior normal.         Thought Content: Thought content normal.         Judgment: Judgment normal.          Toxicity Assessment 6/24/2020 6/17/2020   Toxicity Assessment Male Pelvis Male Pelvis   Fatigue (lethargy, malaise, asthenia) Increased fatigue over baseline, but not altering normal activities Increased fatigue over baseline, but not altering normal activities   Radiation Dermatitis None None   Anorexia Loss of appetite None   Colitis None None   Constipation Requiring stool softener or dietary modification Requiring stool softener or dietary modification   Dehydration None None   Diarrhea w/o Colostomy None None   Flatulence None None   Nausea None None   Proctitis None None   Vomiting None None   RT - Pain due to RT None None   Tumor Pain (onset or exacerbation of tumor pain due to treatment) None None   Dysuria (painful urination) None None   Urinary Frequency Normal Normal   Urinary Urgency None None   Bladder Spasms Absent Absent   Incontinence None None   Urinary Retention Normal Normal       CURRENT MEDICATIONS:    Current Outpatient Medications:   •  Multiple Vitamins-Minerals (CENTRUM SILVER 50+MEN PO), Take 1 tablet by mouth., Disp: , Rfl:   •  metFORMIN (GLUCOPHAGE) 500 MG Tab, Take 1-2 Tabs by mouth 2 times a day, with meals. 1000 mg in am and 500 mg in pm, Disp: 90 Tab, Rfl: 0  •  FERROUS SULFATE PO, Take  by mouth., Disp: , Rfl:   •  cetirizine (ZYRTEC) 10 MG Tab, Take 10 mg by mouth as needed., Disp: , Rfl:   •  clopidogrel (PLAVIX) 75 MG Tab, Take 1 Tab by mouth every day., Disp: 90 Tab, Rfl: 3  •  atorvastatin (LIPITOR) 40 MG Tab, Take 1 Tab by mouth every day., Disp: 30 Tab, Rfl: 11  •  aspirin EC 81 MG EC tablet, Take 1 Tab by mouth every day. (Patient not taking:  Reported on 6/17/2020), Disp: 30 Tab, Rfl: 11  •  gabapentin (NEURONTIN) 100 MG Cap, TAKE 1 CAPSULE BY MOUTH TWICE DAILY MAY INCREASE AS NEEDED EVERY 5 DAYS UP TO MAXIMUM OF 18 CAPSULES PER DAY, Disp: , Rfl: 5  •  docusate sodium (COLACE) 100 MG Cap, Take 100 mg by mouth 2 times a day as needed for Constipation., Disp: , Rfl:   •  donepezil (ARICEPT) 10 MG tablet, Take 10 mg by mouth every evening., Disp: , Rfl: 3  •  losartan (COZAAR) 100 MG Tab, Take 100 mg by mouth every day., Disp: , Rfl:     LABORATORY DATA:   Lab Results   Component Value Date/Time    SODIUM 139 05/11/2020 11:25 AM    POTASSIUM 4.3 05/11/2020 11:25 AM    CHLORIDE 103 05/11/2020 11:25 AM    CO2 26 05/11/2020 11:25 AM    GLUCOSE 105 (H) 05/11/2020 11:25 AM    BUN 20 05/11/2020 11:25 AM    CREATININE 0.94 05/11/2020 11:25 AM       Lab Results   Component Value Date/Time    WBC 6.7 05/11/2020 11:25 AM    RBC 3.84 (L) 05/11/2020 11:25 AM    HEMOGLOBIN 11.2 (L) 05/11/2020 11:25 AM    HEMATOCRIT 33.9 (L) 05/11/2020 11:25 AM    MCV 88.3 05/11/2020 11:25 AM    MCH 29.2 05/11/2020 11:25 AM    MCHC 33.0 (L) 05/11/2020 11:25 AM    PLATELETCT 260 05/11/2020 11:25 AM         RADIOLOGY DATA:  [id number] [place] To Mid-thigh    Result Date: 5/8/2020  1.  There is diffuse uptake in the bladder which would be expected for PET/CT. However, the CT images also demonstrate diffuse bladder wall thickening possibly due to chronic cystitis with malignancy not excluded. There is also air within the bladder of uncertain etiology. 2.  There is diffuse colonic uptake with questionable diffuse bowel wall thickening of ascending colon just above the level the iliac crest. This is suspicious for an area of malignancy. 3.  There is no evidence of metastatic lymphadenopathy in the abdomen or pelvis. 4.  There is no evidence of intrathoracic metastases.      IMPRESSION:  Cancer Staging  Malignant neoplasm of anterior wall of urinary bladder (HCC)  Staging form: Urinary  Bladder, AJCC 8th Edition  - Clinical stage from 5/18/2020: Stage II (cT2, cN0, cM0) - Signed by Gilson Dempsey M.D. on 5/18/2020      PLAN:  No change in treatment plan   Cystoscopy tomorrow with Dr. Jones.    Disposition:  Treatment plan and imaging reviewed. Questions answered. Continue therapy outlined.     Marty GREGORIO M.D.    No orders of the defined types were placed in this encounter.

## 2020-06-25 ENCOUNTER — HOSPITAL ENCOUNTER (OUTPATIENT)
Dept: RADIATION ONCOLOGY | Facility: MEDICAL CENTER | Age: 85
End: 2020-06-25
Payer: MEDICARE

## 2020-06-25 LAB
CHEMOTHERAPY INFUSION START DATE: NORMAL
CHEMOTHERAPY RECORDS: 2
CHEMOTHERAPY RECORDS: 5000
CHEMOTHERAPY RECORDS: NORMAL
CHEMOTHERAPY RX CANCER: NORMAL
DATE 1ST CHEMO CANCER: NORMAL
RAD ONC ARIA COURSE LAST TREATMENT DATE: NORMAL
RAD ONC ARIA COURSE TREATMENT ELAPSED DAYS: NORMAL
RAD ONC ARIA REFERENCE POINT DOSAGE GIVEN TO DATE: 14
RAD ONC ARIA REFERENCE POINT DOSAGE GIVEN TO DATE: 14.44
RAD ONC ARIA REFERENCE POINT ID: NORMAL
RAD ONC ARIA REFERENCE POINT ID: NORMAL
RAD ONC ARIA REFERENCE POINT SESSION DOSAGE GIVEN: 2
RAD ONC ARIA REFERENCE POINT SESSION DOSAGE GIVEN: 2.06

## 2020-06-25 PROCEDURE — 77014 PR CT GUIDANCE PLACEMENT RAD THERAPY FIELDS: CPT | Mod: 26 | Performed by: RADIOLOGY

## 2020-06-25 PROCEDURE — 77386 HCHG IMRT DELIVERY COMPLEX: CPT | Performed by: RADIOLOGY

## 2020-06-26 ENCOUNTER — HOSPITAL ENCOUNTER (OUTPATIENT)
Dept: RADIATION ONCOLOGY | Facility: MEDICAL CENTER | Age: 85
End: 2020-06-26
Payer: MEDICARE

## 2020-06-26 ENCOUNTER — HOSPITAL ENCOUNTER (OUTPATIENT)
Dept: LAB | Facility: MEDICAL CENTER | Age: 85
End: 2020-06-26
Attending: NURSE PRACTITIONER
Payer: MEDICARE

## 2020-06-26 LAB
ALBUMIN SERPL BCP-MCNC: 4.4 G/DL (ref 3.2–4.9)
ALBUMIN/GLOB SERPL: 2.4 G/DL
ALP SERPL-CCNC: 59 U/L (ref 30–99)
ALT SERPL-CCNC: 24 U/L (ref 2–50)
ANION GAP SERPL CALC-SCNC: 12 MMOL/L (ref 7–16)
AST SERPL-CCNC: 26 U/L (ref 12–45)
BILIRUB SERPL-MCNC: 1.8 MG/DL (ref 0.1–1.5)
BUN SERPL-MCNC: 24 MG/DL (ref 8–22)
CALCIUM SERPL-MCNC: 9.3 MG/DL (ref 8.5–10.5)
CHEMOTHERAPY INFUSION START DATE: NORMAL
CHEMOTHERAPY RECORDS: 2
CHEMOTHERAPY RECORDS: 5000
CHEMOTHERAPY RECORDS: NORMAL
CHEMOTHERAPY RX CANCER: NORMAL
CHLORIDE SERPL-SCNC: 101 MMOL/L (ref 96–112)
CO2 SERPL-SCNC: 25 MMOL/L (ref 20–33)
CREAT SERPL-MCNC: 1.02 MG/DL (ref 0.5–1.4)
DATE 1ST CHEMO CANCER: NORMAL
GLOBULIN SER CALC-MCNC: 1.8 G/DL (ref 1.9–3.5)
GLUCOSE SERPL-MCNC: 187 MG/DL (ref 65–99)
POTASSIUM SERPL-SCNC: 4.4 MMOL/L (ref 3.6–5.5)
PROT SERPL-MCNC: 6.2 G/DL (ref 6–8.2)
RAD ONC ARIA COURSE LAST TREATMENT DATE: NORMAL
RAD ONC ARIA COURSE TREATMENT ELAPSED DAYS: NORMAL
RAD ONC ARIA REFERENCE POINT DOSAGE GIVEN TO DATE: 16
RAD ONC ARIA REFERENCE POINT DOSAGE GIVEN TO DATE: 16.5
RAD ONC ARIA REFERENCE POINT ID: NORMAL
RAD ONC ARIA REFERENCE POINT ID: NORMAL
RAD ONC ARIA REFERENCE POINT SESSION DOSAGE GIVEN: 2
RAD ONC ARIA REFERENCE POINT SESSION DOSAGE GIVEN: 2.06
SODIUM SERPL-SCNC: 138 MMOL/L (ref 135–145)

## 2020-06-26 PROCEDURE — 77014 PR CT GUIDANCE PLACEMENT RAD THERAPY FIELDS: CPT | Mod: 26 | Performed by: RADIOLOGY

## 2020-06-26 PROCEDURE — 77386 HCHG IMRT DELIVERY COMPLEX: CPT | Performed by: RADIOLOGY

## 2020-06-26 PROCEDURE — 80053 COMPREHEN METABOLIC PANEL: CPT

## 2020-06-26 PROCEDURE — 77336 RADIATION PHYSICS CONSULT: CPT | Performed by: RADIOLOGY

## 2020-06-29 ENCOUNTER — HOSPITAL ENCOUNTER (OUTPATIENT)
Dept: RADIATION ONCOLOGY | Facility: MEDICAL CENTER | Age: 85
End: 2020-06-29
Payer: MEDICARE

## 2020-06-29 LAB
CHEMOTHERAPY INFUSION START DATE: NORMAL
CHEMOTHERAPY RECORDS: 2
CHEMOTHERAPY RECORDS: 5000
CHEMOTHERAPY RECORDS: NORMAL
CHEMOTHERAPY RX CANCER: NORMAL
DATE 1ST CHEMO CANCER: NORMAL
RAD ONC ARIA COURSE LAST TREATMENT DATE: NORMAL
RAD ONC ARIA COURSE TREATMENT ELAPSED DAYS: NORMAL
RAD ONC ARIA REFERENCE POINT DOSAGE GIVEN TO DATE: 18
RAD ONC ARIA REFERENCE POINT DOSAGE GIVEN TO DATE: 18.57
RAD ONC ARIA REFERENCE POINT ID: NORMAL
RAD ONC ARIA REFERENCE POINT ID: NORMAL
RAD ONC ARIA REFERENCE POINT SESSION DOSAGE GIVEN: 2
RAD ONC ARIA REFERENCE POINT SESSION DOSAGE GIVEN: 2.06

## 2020-06-29 PROCEDURE — 77386 HCHG IMRT DELIVERY COMPLEX: CPT | Performed by: RADIOLOGY

## 2020-06-29 PROCEDURE — 77014 PR CT GUIDANCE PLACEMENT RAD THERAPY FIELDS: CPT | Mod: 26 | Performed by: RADIOLOGY

## 2020-06-30 ENCOUNTER — HOSPITAL ENCOUNTER (OUTPATIENT)
Dept: RADIATION ONCOLOGY | Facility: MEDICAL CENTER | Age: 85
End: 2020-06-30
Payer: MEDICARE

## 2020-06-30 LAB
CHEMOTHERAPY INFUSION START DATE: NORMAL
CHEMOTHERAPY RECORDS: 2
CHEMOTHERAPY RECORDS: 5000
CHEMOTHERAPY RECORDS: NORMAL
CHEMOTHERAPY RX CANCER: NORMAL
DATE 1ST CHEMO CANCER: NORMAL
RAD ONC ARIA COURSE LAST TREATMENT DATE: NORMAL
RAD ONC ARIA COURSE TREATMENT ELAPSED DAYS: NORMAL
RAD ONC ARIA REFERENCE POINT DOSAGE GIVEN TO DATE: 20
RAD ONC ARIA REFERENCE POINT DOSAGE GIVEN TO DATE: 20.63
RAD ONC ARIA REFERENCE POINT ID: NORMAL
RAD ONC ARIA REFERENCE POINT ID: NORMAL
RAD ONC ARIA REFERENCE POINT SESSION DOSAGE GIVEN: 2
RAD ONC ARIA REFERENCE POINT SESSION DOSAGE GIVEN: 2.06

## 2020-06-30 PROCEDURE — 77386 HCHG IMRT DELIVERY COMPLEX: CPT | Performed by: RADIOLOGY

## 2020-06-30 PROCEDURE — 77427 RADIATION TX MANAGEMENT X5: CPT | Performed by: RADIOLOGY

## 2020-06-30 PROCEDURE — 77014 PR CT GUIDANCE PLACEMENT RAD THERAPY FIELDS: CPT | Mod: 26 | Performed by: RADIOLOGY

## 2020-07-01 ENCOUNTER — HOSPITAL ENCOUNTER (OUTPATIENT)
Dept: RADIATION ONCOLOGY | Facility: MEDICAL CENTER | Age: 85
End: 2020-07-31
Attending: RADIOLOGY
Payer: MEDICARE

## 2020-07-01 ENCOUNTER — APPOINTMENT (OUTPATIENT)
Dept: RADIATION ONCOLOGY | Facility: MEDICAL CENTER | Age: 85
End: 2020-07-01
Payer: MEDICARE

## 2020-07-01 VITALS
TEMPERATURE: 98.4 F | HEART RATE: 64 BPM | OXYGEN SATURATION: 99 % | SYSTOLIC BLOOD PRESSURE: 121 MMHG | BODY MASS INDEX: 23 KG/M2 | DIASTOLIC BLOOD PRESSURE: 56 MMHG | WEIGHT: 142.53 LBS

## 2020-07-01 LAB
CHEMOTHERAPY INFUSION START DATE: NORMAL
CHEMOTHERAPY RECORDS: 2
CHEMOTHERAPY RECORDS: 5000
CHEMOTHERAPY RECORDS: NORMAL
CHEMOTHERAPY RX CANCER: NORMAL
DATE 1ST CHEMO CANCER: NORMAL
RAD ONC ARIA COURSE LAST TREATMENT DATE: NORMAL
RAD ONC ARIA COURSE TREATMENT ELAPSED DAYS: NORMAL
RAD ONC ARIA REFERENCE POINT DOSAGE GIVEN TO DATE: 22
RAD ONC ARIA REFERENCE POINT DOSAGE GIVEN TO DATE: 22.69
RAD ONC ARIA REFERENCE POINT ID: NORMAL
RAD ONC ARIA REFERENCE POINT ID: NORMAL
RAD ONC ARIA REFERENCE POINT SESSION DOSAGE GIVEN: 2
RAD ONC ARIA REFERENCE POINT SESSION DOSAGE GIVEN: 2.06

## 2020-07-01 PROCEDURE — 77386 HCHG IMRT DELIVERY COMPLEX: CPT | Performed by: RADIOLOGY

## 2020-07-01 PROCEDURE — 77014 PR CT GUIDANCE PLACEMENT RAD THERAPY FIELDS: CPT | Mod: 26 | Performed by: RADIOLOGY

## 2020-07-01 ASSESSMENT — FIBROSIS 4 INDEX: FIB4 SCORE: 1.76

## 2020-07-01 ASSESSMENT — PAIN SCALES - GENERAL: PAINLEVEL: NO PAIN

## 2020-07-01 NOTE — ON TREATMENT VISIT
ON TREATMENT  NOTE  RADIATION ONCOLOGY DEPARTMENT    Patient name:  Filiberto Jauregui    Primary Physician:  Mac Miller M.D. MRN: [id number]  [facility]: [id number]   Referring physician:  Nazario Hamilton M.D. : 1933, 86 y.o.     ENCOUNTER DATE:  20    DIAGNOSIS:  Malignant neoplasm of anterior wall of urinary bladder (HCC)  Staging form: Urinary Bladder, AJCC 8th Edition  - Clinical stage from 2020: Stage II (cT2, cN0, cM0) - Signed by Gilson Dempsey M.D. on 2020      TREATMENT SUMMARY:  [facility] Treatment Information        Some values may be hidden. Unless noted otherwise, only the newest values recorded on each date are displayed.         [facility] Treatment Summary 20   Course First Treatment Date 2020   Course Last Treatment Date 2020   Bladder Plan from Course C1_bladder   Fraction 11 of 25   Elapsed Course Days  @    Prescribed Fraction Dose 200 cGy   Prescribed Total Dose 5,000 cGy   Bladder Reference Point from Course C1_bladder   Elapsed Course Days  @ [id number]   Session Dose 200 cGy   Total Dose 2,200 cGy   Bladder CP Reference Point from Course C1_bladder   Elapsed Course Days  @    Session Dose 206 cGy   Total Dose 2,269 cGy             SUBJECTIVE:  Well appearing      VITAL SIGNS:  KPS: 100, Fully active, able to carry on all pre-disease performed without restriction (ECOG equivalent 0)  Encounter Vitals  Temperature: 36.9 °C (98.4 °F)  Blood Pressure : 121/56  Pulse: 64  Pulse Oximetry: 99 %  Weight: 64.7 kg (142 lb 8.4 oz)  Pain Score: No pain  Pain Assessment 2020   Pain Assessment Denies Pain Denies Pain Denies Pain   Pain Score 0 0 0   Some recent data might be hidden          PHYSICAL EXAM:    Physical Exam  Vitals signs reviewed.   Constitutional:       Appearance: Normal appearance.   Neurological:      Mental Status: He is alert.          Toxicity Assessment 2020   Toxicity  Assessment Male Pelvis Male Pelvis Male Pelvis   Fatigue (lethargy, malaise, asthenia) Increased fatigue over baseline, but not altering normal activities Increased fatigue over baseline, but not altering normal activities Increased fatigue over baseline, but not altering normal activities   Radiation Dermatitis None None None   Anorexia Loss of appetite Loss of appetite None   Colitis None None None   Constipation None Requiring stool softener or dietary modification Requiring stool softener or dietary modification   Dehydration None None None   Diarrhea w/o Colostomy Increase of <4 stools/day over pre-treatment None None   Flatulence None None None   Nausea None None None   Proctitis Increased stool frequency, occasional blood-streaked stools or rectal discomfort (including hemorrhoids) not requiring medication None None   Vomiting None None None   RT - Pain due to RT None None None   Tumor Pain (onset or exacerbation of tumor pain due to treatment) None None None   Dysuria (painful urination) Mild symptoms requiring no intervention None None   Urinary Frequency Increase in frequency up to 2x normal Normal Normal   Urinary Urgency None None None   Bladder Spasms Absent Absent Absent   Incontinence None None None   Urinary Retention Normal Normal Normal       CURRENT MEDICATIONS:    Current Outpatient Medications:   •  Multiple Vitamins-Minerals (CENTRUM SILVER 50+MEN PO), Take 1 tablet by mouth., Disp: , Rfl:   •  metFORMIN (GLUCOPHAGE) 500 MG Tab, Take 1-2 Tabs by mouth 2 times a day, with meals. 1000 mg in am and 500 mg in pm, Disp: 90 Tab, Rfl: 0  •  FERROUS SULFATE PO, Take  by mouth., Disp: , Rfl:   •  cetirizine (ZYRTEC) 10 MG Tab, Take 10 mg by mouth as needed., Disp: , Rfl:   •  clopidogrel (PLAVIX) 75 MG Tab, Take 1 Tab by mouth every day., Disp: 90 Tab, Rfl: 3  •  atorvastatin (LIPITOR) 40 MG Tab, Take 1 Tab by mouth every day., Disp: 30 Tab, Rfl: 11  •  aspirin EC 81 MG EC tablet, Take 1 Tab by mouth  every day. (Patient not taking: Reported on 6/17/2020), Disp: 30 Tab, Rfl: 11  •  gabapentin (NEURONTIN) 100 MG Cap, TAKE 1 CAPSULE BY MOUTH TWICE DAILY MAY INCREASE AS NEEDED EVERY 5 DAYS UP TO MAXIMUM OF 18 CAPSULES PER DAY, Disp: , Rfl: 5  •  docusate sodium (COLACE) 100 MG Cap, Take 100 mg by mouth 2 times a day as needed for Constipation., Disp: , Rfl:   •  donepezil (ARICEPT) 10 MG tablet, Take 10 mg by mouth every evening., Disp: , Rfl: 3  •  losartan (COZAAR) 100 MG Tab, Take 100 mg by mouth every day., Disp: , Rfl:     LABORATORY DATA:   Lab Results   Component Value Date/Time    SODIUM 138 06/26/2020 11:43 AM    POTASSIUM 4.4 06/26/2020 11:43 AM    CHLORIDE 101 06/26/2020 11:43 AM    CO2 25 06/26/2020 11:43 AM    GLUCOSE 187 (H) 06/26/2020 11:43 AM    BUN 24 (H) 06/26/2020 11:43 AM    CREATININE 1.02 06/26/2020 11:43 AM         Lab Results   Component Value Date/Time    WBC 6.7 05/11/2020 11:25 AM    HEMOGLOBIN 11.2 (L) 05/11/2020 11:25 AM    HEMATOCRIT 33.9 (L) 05/11/2020 11:25 AM    MCV 88.3 05/11/2020 11:25 AM    PLATELETCT 260 05/11/2020 11:25 AM        RADIOLOGY DATA:  No results found.    IMPRESSION:  Malignant neoplasm of anterior wall of urinary bladder (HCC)  Staging form: Urinary Bladder, AJCC 8th Edition  - Clinical stage from 5/18/2020: Stage II (cT2, cN0, cM0) - Signed by Gilson Dempsey M.D. on 5/18/2020      PLAN:  No change in treatment plan    Disposition:  Treatment plan reviewed. Questions answered. Continue therapy outlined.     Gilson Dempsey M.D.    No orders of the defined types were placed in this encounter.

## 2020-07-02 ENCOUNTER — HOSPITAL ENCOUNTER (OUTPATIENT)
Dept: LAB | Facility: MEDICAL CENTER | Age: 85
End: 2020-07-02
Attending: NURSE PRACTITIONER
Payer: MEDICARE

## 2020-07-02 ENCOUNTER — HOSPITAL ENCOUNTER (OUTPATIENT)
Dept: RADIATION ONCOLOGY | Facility: MEDICAL CENTER | Age: 85
End: 2020-07-02
Payer: MEDICARE

## 2020-07-02 LAB
ALBUMIN SERPL BCP-MCNC: 4.2 G/DL (ref 3.2–4.9)
ALBUMIN/GLOB SERPL: 2 G/DL
ALP SERPL-CCNC: 53 U/L (ref 30–99)
ALT SERPL-CCNC: 29 U/L (ref 2–50)
ANION GAP SERPL CALC-SCNC: 13 MMOL/L (ref 7–16)
AST SERPL-CCNC: 34 U/L (ref 12–45)
BILIRUB SERPL-MCNC: 1.7 MG/DL (ref 0.1–1.5)
BUN SERPL-MCNC: 21 MG/DL (ref 8–22)
CALCIUM SERPL-MCNC: 9.1 MG/DL (ref 8.5–10.5)
CHEMOTHERAPY INFUSION START DATE: NORMAL
CHEMOTHERAPY RECORDS: 2
CHEMOTHERAPY RECORDS: 5000
CHEMOTHERAPY RECORDS: NORMAL
CHEMOTHERAPY RX CANCER: NORMAL
CHLORIDE SERPL-SCNC: 101 MMOL/L (ref 96–112)
CO2 SERPL-SCNC: 26 MMOL/L (ref 20–33)
CREAT SERPL-MCNC: 0.93 MG/DL (ref 0.5–1.4)
DATE 1ST CHEMO CANCER: NORMAL
GLOBULIN SER CALC-MCNC: 2.1 G/DL (ref 1.9–3.5)
GLUCOSE SERPL-MCNC: 177 MG/DL (ref 65–99)
POTASSIUM SERPL-SCNC: 4 MMOL/L (ref 3.6–5.5)
PROT SERPL-MCNC: 6.3 G/DL (ref 6–8.2)
RAD ONC ARIA COURSE LAST TREATMENT DATE: NORMAL
RAD ONC ARIA COURSE TREATMENT ELAPSED DAYS: NORMAL
RAD ONC ARIA REFERENCE POINT DOSAGE GIVEN TO DATE: 24
RAD ONC ARIA REFERENCE POINT DOSAGE GIVEN TO DATE: 24.76
RAD ONC ARIA REFERENCE POINT ID: NORMAL
RAD ONC ARIA REFERENCE POINT ID: NORMAL
RAD ONC ARIA REFERENCE POINT SESSION DOSAGE GIVEN: 2
RAD ONC ARIA REFERENCE POINT SESSION DOSAGE GIVEN: 2.06
SODIUM SERPL-SCNC: 140 MMOL/L (ref 135–145)

## 2020-07-02 PROCEDURE — 77014 PR CT GUIDANCE PLACEMENT RAD THERAPY FIELDS: CPT | Mod: 26 | Performed by: RADIOLOGY

## 2020-07-02 PROCEDURE — 77386 HCHG IMRT DELIVERY COMPLEX: CPT | Performed by: RADIOLOGY

## 2020-07-02 PROCEDURE — 80053 COMPREHEN METABOLIC PANEL: CPT

## 2020-07-06 ENCOUNTER — HOSPITAL ENCOUNTER (OUTPATIENT)
Dept: LAB | Facility: MEDICAL CENTER | Age: 85
End: 2020-07-06
Attending: INTERNAL MEDICINE
Payer: MEDICARE

## 2020-07-06 ENCOUNTER — HOSPITAL ENCOUNTER (OUTPATIENT)
Dept: RADIATION ONCOLOGY | Facility: MEDICAL CENTER | Age: 85
End: 2020-07-06
Payer: MEDICARE

## 2020-07-06 LAB
CHEMOTHERAPY INFUSION START DATE: NORMAL
CHEMOTHERAPY RECORDS: 2
CHEMOTHERAPY RECORDS: 5000
CHEMOTHERAPY RECORDS: NORMAL
CHEMOTHERAPY RX CANCER: NORMAL
DATE 1ST CHEMO CANCER: NORMAL
FERRITIN SERPL-MCNC: 177 NG/ML (ref 22–322)
FOLATE SERPL-MCNC: 12.1 NG/ML
IRON SATN MFR SERPL: 24 % (ref 15–55)
IRON SERPL-MCNC: 59 UG/DL (ref 50–180)
RAD ONC ARIA COURSE LAST TREATMENT DATE: NORMAL
RAD ONC ARIA COURSE TREATMENT ELAPSED DAYS: NORMAL
RAD ONC ARIA REFERENCE POINT DOSAGE GIVEN TO DATE: 26
RAD ONC ARIA REFERENCE POINT DOSAGE GIVEN TO DATE: 26.82
RAD ONC ARIA REFERENCE POINT ID: NORMAL
RAD ONC ARIA REFERENCE POINT ID: NORMAL
RAD ONC ARIA REFERENCE POINT SESSION DOSAGE GIVEN: 2
RAD ONC ARIA REFERENCE POINT SESSION DOSAGE GIVEN: 2.06
TIBC SERPL-MCNC: 245 UG/DL (ref 250–450)
UIBC SERPL-MCNC: 186 UG/DL (ref 110–370)
VIT B12 SERPL-MCNC: 372 PG/ML (ref 211–911)

## 2020-07-06 PROCEDURE — 83540 ASSAY OF IRON: CPT

## 2020-07-06 PROCEDURE — 77336 RADIATION PHYSICS CONSULT: CPT | Performed by: RADIOLOGY

## 2020-07-06 PROCEDURE — 82746 ASSAY OF FOLIC ACID SERUM: CPT

## 2020-07-06 PROCEDURE — 82728 ASSAY OF FERRITIN: CPT

## 2020-07-06 PROCEDURE — 83550 IRON BINDING TEST: CPT

## 2020-07-06 PROCEDURE — 77014 PR CT GUIDANCE PLACEMENT RAD THERAPY FIELDS: CPT | Mod: 26 | Performed by: RADIOLOGY

## 2020-07-06 PROCEDURE — 82607 VITAMIN B-12: CPT

## 2020-07-06 PROCEDURE — 77386 HCHG IMRT DELIVERY COMPLEX: CPT | Performed by: RADIOLOGY

## 2020-07-07 ENCOUNTER — HOSPITAL ENCOUNTER (OUTPATIENT)
Dept: RADIATION ONCOLOGY | Facility: MEDICAL CENTER | Age: 85
End: 2020-07-07
Payer: MEDICARE

## 2020-07-07 LAB
CHEMOTHERAPY INFUSION START DATE: NORMAL
CHEMOTHERAPY RECORDS: 2
CHEMOTHERAPY RECORDS: 5000
CHEMOTHERAPY RECORDS: NORMAL
CHEMOTHERAPY RX CANCER: NORMAL
DATE 1ST CHEMO CANCER: NORMAL
RAD ONC ARIA COURSE LAST TREATMENT DATE: NORMAL
RAD ONC ARIA COURSE TREATMENT ELAPSED DAYS: NORMAL
RAD ONC ARIA REFERENCE POINT DOSAGE GIVEN TO DATE: 28
RAD ONC ARIA REFERENCE POINT DOSAGE GIVEN TO DATE: 28.88
RAD ONC ARIA REFERENCE POINT ID: NORMAL
RAD ONC ARIA REFERENCE POINT ID: NORMAL
RAD ONC ARIA REFERENCE POINT SESSION DOSAGE GIVEN: 2
RAD ONC ARIA REFERENCE POINT SESSION DOSAGE GIVEN: 2.06

## 2020-07-07 PROCEDURE — 77014 PR CT GUIDANCE PLACEMENT RAD THERAPY FIELDS: CPT | Mod: 26 | Performed by: RADIOLOGY

## 2020-07-07 PROCEDURE — 77386 HCHG IMRT DELIVERY COMPLEX: CPT | Performed by: RADIOLOGY

## 2020-07-07 NOTE — PROGRESS NOTES
Telephone call to pt to discuss navigation and barriers to care.  Pt answered the telephone and requested I speak with his wife.  Spoke with pts wife, Jacinta.  Introduced myself and the NN role and provided contact information. Support and services offered; discussed plan of care.  Pt is scheduled for chemo education at his oncologist's office on Tuesday and will be scheduled for his chemotherapy at the office after that appointment.  Reinforced pt is scheduled to start radiation on 6/17 at 1415, and Jacinta confirmed this.  She denied needs, stating her  is a retired physician and they have the support of their adult children who are in the medical field.  Encouraged to call with any questions or needs.

## 2020-07-08 ENCOUNTER — HOSPITAL ENCOUNTER (OUTPATIENT)
Dept: RADIATION ONCOLOGY | Facility: MEDICAL CENTER | Age: 85
End: 2020-07-08
Payer: MEDICARE

## 2020-07-08 VITALS
HEART RATE: 6 BPM | OXYGEN SATURATION: 99 % | SYSTOLIC BLOOD PRESSURE: 137 MMHG | TEMPERATURE: 98.3 F | BODY MASS INDEX: 22.97 KG/M2 | WEIGHT: 142.31 LBS | DIASTOLIC BLOOD PRESSURE: 55 MMHG

## 2020-07-08 LAB
CHEMOTHERAPY INFUSION START DATE: NORMAL
CHEMOTHERAPY RECORDS: 2
CHEMOTHERAPY RECORDS: 5000
CHEMOTHERAPY RECORDS: NORMAL
CHEMOTHERAPY RX CANCER: NORMAL
DATE 1ST CHEMO CANCER: NORMAL
RAD ONC ARIA COURSE LAST TREATMENT DATE: NORMAL
RAD ONC ARIA COURSE TREATMENT ELAPSED DAYS: NORMAL
RAD ONC ARIA REFERENCE POINT DOSAGE GIVEN TO DATE: 30
RAD ONC ARIA REFERENCE POINT DOSAGE GIVEN TO DATE: 30.94
RAD ONC ARIA REFERENCE POINT ID: NORMAL
RAD ONC ARIA REFERENCE POINT ID: NORMAL
RAD ONC ARIA REFERENCE POINT SESSION DOSAGE GIVEN: 2
RAD ONC ARIA REFERENCE POINT SESSION DOSAGE GIVEN: 2.06

## 2020-07-08 PROCEDURE — 77014 PR CT GUIDANCE PLACEMENT RAD THERAPY FIELDS: CPT | Mod: 26 | Performed by: RADIOLOGY

## 2020-07-08 PROCEDURE — 77427 RADIATION TX MANAGEMENT X5: CPT | Performed by: RADIOLOGY

## 2020-07-08 PROCEDURE — 77386 HCHG IMRT DELIVERY COMPLEX: CPT | Performed by: RADIOLOGY

## 2020-07-08 ASSESSMENT — PAIN SCALES - GENERAL: PAINLEVEL: NO PAIN

## 2020-07-08 ASSESSMENT — FIBROSIS 4 INDEX: FIB4 SCORE: 2.09

## 2020-07-08 NOTE — ON TREATMENT VISIT
ON TREATMENT  NOTE  RADIATION ONCOLOGY DEPARTMENT    Patient name:  Filiberto Jauregui    Primary Physician:  Mac Miller M.D. MRN: [id number]  [facility]: [id number]   Referring physician:  Nazario Hamilton M.D. : 1933, 86 y.o.     ENCOUNTER DATE:  20    DIAGNOSIS:  Malignant neoplasm of anterior wall of urinary bladder (HCC)  Staging form: Urinary Bladder, AJCC 8th Edition  - Clinical stage from 2020: Stage II (cT2, cN0, cM0) - Signed by Gilson Dempsey M.D. on 2020      TREATMENT SUMMARY:  [facility] Treatment Information        Some values may be hidden. Unless noted otherwise, only the newest values recorded on each date are displayed.         [facility]ria Treatment Summary 20   Course First Treatment Date 2020   Course Last Treatment Date 2020   Bladder Plan from Course C1_bladder   Fraction 15 of 25   Elapsed Course Days  @    Prescribed Fraction Dose 200 cGy   Prescribed Total Dose 5,000 cGy   Bladder Reference Point from Course C1_bladder   Elapsed Course Days  @ [id number]   Session Dose 200 cGy   Total Dose 3,000 cGy   Bladder CP Reference Point from Course C1_bladder   Elapsed Course Days  @ [id number]   Session Dose 206 cGy   Total Dose 3,094 cGy             SUBJECTIVE:  Well appearing      VITAL SIGNS:  KPS: 100, Fully active, able to carry on all pre-disease performed without restriction (ECOG equivalent 0)  Encounter Vitals  Temperature: 36.8 °C (98.3 °F)  Blood Pressure : 137/55  Pulse: (!) 6  Pulse Oximetry: 99 %  Weight: 64.5 kg (142 lb 4.9 oz)  Pain Score: No pain  Pain Assessment 2020   Pain Assessment Denies Pain Denies Pain Denies Pain   Pain Score 0 0 0   Some recent data might be hidden          PHYSICAL EXAM:    Physical Exam  Constitutional:       Appearance: Normal appearance.   Abdominal:      General: There is no distension.      Palpations: Abdomen is soft.   Skin:     Findings: No erythema.   Neurological:      Mental  Status: He is alert.          Toxicity Assessment 7/8/2020 7/1/2020 6/24/2020 6/17/2020   Toxicity Assessment Male Pelvis Male Pelvis Male Pelvis Male Pelvis   Fatigue (lethargy, malaise, asthenia) Increased fatigue over baseline, but not altering normal activities Increased fatigue over baseline, but not altering normal activities Increased fatigue over baseline, but not altering normal activities Increased fatigue over baseline, but not altering normal activities   Radiation Dermatitis None None None None   Anorexia Oral intake significantly decreased Loss of appetite Loss of appetite None   Colitis None None None None   Constipation None None Requiring stool softener or dietary modification Requiring stool softener or dietary modification   Dehydration None None None None   Diarrhea w/o Colostomy Increase of <4 stools/day over pre-treatment Increase of <4 stools/day over pre-treatment None None   Flatulence None None None None   Nausea Able to eat None None None   Proctitis Increased stool frequency, occasional blood-streaked stools or rectal discomfort (including hemorrhoids) not requiring medication Increased stool frequency, occasional blood-streaked stools or rectal discomfort (including hemorrhoids) not requiring medication None None   Vomiting None None None None   RT - Pain due to RT None None None None   Tumor Pain (onset or exacerbation of tumor pain due to treatment) None None None None   Dysuria (painful urination) Mild symptoms requiring no intervention Mild symptoms requiring no intervention None None   Urinary Frequency Increase in frequency up to 2x normal Increase in frequency up to 2x normal Normal Normal   Urinary Urgency Present None None None   Bladder Spasms Absent Absent Absent Absent   Incontinence None None None None   Urinary Retention Normal Normal Normal Normal       CURRENT MEDICATIONS:    Current Outpatient Medications:   •  Multiple Vitamins-Minerals (CENTRUM SILVER 50+MEN PO), Take  1 tablet by mouth., Disp: , Rfl:   •  metFORMIN (GLUCOPHAGE) 500 MG Tab, Take 1-2 Tabs by mouth 2 times a day, with meals. 1000 mg in am and 500 mg in pm, Disp: 90 Tab, Rfl: 0  •  FERROUS SULFATE PO, Take  by mouth., Disp: , Rfl:   •  cetirizine (ZYRTEC) 10 MG Tab, Take 10 mg by mouth as needed., Disp: , Rfl:   •  clopidogrel (PLAVIX) 75 MG Tab, Take 1 Tab by mouth every day., Disp: 90 Tab, Rfl: 3  •  atorvastatin (LIPITOR) 40 MG Tab, Take 1 Tab by mouth every day., Disp: 30 Tab, Rfl: 11  •  aspirin EC 81 MG EC tablet, Take 1 Tab by mouth every day. (Patient not taking: Reported on 6/17/2020), Disp: 30 Tab, Rfl: 11  •  gabapentin (NEURONTIN) 100 MG Cap, TAKE 1 CAPSULE BY MOUTH TWICE DAILY MAY INCREASE AS NEEDED EVERY 5 DAYS UP TO MAXIMUM OF 18 CAPSULES PER DAY, Disp: , Rfl: 5  •  docusate sodium (COLACE) 100 MG Cap, Take 100 mg by mouth 2 times a day as needed for Constipation., Disp: , Rfl:   •  donepezil (ARICEPT) 10 MG tablet, Take 10 mg by mouth every evening., Disp: , Rfl: 3  •  losartan (COZAAR) 100 MG Tab, Take 100 mg by mouth every day., Disp: , Rfl:     LABORATORY DATA:   Lab Results   Component Value Date/Time    SODIUM 140 07/02/2020 09:06 AM    POTASSIUM 4.0 07/02/2020 09:06 AM    CHLORIDE 101 07/02/2020 09:06 AM    CO2 26 07/02/2020 09:06 AM    GLUCOSE 177 (H) 07/02/2020 09:06 AM    BUN 21 07/02/2020 09:06 AM    CREATININE 0.93 07/02/2020 09:06 AM         Lab Results   Component Value Date/Time    WBC 6.7 05/11/2020 11:25 AM    HEMOGLOBIN 11.2 (L) 05/11/2020 11:25 AM    HEMATOCRIT 33.9 (L) 05/11/2020 11:25 AM    MCV 88.3 05/11/2020 11:25 AM    PLATELETCT 260 05/11/2020 11:25 AM        RADIOLOGY DATA:  No results found.    IMPRESSION:  Malignant neoplasm of anterior wall of urinary bladder (HCC)  Staging form: Urinary Bladder, AJCC 8th Edition  - Clinical stage from 5/18/2020: Stage II (cT2, cN0, cM0) - Signed by Gilson Dempsey M.D. on 5/18/2020      PLAN:  No change in treatment  plan    Disposition:  Treatment plan reviewed. Questions answered. Continue therapy outlined. Recommend Azo for radiation cystitis. Low residual diet for loose stools.     Gilson Dempsey M.D.    No orders of the defined types were placed in this encounter.

## 2020-07-09 ENCOUNTER — HOSPITAL ENCOUNTER (OUTPATIENT)
Dept: RADIATION ONCOLOGY | Facility: MEDICAL CENTER | Age: 85
End: 2020-07-09
Payer: MEDICARE

## 2020-07-09 LAB
CHEMOTHERAPY INFUSION START DATE: NORMAL
CHEMOTHERAPY RECORDS: 2
CHEMOTHERAPY RECORDS: 5000
CHEMOTHERAPY RECORDS: NORMAL
CHEMOTHERAPY RX CANCER: NORMAL
DATE 1ST CHEMO CANCER: NORMAL
RAD ONC ARIA COURSE LAST TREATMENT DATE: NORMAL
RAD ONC ARIA COURSE TREATMENT ELAPSED DAYS: NORMAL
RAD ONC ARIA REFERENCE POINT DOSAGE GIVEN TO DATE: 32
RAD ONC ARIA REFERENCE POINT DOSAGE GIVEN TO DATE: 33.01
RAD ONC ARIA REFERENCE POINT ID: NORMAL
RAD ONC ARIA REFERENCE POINT ID: NORMAL
RAD ONC ARIA REFERENCE POINT SESSION DOSAGE GIVEN: 2
RAD ONC ARIA REFERENCE POINT SESSION DOSAGE GIVEN: 2.06

## 2020-07-09 PROCEDURE — 77386 HCHG IMRT DELIVERY COMPLEX: CPT | Performed by: RADIOLOGY

## 2020-07-09 PROCEDURE — 77014 PR CT GUIDANCE PLACEMENT RAD THERAPY FIELDS: CPT | Mod: 26 | Performed by: RADIOLOGY

## 2020-07-10 ENCOUNTER — HOSPITAL ENCOUNTER (OUTPATIENT)
Dept: LAB | Facility: MEDICAL CENTER | Age: 85
End: 2020-07-10
Attending: NURSE PRACTITIONER
Payer: MEDICARE

## 2020-07-10 ENCOUNTER — HOSPITAL ENCOUNTER (OUTPATIENT)
Dept: RADIATION ONCOLOGY | Facility: MEDICAL CENTER | Age: 85
End: 2020-07-10
Payer: MEDICARE

## 2020-07-10 LAB
ALBUMIN SERPL BCP-MCNC: 3.9 G/DL (ref 3.2–4.9)
ALBUMIN/GLOB SERPL: 2 G/DL
ALP SERPL-CCNC: 54 U/L (ref 30–99)
ALT SERPL-CCNC: 41 U/L (ref 2–50)
ANION GAP SERPL CALC-SCNC: 14 MMOL/L (ref 7–16)
AST SERPL-CCNC: 44 U/L (ref 12–45)
BILIRUB SERPL-MCNC: 1.4 MG/DL (ref 0.1–1.5)
BUN SERPL-MCNC: 14 MG/DL (ref 8–22)
CALCIUM SERPL-MCNC: 9.3 MG/DL (ref 8.5–10.5)
CHEMOTHERAPY INFUSION START DATE: NORMAL
CHEMOTHERAPY RECORDS: 2
CHEMOTHERAPY RECORDS: 5000
CHEMOTHERAPY RECORDS: NORMAL
CHEMOTHERAPY RX CANCER: NORMAL
CHLORIDE SERPL-SCNC: 100 MMOL/L (ref 96–112)
CO2 SERPL-SCNC: 24 MMOL/L (ref 20–33)
CREAT SERPL-MCNC: 0.91 MG/DL (ref 0.5–1.4)
DATE 1ST CHEMO CANCER: NORMAL
GLOBULIN SER CALC-MCNC: 2 G/DL (ref 1.9–3.5)
GLUCOSE SERPL-MCNC: 151 MG/DL (ref 65–99)
POTASSIUM SERPL-SCNC: 4.5 MMOL/L (ref 3.6–5.5)
PROT SERPL-MCNC: 5.9 G/DL (ref 6–8.2)
RAD ONC ARIA COURSE LAST TREATMENT DATE: NORMAL
RAD ONC ARIA COURSE TREATMENT ELAPSED DAYS: NORMAL
RAD ONC ARIA REFERENCE POINT DOSAGE GIVEN TO DATE: 34
RAD ONC ARIA REFERENCE POINT DOSAGE GIVEN TO DATE: 35.07
RAD ONC ARIA REFERENCE POINT ID: NORMAL
RAD ONC ARIA REFERENCE POINT ID: NORMAL
RAD ONC ARIA REFERENCE POINT SESSION DOSAGE GIVEN: 2
RAD ONC ARIA REFERENCE POINT SESSION DOSAGE GIVEN: 2.06
SODIUM SERPL-SCNC: 138 MMOL/L (ref 135–145)

## 2020-07-10 PROCEDURE — 77014 PR CT GUIDANCE PLACEMENT RAD THERAPY FIELDS: CPT | Mod: 26 | Performed by: RADIOLOGY

## 2020-07-10 PROCEDURE — 77386 HCHG IMRT DELIVERY COMPLEX: CPT | Performed by: RADIOLOGY

## 2020-07-10 PROCEDURE — 80053 COMPREHEN METABOLIC PANEL: CPT

## 2020-07-13 ENCOUNTER — HOSPITAL ENCOUNTER (OUTPATIENT)
Dept: LAB | Facility: MEDICAL CENTER | Age: 85
End: 2020-07-13
Attending: INTERNAL MEDICINE
Payer: MEDICARE

## 2020-07-13 ENCOUNTER — HOSPITAL ENCOUNTER (OUTPATIENT)
Dept: RADIATION ONCOLOGY | Facility: MEDICAL CENTER | Age: 85
End: 2020-07-13
Payer: MEDICARE

## 2020-07-13 PROCEDURE — 77386 HCHG IMRT DELIVERY COMPLEX: CPT | Performed by: RADIOLOGY

## 2020-07-13 PROCEDURE — 80053 COMPREHEN METABOLIC PANEL: CPT

## 2020-07-13 PROCEDURE — 77014 PR CT GUIDANCE PLACEMENT RAD THERAPY FIELDS: CPT | Mod: 26 | Performed by: RADIOLOGY

## 2020-07-13 PROCEDURE — 77336 RADIATION PHYSICS CONSULT: CPT | Performed by: RADIOLOGY

## 2020-07-14 ENCOUNTER — HOSPITAL ENCOUNTER (OUTPATIENT)
Dept: RADIATION ONCOLOGY | Facility: MEDICAL CENTER | Age: 85
End: 2020-07-14
Payer: MEDICARE

## 2020-07-14 LAB
ALBUMIN SERPL BCP-MCNC: 3.9 G/DL (ref 3.2–4.9)
ALBUMIN/GLOB SERPL: 2.1 G/DL
ALP SERPL-CCNC: 50 U/L (ref 30–99)
ALT SERPL-CCNC: 38 U/L (ref 2–50)
ANION GAP SERPL CALC-SCNC: 13 MMOL/L (ref 7–16)
AST SERPL-CCNC: 39 U/L (ref 12–45)
BILIRUB SERPL-MCNC: 1.6 MG/DL (ref 0.1–1.5)
BUN SERPL-MCNC: 18 MG/DL (ref 8–22)
CALCIUM SERPL-MCNC: 9.1 MG/DL (ref 8.5–10.5)
CHEMOTHERAPY INFUSION START DATE: NORMAL
CHEMOTHERAPY INFUSION START DATE: NORMAL
CHEMOTHERAPY RECORDS: 2
CHEMOTHERAPY RECORDS: 2
CHEMOTHERAPY RECORDS: 5000
CHEMOTHERAPY RECORDS: 5000
CHEMOTHERAPY RECORDS: NORMAL
CHEMOTHERAPY RX CANCER: NORMAL
CHEMOTHERAPY RX CANCER: NORMAL
CHLORIDE SERPL-SCNC: 100 MMOL/L (ref 96–112)
CO2 SERPL-SCNC: 25 MMOL/L (ref 20–33)
CREAT SERPL-MCNC: 1.07 MG/DL (ref 0.5–1.4)
DATE 1ST CHEMO CANCER: NORMAL
DATE 1ST CHEMO CANCER: NORMAL
GLOBULIN SER CALC-MCNC: 1.9 G/DL (ref 1.9–3.5)
GLUCOSE SERPL-MCNC: 164 MG/DL (ref 65–99)
POTASSIUM SERPL-SCNC: 4.2 MMOL/L (ref 3.6–5.5)
PROT SERPL-MCNC: 5.8 G/DL (ref 6–8.2)
RAD ONC ARIA COURSE LAST TREATMENT DATE: NORMAL
RAD ONC ARIA COURSE LAST TREATMENT DATE: NORMAL
RAD ONC ARIA COURSE TREATMENT ELAPSED DAYS: NORMAL
RAD ONC ARIA COURSE TREATMENT ELAPSED DAYS: NORMAL
RAD ONC ARIA REFERENCE POINT DOSAGE GIVEN TO DATE: 36
RAD ONC ARIA REFERENCE POINT DOSAGE GIVEN TO DATE: 37.13
RAD ONC ARIA REFERENCE POINT DOSAGE GIVEN TO DATE: 38
RAD ONC ARIA REFERENCE POINT DOSAGE GIVEN TO DATE: 39.2
RAD ONC ARIA REFERENCE POINT ID: NORMAL
RAD ONC ARIA REFERENCE POINT SESSION DOSAGE GIVEN: 2
RAD ONC ARIA REFERENCE POINT SESSION DOSAGE GIVEN: 2
RAD ONC ARIA REFERENCE POINT SESSION DOSAGE GIVEN: 2.06
RAD ONC ARIA REFERENCE POINT SESSION DOSAGE GIVEN: 2.06
SODIUM SERPL-SCNC: 138 MMOL/L (ref 135–145)

## 2020-07-14 PROCEDURE — 77386 HCHG IMRT DELIVERY COMPLEX: CPT | Performed by: RADIOLOGY

## 2020-07-14 PROCEDURE — 77014 PR CT GUIDANCE PLACEMENT RAD THERAPY FIELDS: CPT | Mod: 26 | Performed by: RADIOLOGY

## 2020-07-15 ENCOUNTER — APPOINTMENT (OUTPATIENT)
Dept: RADIATION ONCOLOGY | Facility: MEDICAL CENTER | Age: 85
End: 2020-07-15
Payer: MEDICARE

## 2020-07-15 ENCOUNTER — HOSPITAL ENCOUNTER (OUTPATIENT)
Dept: RADIOLOGY | Facility: MEDICAL CENTER | Age: 85
End: 2020-07-15
Attending: RADIOLOGY
Payer: MEDICARE

## 2020-07-15 VITALS
TEMPERATURE: 98.2 F | SYSTOLIC BLOOD PRESSURE: 106 MMHG | BODY MASS INDEX: 22.61 KG/M2 | HEART RATE: 74 BPM | WEIGHT: 140.1 LBS | OXYGEN SATURATION: 95 % | DIASTOLIC BLOOD PRESSURE: 78 MMHG

## 2020-07-15 DIAGNOSIS — I83.223: ICD-10-CM

## 2020-07-15 DIAGNOSIS — R60.9 SWELLING: ICD-10-CM

## 2020-07-15 LAB
CHEMOTHERAPY INFUSION START DATE: NORMAL
CHEMOTHERAPY RECORDS: 2
CHEMOTHERAPY RECORDS: 5000
CHEMOTHERAPY RECORDS: NORMAL
CHEMOTHERAPY RX CANCER: NORMAL
DATE 1ST CHEMO CANCER: NORMAL
RAD ONC ARIA COURSE LAST TREATMENT DATE: NORMAL
RAD ONC ARIA COURSE TREATMENT ELAPSED DAYS: NORMAL
RAD ONC ARIA REFERENCE POINT DOSAGE GIVEN TO DATE: 40
RAD ONC ARIA REFERENCE POINT DOSAGE GIVEN TO DATE: 41.26
RAD ONC ARIA REFERENCE POINT ID: NORMAL
RAD ONC ARIA REFERENCE POINT ID: NORMAL
RAD ONC ARIA REFERENCE POINT SESSION DOSAGE GIVEN: 2
RAD ONC ARIA REFERENCE POINT SESSION DOSAGE GIVEN: 2.06

## 2020-07-15 PROCEDURE — 77386 HCHG IMRT DELIVERY COMPLEX: CPT | Performed by: RADIOLOGY

## 2020-07-15 PROCEDURE — 77427 RADIATION TX MANAGEMENT X5: CPT | Performed by: RADIOLOGY

## 2020-07-15 PROCEDURE — 77014 PR CT GUIDANCE PLACEMENT RAD THERAPY FIELDS: CPT | Mod: 26 | Performed by: RADIOLOGY

## 2020-07-15 PROCEDURE — 93971 EXTREMITY STUDY: CPT | Mod: LT

## 2020-07-15 ASSESSMENT — FIBROSIS 4 INDEX: FIB4 SCORE: 2.09

## 2020-07-15 ASSESSMENT — PAIN SCALES - GENERAL: PAINLEVEL: NO PAIN

## 2020-07-15 NOTE — ON TREATMENT VISIT
ON TREATMENT  NOTE  RADIATION ONCOLOGY DEPARTMENT    Patient name:  Filiberto Jauregui    Primary Physician:  Mac Miller M.D. MRN: [id number]  [facility]: [id number]   Referring physician:  Nazario Hamilton M.D. : 1933, 86 y.o.     ENCOUNTER DATE:  07/15/20    DIAGNOSIS:  Visit Diagnoses     ICD-10-CM   1. Swelling  R60.9   2. 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 on 2020      TREATMENT SUMMARY:  Aria Treatment Information        Some values may be hidden. Unless noted otherwise, only the newest values recorded on each date are displayed.         Aria Treatment Summary 7/15/20   Course First Treatment Date 2020   Course Last Treatment Date 07/15/2020   Bladder Plan from Course C1_bladder   Fraction 20 of 25   Elapsed Course Days  @ [id number]   Prescribed Fraction Dose 200 cGy   Prescribed Total Dose 5,000 cGy   Bladder Reference Point from Course C1_bladder   Elapsed Course Days  @ [id number]   Session Dose 200 cGy   Total Dose 4,000 cGy   Bladder CP Reference Point from Course C1_bladder   Elapsed Course Days  @    Session Dose 206 cGy   Total Dose 4,126 cGy             SUBJECTIVE:  Well appearing      VITAL SIGNS:  KPS: 100, Fully active, able to carry on all pre-disease performed without restriction (ECOG equivalent 0)  Encounter Vitals  Temperature: 36.8 °C (98.2 °F)  Blood Pressure : 106/78  Pulse: 74  Pulse Oximetry: 95 %  Weight: 63.6 kg (140 lb 1.6 oz)  Pain Score: No pain  Pain Assessment 7/15/2020 2020 2020 2020   Pain Assessment - Denies Pain Denies Pain Denies Pain   Pain Score 0 0 0 0   Some recent data might be hidden          PHYSICAL EXAM:    Physical Exam  Constitutional:        Appearance: Normal appearance.   Abdominal:      General: There is no distension.      Palpations: Abdomen is soft.   Skin:     Findings: No erythema.   Neurological:      Mental Status: He is alert.          Toxicity Assessment 7/15/2020 7/8/2020 7/1/2020 6/24/2020 6/17/2020   Toxicity Assessment Male Pelvis Male Pelvis Male Pelvis Male Pelvis Male Pelvis   Fatigue (lethargy, malaise, asthenia) Increased fatigue over baseline, but not altering normal activities Increased fatigue over baseline, but not altering normal activities Increased fatigue over baseline, but not altering normal activities Increased fatigue over baseline, but not altering normal activities Increased fatigue over baseline, but not altering normal activities   Radiation Dermatitis Faint erythema or dry desquamation None None None None   Anorexia Oral intake significantly decreased Oral intake significantly decreased Loss of appetite Loss of appetite None   Colitis Abdominal pain with mucus and/or blood in stool None None None None   Constipation Requiring stool softener or dietary modification None None Requiring stool softener or dietary modification Requiring stool softener or dietary modification   Dehydration None None None None None   Diarrhea w/o Colostomy None Increase of <4 stools/day over pre-treatment Increase of <4 stools/day over pre-treatment None None   Flatulence Mild None None None None   Nausea None Able to eat None None None   Proctitis None Increased stool frequency, occasional blood-streaked stools or rectal discomfort (including hemorrhoids) not requiring medication Increased stool frequency, occasional blood-streaked stools or rectal discomfort (including hemorrhoids) not requiring medication None None   Vomiting None None None None None   RT - Pain due to RT Mild pain not interfering with function None None None None   Tumor Pain (onset or exacerbation of tumor pain due to treatment) Mild pain not interfering with function  None None None None   Dysuria (painful urination) Mild symptoms requiring no intervention Mild symptoms requiring no intervention Mild symptoms requiring no intervention None None   Urinary Frequency Increase in frequency up to 2x normal Increase in frequency up to 2x normal Increase in frequency up to 2x normal Normal Normal   Urinary Urgency Present Present None None None   Bladder Spasms Absent Absent Absent Absent Absent   Incontinence None None None None None   Urinary Retention Hesitency or dribbling, but no significant residual urine and/or retention occuring during the immediate postoperative period Normal Normal Normal Normal       CURRENT MEDICATIONS:    Current Outpatient Medications:   •  Multiple Vitamins-Minerals (CENTRUM SILVER 50+MEN PO), Take 1 tablet by mouth., Disp: , Rfl:   •  metFORMIN (GLUCOPHAGE) 500 MG Tab, Take 1-2 Tabs by mouth 2 times a day, with meals. 1000 mg in am and 500 mg in pm, Disp: 90 Tab, Rfl: 0  •  FERROUS SULFATE PO, Take  by mouth., Disp: , Rfl:   •  cetirizine (ZYRTEC) 10 MG Tab, Take 10 mg by mouth as needed., Disp: , Rfl:   •  clopidogrel (PLAVIX) 75 MG Tab, Take 1 Tab by mouth every day., Disp: 90 Tab, Rfl: 3  •  atorvastatin (LIPITOR) 40 MG Tab, Take 1 Tab by mouth every day., Disp: 30 Tab, Rfl: 11  •  aspirin EC 81 MG EC tablet, Take 1 Tab by mouth every day. (Patient not taking: Reported on 6/17/2020), Disp: 30 Tab, Rfl: 11  •  gabapentin (NEURONTIN) 100 MG Cap, TAKE 1 CAPSULE BY MOUTH TWICE DAILY MAY INCREASE AS NEEDED EVERY 5 DAYS UP TO MAXIMUM OF 18 CAPSULES PER DAY, Disp: , Rfl: 5  •  docusate sodium (COLACE) 100 MG Cap, Take 100 mg by mouth 2 times a day as needed for Constipation., Disp: , Rfl:   •  donepezil (ARICEPT) 10 MG tablet, Take 10 mg by mouth every evening., Disp: , Rfl: 3  •  losartan (COZAAR) 100 MG Tab, Take 100 mg by mouth every day., Disp: , Rfl:     LABORATORY DATA:   Lab Results   Component Value Date/Time    SODIUM 138 07/13/2020 10:10 AM     POTASSIUM 4.2 07/13/2020 10:10 AM    CHLORIDE 100 07/13/2020 10:10 AM    CO2 25 07/13/2020 10:10 AM    GLUCOSE 164 (H) 07/13/2020 10:10 AM    BUN 18 07/13/2020 10:10 AM    CREATININE 1.07 07/13/2020 10:10 AM         Lab Results   Component Value Date/Time    WBC 6.7 05/11/2020 11:25 AM    HEMOGLOBIN 11.2 (L) 05/11/2020 11:25 AM    HEMATOCRIT 33.9 (L) 05/11/2020 11:25 AM    MCV 88.3 05/11/2020 11:25 AM    PLATELETCT 260 05/11/2020 11:25 AM        RADIOLOGY DATA:  No results found.    IMPRESSION:  Malignant neoplasm of anterior wall of urinary bladder (HCC)  Staging form: Urinary Bladder, AJCC 8th Edition  - Clinical stage from 5/18/2020: Stage II (cT2, cN0, cM0) - Signed by Gilson Dempsey M.D. on 5/18/2020      PLAN:  No change in treatment plan    Disposition:  Treatment plan reviewed. Questions answered. Continue therapy outlined. Try azo for dysuria. Swelling in left leg sent for US.     Gilson Dempsey M.D.    Orders Placed This Encounter   • Rad Onc Aria Session Summary   • US-EXTREMITY VENOUS LOWER UNILAT LEFT

## 2020-07-16 ENCOUNTER — HOSPITAL ENCOUNTER (OUTPATIENT)
Dept: RADIATION ONCOLOGY | Facility: MEDICAL CENTER | Age: 85
End: 2020-07-16
Payer: MEDICARE

## 2020-07-16 ENCOUNTER — PATIENT OUTREACH (OUTPATIENT)
Dept: OTHER | Facility: MEDICAL CENTER | Age: 85
End: 2020-07-16

## 2020-07-16 LAB
CHEMOTHERAPY INFUSION START DATE: NORMAL
CHEMOTHERAPY RECORDS: 2
CHEMOTHERAPY RECORDS: 5000
CHEMOTHERAPY RECORDS: NORMAL
CHEMOTHERAPY RX CANCER: NORMAL
DATE 1ST CHEMO CANCER: NORMAL
RAD ONC ARIA COURSE LAST TREATMENT DATE: NORMAL
RAD ONC ARIA COURSE TREATMENT ELAPSED DAYS: NORMAL
RAD ONC ARIA REFERENCE POINT DOSAGE GIVEN TO DATE: 42
RAD ONC ARIA REFERENCE POINT DOSAGE GIVEN TO DATE: 43.32
RAD ONC ARIA REFERENCE POINT ID: NORMAL
RAD ONC ARIA REFERENCE POINT ID: NORMAL
RAD ONC ARIA REFERENCE POINT SESSION DOSAGE GIVEN: 2
RAD ONC ARIA REFERENCE POINT SESSION DOSAGE GIVEN: 2.06

## 2020-07-16 PROCEDURE — 77386 HCHG IMRT DELIVERY COMPLEX: CPT | Performed by: RADIOLOGY

## 2020-07-16 PROCEDURE — 77014 PR CT GUIDANCE PLACEMENT RAD THERAPY FIELDS: CPT | Mod: 26 | Performed by: RADIOLOGY

## 2020-07-16 NOTE — PROGRESS NOTES
Wife asking for advance directive packets for both her and patient.  Provided packet and information.  After short discussion brought up POLST information and difference between two.  She would like that information as well.  POLST forms given per request.  Discussed can have patient's navigator follow up with any additional questions they may have.  Wife grateful with no other immediate questions or concerns.  Updated Fabienne, patients navigator.

## 2020-07-17 ENCOUNTER — HOSPITAL ENCOUNTER (OUTPATIENT)
Dept: RADIATION ONCOLOGY | Facility: MEDICAL CENTER | Age: 85
End: 2020-07-17
Payer: MEDICARE

## 2020-07-17 LAB
CHEMOTHERAPY INFUSION START DATE: NORMAL
CHEMOTHERAPY RECORDS: 2
CHEMOTHERAPY RECORDS: 5000
CHEMOTHERAPY RECORDS: NORMAL
CHEMOTHERAPY RX CANCER: NORMAL
DATE 1ST CHEMO CANCER: NORMAL
RAD ONC ARIA COURSE LAST TREATMENT DATE: NORMAL
RAD ONC ARIA COURSE TREATMENT ELAPSED DAYS: NORMAL
RAD ONC ARIA REFERENCE POINT DOSAGE GIVEN TO DATE: 44
RAD ONC ARIA REFERENCE POINT DOSAGE GIVEN TO DATE: 45.38
RAD ONC ARIA REFERENCE POINT ID: NORMAL
RAD ONC ARIA REFERENCE POINT ID: NORMAL
RAD ONC ARIA REFERENCE POINT SESSION DOSAGE GIVEN: 2
RAD ONC ARIA REFERENCE POINT SESSION DOSAGE GIVEN: 2.06

## 2020-07-17 PROCEDURE — 77014 PR CT GUIDANCE PLACEMENT RAD THERAPY FIELDS: CPT | Mod: 26 | Performed by: RADIOLOGY

## 2020-07-17 PROCEDURE — 77386 HCHG IMRT DELIVERY COMPLEX: CPT | Performed by: RADIOLOGY

## 2020-07-20 ENCOUNTER — HOSPITAL ENCOUNTER (OUTPATIENT)
Dept: RADIATION ONCOLOGY | Facility: MEDICAL CENTER | Age: 85
End: 2020-07-20
Payer: MEDICARE

## 2020-07-20 ENCOUNTER — HOSPITAL ENCOUNTER (OUTPATIENT)
Dept: LAB | Facility: MEDICAL CENTER | Age: 85
End: 2020-07-20
Attending: NURSE PRACTITIONER
Payer: MEDICARE

## 2020-07-20 LAB
CHEMOTHERAPY INFUSION START DATE: NORMAL
CHEMOTHERAPY RECORDS: 2
CHEMOTHERAPY RECORDS: 5000
CHEMOTHERAPY RECORDS: NORMAL
CHEMOTHERAPY RX CANCER: NORMAL
DATE 1ST CHEMO CANCER: NORMAL
RAD ONC ARIA COURSE LAST TREATMENT DATE: NORMAL
RAD ONC ARIA COURSE TREATMENT ELAPSED DAYS: NORMAL
RAD ONC ARIA REFERENCE POINT DOSAGE GIVEN TO DATE: 46
RAD ONC ARIA REFERENCE POINT DOSAGE GIVEN TO DATE: 47.45
RAD ONC ARIA REFERENCE POINT ID: NORMAL
RAD ONC ARIA REFERENCE POINT ID: NORMAL
RAD ONC ARIA REFERENCE POINT SESSION DOSAGE GIVEN: 2
RAD ONC ARIA REFERENCE POINT SESSION DOSAGE GIVEN: 2.06

## 2020-07-20 PROCEDURE — 77386 HCHG IMRT DELIVERY COMPLEX: CPT | Performed by: RADIOLOGY

## 2020-07-20 PROCEDURE — 80053 COMPREHEN METABOLIC PANEL: CPT

## 2020-07-20 PROCEDURE — 77338 DESIGN MLC DEVICE FOR IMRT: CPT | Mod: XU | Performed by: RADIOLOGY

## 2020-07-20 PROCEDURE — 77336 RADIATION PHYSICS CONSULT: CPT | Performed by: RADIOLOGY

## 2020-07-20 PROCEDURE — 77014 PR CT GUIDANCE PLACEMENT RAD THERAPY FIELDS: CPT | Mod: 26 | Performed by: RADIOLOGY

## 2020-07-20 PROCEDURE — 77338 DESIGN MLC DEVICE FOR IMRT: CPT | Mod: 26 | Performed by: RADIOLOGY

## 2020-07-20 PROCEDURE — 77300 RADIATION THERAPY DOSE PLAN: CPT | Performed by: RADIOLOGY

## 2020-07-20 PROCEDURE — 77300 RADIATION THERAPY DOSE PLAN: CPT | Mod: 26 | Performed by: RADIOLOGY

## 2020-07-21 ENCOUNTER — HOSPITAL ENCOUNTER (OUTPATIENT)
Dept: RADIATION ONCOLOGY | Facility: MEDICAL CENTER | Age: 85
End: 2020-07-21
Payer: MEDICARE

## 2020-07-21 LAB
ALBUMIN SERPL BCP-MCNC: 3.8 G/DL (ref 3.2–4.9)
ALBUMIN/GLOB SERPL: 1.9 G/DL
ALP SERPL-CCNC: 52 U/L (ref 30–99)
ALT SERPL-CCNC: 48 U/L (ref 2–50)
ANION GAP SERPL CALC-SCNC: 14 MMOL/L (ref 7–16)
AST SERPL-CCNC: 53 U/L (ref 12–45)
BILIRUB SERPL-MCNC: 1.3 MG/DL (ref 0.1–1.5)
BUN SERPL-MCNC: 16 MG/DL (ref 8–22)
CALCIUM SERPL-MCNC: 8.8 MG/DL (ref 8.5–10.5)
CHEMOTHERAPY INFUSION START DATE: NORMAL
CHEMOTHERAPY RECORDS: 2
CHEMOTHERAPY RECORDS: 5000
CHEMOTHERAPY RECORDS: NORMAL
CHEMOTHERAPY RX CANCER: NORMAL
CHLORIDE SERPL-SCNC: 100 MMOL/L (ref 96–112)
CO2 SERPL-SCNC: 23 MMOL/L (ref 20–33)
CREAT SERPL-MCNC: 1.03 MG/DL (ref 0.5–1.4)
DATE 1ST CHEMO CANCER: NORMAL
GLOBULIN SER CALC-MCNC: 2 G/DL (ref 1.9–3.5)
GLUCOSE SERPL-MCNC: 183 MG/DL (ref 65–99)
POTASSIUM SERPL-SCNC: 4.5 MMOL/L (ref 3.6–5.5)
PROT SERPL-MCNC: 5.8 G/DL (ref 6–8.2)
RAD ONC ARIA COURSE LAST TREATMENT DATE: NORMAL
RAD ONC ARIA COURSE TREATMENT ELAPSED DAYS: NORMAL
RAD ONC ARIA REFERENCE POINT DOSAGE GIVEN TO DATE: 48
RAD ONC ARIA REFERENCE POINT DOSAGE GIVEN TO DATE: 49.51
RAD ONC ARIA REFERENCE POINT ID: NORMAL
RAD ONC ARIA REFERENCE POINT ID: NORMAL
RAD ONC ARIA REFERENCE POINT SESSION DOSAGE GIVEN: 2
RAD ONC ARIA REFERENCE POINT SESSION DOSAGE GIVEN: 2.06
SODIUM SERPL-SCNC: 137 MMOL/L (ref 135–145)

## 2020-07-21 PROCEDURE — 77014 PR CT GUIDANCE PLACEMENT RAD THERAPY FIELDS: CPT | Mod: 26 | Performed by: RADIOLOGY

## 2020-07-21 PROCEDURE — 77386 HCHG IMRT DELIVERY COMPLEX: CPT | Performed by: RADIOLOGY

## 2020-07-22 ENCOUNTER — APPOINTMENT (OUTPATIENT)
Dept: RADIATION ONCOLOGY | Facility: MEDICAL CENTER | Age: 85
End: 2020-07-22
Payer: MEDICARE

## 2020-07-22 ENCOUNTER — PATIENT OUTREACH (OUTPATIENT)
Dept: OTHER | Facility: MEDICAL CENTER | Age: 85
End: 2020-07-22

## 2020-07-22 VITALS
HEART RATE: 78 BPM | SYSTOLIC BLOOD PRESSURE: 128 MMHG | TEMPERATURE: 99.2 F | BODY MASS INDEX: 22.52 KG/M2 | OXYGEN SATURATION: 96 % | WEIGHT: 139.55 LBS | DIASTOLIC BLOOD PRESSURE: 55 MMHG

## 2020-07-22 LAB
CHEMOTHERAPY INFUSION START DATE: NORMAL
CHEMOTHERAPY RECORDS: 2
CHEMOTHERAPY RECORDS: 5000
CHEMOTHERAPY RECORDS: NORMAL
CHEMOTHERAPY RX CANCER: NORMAL
DATE 1ST CHEMO CANCER: NORMAL
RAD ONC ARIA COURSE LAST TREATMENT DATE: NORMAL
RAD ONC ARIA COURSE TREATMENT ELAPSED DAYS: NORMAL
RAD ONC ARIA REFERENCE POINT DOSAGE GIVEN TO DATE: 50
RAD ONC ARIA REFERENCE POINT DOSAGE GIVEN TO DATE: 51.57
RAD ONC ARIA REFERENCE POINT ID: NORMAL
RAD ONC ARIA REFERENCE POINT ID: NORMAL
RAD ONC ARIA REFERENCE POINT SESSION DOSAGE GIVEN: 2
RAD ONC ARIA REFERENCE POINT SESSION DOSAGE GIVEN: 2.06

## 2020-07-22 PROCEDURE — 77014 PR CT GUIDANCE PLACEMENT RAD THERAPY FIELDS: CPT | Mod: 26 | Performed by: RADIOLOGY

## 2020-07-22 PROCEDURE — 77427 RADIATION TX MANAGEMENT X5: CPT | Performed by: RADIOLOGY

## 2020-07-22 PROCEDURE — 77386 HCHG IMRT DELIVERY COMPLEX: CPT | Performed by: RADIOLOGY

## 2020-07-22 ASSESSMENT — FIBROSIS 4 INDEX: FIB4 SCORE: 2.53

## 2020-07-22 ASSESSMENT — PAIN SCALES - GENERAL: PAINLEVEL: 5=MODERATE PAIN

## 2020-07-22 NOTE — PROGRESS NOTES
"Met with pts wife while pt receiving radiation treatment today.  Enquired about barriers, needs and questions.  She continued to deny needs.  Had some questions regarding the advanced directive packets they were given last week.  Clarified that they are able to state what it is they want at end of life.  She did state that Filiberto has been losing weight during treatment. Stated he is not hungry and \"nothing tastes good\".  She is making him things he likes, along with protein shakes and other high calorie food.  Will refer to oncology dietician.  Met pt briefly prior to seeing physician.  Encouraged to call with questions or needs.   "

## 2020-07-22 NOTE — ON TREATMENT VISIT
ON TREATMENT  NOTE  RADIATION ONCOLOGY DEPARTMENT    Patient name:  Filiberto Jauregui    Primary Physician:  Mac Miller M.D. MRN: [id number]  [facility]: [id number]   Referring physician:  Nazario Hamilton M.D. : 1933, 86 y.o.     ENCOUNTER DATE:  20    DIAGNOSIS:  Malignant neoplasm of anterior wall of urinary bladder (HCC)  Staging form: Urinary Bladder, AJCC 8th Edition  - Clinical stage from 2020: Stage II (cT2, cN0, cM0) - Signed by Gilson Dempsey M.D. on 2020      TREATMENT SUMMARY:  Aria Treatment Information        Some values may be hidden. Unless noted otherwise, only the newest values recorded on each date are displayed.         Aria Treatment Summary 20   Course First Treatment Date 2020   Course Last Treatment Date 2020   Bladder Plan from Course C1_bladder   Fraction 25 of 25   Elapsed Course Days  @ [id number]   Prescribed Fraction Dose 200 cGy   Prescribed Total Dose 5,000 cGy   Bladder Reference Point from Course C1_bladder   Elapsed Course Days  @ [id number]   Session Dose 200 cGy   Total Dose 5,000 cGy   Bladder CP Reference Point from Course C1_bladder   Elapsed Course Days  @    Session Dose 206 cGy   Total Dose 5,157 cGy             SUBJECTIVE:  Well appearing, mild dysuria      VITAL SIGNS:  KPS: 90, Able to carry on normal activity; minor signs or symptoms of disease (ECOG equivalent 0)  Encounter Vitals  Temperature: 37.3 °C (99.2 °F)  Blood Pressure : 128/55  Pulse: 78  Pulse Oximetry: 96 %  Weight: 63.3 kg (139 lb 8.8 oz)  Pain Score: 5=Moderate Pain  Pain Assessment 2020 7/15/2020 2020   Pain Assessment Chronic Pain - Denies Pain   Pain Score 5 0 0   Pain Loc Buttocks - -   Some recent data might be hidden     Karnofsky Score: 80    PHYSICAL EXAM:    Physical Exam  Constitutional:       Appearance: Normal appearance.   Abdominal:      General: There is no distension.      Palpations: Abdomen is soft.   Skin:      Findings: No erythema.   Neurological:      Mental Status: He is alert.          Toxicity Assessment 7/22/2020 7/15/2020 7/8/2020 7/1/2020 6/24/2020 6/17/2020   Toxicity Assessment Male Pelvis Male Pelvis Male Pelvis Male Pelvis Male Pelvis Male Pelvis   Fatigue (lethargy, malaise, asthenia) Increased fatigue over baseline, but not altering normal activities Increased fatigue over baseline, but not altering normal activities Increased fatigue over baseline, but not altering normal activities Increased fatigue over baseline, but not altering normal activities Increased fatigue over baseline, but not altering normal activities Increased fatigue over baseline, but not altering normal activities   Radiation Dermatitis Faint erythema or dry desquamation Faint erythema or dry desquamation None None None None   Anorexia Oral intake significantly decreased Oral intake significantly decreased Oral intake significantly decreased Loss of appetite Loss of appetite None   Colitis None Abdominal pain with mucus and/or blood in stool None None None None   Constipation None Requiring stool softener or dietary modification None None Requiring stool softener or dietary modification Requiring stool softener or dietary modification   Dehydration None None None None None None   Diarrhea w/o Colostomy Increase of <4 stools/day over pre-treatment None Increase of <4 stools/day over pre-treatment Increase of <4 stools/day over pre-treatment None None   Flatulence None Mild None None None None   Nausea None None Able to eat None None None   Proctitis Increased stool frequency, occasional blood-streaked stools or rectal discomfort (including hemorrhoids) not requiring medication None Increased stool frequency, occasional blood-streaked stools or rectal discomfort (including hemorrhoids) not requiring medication Increased stool frequency, occasional blood-streaked stools or rectal discomfort (including hemorrhoids) not requiring medication  None None   Vomiting None None None None None None   RT - Pain due to RT None Mild pain not interfering with function None None None None   Tumor Pain (onset or exacerbation of tumor pain due to treatment) - Mild pain not interfering with function None None None None   Dysuria (painful urination) Mild symptoms requiring no intervention Mild symptoms requiring no intervention Mild symptoms requiring no intervention Mild symptoms requiring no intervention None None   Urinary Frequency Increase >2x normal but <hourly Increase in frequency up to 2x normal Increase in frequency up to 2x normal Increase in frequency up to 2x normal Normal Normal   Urinary Urgency Present Present Present None None None   Bladder Spasms Absent Absent Absent Absent Absent Absent   Incontinence None None None None None None   Urinary Retention Normal Hesitency or dribbling, but no significant residual urine and/or retention occuring during the immediate postoperative period Normal Normal Normal Normal       CURRENT MEDICATIONS:    Current Outpatient Medications:   •  Multiple Vitamins-Minerals (CENTRUM SILVER 50+MEN PO), Take 1 tablet by mouth., Disp: , Rfl:   •  metFORMIN (GLUCOPHAGE) 500 MG Tab, Take 1-2 Tabs by mouth 2 times a day, with meals. 1000 mg in am and 500 mg in pm, Disp: 90 Tab, Rfl: 0  •  FERROUS SULFATE PO, Take  by mouth., Disp: , Rfl:   •  cetirizine (ZYRTEC) 10 MG Tab, Take 10 mg by mouth as needed., Disp: , Rfl:   •  clopidogrel (PLAVIX) 75 MG Tab, Take 1 Tab by mouth every day., Disp: 90 Tab, Rfl: 3  •  atorvastatin (LIPITOR) 40 MG Tab, Take 1 Tab by mouth every day., Disp: 30 Tab, Rfl: 11  •  aspirin EC 81 MG EC tablet, Take 1 Tab by mouth every day. (Patient not taking: Reported on 6/17/2020), Disp: 30 Tab, Rfl: 11  •  gabapentin (NEURONTIN) 100 MG Cap, TAKE 1 CAPSULE BY MOUTH TWICE DAILY MAY INCREASE AS NEEDED EVERY 5 DAYS UP TO MAXIMUM OF 18 CAPSULES PER DAY, Disp: , Rfl: 5  •  docusate sodium (COLACE) 100 MG Cap, Take  100 mg by mouth 2 times a day as needed for Constipation., Disp: , Rfl:   •  donepezil (ARICEPT) 10 MG tablet, Take 10 mg by mouth every evening., Disp: , Rfl: 3  •  losartan (COZAAR) 100 MG Tab, Take 100 mg by mouth every day., Disp: , Rfl:     LABORATORY DATA:   Lab Results   Component Value Date/Time    SODIUM 137 07/20/2020 11:06 AM    POTASSIUM 4.5 07/20/2020 11:06 AM    CHLORIDE 100 07/20/2020 11:06 AM    CO2 23 07/20/2020 11:06 AM    GLUCOSE 183 (H) 07/20/2020 11:06 AM    BUN 16 07/20/2020 11:06 AM    CREATININE 1.03 07/20/2020 11:06 AM         Lab Results   Component Value Date/Time    WBC 6.7 05/11/2020 11:25 AM    HEMOGLOBIN 11.2 (L) 05/11/2020 11:25 AM    HEMATOCRIT 33.9 (L) 05/11/2020 11:25 AM    MCV 88.3 05/11/2020 11:25 AM    PLATELETCT 260 05/11/2020 11:25 AM        RADIOLOGY DATA:  Us-extremity Venous Lower Unilat Left    Result Date: 7/15/2020  CLINICAL INFORMATION: Left lower extremity edema PROCEDURE: Ultrasound examination of left lower extremity deep venous system was performed using grayscale, color and spectral Doppler in the ultrasound section. COMPARISON: None. FINDINGS: LEFT: The left common femoral, saphenofemoral junction, femoral, and popliteal veins demonstrate a normal response to augmentation and compression maneuvers. There is also a normal response to respiratory variation. The bifurcation of the common femoral vein into the superficial and deep femoral veins is visualized.  Flow is identified in these vessels with no evidence of intraluminal thrombus on either color Doppler or grayscale images. The visualized portions of the left proximal calf veins are also normal.     No evidence of acute deep venous thrombosis in the visualized venous segments of the left lower extremity.       IMPRESSION:  Malignant neoplasm of anterior wall of urinary bladder (HCC)  Staging form: Urinary Bladder, AJCC 8th Edition  - Clinical stage from 5/18/2020: Stage II (cT2, cN0, cM0) - Signed by Gilson BHAGAT  TEOFILO Dempsey on 5/18/2020      PLAN:  No change in treatment plan    Disposition:  Treatment plan reviewed. Questions answered. Continue therapy outlined. Cont pyridium 200mg TID.    Gilson Dempsey M.D.    Orders Placed This Encounter   • Rad Onc Aria Session Summary

## 2020-07-23 ENCOUNTER — HOSPITAL ENCOUNTER (OUTPATIENT)
Dept: RADIATION ONCOLOGY | Facility: MEDICAL CENTER | Age: 85
End: 2020-07-23
Payer: MEDICARE

## 2020-07-23 LAB
CHEMOTHERAPY INFUSION START DATE: NORMAL
CHEMOTHERAPY RECORDS: 1400
CHEMOTHERAPY RECORDS: 2
CHEMOTHERAPY RECORDS: NORMAL
CHEMOTHERAPY RX CANCER: NORMAL
DATE 1ST CHEMO CANCER: NORMAL
RAD ONC ARIA COURSE LAST TREATMENT DATE: NORMAL
RAD ONC ARIA COURSE TREATMENT ELAPSED DAYS: NORMAL
RAD ONC ARIA REFERENCE POINT DOSAGE GIVEN TO DATE: 2
RAD ONC ARIA REFERENCE POINT DOSAGE GIVEN TO DATE: 2.08
RAD ONC ARIA REFERENCE POINT ID: NORMAL
RAD ONC ARIA REFERENCE POINT ID: NORMAL
RAD ONC ARIA REFERENCE POINT SESSION DOSAGE GIVEN: 2
RAD ONC ARIA REFERENCE POINT SESSION DOSAGE GIVEN: 2.08

## 2020-07-23 PROCEDURE — 77280 THER RAD SIMULAJ FIELD SMPL: CPT | Performed by: RADIOLOGY

## 2020-07-23 PROCEDURE — 77014 PR CT GUIDANCE PLACEMENT RAD THERAPY FIELDS: CPT | Mod: 26 | Performed by: RADIOLOGY

## 2020-07-23 PROCEDURE — 77386 HCHG IMRT DELIVERY COMPLEX: CPT | Performed by: RADIOLOGY

## 2020-07-24 ENCOUNTER — DOCUMENTATION (OUTPATIENT)
Dept: NUTRITION | Facility: MEDICAL CENTER | Age: 85
End: 2020-07-24

## 2020-07-24 ENCOUNTER — HOSPITAL ENCOUNTER (OUTPATIENT)
Dept: RADIATION ONCOLOGY | Facility: MEDICAL CENTER | Age: 85
End: 2020-07-24
Payer: MEDICARE

## 2020-07-24 LAB
CHEMOTHERAPY INFUSION START DATE: NORMAL
CHEMOTHERAPY RECORDS: 1400
CHEMOTHERAPY RECORDS: 2
CHEMOTHERAPY RECORDS: NORMAL
CHEMOTHERAPY RX CANCER: NORMAL
DATE 1ST CHEMO CANCER: NORMAL
RAD ONC ARIA COURSE LAST TREATMENT DATE: NORMAL
RAD ONC ARIA COURSE TREATMENT ELAPSED DAYS: NORMAL
RAD ONC ARIA REFERENCE POINT DOSAGE GIVEN TO DATE: 4
RAD ONC ARIA REFERENCE POINT DOSAGE GIVEN TO DATE: 4.15
RAD ONC ARIA REFERENCE POINT ID: NORMAL
RAD ONC ARIA REFERENCE POINT ID: NORMAL
RAD ONC ARIA REFERENCE POINT SESSION DOSAGE GIVEN: 2
RAD ONC ARIA REFERENCE POINT SESSION DOSAGE GIVEN: 2.08

## 2020-07-24 PROCEDURE — 77014 PR CT GUIDANCE PLACEMENT RAD THERAPY FIELDS: CPT | Mod: 26 | Performed by: RADIOLOGY

## 2020-07-24 PROCEDURE — 77386 HCHG IMRT DELIVERY COMPLEX: CPT | Performed by: RADIOLOGY

## 2020-07-24 NOTE — PROGRESS NOTES
"Nutrition Services: RD Consultation/ New Referral from nurse navigator  86 year old male with diagnosis of bladder cancer.       Past Medical History:   Diagnosis Date   • Bowel habit changes 03/10/2020    Constipation   • Cancer (HCC)     prostate cancer s/p prostatectomy (30 years ago)   • Cancer (HCC)     Bladder CA DX 2019   • Cataract 03/10/2020    OU   • Diabetes (HCC)     oral medication \"borderline\"    • Diabetes (HCC) 03/10/2020    Takes Oral Medication   • Hemorrhagic disorder (HCC) 03/10/2020    Takes Plavix   • High cholesterol    • Hypertension 10/23/2019   • Mitral regurgitation    • MVP (mitral valve prolapse)    • Pneumonia     hx x1    • Sciatica 03/10/2020   • Seasonal allergies    • Snoring 03/10/2020    Has not had a sleep study.   • Valvular heart disease      I met with patient and his wife this afternoon during XRT appointment.  Wife reports that patients appetite has been decreased and that he has been losing weight since January.  She notes he previously used to weigh ~ 155 pounds.  Wife reports that patient has no nausea but occasionally has diarrhea.  He takes imodium PRN which helps.  He tends to like sweeter flavors but has been having decreased taste sensation which has impacted his appetite.  Wife has been serving him a mocha protein shake mixed with ice cream daily.    We discussed nutrition related goals during treatment as well and RD role. We discussed the potential side effects of treatment and their impacts on nutrition. We discussed the benefit of small, frequent meals and snacks focusing on high calorie/protein items as well as the benefits of maintaining weight and lean muscle mass throughout treatment. Provided and went over handouts/food lists regarding a general healthy diet, healthy high calorie/protein snack ideas, foods high in protein and the benefits of a plant based diet limiting red and processed meats.  Pt did not express any further nutrition-related questions or " concerns at this time.     Assessment:  • Pertinent Labs: Glucose: 183  • Pertinent Meds: Metformin, plavix, daily multivitamin, iron, colace  • Weight: 63.3 kg/139 pounds  • Height: 5'6''  • BMI: 22.5  • Weight down ~6 pounds/4% in the last 9 months per chart review.    Weight History from Chart:  10/24/2019: 145 pounds  1/10/2020: 148 pounds  5/18/2020: 140 pounds    Plan/Recommendations:  • Provided and discussed handout regarding general nutrition guidelines as well as nutritional recommendations for patient's with cancer, including tips/tricks should side effects of tx occur.  • Discussed in detail taste change tips and provided MiracleBerries sample as well as TruCitrus. Also provided coupon for MetaQuil.   • Increase meal and snack frequency to 4-6 x/day.  Advised focus on incorporating snacks between meals and eating more frequently.   • Focus on high calorie and protein foods, fruits and vegetables with all meals/snacks - handout provided.  • Incorporate protein rich foods and shakes daily. Provided recipes for high calorie/protein smoothie ideas.     Pt and wife report understanding and were receptive to information provided. RD provided contact information. Encouraged pt to reach out as questions/concerns arise.   RD will monitor weight and provided additional resources and recommendations as necessary.     [phone]

## 2020-07-27 ENCOUNTER — HOSPITAL ENCOUNTER (OUTPATIENT)
Dept: RADIATION ONCOLOGY | Facility: MEDICAL CENTER | Age: 85
End: 2020-07-27
Payer: MEDICARE

## 2020-07-27 ENCOUNTER — HOSPITAL ENCOUNTER (OUTPATIENT)
Dept: LAB | Facility: MEDICAL CENTER | Age: 85
End: 2020-07-27
Attending: INTERNAL MEDICINE
Payer: MEDICARE

## 2020-07-27 LAB
CHEMOTHERAPY INFUSION START DATE: NORMAL
CHEMOTHERAPY RECORDS: 1400
CHEMOTHERAPY RECORDS: 2
CHEMOTHERAPY RECORDS: NORMAL
CHEMOTHERAPY RX CANCER: NORMAL
DATE 1ST CHEMO CANCER: NORMAL
RAD ONC ARIA COURSE LAST TREATMENT DATE: NORMAL
RAD ONC ARIA COURSE TREATMENT ELAPSED DAYS: NORMAL
RAD ONC ARIA REFERENCE POINT DOSAGE GIVEN TO DATE: 6
RAD ONC ARIA REFERENCE POINT DOSAGE GIVEN TO DATE: 6.23
RAD ONC ARIA REFERENCE POINT ID: NORMAL
RAD ONC ARIA REFERENCE POINT ID: NORMAL
RAD ONC ARIA REFERENCE POINT SESSION DOSAGE GIVEN: 2
RAD ONC ARIA REFERENCE POINT SESSION DOSAGE GIVEN: 2.08

## 2020-07-27 PROCEDURE — 77386 HCHG IMRT DELIVERY COMPLEX: CPT | Performed by: RADIOLOGY

## 2020-07-27 PROCEDURE — 77336 RADIATION PHYSICS CONSULT: CPT | Performed by: RADIOLOGY

## 2020-07-27 PROCEDURE — 80053 COMPREHEN METABOLIC PANEL: CPT

## 2020-07-27 PROCEDURE — 77014 PR CT GUIDANCE PLACEMENT RAD THERAPY FIELDS: CPT | Mod: 26 | Performed by: RADIOLOGY

## 2020-07-28 ENCOUNTER — HOSPITAL ENCOUNTER (OUTPATIENT)
Dept: RADIATION ONCOLOGY | Facility: MEDICAL CENTER | Age: 85
End: 2020-07-28
Payer: MEDICARE

## 2020-07-28 LAB
ALBUMIN SERPL BCP-MCNC: 4 G/DL (ref 3.2–4.9)
ALBUMIN/GLOB SERPL: 2 G/DL
ALP SERPL-CCNC: 51 U/L (ref 30–99)
ALT SERPL-CCNC: 52 U/L (ref 2–50)
ANION GAP SERPL CALC-SCNC: 9 MMOL/L (ref 7–16)
AST SERPL-CCNC: 60 U/L (ref 12–45)
BILIRUB SERPL-MCNC: 1.3 MG/DL (ref 0.1–1.5)
BUN SERPL-MCNC: 27 MG/DL (ref 8–22)
CALCIUM SERPL-MCNC: 8.8 MG/DL (ref 8.5–10.5)
CHEMOTHERAPY INFUSION START DATE: NORMAL
CHEMOTHERAPY RECORDS: 1400
CHEMOTHERAPY RECORDS: 2
CHEMOTHERAPY RECORDS: NORMAL
CHEMOTHERAPY RX CANCER: NORMAL
CHLORIDE SERPL-SCNC: 101 MMOL/L (ref 96–112)
CO2 SERPL-SCNC: 24 MMOL/L (ref 20–33)
CREAT SERPL-MCNC: 1.75 MG/DL (ref 0.5–1.4)
DATE 1ST CHEMO CANCER: NORMAL
GLOBULIN SER CALC-MCNC: 2 G/DL (ref 1.9–3.5)
GLUCOSE SERPL-MCNC: 134 MG/DL (ref 65–99)
POTASSIUM SERPL-SCNC: 4.9 MMOL/L (ref 3.6–5.5)
PROT SERPL-MCNC: 6 G/DL (ref 6–8.2)
RAD ONC ARIA COURSE LAST TREATMENT DATE: NORMAL
RAD ONC ARIA COURSE TREATMENT ELAPSED DAYS: NORMAL
RAD ONC ARIA REFERENCE POINT DOSAGE GIVEN TO DATE: 8
RAD ONC ARIA REFERENCE POINT DOSAGE GIVEN TO DATE: 8.31
RAD ONC ARIA REFERENCE POINT ID: NORMAL
RAD ONC ARIA REFERENCE POINT ID: NORMAL
RAD ONC ARIA REFERENCE POINT SESSION DOSAGE GIVEN: 2
RAD ONC ARIA REFERENCE POINT SESSION DOSAGE GIVEN: 2.08
SODIUM SERPL-SCNC: 134 MMOL/L (ref 135–145)

## 2020-07-28 PROCEDURE — 77014 PR CT GUIDANCE PLACEMENT RAD THERAPY FIELDS: CPT | Mod: 26 | Performed by: RADIOLOGY

## 2020-07-28 PROCEDURE — 77386 HCHG IMRT DELIVERY COMPLEX: CPT | Performed by: RADIOLOGY

## 2020-07-29 ENCOUNTER — HOSPITAL ENCOUNTER (OUTPATIENT)
Dept: LAB | Facility: MEDICAL CENTER | Age: 85
End: 2020-07-29
Attending: RADIOLOGY
Payer: MEDICARE

## 2020-07-29 ENCOUNTER — HOSPITAL ENCOUNTER (OUTPATIENT)
Dept: RADIATION ONCOLOGY | Facility: MEDICAL CENTER | Age: 85
End: 2020-07-29
Payer: MEDICARE

## 2020-07-29 VITALS
OXYGEN SATURATION: 94 % | TEMPERATURE: 98.4 F | WEIGHT: 138.01 LBS | BODY MASS INDEX: 22.28 KG/M2 | SYSTOLIC BLOOD PRESSURE: 135 MMHG | DIASTOLIC BLOOD PRESSURE: 61 MMHG | HEART RATE: 74 BPM

## 2020-07-29 DIAGNOSIS — R30.0 DYSURIA: ICD-10-CM

## 2020-07-29 LAB
CHEMOTHERAPY INFUSION START DATE: NORMAL
CHEMOTHERAPY RECORDS: 1400
CHEMOTHERAPY RECORDS: 2
CHEMOTHERAPY RECORDS: NORMAL
CHEMOTHERAPY RX CANCER: NORMAL
DATE 1ST CHEMO CANCER: NORMAL
RAD ONC ARIA COURSE LAST TREATMENT DATE: NORMAL
RAD ONC ARIA COURSE TREATMENT ELAPSED DAYS: NORMAL
RAD ONC ARIA REFERENCE POINT DOSAGE GIVEN TO DATE: 10
RAD ONC ARIA REFERENCE POINT DOSAGE GIVEN TO DATE: 10.39
RAD ONC ARIA REFERENCE POINT ID: NORMAL
RAD ONC ARIA REFERENCE POINT ID: NORMAL
RAD ONC ARIA REFERENCE POINT SESSION DOSAGE GIVEN: 2
RAD ONC ARIA REFERENCE POINT SESSION DOSAGE GIVEN: 2.08

## 2020-07-29 PROCEDURE — 87086 URINE CULTURE/COLONY COUNT: CPT

## 2020-07-29 PROCEDURE — 81001 URINALYSIS AUTO W/SCOPE: CPT

## 2020-07-29 PROCEDURE — 77014 PR CT GUIDANCE PLACEMENT RAD THERAPY FIELDS: CPT | Mod: 26 | Performed by: RADIOLOGY

## 2020-07-29 PROCEDURE — 77427 RADIATION TX MANAGEMENT X5: CPT | Performed by: RADIOLOGY

## 2020-07-29 PROCEDURE — 77386 HCHG IMRT DELIVERY COMPLEX: CPT | Performed by: RADIOLOGY

## 2020-07-29 ASSESSMENT — FIBROSIS 4 INDEX: FIB4 SCORE: 2.78

## 2020-07-29 ASSESSMENT — PAIN SCALES - GENERAL: PAINLEVEL: NO PAIN

## 2020-07-29 NOTE — ON TREATMENT VISIT
ON TREATMENT  NOTE  RADIATION ONCOLOGY DEPARTMENT    Patient name:  Filiberto Jauregui    Primary Physician:  Mac Miller M.D. MRN: [id number]  [facility]: [id number]   Referring physician:  Nazario Hamilton M.D. : 1933, 87 y.o.     ENCOUNTER DATE:  20    DIAGNOSIS:  Visit Diagnoses     ICD-10-CM   1. Dysuria  R30.0     Malignant neoplasm of anterior wall of urinary bladder (HCC)  Staging form: Urinary Bladder, AJCC 8th Edition  - Clinical stage from 2020: Stage II (cT2, cN0, cM0) - Signed by Gilson Dempsey M.D. on 2020      TREATMENT SUMMARY:  Aria Treatment Information        Some values may be hidden. Unless noted otherwise, only the newest values recorded on each date are displayed.         Aria Treatment Summary 20   Course First Treatment Date 2020   Course Last Treatment Date 2020   Bladder Plan from Course C1_bladder   Bladder_Bst Plan from Course C1_bladder   Fraction 5 of 7   Elapsed Course Days 42 @    Prescribed Fraction Dose 200 cGy   Prescribed Total Dose 1,400 cGy   Bladder Reference Point from Course C1_bladder   Bladder CP Reference Point from Course C1_bladder   Bladder_Bst Reference Point from Course C1_bladder   Elapsed Course Days  @    Session Dose 200 cGy   Total Dose 1,000 cGy   Bladder_Bst CP Reference Point from Course C1_bladder   Elapsed Course Days  @    Session Dose 208 cGy   Total Dose 1,039 cGy             SUBJECTIVE:  Well appearing, moderate dysuria        VITAL SIGNS:  KPS: 90, Able to carry on normal activity; minor signs or symptoms of disease (ECOG equivalent 0)  Encounter Vitals  Temperature: 36.9 °C (98.4 °F)  Blood Pressure : 135/61  Pulse: 74  Pulse Oximetry: 94 %  Weight: 62.6 kg (138 lb 0.1 oz)  Pain Score: No pain  Pain Assessment 2020 2020 7/15/2020   Pain Assessment Denies Pain Chronic Pain -   Pain Score 0 5 0   Pain Loc - Buttocks -   Some recent data might be hidden     Karnofsky  Score: 80    PHYSICAL EXAM:    Physical Exam  Vitals signs reviewed.   Constitutional:       Appearance: Normal appearance.   Neurological:      General: No focal deficit present.      Mental Status: He is alert.          Toxicity Assessment 7/29/2020 7/22/2020 7/15/2020 7/8/2020 7/1/2020 6/24/2020 6/17/2020   Toxicity Assessment Male Pelvis Male Pelvis Male Pelvis Male Pelvis Male Pelvis Male Pelvis Male Pelvis   Fatigue (lethargy, malaise, asthenia) Increased fatigue over baseline, but not altering normal activities Increased fatigue over baseline, but not altering normal activities Increased fatigue over baseline, but not altering normal activities Increased fatigue over baseline, but not altering normal activities Increased fatigue over baseline, but not altering normal activities Increased fatigue over baseline, but not altering normal activities Increased fatigue over baseline, but not altering normal activities   Radiation Dermatitis None Faint erythema or dry desquamation Faint erythema or dry desquamation None None None None   Anorexia Oral intake significantly decreased Oral intake significantly decreased Oral intake significantly decreased Oral intake significantly decreased Loss of appetite Loss of appetite None   Colitis None None Abdominal pain with mucus and/or blood in stool None None None None   Constipation None None Requiring stool softener or dietary modification None None Requiring stool softener or dietary modification Requiring stool softener or dietary modification   Dehydration None None None None None None None   Diarrhea w/o Colostomy None Increase of <4 stools/day over pre-treatment None Increase of <4 stools/day over pre-treatment Increase of <4 stools/day over pre-treatment None None   Flatulence None None Mild None None None None   Nausea None None None Able to eat None None None   Proctitis Increased stool frequency, occasional blood-streaked stools or rectal discomfort (including  hemorrhoids) not requiring medication Increased stool frequency, occasional blood-streaked stools or rectal discomfort (including hemorrhoids) not requiring medication None Increased stool frequency, occasional blood-streaked stools or rectal discomfort (including hemorrhoids) not requiring medication Increased stool frequency, occasional blood-streaked stools or rectal discomfort (including hemorrhoids) not requiring medication None None   Vomiting None None None None None None None   RT - Pain due to RT None None Mild pain not interfering with function None None None None   Tumor Pain (onset or exacerbation of tumor pain due to treatment) None - Mild pain not interfering with function None None None None   Dysuria (painful urination) Mild symptoms requiring no intervention Mild symptoms requiring no intervention Mild symptoms requiring no intervention Mild symptoms requiring no intervention Mild symptoms requiring no intervention None None   Urinary Frequency Increase in frequency up to 2x normal Increase >2x normal but <hourly Increase in frequency up to 2x normal Increase in frequency up to 2x normal Increase in frequency up to 2x normal Normal Normal   Urinary Urgency Present Present Present Present None None None   Bladder Spasms Absent Absent Absent Absent Absent Absent Absent   Incontinence None None None None None None None   Urinary Retention Hesitency or dribbling, but no significant residual urine and/or retention occuring during the immediate postoperative period Normal Hesitency or dribbling, but no significant residual urine and/or retention occuring during the immediate postoperative period Normal Normal Normal Normal       CURRENT MEDICATIONS:    Current Outpatient Medications:   •  Multiple Vitamins-Minerals (CENTRUM SILVER 50+MEN PO), Take 1 tablet by mouth., Disp: , Rfl:   •  metFORMIN (GLUCOPHAGE) 500 MG Tab, Take 1-2 Tabs by mouth 2 times a day, with meals. 1000 mg in am and 500 mg in pm, Disp:  90 Tab, Rfl: 0  •  FERROUS SULFATE PO, Take  by mouth., Disp: , Rfl:   •  cetirizine (ZYRTEC) 10 MG Tab, Take 10 mg by mouth as needed., Disp: , Rfl:   •  clopidogrel (PLAVIX) 75 MG Tab, Take 1 Tab by mouth every day., Disp: 90 Tab, Rfl: 3  •  atorvastatin (LIPITOR) 40 MG Tab, Take 1 Tab by mouth every day., Disp: 30 Tab, Rfl: 11  •  aspirin EC 81 MG EC tablet, Take 1 Tab by mouth every day. (Patient not taking: Reported on 6/17/2020), Disp: 30 Tab, Rfl: 11  •  gabapentin (NEURONTIN) 100 MG Cap, TAKE 1 CAPSULE BY MOUTH TWICE DAILY MAY INCREASE AS NEEDED EVERY 5 DAYS UP TO MAXIMUM OF 18 CAPSULES PER DAY, Disp: , Rfl: 5  •  docusate sodium (COLACE) 100 MG Cap, Take 100 mg by mouth 2 times a day as needed for Constipation., Disp: , Rfl:   •  donepezil (ARICEPT) 10 MG tablet, Take 10 mg by mouth every evening., Disp: , Rfl: 3  •  losartan (COZAAR) 100 MG Tab, Take 100 mg by mouth every day., Disp: , Rfl:     LABORATORY DATA:   Lab Results   Component Value Date/Time    SODIUM 134 (L) 07/27/2020 11:40 AM    POTASSIUM 4.9 07/27/2020 11:40 AM    CHLORIDE 101 07/27/2020 11:40 AM    CO2 24 07/27/2020 11:40 AM    GLUCOSE 134 (H) 07/27/2020 11:40 AM    BUN 27 (H) 07/27/2020 11:40 AM    CREATININE 1.75 (H) 07/27/2020 11:40 AM         Lab Results   Component Value Date/Time    WBC 6.7 05/11/2020 11:25 AM    HEMOGLOBIN 11.2 (L) 05/11/2020 11:25 AM    HEMATOCRIT 33.9 (L) 05/11/2020 11:25 AM    MCV 88.3 05/11/2020 11:25 AM    PLATELETCT 260 05/11/2020 11:25 AM        RADIOLOGY DATA:  Us-extremity Venous Lower Unilat Left    Result Date: 7/15/2020  CLINICAL INFORMATION: Left lower extremity edema PROCEDURE: Ultrasound examination of left lower extremity deep venous system was performed using grayscale, color and spectral Doppler in the ultrasound section. COMPARISON: None. FINDINGS: LEFT: The left common femoral, saphenofemoral junction, femoral, and popliteal veins demonstrate a normal response to augmentation and compression  maneuvers. There is also a normal response to respiratory variation. The bifurcation of the common femoral vein into the superficial and deep femoral veins is visualized.  Flow is identified in these vessels with no evidence of intraluminal thrombus on either color Doppler or grayscale images. The visualized portions of the left proximal calf veins are also normal.     No evidence of acute deep venous thrombosis in the visualized venous segments of the left lower extremity.       IMPRESSION:  Malignant neoplasm of anterior wall of urinary bladder (HCC)  Staging form: Urinary Bladder, AJCC 8th Edition  - Clinical stage from 5/18/2020: Stage II (cT2, cN0, cM0) - Signed by Gilson Dempsey M.D. on 5/18/2020      PLAN:  No change in treatment plan    Disposition:  Treatment plan reviewed. Questions answered. Continue therapy outlined. Sent for UA to r/o UTI. Taking pyridium TID. Follow up in 6-8 wks.    Gilson Dempsey M.D.    Orders Placed This Encounter   • URINALYSIS,CULTURE IF INDICATED

## 2020-07-30 ENCOUNTER — HOSPITAL ENCOUNTER (OUTPATIENT)
Dept: RADIATION ONCOLOGY | Facility: MEDICAL CENTER | Age: 85
End: 2020-07-30
Payer: MEDICARE

## 2020-07-30 LAB
APPEARANCE UR: CLEAR
BACTERIA #/AREA URNS HPF: NEGATIVE /HPF
BILIRUB UR QL STRIP.AUTO: NEGATIVE
CHEMOTHERAPY INFUSION START DATE: NORMAL
CHEMOTHERAPY RECORDS: 1400
CHEMOTHERAPY RECORDS: 2
CHEMOTHERAPY RECORDS: NORMAL
CHEMOTHERAPY RX CANCER: NORMAL
COLOR UR: YELLOW
DATE 1ST CHEMO CANCER: NORMAL
EPI CELLS #/AREA URNS HPF: NEGATIVE /HPF
GLUCOSE UR STRIP.AUTO-MCNC: NEGATIVE MG/DL
HYALINE CASTS #/AREA URNS LPF: ABNORMAL /LPF
KETONES UR STRIP.AUTO-MCNC: NEGATIVE MG/DL
LEUKOCYTE ESTERASE UR QL STRIP.AUTO: NEGATIVE
MICRO URNS: ABNORMAL
NITRITE UR QL STRIP.AUTO: POSITIVE
PH UR STRIP.AUTO: 6 [PH] (ref 5–8)
PROT UR QL STRIP: >=300 MG/DL
RAD ONC ARIA COURSE LAST TREATMENT DATE: NORMAL
RAD ONC ARIA COURSE TREATMENT ELAPSED DAYS: NORMAL
RAD ONC ARIA REFERENCE POINT DOSAGE GIVEN TO DATE: 12
RAD ONC ARIA REFERENCE POINT DOSAGE GIVEN TO DATE: 12.46
RAD ONC ARIA REFERENCE POINT ID: NORMAL
RAD ONC ARIA REFERENCE POINT ID: NORMAL
RAD ONC ARIA REFERENCE POINT SESSION DOSAGE GIVEN: 2
RAD ONC ARIA REFERENCE POINT SESSION DOSAGE GIVEN: 2.08
RBC # URNS HPF: >150 /HPF
RBC UR QL AUTO: ABNORMAL
SP GR UR STRIP.AUTO: 1.02
UROBILINOGEN UR STRIP.AUTO-MCNC: 2 MG/DL
WBC #/AREA URNS HPF: ABNORMAL /HPF

## 2020-07-30 PROCEDURE — 77014 PR CT GUIDANCE PLACEMENT RAD THERAPY FIELDS: CPT | Mod: 26 | Performed by: RADIOLOGY

## 2020-07-30 PROCEDURE — 77386 HCHG IMRT DELIVERY COMPLEX: CPT | Performed by: RADIOLOGY

## 2020-07-30 PROCEDURE — 87086 URINE CULTURE/COLONY COUNT: CPT

## 2020-07-31 ENCOUNTER — HOSPITAL ENCOUNTER (OUTPATIENT)
Dept: RADIATION ONCOLOGY | Facility: MEDICAL CENTER | Age: 85
End: 2020-07-31
Payer: MEDICARE

## 2020-07-31 ENCOUNTER — DOCUMENTATION (OUTPATIENT)
Dept: NUTRITION | Facility: MEDICAL CENTER | Age: 85
End: 2020-07-31

## 2020-07-31 LAB
CHEMOTHERAPY INFUSION START DATE: NORMAL
CHEMOTHERAPY INFUSION START DATE: NORMAL
CHEMOTHERAPY INFUSION STOP DATE: NORMAL
CHEMOTHERAPY RECORDS: 1400
CHEMOTHERAPY RECORDS: 1400
CHEMOTHERAPY RECORDS: 2
CHEMOTHERAPY RECORDS: 5000
CHEMOTHERAPY RECORDS: NORMAL
CHEMOTHERAPY RX CANCER: NORMAL
CHEMOTHERAPY RX CANCER: NORMAL
DATE 1ST CHEMO CANCER: NORMAL
DATE 1ST CHEMO CANCER: NORMAL
RAD ONC ARIA COURSE LAST TREATMENT DATE: NORMAL
RAD ONC ARIA COURSE LAST TREATMENT DATE: NORMAL
RAD ONC ARIA COURSE TREATMENT ELAPSED DAYS: NORMAL
RAD ONC ARIA COURSE TREATMENT ELAPSED DAYS: NORMAL
RAD ONC ARIA REFERENCE POINT DOSAGE GIVEN TO DATE: 14
RAD ONC ARIA REFERENCE POINT DOSAGE GIVEN TO DATE: 14
RAD ONC ARIA REFERENCE POINT DOSAGE GIVEN TO DATE: 14.54
RAD ONC ARIA REFERENCE POINT DOSAGE GIVEN TO DATE: 14.54
RAD ONC ARIA REFERENCE POINT DOSAGE GIVEN TO DATE: 50
RAD ONC ARIA REFERENCE POINT DOSAGE GIVEN TO DATE: 51.57
RAD ONC ARIA REFERENCE POINT ID: NORMAL
RAD ONC ARIA REFERENCE POINT SESSION DOSAGE GIVEN: 2
RAD ONC ARIA REFERENCE POINT SESSION DOSAGE GIVEN: 2.08

## 2020-07-31 PROCEDURE — 77014 PR CT GUIDANCE PLACEMENT RAD THERAPY FIELDS: CPT | Mod: 26 | Performed by: RADIOLOGY

## 2020-07-31 PROCEDURE — 77386 HCHG IMRT DELIVERY COMPLEX: CPT | Performed by: RADIOLOGY

## 2020-08-01 LAB
BACTERIA UR CULT: NORMAL
SIGNIFICANT IND 70042: NORMAL
SITE SITE: NORMAL
SOURCE SOURCE: NORMAL

## 2020-08-01 NOTE — PROGRESS NOTES
Nutrition Services: Brief Update  Weight: 138 pounds/62.6 kg  Weight Change: Weight fairly stable within the last week, but of note patient has lost ~ 4-5% of his body weight in the last 9 months.    I met with patient and wife today following XRT.  Today is his final scheduled XRT.  Patient has been experiencing more diarrhea and a decreased appetite.  Wife has been giving him a BRAT style diet + probiotics and yogurt.  She has also been trying to get him to drink water and eat regularly.  She reports he will receive no further chemotherapy.  Discussed that the BRAT diet can be helpful with diarrhea and that patient should continue selecting bland foods with soluble fiber.  Patient with low energy levels.  Discussed with wife and patient that likely the side effects will get a but worse before the get better and that this typically can last 2-3 weeks following treatment related to the cumulative effects of radiation. Provided wife with my card and encouraged her to call with questions or concerns as needed.       Plan/Recommend:  • Continue with bland BRAT style diet to aid in chronic diarrhea.   • Consider taking antidiarrheal medication if okay with MD. ESCUDERO to continue to monitor as indicated.  Please contact [phone]

## 2020-08-06 ENCOUNTER — HOSPITAL ENCOUNTER (OUTPATIENT)
Dept: LAB | Facility: MEDICAL CENTER | Age: 85
End: 2020-08-06
Attending: INTERNAL MEDICINE
Payer: MEDICARE

## 2020-08-06 LAB
ALBUMIN SERPL BCP-MCNC: 3.4 G/DL (ref 3.2–4.9)
ALBUMIN/GLOB SERPL: 2 G/DL
ALP SERPL-CCNC: 53 U/L (ref 30–99)
ALT SERPL-CCNC: 25 U/L (ref 2–50)
ANION GAP SERPL CALC-SCNC: 15 MMOL/L (ref 7–16)
AST SERPL-CCNC: 32 U/L (ref 12–45)
BILIRUB SERPL-MCNC: 1.1 MG/DL (ref 0.1–1.5)
BUN SERPL-MCNC: 23 MG/DL (ref 8–22)
CALCIUM SERPL-MCNC: 8.1 MG/DL (ref 8.5–10.5)
CHLORIDE SERPL-SCNC: 89 MMOL/L (ref 96–112)
CO2 SERPL-SCNC: 22 MMOL/L (ref 20–33)
CREAT SERPL-MCNC: 2.57 MG/DL (ref 0.5–1.4)
GLOBULIN SER CALC-MCNC: 1.7 G/DL (ref 1.9–3.5)
GLUCOSE SERPL-MCNC: 132 MG/DL (ref 65–99)
POTASSIUM SERPL-SCNC: 4.1 MMOL/L (ref 3.6–5.5)
PROT SERPL-MCNC: 5.1 G/DL (ref 6–8.2)
SODIUM SERPL-SCNC: 126 MMOL/L (ref 135–145)

## 2020-08-06 PROCEDURE — 80053 COMPREHEN METABOLIC PANEL: CPT

## 2020-08-10 ENCOUNTER — HOSPITAL ENCOUNTER (OUTPATIENT)
Dept: LAB | Facility: MEDICAL CENTER | Age: 85
End: 2020-08-10
Attending: INTERNAL MEDICINE
Payer: MEDICARE

## 2020-08-10 LAB
ALBUMIN SERPL BCP-MCNC: 4 G/DL (ref 3.2–4.9)
ALBUMIN/GLOB SERPL: 2 G/DL
ALP SERPL-CCNC: 58 U/L (ref 30–99)
ALT SERPL-CCNC: 27 U/L (ref 2–50)
ANION GAP SERPL CALC-SCNC: 14 MMOL/L (ref 7–16)
AST SERPL-CCNC: 39 U/L (ref 12–45)
BILIRUB SERPL-MCNC: 1.3 MG/DL (ref 0.1–1.5)
BUN SERPL-MCNC: 18 MG/DL (ref 8–22)
CALCIUM SERPL-MCNC: 8.9 MG/DL (ref 8.5–10.5)
CHLORIDE SERPL-SCNC: 88 MMOL/L (ref 96–112)
CO2 SERPL-SCNC: 22 MMOL/L (ref 20–33)
CREAT SERPL-MCNC: 2 MG/DL (ref 0.5–1.4)
GLOBULIN SER CALC-MCNC: 2 G/DL (ref 1.9–3.5)
GLUCOSE SERPL-MCNC: 106 MG/DL (ref 65–99)
POTASSIUM SERPL-SCNC: 4.6 MMOL/L (ref 3.6–5.5)
PROT SERPL-MCNC: 6 G/DL (ref 6–8.2)
SODIUM SERPL-SCNC: 124 MMOL/L (ref 135–145)

## 2020-08-10 PROCEDURE — 80053 COMPREHEN METABOLIC PANEL: CPT

## 2020-08-11 ENCOUNTER — HOSPITAL ENCOUNTER (OUTPATIENT)
Dept: RADIOLOGY | Facility: MEDICAL CENTER | Age: 85
End: 2020-08-11
Attending: INTERNAL MEDICINE
Payer: MEDICARE

## 2020-08-11 DIAGNOSIS — C67.3 MALIGNANT NEOPLASM OF ANTERIOR WALL OF URINARY BLADDER (HCC): ICD-10-CM

## 2020-08-11 PROCEDURE — 76775 US EXAM ABDO BACK WALL LIM: CPT

## 2020-08-13 ENCOUNTER — HOSPITAL ENCOUNTER (OUTPATIENT)
Dept: LAB | Facility: MEDICAL CENTER | Age: 85
End: 2020-08-13
Attending: INTERNAL MEDICINE
Payer: MEDICARE

## 2020-08-13 LAB
APPEARANCE UR: ABNORMAL
BACTERIA #/AREA URNS HPF: NEGATIVE /HPF
BILIRUB UR QL STRIP.AUTO: NEGATIVE
COLOR UR: ABNORMAL
EPI CELLS #/AREA URNS HPF: NEGATIVE /HPF
GLUCOSE UR STRIP.AUTO-MCNC: NEGATIVE MG/DL
HYALINE CASTS #/AREA URNS LPF: ABNORMAL /LPF
KETONES UR STRIP.AUTO-MCNC: NEGATIVE MG/DL
LEUKOCYTE ESTERASE UR QL STRIP.AUTO: ABNORMAL
MICRO URNS: ABNORMAL
NITRITE UR QL STRIP.AUTO: NEGATIVE
PH UR STRIP.AUTO: 6 [PH] (ref 5–8)
PROT UR QL STRIP: 300 MG/DL
RBC # URNS HPF: >150 /HPF
RBC UR QL AUTO: ABNORMAL
SP GR UR STRIP.AUTO: 1.01
UROBILINOGEN UR STRIP.AUTO-MCNC: 0.2 MG/DL
WBC #/AREA URNS HPF: ABNORMAL /HPF

## 2020-08-13 PROCEDURE — 81001 URINALYSIS AUTO W/SCOPE: CPT

## 2020-08-17 ENCOUNTER — HOSPITAL ENCOUNTER (OUTPATIENT)
Dept: LAB | Facility: MEDICAL CENTER | Age: 85
End: 2020-08-17
Attending: NURSE PRACTITIONER
Payer: MEDICARE

## 2020-08-17 PROCEDURE — 80053 COMPREHEN METABOLIC PANEL: CPT

## 2020-08-18 LAB
ALBUMIN SERPL BCP-MCNC: 3.9 G/DL (ref 3.2–4.9)
ALBUMIN/GLOB SERPL: 2.1 G/DL
ALP SERPL-CCNC: 60 U/L (ref 30–99)
ALT SERPL-CCNC: 15 U/L (ref 2–50)
ANION GAP SERPL CALC-SCNC: 16 MMOL/L (ref 7–16)
AST SERPL-CCNC: 25 U/L (ref 12–45)
BILIRUB SERPL-MCNC: 1 MG/DL (ref 0.1–1.5)
BUN SERPL-MCNC: 21 MG/DL (ref 8–22)
CALCIUM SERPL-MCNC: 9.3 MG/DL (ref 8.5–10.5)
CHLORIDE SERPL-SCNC: 96 MMOL/L (ref 96–112)
CO2 SERPL-SCNC: 20 MMOL/L (ref 20–33)
CREAT SERPL-MCNC: 1.77 MG/DL (ref 0.5–1.4)
GLOBULIN SER CALC-MCNC: 1.9 G/DL (ref 1.9–3.5)
GLUCOSE SERPL-MCNC: 164 MG/DL (ref 65–99)
POTASSIUM SERPL-SCNC: 4.7 MMOL/L (ref 3.6–5.5)
PROT SERPL-MCNC: 5.8 G/DL (ref 6–8.2)
SODIUM SERPL-SCNC: 132 MMOL/L (ref 135–145)

## 2020-08-19 ENCOUNTER — HOSPITAL ENCOUNTER (OUTPATIENT)
Dept: LAB | Facility: MEDICAL CENTER | Age: 85
End: 2020-08-19
Attending: INTERNAL MEDICINE
Payer: MEDICARE

## 2020-08-19 LAB
ALBUMIN SERPL BCP-MCNC: 4 G/DL (ref 3.2–4.9)
ALBUMIN/GLOB SERPL: 1.9 G/DL
ALP SERPL-CCNC: 66 U/L (ref 30–99)
ALT SERPL-CCNC: 16 U/L (ref 2–50)
ANION GAP SERPL CALC-SCNC: 11 MMOL/L (ref 7–16)
AST SERPL-CCNC: 21 U/L (ref 12–45)
BILIRUB SERPL-MCNC: 1 MG/DL (ref 0.1–1.5)
BUN SERPL-MCNC: 23 MG/DL (ref 8–22)
CALCIUM SERPL-MCNC: 9.2 MG/DL (ref 8.5–10.5)
CHLORIDE SERPL-SCNC: 96 MMOL/L (ref 96–112)
CO2 SERPL-SCNC: 24 MMOL/L (ref 20–33)
CREAT SERPL-MCNC: 1.78 MG/DL (ref 0.5–1.4)
GLOBULIN SER CALC-MCNC: 2.1 G/DL (ref 1.9–3.5)
GLUCOSE SERPL-MCNC: 145 MG/DL (ref 65–99)
POTASSIUM SERPL-SCNC: 4.7 MMOL/L (ref 3.6–5.5)
PROT SERPL-MCNC: 6.1 G/DL (ref 6–8.2)
SODIUM SERPL-SCNC: 131 MMOL/L (ref 135–145)

## 2020-08-19 PROCEDURE — 80053 COMPREHEN METABOLIC PANEL: CPT

## 2020-08-24 ENCOUNTER — HOSPITAL ENCOUNTER (OUTPATIENT)
Dept: LAB | Facility: MEDICAL CENTER | Age: 85
End: 2020-08-24
Attending: NURSE PRACTITIONER
Payer: MEDICARE

## 2020-08-24 LAB
ALBUMIN SERPL BCP-MCNC: 3.8 G/DL (ref 3.2–4.9)
ALBUMIN/GLOB SERPL: 2 G/DL
ALP SERPL-CCNC: 68 U/L (ref 30–99)
ALT SERPL-CCNC: 17 U/L (ref 2–50)
ANION GAP SERPL CALC-SCNC: 14 MMOL/L (ref 7–16)
AST SERPL-CCNC: 24 U/L (ref 12–45)
BILIRUB SERPL-MCNC: 1 MG/DL (ref 0.1–1.5)
BUN SERPL-MCNC: 33 MG/DL (ref 8–22)
CALCIUM SERPL-MCNC: 9.2 MG/DL (ref 8.5–10.5)
CHLORIDE SERPL-SCNC: 97 MMOL/L (ref 96–112)
CO2 SERPL-SCNC: 22 MMOL/L (ref 20–33)
CREAT SERPL-MCNC: 1.51 MG/DL (ref 0.5–1.4)
GLOBULIN SER CALC-MCNC: 1.9 G/DL (ref 1.9–3.5)
GLUCOSE SERPL-MCNC: 148 MG/DL (ref 65–99)
POTASSIUM SERPL-SCNC: 4.6 MMOL/L (ref 3.6–5.5)
PROT SERPL-MCNC: 5.7 G/DL (ref 6–8.2)
SODIUM SERPL-SCNC: 133 MMOL/L (ref 135–145)

## 2020-08-24 PROCEDURE — 80053 COMPREHEN METABOLIC PANEL: CPT

## 2020-08-31 ENCOUNTER — HOSPITAL ENCOUNTER (OUTPATIENT)
Facility: MEDICAL CENTER | Age: 85
End: 2020-08-31
Attending: NURSE PRACTITIONER
Payer: MEDICARE

## 2020-08-31 PROCEDURE — 80048 BASIC METABOLIC PNL TOTAL CA: CPT

## 2020-09-01 LAB
ANION GAP SERPL CALC-SCNC: 14 MMOL/L (ref 7–16)
BUN SERPL-MCNC: 31 MG/DL (ref 8–22)
CALCIUM SERPL-MCNC: 9.3 MG/DL (ref 8.5–10.5)
CHLORIDE SERPL-SCNC: 97 MMOL/L (ref 96–112)
CO2 SERPL-SCNC: 23 MMOL/L (ref 20–33)
CREAT SERPL-MCNC: 1.23 MG/DL (ref 0.5–1.4)
GLUCOSE SERPL-MCNC: 169 MG/DL (ref 65–99)
POTASSIUM SERPL-SCNC: 4.5 MMOL/L (ref 3.6–5.5)
SODIUM SERPL-SCNC: 134 MMOL/L (ref 135–145)

## 2020-09-11 ENCOUNTER — HOSPITAL ENCOUNTER (OUTPATIENT)
Dept: LAB | Facility: MEDICAL CENTER | Age: 85
End: 2020-09-11
Attending: NURSE PRACTITIONER
Payer: MEDICARE

## 2020-09-11 LAB
ANION GAP SERPL CALC-SCNC: 12 MMOL/L (ref 7–16)
BUN SERPL-MCNC: 18 MG/DL (ref 8–22)
CALCIUM SERPL-MCNC: 8.9 MG/DL (ref 8.5–10.5)
CHLORIDE SERPL-SCNC: 96 MMOL/L (ref 96–112)
CO2 SERPL-SCNC: 27 MMOL/L (ref 20–33)
CREAT SERPL-MCNC: 1.16 MG/DL (ref 0.5–1.4)
GLUCOSE SERPL-MCNC: 109 MG/DL (ref 65–99)
POTASSIUM SERPL-SCNC: 4.8 MMOL/L (ref 3.6–5.5)
SODIUM SERPL-SCNC: 135 MMOL/L (ref 135–145)

## 2020-09-11 PROCEDURE — 80048 BASIC METABOLIC PNL TOTAL CA: CPT

## 2020-09-18 ENCOUNTER — HOSPITAL ENCOUNTER (OUTPATIENT)
Dept: LAB | Facility: MEDICAL CENTER | Age: 85
End: 2020-09-18
Attending: NURSE PRACTITIONER
Payer: MEDICARE

## 2020-09-18 LAB
ANION GAP SERPL CALC-SCNC: 11 MMOL/L (ref 7–16)
BUN SERPL-MCNC: 21 MG/DL (ref 8–22)
CALCIUM SERPL-MCNC: 8.7 MG/DL (ref 8.5–10.5)
CHLORIDE SERPL-SCNC: 99 MMOL/L (ref 96–112)
CO2 SERPL-SCNC: 28 MMOL/L (ref 20–33)
CREAT SERPL-MCNC: 1.18 MG/DL (ref 0.5–1.4)
GLUCOSE SERPL-MCNC: 122 MG/DL (ref 65–99)
POTASSIUM SERPL-SCNC: 4.9 MMOL/L (ref 3.6–5.5)
SODIUM SERPL-SCNC: 138 MMOL/L (ref 135–145)

## 2020-09-18 PROCEDURE — 80048 BASIC METABOLIC PNL TOTAL CA: CPT

## 2020-09-21 ENCOUNTER — HOSPITAL ENCOUNTER (OUTPATIENT)
Dept: RADIATION ONCOLOGY | Facility: MEDICAL CENTER | Age: 85
End: 2020-09-30
Attending: RADIOLOGY
Payer: MEDICARE

## 2020-09-21 VITALS
WEIGHT: 128.09 LBS | BODY MASS INDEX: 20.67 KG/M2 | SYSTOLIC BLOOD PRESSURE: 127 MMHG | HEART RATE: 67 BPM | TEMPERATURE: 98.6 F | DIASTOLIC BLOOD PRESSURE: 56 MMHG | OXYGEN SATURATION: 96 %

## 2020-09-21 DIAGNOSIS — C67.3 MALIGNANT NEOPLASM OF ANTERIOR WALL OF URINARY BLADDER (HCC): ICD-10-CM

## 2020-09-21 PROCEDURE — 99212 OFFICE O/P EST SF 10 MIN: CPT | Performed by: RADIOLOGY

## 2020-09-21 RX ORDER — DIPHENOXYLATE HYDROCHLORIDE AND ATROPINE SULFATE 2.5; .025 MG/1; MG/1
TABLET ORAL
COMMUNITY
Start: 2020-08-10 | End: 2021-02-01

## 2020-09-21 ASSESSMENT — FIBROSIS 4 INDEX: FIB4 SCORE: 1.95

## 2020-09-21 ASSESSMENT — PAIN SCALES - GENERAL: PAINLEVEL: 2=MINIMAL-SLIGHT

## 2020-09-21 NOTE — PROGRESS NOTES
RADIATION ONCOLOGY FOLLOW-UP    DATE OF SERVICE: 9/21/2020    IDENTIFICATION:   A 87 y.o. male with    Malignant neoplasm of anterior wall of urinary bladder (HCC)  Staging form: Urinary Bladder, AJCC 8th Edition  - Clinical stage from 5/18/2020: Stage II (cT2, cN0, cM0) - Signed by Gilson Dempsey M.D. on 5/18/2020      RADIATION SUMMARY:  Aria Treatment Information        Some values may be hidden. Unless noted otherwise, only the newest values recorded on each date are displayed.         Aria Treatment Summary 7/31/20   Course First Treatment Date 06/17/2020    Course Last Treatment Date 07/31/2020    Bladder Plan from Course C1_bladder   Fraction 25 of 25   Elapsed Course Days 44 @ [id number]   Prescribed Fraction Dose 200 cGy   Prescribed Total Dose 5,000 cGy   Bladder_Bst Plan from Course C1_bladder   Fraction 7 of 7    Elapsed Course Days 44 @ [id number]    Prescribed Fraction Dose 200 cGy    Prescribed Total Dose 1,400 cGy    Bladder Reference Point from Course C1_bladder   Elapsed Course Days 44 @ [id number]   Session Dose -   Total Dose 5,000 cGy   Bladder CP Reference Point from Course C1_bladder   Elapsed Course Days 44 @ [id number]   Session Dose -   Total Dose 5,157 cGy   Bladder_Bst Reference Point from Course C1_bladder   Elapsed Course Days 44 @ [id number]    Session Dose -    Total Dose 1,400 cGy    Bladder_Bst CP Reference Point from Course C1_bladder   Elapsed Course Days 44 @ [id number]    Session Dose -    Total Dose 1,454 cGy     Some values recorded on this date have been omitted.              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  Patient had a PET CT scan on May 8, 2020 which showed no evidence of distant metastatic disease although there was some uptake in the ascending colon.   Patient will have last BCG installation on May 28, 2020.  Patient currently doing well he does have a little bit of weight loss and fatigue and occasional pain with urination as well as waking up at night.    INTERVAL HISTORY:  Patient recently completed chemoradiation and had significant dehydration and acute kidney injury requiring hydration.  His kidney function has since returned to normal he still has significant frequency and urgency and dysuria which his Pyridium had not helped.  He has diarrhea which she takes Lomotil 2-3 times a day which she says is getting better.  Per his report he saw Dr. Jones who did a cystoscopy and said bladder appears healing and no evidence of disease recurrence.      PROBLEM LIST:  Patient Active Problem List   Diagnosis   • S/P mitral valve clip implantation   • Essential hypertension   • Coronary artery disease involving native coronary artery of native heart without angina pectoris   • S/P angioplasty with stent   • Malignant neoplasm of anterior wall of urinary bladder (HCC)   • Swelling       CURRENT MEDICATIONS:  Current Outpatient Medications   Medication Sig Dispense Refill   • diphenoxylate-atropine (LOMOTIL) 2.5-0.025 MG Tab TAKE 1 TO 2 TABLETS BY MOUTH 4 TIMES DAILY AS NEEDED FOR DIARRHEA     • metFORMIN (GLUCOPHAGE) 500 MG Tab Take 1-2 Tabs by mouth 2 times a day, with meals. 1000 mg in am and 500 mg in pm 90 Tab 0   • cetirizine (ZYRTEC) 10 MG Tab Take 10 mg by mouth as needed.     • clopidogrel (PLAVIX) 75 MG Tab Take 1 Tab by mouth every day. 90 Tab 3   • atorvastatin (LIPITOR) 40 MG Tab Take 1 Tab by mouth every day. 30 Tab 11   • donepezil (ARICEPT) 10 MG tablet Take 10 mg by mouth every evening.  3   • losartan (COZAAR) 100 MG Tab Take 100 mg by mouth every day.     • Multiple Vitamins-Minerals (CENTRUM SILVER 50+MEN PO) Take 1 tablet by mouth.     • FERROUS SULFATE PO Take  by mouth.     • aspirin EC 81 MG EC tablet Take 1 Tab by mouth every day. (Patient not  taking: Reported on 6/17/2020) 30 Tab 11   • gabapentin (NEURONTIN) 100 MG Cap TAKE 1 CAPSULE BY MOUTH TWICE DAILY MAY INCREASE AS NEEDED EVERY 5 DAYS UP TO MAXIMUM OF 18 CAPSULES PER DAY  5   • docusate sodium (COLACE) 100 MG Cap Take 100 mg by mouth 2 times a day as needed for Constipation.       No current facility-administered medications for this encounter.        ALLERGIES:  Contrast media with iodine [iodine], Iodide, and Penicillins    REVIEW OF SYSTEMS:  A review of systems for today's date of service was reviewed and uploaded into the electronic medical record.    PHYSICAL EXAM:  PERFORMANCE STATUS:  ECOG Performance Review 5/18/2020   ECOG Performance Status Fully active, able to carry on all pre-disease performance without restriction   Some recent data might be hidden     Karnofsky Score 9/21/2020 7/29/2020 7/22/2020 5/18/2020   Karnofsky Score 70 80 80 100   Some recent data might be hidden     /56 (BP Location: Left arm, Patient Position: Sitting, BP Cuff Size: Adult)   Pulse 67   Temp 37 °C (98.6 °F)   Wt 58.1 kg (128 lb 1.4 oz)   SpO2 96%   BMI 20.67 kg/m²   Physical Exam  Vitals signs reviewed.   Constitutional:       Appearance: Normal appearance.   HENT:      Head: Normocephalic and atraumatic.      Nose: Nose normal.   Eyes:      Pupils: Pupils are equal, round, and reactive to light.   Cardiovascular:      Rate and Rhythm: Normal rate.   Abdominal:      General: Abdomen is flat.   Musculoskeletal: Normal range of motion.   Neurological:      General: No focal deficit present.      Mental Status: He is alert.   Psychiatric:         Mood and Affect: Mood normal.         LABORATORY DATA:   Lab Results   Component Value Date/Time    WBC 6.7 05/11/2020 1125    WBC 6.3 04/02/2020 1415    WBC 6.2 03/10/2020 1354    HEMOGLOBIN 11.2 (L) 05/11/2020 1125    HEMOGLOBIN 10.8 (L) 04/02/2020 1415    HEMOGLOBIN 11.6 (L) 03/10/2020 1354    HEMATOCRIT 33.9 (L) 05/11/2020 1125    HEMATOCRIT 33.2 (L)  04/02/2020 1415    HEMATOCRIT 36.3 (L) 03/10/2020 1354    MCV 88.3 05/11/2020 1125    MCV 87.4 04/02/2020 1415    MCV 89.9 03/10/2020 1354    PLATELETCT 260 05/11/2020 1125    PLATELETCT 234 04/02/2020 1415    PLATELETCT 203 03/10/2020 1354    NEUTS 4.62 05/11/2020 1125    NEUTS 4.24 04/02/2020 1415    NEUTS 4.39 03/10/2020 1354      Lab Results   Component Value Date/Time    SODIUM 138 09/18/2020 1220    SODIUM 135 09/11/2020 1645    SODIUM 134 (L) 08/31/2020 1220    POTASSIUM 4.9 09/18/2020 1220    POTASSIUM 4.8 09/11/2020 1645    POTASSIUM 4.5 08/31/2020 1220    BUN 21 09/18/2020 1220    BUN 18 09/11/2020 1645    BUN 31 (H) 08/31/2020 1220    CREATININE 1.18 09/18/2020 1220    CREATININE 1.16 09/11/2020 1645    CREATININE 1.23 08/31/2020 1220    CALCIUM 8.7 09/18/2020 1220    CALCIUM 8.9 09/11/2020 1645    CALCIUM 9.3 08/31/2020 1220    ALBUMIN 3.8 08/24/2020 1126    ALBUMIN 4.0 08/19/2020 1344    ALBUMIN 3.9 08/17/2020 1031    ASTSGOT 24 08/24/2020 1126    ASTSGOT 21 08/19/2020 1344    ASTSGOT 25 08/17/2020 1031    ALKPHOSPHAT 68 08/24/2020 1126    ALKPHOSPHAT 66 08/19/2020 1344    ALKPHOSPHAT 60 08/17/2020 1031    IFNOTAFR 58 (A) 09/18/2020 1220    IFNOTAFR 60 09/11/2020 1645    IFNOTAFR 56 (A) 08/31/2020 1220         IMPRESSION:    A 87 y.o. 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 on 5/18/2020    RECOMMENDATIONS:   Patient is recovering from his acute radiation side effects.  He has no clinical evidence of disease recurrence.  I have ordered a repeat CT chest and pelvis for 3 months    Thank you for the opportunity to participate in his care.  If any questions or comments, please do not hesitate in calling.    Orders Placed This Encounter   • diphenoxylate-atropine (LOMOTIL) 2.5-0.025 MG Tab

## 2020-09-21 NOTE — NON-PROVIDER
Patient was seen today in clinic with Dr. Dempsey for follow up.  Vitals signs and weight were obtained and pain assessment was completed.  Allergies and medications were reviewed with the patient.  Review of systems completed.     Vitals/Pain:  Vitals:    09/21/20 1009   BP: 127/56   BP Location: Left arm   Patient Position: Sitting   BP Cuff Size: Adult   Pulse: 67   Temp: 37 °C (98.6 °F)   SpO2: 96%   Weight: 58.1 kg (128 lb 1.4 oz)   Pain Score: 2=Minimal-Slight        Allergies:   Contrast media with iodine [iodine], Iodide, and Penicillins    Current Medications:  Current Outpatient Medications   Medication Sig Dispense Refill   • diphenoxylate-atropine (LOMOTIL) 2.5-0.025 MG Tab TAKE 1 TO 2 TABLETS BY MOUTH 4 TIMES DAILY AS NEEDED FOR DIARRHEA     • metFORMIN (GLUCOPHAGE) 500 MG Tab Take 1-2 Tabs by mouth 2 times a day, with meals. 1000 mg in am and 500 mg in pm 90 Tab 0   • cetirizine (ZYRTEC) 10 MG Tab Take 10 mg by mouth as needed.     • clopidogrel (PLAVIX) 75 MG Tab Take 1 Tab by mouth every day. 90 Tab 3   • atorvastatin (LIPITOR) 40 MG Tab Take 1 Tab by mouth every day. 30 Tab 11   • donepezil (ARICEPT) 10 MG tablet Take 10 mg by mouth every evening.  3   • losartan (COZAAR) 100 MG Tab Take 100 mg by mouth every day.     • Multiple Vitamins-Minerals (CENTRUM SILVER 50+MEN PO) Take 1 tablet by mouth.     • FERROUS SULFATE PO Take  by mouth.     • aspirin EC 81 MG EC tablet Take 1 Tab by mouth every day. (Patient not taking: Reported on 6/17/2020) 30 Tab 11   • gabapentin (NEURONTIN) 100 MG Cap TAKE 1 CAPSULE BY MOUTH TWICE DAILY MAY INCREASE AS NEEDED EVERY 5 DAYS UP TO MAXIMUM OF 18 CAPSULES PER DAY  5   • docusate sodium (COLACE) 100 MG Cap Take 100 mg by mouth 2 times a day as needed for Constipation.       No current facility-administered medications for this encounter.          PCP:  Paul Peters, Med Ass't

## 2020-11-04 ENCOUNTER — APPOINTMENT (OUTPATIENT)
Dept: RADIOLOGY | Facility: MEDICAL CENTER | Age: 85
End: 2020-11-04
Attending: RADIOLOGY
Payer: MEDICARE

## 2020-11-06 NOTE — NON-PROVIDER
Patient was seen today in clinic with Dr. Dempsey for follow up.  Vitals signs and weight were obtained and pain assessment was completed.  Allergies and medications were reviewed with the patient.  Review of systems completed.     Vitals/Pain:  There were no vitals filed for this visit.         Allergies:   Contrast media with iodine [iodine], Iodide, and Penicillins    Current Medications:  Current Outpatient Medications   Medication Sig Dispense Refill   • diphenoxylate-atropine (LOMOTIL) 2.5-0.025 MG Tab TAKE 1 TO 2 TABLETS BY MOUTH 4 TIMES DAILY AS NEEDED FOR DIARRHEA     • Multiple Vitamins-Minerals (CENTRUM SILVER 50+MEN PO) Take 1 tablet by mouth.     • metFORMIN (GLUCOPHAGE) 500 MG Tab Take 1-2 Tabs by mouth 2 times a day, with meals. 1000 mg in am and 500 mg in pm 90 Tab 0   • FERROUS SULFATE PO Take  by mouth.     • cetirizine (ZYRTEC) 10 MG Tab Take 10 mg by mouth as needed.     • clopidogrel (PLAVIX) 75 MG Tab Take 1 Tab by mouth every day. 90 Tab 3   • atorvastatin (LIPITOR) 40 MG Tab Take 1 Tab by mouth every day. 30 Tab 11   • aspirin EC 81 MG EC tablet Take 1 Tab by mouth every day. (Patient not taking: Reported on 6/17/2020) 30 Tab 11   • gabapentin (NEURONTIN) 100 MG Cap TAKE 1 CAPSULE BY MOUTH TWICE DAILY MAY INCREASE AS NEEDED EVERY 5 DAYS UP TO MAXIMUM OF 18 CAPSULES PER DAY  5   • docusate sodium (COLACE) 100 MG Cap Take 100 mg by mouth 2 times a day as needed for Constipation.     • donepezil (ARICEPT) 10 MG tablet Take 10 mg by mouth every evening.  3   • losartan (COZAAR) 100 MG Tab Take 100 mg by mouth every day.       No current facility-administered medications for this encounter.          PCP:  Paul Peters, Med Ass't

## 2020-11-09 ENCOUNTER — HOSPITAL ENCOUNTER (OUTPATIENT)
Dept: LAB | Facility: MEDICAL CENTER | Age: 85
End: 2020-11-09
Attending: RADIOLOGY
Payer: MEDICARE

## 2020-11-09 DIAGNOSIS — C67.3 MALIGNANT NEOPLASM OF ANTERIOR WALL OF URINARY BLADDER (HCC): ICD-10-CM

## 2020-11-09 LAB
ALBUMIN SERPL BCP-MCNC: 4 G/DL (ref 3.2–4.9)
ALBUMIN/GLOB SERPL: 1.5 G/DL
ALP SERPL-CCNC: 59 U/L (ref 30–99)
ALT SERPL-CCNC: 38 U/L (ref 2–50)
ANION GAP SERPL CALC-SCNC: 10 MMOL/L (ref 7–16)
AST SERPL-CCNC: 35 U/L (ref 12–45)
BASOPHILS # BLD AUTO: 0.9 % (ref 0–1.8)
BASOPHILS # BLD: 0.06 K/UL (ref 0–0.12)
BILIRUB SERPL-MCNC: 0.8 MG/DL (ref 0.1–1.5)
BUN SERPL-MCNC: 31 MG/DL (ref 8–22)
CALCIUM SERPL-MCNC: 9.4 MG/DL (ref 8.5–10.5)
CHLORIDE SERPL-SCNC: 101 MMOL/L (ref 96–112)
CO2 SERPL-SCNC: 28 MMOL/L (ref 20–33)
CREAT SERPL-MCNC: 1.24 MG/DL (ref 0.5–1.4)
EOSINOPHIL # BLD AUTO: 0.07 K/UL (ref 0–0.51)
EOSINOPHIL NFR BLD: 1.1 % (ref 0–6.9)
ERYTHROCYTE [DISTWIDTH] IN BLOOD BY AUTOMATED COUNT: 46.6 FL (ref 35.9–50)
GLOBULIN SER CALC-MCNC: 2.6 G/DL (ref 1.9–3.5)
GLUCOSE SERPL-MCNC: 93 MG/DL (ref 65–99)
HCT VFR BLD AUTO: 32.1 % (ref 42–52)
HGB BLD-MCNC: 9.9 G/DL (ref 14–18)
IMM GRANULOCYTES # BLD AUTO: 0.01 K/UL (ref 0–0.11)
IMM GRANULOCYTES NFR BLD AUTO: 0.2 % (ref 0–0.9)
LYMPHOCYTES # BLD AUTO: 1.58 K/UL (ref 1–4.8)
LYMPHOCYTES NFR BLD: 24.5 % (ref 22–41)
MCH RBC QN AUTO: 29.5 PG (ref 27–33)
MCHC RBC AUTO-ENTMCNC: 30.8 G/DL (ref 33.7–35.3)
MCV RBC AUTO: 95.5 FL (ref 81.4–97.8)
MONOCYTES # BLD AUTO: 0.48 K/UL (ref 0–0.85)
MONOCYTES NFR BLD AUTO: 7.4 % (ref 0–13.4)
NEUTROPHILS # BLD AUTO: 4.25 K/UL (ref 1.82–7.42)
NEUTROPHILS NFR BLD: 65.9 % (ref 44–72)
NRBC # BLD AUTO: 0 K/UL
NRBC BLD-RTO: 0 /100 WBC
PLATELET # BLD AUTO: 171 K/UL (ref 164–446)
PMV BLD AUTO: 10 FL (ref 9–12.9)
POTASSIUM SERPL-SCNC: 4.7 MMOL/L (ref 3.6–5.5)
PROT SERPL-MCNC: 6.6 G/DL (ref 6–8.2)
RBC # BLD AUTO: 3.36 M/UL (ref 4.7–6.1)
SODIUM SERPL-SCNC: 139 MMOL/L (ref 135–145)
WBC # BLD AUTO: 6.5 K/UL (ref 4.8–10.8)

## 2020-11-09 PROCEDURE — 36415 COLL VENOUS BLD VENIPUNCTURE: CPT

## 2020-11-09 PROCEDURE — 80053 COMPREHEN METABOLIC PANEL: CPT

## 2020-11-09 PROCEDURE — 85025 COMPLETE CBC W/AUTO DIFF WBC: CPT

## 2020-11-12 ENCOUNTER — HOSPITAL ENCOUNTER (OUTPATIENT)
Dept: RADIOLOGY | Facility: MEDICAL CENTER | Age: 85
End: 2020-11-12
Attending: RADIOLOGY
Payer: MEDICARE

## 2020-11-12 DIAGNOSIS — C67.3 MALIGNANT NEOPLASM OF ANTERIOR WALL OF URINARY BLADDER (HCC): ICD-10-CM

## 2020-11-12 PROCEDURE — 71260 CT THORAX DX C+: CPT

## 2020-11-12 PROCEDURE — 700117 HCHG RX CONTRAST REV CODE 255: Performed by: RADIOLOGY

## 2020-11-12 PROCEDURE — 74178 CT ABD&PLV WO CNTR FLWD CNTR: CPT

## 2020-11-12 RX ADMIN — IOHEXOL 100 ML: 350 INJECTION, SOLUTION INTRAVENOUS at 14:43

## 2020-11-13 ENCOUNTER — HOSPITAL ENCOUNTER (OUTPATIENT)
Dept: RADIATION ONCOLOGY | Facility: MEDICAL CENTER | Age: 85
End: 2020-11-30
Attending: RADIOLOGY
Payer: MEDICARE

## 2020-11-13 VITALS — HEIGHT: 66 IN | WEIGHT: 141 LBS | BODY MASS INDEX: 22.66 KG/M2

## 2020-11-13 DIAGNOSIS — C67.3 MALIGNANT NEOPLASM OF ANTERIOR WALL OF URINARY BLADDER (HCC): ICD-10-CM

## 2020-11-13 PROCEDURE — 99442 PR PHYSICIAN TELEPHONE EVALUATION 11-20 MIN: CPT | Mod: 95 | Performed by: RADIOLOGY

## 2020-11-13 RX ORDER — PREDNISONE 50 MG/1
TABLET ORAL
Qty: 15 TAB | Refills: 0 | Status: SHIPPED | OUTPATIENT
Start: 2020-11-13 | End: 2021-01-12

## 2020-11-13 ASSESSMENT — PAIN SCALES - GENERAL: PAINLEVEL: NO PAIN

## 2020-11-13 ASSESSMENT — FIBROSIS 4 INDEX: FIB4 SCORE: 2.89

## 2020-11-13 NOTE — PROGRESS NOTES
Telephone Appointment Visit   As a means of avoiding spread of COVID-19, this visit is being conducted by telephone. This telephone visit was initiated by the patient and they verbally consented.    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  Patient had a PET CT scan on May 8, 2020 which showed no evidence of distant metastatic disease although there was some uptake in the ascending colon.  Patient will have last BCG installation on May 28, 2020.  Patient currently doing well he does have a little bit of weight loss and fatigue and occasional pain with urination as well as waking up at night.    9/21/20  Patient recently completed chemoradiation and had significant dehydration and acute kidney injury requiring hydration.  His kidney function has since returned to normal he still has significant frequency and urgency and dysuria which his Pyridium had not helped.  He has diarrhea which she takes Lomotil 2-3 times a day which she says is getting better.  Per his report he saw Dr. Jones who did a cystoscopy and said bladder appears healing and no evidence of disease recurrence.     INTERVAL HISTORY:  Patient doing well he says that his diarrhea is improving Lomotil.  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  He said that the Pyridium and oxybutynin did not work.  He is seen Dr. Jones next month for repeat cystoscopy.  CT chest abdomen pelvis shows no evidence of distant metastatic disease there is some bladder wall thickening which will be evaluated on next cystoscopy.  Hematuria has essentially stopped although he does have some occasional mild hematuria.      Labs / Images Reviewed:   CT CAP    Assessment and Plan:     Stage 2 Bladder cancer s/p  chemoRT    Follow-up:   3 Months with repeat CT A/P urogram, Cysto q3 months with Dr. Jones.    Time at end of call: 115PM  Total Time Spent: 11-20 minutes    CC: Dr. Hamilton, Dr. Jones

## 2020-11-13 NOTE — NON-PROVIDER
"Patient was seen today in clinic with Dr. Dempsey for follow up.  Vitals signs and weight were obtained and pain assessment was completed.  Allergies and medications were reviewed with the patient.  Review of systems completed.     Vitals/Pain:  Vitals:    11/13/20 1306   Weight: 64 kg (141 lb)   Height: 1.676 m (5' 6\")   Pain Score: No pain        Allergies:   Contrast media with iodine [iodine], Iodide, and Penicillins    Current Medications:  Current Outpatient Medications   Medication Sig Dispense Refill   • diphenoxylate-atropine (LOMOTIL) 2.5-0.025 MG Tab TAKE 1 TO 2 TABLETS BY MOUTH 4 TIMES DAILY AS NEEDED FOR DIARRHEA     • Multiple Vitamins-Minerals (CENTRUM SILVER 50+MEN PO) Take 1 tablet by mouth.     • metFORMIN (GLUCOPHAGE) 500 MG Tab Take 1-2 Tabs by mouth 2 times a day, with meals. 1000 mg in am and 500 mg in pm 90 Tab 0   • FERROUS SULFATE PO Take  by mouth.     • cetirizine (ZYRTEC) 10 MG Tab Take 10 mg by mouth as needed.     • clopidogrel (PLAVIX) 75 MG Tab Take 1 Tab by mouth every day. 90 Tab 3   • atorvastatin (LIPITOR) 40 MG Tab Take 1 Tab by mouth every day. 30 Tab 11   • aspirin EC 81 MG EC tablet Take 1 Tab by mouth every day. (Patient not taking: Reported on 6/17/2020) 30 Tab 11   • gabapentin (NEURONTIN) 100 MG Cap TAKE 1 CAPSULE BY MOUTH TWICE DAILY MAY INCREASE AS NEEDED EVERY 5 DAYS UP TO MAXIMUM OF 18 CAPSULES PER DAY  5   • docusate sodium (COLACE) 100 MG Cap Take 100 mg by mouth 2 times a day as needed for Constipation.     • donepezil (ARICEPT) 10 MG tablet Take 10 mg by mouth every evening.  3   • losartan (COZAAR) 100 MG Tab Take 100 mg by mouth every day.       No current facility-administered medications for this encounter.          PCP:  Paul Peters, Med Ass't  "

## 2020-12-22 ENCOUNTER — HOSPITAL ENCOUNTER (OUTPATIENT)
Dept: LAB | Facility: MEDICAL CENTER | Age: 85
End: 2020-12-22
Attending: INTERNAL MEDICINE
Payer: MEDICARE

## 2020-12-22 LAB
BASOPHILS # BLD AUTO: 0.5 % (ref 0–1.8)
BASOPHILS # BLD: 0.04 K/UL (ref 0–0.12)
EOSINOPHIL # BLD AUTO: 0.07 K/UL (ref 0–0.51)
EOSINOPHIL NFR BLD: 0.9 % (ref 0–6.9)
ERYTHROCYTE [DISTWIDTH] IN BLOOD BY AUTOMATED COUNT: 42.6 FL (ref 35.9–50)
HCT VFR BLD AUTO: 33.8 % (ref 42–52)
HGB BLD-MCNC: 10.7 G/DL (ref 14–18)
IMM GRANULOCYTES # BLD AUTO: 0.05 K/UL (ref 0–0.11)
IMM GRANULOCYTES NFR BLD AUTO: 0.6 % (ref 0–0.9)
LYMPHOCYTES # BLD AUTO: 1.69 K/UL (ref 1–4.8)
LYMPHOCYTES NFR BLD: 21.7 % (ref 22–41)
MCH RBC QN AUTO: 27.9 PG (ref 27–33)
MCHC RBC AUTO-ENTMCNC: 31.7 G/DL (ref 33.7–35.3)
MCV RBC AUTO: 88 FL (ref 81.4–97.8)
MONOCYTES # BLD AUTO: 0.64 K/UL (ref 0–0.85)
MONOCYTES NFR BLD AUTO: 8.2 % (ref 0–13.4)
NEUTROPHILS # BLD AUTO: 5.31 K/UL (ref 1.82–7.42)
NEUTROPHILS NFR BLD: 68.1 % (ref 44–72)
NRBC # BLD AUTO: 0 K/UL
NRBC BLD-RTO: 0 /100 WBC
PLATELET # BLD AUTO: 201 K/UL (ref 164–446)
PMV BLD AUTO: 10 FL (ref 9–12.9)
RBC # BLD AUTO: 3.84 M/UL (ref 4.7–6.1)
WBC # BLD AUTO: 7.8 K/UL (ref 4.8–10.8)

## 2020-12-22 PROCEDURE — 85025 COMPLETE CBC W/AUTO DIFF WBC: CPT

## 2020-12-22 PROCEDURE — 36415 COLL VENOUS BLD VENIPUNCTURE: CPT

## 2020-12-29 ENCOUNTER — HOSPITAL ENCOUNTER (OUTPATIENT)
Dept: LAB | Facility: MEDICAL CENTER | Age: 85
End: 2020-12-29
Attending: INTERNAL MEDICINE
Payer: MEDICARE

## 2020-12-29 DIAGNOSIS — Z20.822 CLOSE EXPOSURE TO COVID-19 VIRUS: ICD-10-CM

## 2020-12-29 LAB
COVID ORDER STATUS COVID19: NORMAL
SARS-COV-2 RNA RESP QL NAA+PROBE: NOTDETECTED
SPECIMEN SOURCE: NORMAL

## 2020-12-29 PROCEDURE — U0003 INFECTIOUS AGENT DETECTION BY NUCLEIC ACID (DNA OR RNA); SEVERE ACUTE RESPIRATORY SYNDROME CORONAVIRUS 2 (SARS-COV-2) (CORONAVIRUS DISEASE [COVID-19]), AMPLIFIED PROBE TECHNIQUE, MAKING USE OF HIGH THROUGHPUT TECHNOLOGIES AS DESCRIBED BY CMS-2020-01-R: HCPCS

## 2020-12-29 PROCEDURE — C9803 HOPD COVID-19 SPEC COLLECT: HCPCS

## 2020-12-31 ENCOUNTER — HOSPITAL ENCOUNTER (OUTPATIENT)
Facility: MEDICAL CENTER | Age: 85
End: 2020-12-31
Attending: PHYSICIAN ASSISTANT
Payer: MEDICARE

## 2020-12-31 PROCEDURE — 87086 URINE CULTURE/COLONY COUNT: CPT

## 2021-01-01 ENCOUNTER — OFFICE VISIT (OUTPATIENT)
Dept: NEUROLOGY | Facility: MEDICAL CENTER | Age: 86
End: 2021-01-01
Attending: PSYCHIATRY & NEUROLOGY
Payer: MEDICARE

## 2021-01-01 ENCOUNTER — TELEPHONE (OUTPATIENT)
Dept: CARDIOLOGY | Facility: MEDICAL CENTER | Age: 86
End: 2021-01-01

## 2021-01-01 ENCOUNTER — APPOINTMENT (OUTPATIENT)
Dept: RADIOLOGY | Facility: MEDICAL CENTER | Age: 86
End: 2021-01-01
Attending: RADIOLOGY
Payer: MEDICARE

## 2021-01-01 ENCOUNTER — PRE-ADMISSION TESTING (OUTPATIENT)
Dept: ADMISSIONS | Facility: MEDICAL CENTER | Age: 86
End: 2021-01-01
Attending: UROLOGY
Payer: MEDICARE

## 2021-01-01 ENCOUNTER — HOSPITAL ENCOUNTER (OUTPATIENT)
Facility: MEDICAL CENTER | Age: 86
End: 2021-10-15
Attending: UROLOGY | Admitting: INTERNAL MEDICINE
Payer: MEDICARE

## 2021-01-01 ENCOUNTER — HOSPITAL ENCOUNTER (OUTPATIENT)
Dept: RADIOLOGY | Facility: MEDICAL CENTER | Age: 86
End: 2021-11-16
Attending: RADIOLOGY
Payer: MEDICARE

## 2021-01-01 ENCOUNTER — ANESTHESIA (OUTPATIENT)
Dept: SURGERY | Facility: MEDICAL CENTER | Age: 86
End: 2021-01-01
Payer: MEDICARE

## 2021-01-01 ENCOUNTER — HOSPITAL ENCOUNTER (OUTPATIENT)
Dept: RADIATION ONCOLOGY | Facility: MEDICAL CENTER | Age: 86
End: 2021-11-30
Attending: RADIOLOGY
Payer: MEDICARE

## 2021-01-01 ENCOUNTER — HOSPITAL ENCOUNTER (OUTPATIENT)
Dept: LAB | Facility: MEDICAL CENTER | Age: 86
End: 2021-12-07
Attending: RADIOLOGY
Payer: MEDICARE

## 2021-01-01 ENCOUNTER — APPOINTMENT (OUTPATIENT)
Dept: RADIOLOGY | Facility: MEDICAL CENTER | Age: 86
End: 2021-01-01
Attending: UROLOGY
Payer: MEDICARE

## 2021-01-01 ENCOUNTER — ANESTHESIA EVENT (OUTPATIENT)
Dept: SURGERY | Facility: MEDICAL CENTER | Age: 86
End: 2021-01-01
Payer: MEDICARE

## 2021-01-01 ENCOUNTER — HOSPITAL ENCOUNTER (OUTPATIENT)
Dept: RADIOLOGY | Facility: MEDICAL CENTER | Age: 86
End: 2021-05-26
Attending: RADIOLOGY
Payer: MEDICARE

## 2021-01-01 ENCOUNTER — HOSPITAL ENCOUNTER (OUTPATIENT)
Facility: MEDICAL CENTER | Age: 86
End: 2021-07-08
Attending: PHYSICIAN ASSISTANT
Payer: MEDICARE

## 2021-01-01 ENCOUNTER — OFFICE VISIT (OUTPATIENT)
Dept: CARDIOLOGY | Facility: MEDICAL CENTER | Age: 86
End: 2021-01-01
Payer: MEDICARE

## 2021-01-01 ENCOUNTER — HOSPITAL ENCOUNTER (OUTPATIENT)
Dept: RADIATION ONCOLOGY | Facility: MEDICAL CENTER | Age: 86
End: 2021-05-31
Attending: RADIOLOGY
Payer: MEDICARE

## 2021-01-01 VITALS
SYSTOLIC BLOOD PRESSURE: 123 MMHG | OXYGEN SATURATION: 96 % | WEIGHT: 149 LBS | TEMPERATURE: 97.2 F | BODY MASS INDEX: 24.05 KG/M2 | HEART RATE: 59 BPM | DIASTOLIC BLOOD PRESSURE: 67 MMHG

## 2021-01-01 VITALS
DIASTOLIC BLOOD PRESSURE: 62 MMHG | WEIGHT: 151.9 LBS | SYSTOLIC BLOOD PRESSURE: 142 MMHG | TEMPERATURE: 96.9 F | BODY MASS INDEX: 24.41 KG/M2 | RESPIRATION RATE: 16 BRPM | OXYGEN SATURATION: 93 % | HEART RATE: 62 BPM | HEIGHT: 66 IN

## 2021-01-01 VITALS
OXYGEN SATURATION: 99 % | HEART RATE: 58 BPM | BODY MASS INDEX: 24.69 KG/M2 | DIASTOLIC BLOOD PRESSURE: 52 MMHG | TEMPERATURE: 97.7 F | WEIGHT: 148.37 LBS | SYSTOLIC BLOOD PRESSURE: 121 MMHG

## 2021-01-01 VITALS
OXYGEN SATURATION: 97 % | HEIGHT: 66 IN | SYSTOLIC BLOOD PRESSURE: 132 MMHG | BODY MASS INDEX: 23.18 KG/M2 | WEIGHT: 144.2 LBS | HEART RATE: 63 BPM | DIASTOLIC BLOOD PRESSURE: 64 MMHG | RESPIRATION RATE: 12 BRPM

## 2021-01-01 VITALS
HEIGHT: 66 IN | TEMPERATURE: 98.8 F | HEART RATE: 70 BPM | OXYGEN SATURATION: 93 % | SYSTOLIC BLOOD PRESSURE: 120 MMHG | DIASTOLIC BLOOD PRESSURE: 50 MMHG | BODY MASS INDEX: 23.31 KG/M2 | WEIGHT: 145.06 LBS

## 2021-01-01 DIAGNOSIS — C67.3 MALIGNANT NEOPLASM OF ANTERIOR WALL OF URINARY BLADDER (HCC): ICD-10-CM

## 2021-01-01 DIAGNOSIS — I25.10 ATHEROSCLEROSIS OF NATIVE CORONARY ARTERY OF NATIVE HEART WITHOUT ANGINA PECTORIS: ICD-10-CM

## 2021-01-01 DIAGNOSIS — F03.90 DEMENTIA WITHOUT BEHAVIORAL DISTURBANCE, UNSPECIFIED DEMENTIA TYPE: ICD-10-CM

## 2021-01-01 DIAGNOSIS — I10 ESSENTIAL HYPERTENSION: ICD-10-CM

## 2021-01-01 DIAGNOSIS — F03.B0 MODERATE DEMENTIA WITHOUT BEHAVIORAL DISTURBANCE (HCC): Primary | ICD-10-CM

## 2021-01-01 DIAGNOSIS — E78.2 MIXED HYPERLIPIDEMIA: ICD-10-CM

## 2021-01-01 DIAGNOSIS — C67.2 MALIGNANT NEOPLASM OF LATERAL WALL OF URINARY BLADDER (HCC): ICD-10-CM

## 2021-01-01 DIAGNOSIS — Z01.812 PRE-OPERATIVE LABORATORY EXAMINATION: ICD-10-CM

## 2021-01-01 DIAGNOSIS — Z98.890 HISTORY OF HEART VALVE REPAIR: ICD-10-CM

## 2021-01-01 LAB
ALBUMIN SERPL BCP-MCNC: 4.3 G/DL (ref 3.2–4.9)
ALBUMIN/GLOB SERPL: 1.7 G/DL
ALP SERPL-CCNC: 64 U/L (ref 30–99)
ALT SERPL-CCNC: 35 U/L (ref 2–50)
ANION GAP SERPL CALC-SCNC: 10 MMOL/L (ref 7–16)
ANION GAP SERPL CALC-SCNC: 10 MMOL/L (ref 7–16)
ANION GAP SERPL CALC-SCNC: 8 MMOL/L (ref 7–16)
APPEARANCE UR: CLEAR
AST SERPL-CCNC: 36 U/L (ref 12–45)
BACTERIA #/AREA URNS HPF: NEGATIVE /HPF
BACTERIA UR CULT: NORMAL
BACTERIA UR CULT: NORMAL
BASOPHILS # BLD AUTO: 0.8 % (ref 0–1.8)
BASOPHILS # BLD AUTO: 1.1 % (ref 0–1.8)
BASOPHILS # BLD: 0.05 K/UL (ref 0–0.12)
BASOPHILS # BLD: 0.06 K/UL (ref 0–0.12)
BILIRUB SERPL-MCNC: 1.4 MG/DL (ref 0.1–1.5)
BILIRUB UR QL STRIP.AUTO: NEGATIVE
BUN SERPL-MCNC: 21 MG/DL (ref 8–22)
BUN SERPL-MCNC: 24 MG/DL (ref 8–22)
BUN SERPL-MCNC: 24 MG/DL (ref 8–22)
CALCIUM SERPL-MCNC: 9.5 MG/DL (ref 8.5–10.5)
CALCIUM SERPL-MCNC: 9.6 MG/DL (ref 8.5–10.5)
CALCIUM SERPL-MCNC: 9.6 MG/DL (ref 8.5–10.5)
CHLORIDE SERPL-SCNC: 101 MMOL/L (ref 96–112)
CHLORIDE SERPL-SCNC: 103 MMOL/L (ref 96–112)
CHLORIDE SERPL-SCNC: 105 MMOL/L (ref 96–112)
CO2 SERPL-SCNC: 27 MMOL/L (ref 20–33)
CO2 SERPL-SCNC: 28 MMOL/L (ref 20–33)
CO2 SERPL-SCNC: 28 MMOL/L (ref 20–33)
COLOR UR: YELLOW
CREAT SERPL-MCNC: 1.2 MG/DL (ref 0.5–1.4)
CREAT SERPL-MCNC: 1.22 MG/DL (ref 0.5–1.4)
CREAT SERPL-MCNC: 1.28 MG/DL (ref 0.5–1.4)
EKG IMPRESSION: NORMAL
EOSINOPHIL # BLD AUTO: 0.06 K/UL (ref 0–0.51)
EOSINOPHIL # BLD AUTO: 0.11 K/UL (ref 0–0.51)
EOSINOPHIL NFR BLD: 1 % (ref 0–6.9)
EOSINOPHIL NFR BLD: 2 % (ref 0–6.9)
EPI CELLS #/AREA URNS HPF: NEGATIVE /HPF
ERYTHROCYTE [DISTWIDTH] IN BLOOD BY AUTOMATED COUNT: 43.8 FL (ref 35.9–50)
ERYTHROCYTE [DISTWIDTH] IN BLOOD BY AUTOMATED COUNT: 44.1 FL (ref 35.9–50)
ERYTHROCYTE [DISTWIDTH] IN BLOOD BY AUTOMATED COUNT: 51.2 FL (ref 35.9–50)
EST. AVERAGE GLUCOSE BLD GHB EST-MCNC: 126 MG/DL
EXTERNAL QUALITY CONTROL: NORMAL
GLOBULIN SER CALC-MCNC: 2.6 G/DL (ref 1.9–3.5)
GLUCOSE BLD-MCNC: 137 MG/DL (ref 65–99)
GLUCOSE BLD-MCNC: 139 MG/DL (ref 65–99)
GLUCOSE BLD-MCNC: 158 MG/DL (ref 65–99)
GLUCOSE BLD-MCNC: 86 MG/DL (ref 65–99)
GLUCOSE SERPL-MCNC: 140 MG/DL (ref 65–99)
GLUCOSE SERPL-MCNC: 169 MG/DL (ref 65–99)
GLUCOSE SERPL-MCNC: 97 MG/DL (ref 65–99)
GLUCOSE UR STRIP.AUTO-MCNC: NEGATIVE MG/DL
HBA1C MFR BLD: 6 % (ref 4–5.6)
HCT VFR BLD AUTO: 34.5 % (ref 42–52)
HCT VFR BLD AUTO: 37.4 % (ref 42–52)
HCT VFR BLD AUTO: 37.6 % (ref 42–52)
HGB BLD-MCNC: 10.7 G/DL (ref 14–18)
HGB BLD-MCNC: 12 G/DL (ref 14–18)
HGB BLD-MCNC: 12.3 G/DL (ref 14–18)
HYALINE CASTS #/AREA URNS LPF: ABNORMAL /LPF
IMM GRANULOCYTES # BLD AUTO: 0.01 K/UL (ref 0–0.11)
IMM GRANULOCYTES # BLD AUTO: 0.02 K/UL (ref 0–0.11)
IMM GRANULOCYTES NFR BLD AUTO: 0.2 % (ref 0–0.9)
IMM GRANULOCYTES NFR BLD AUTO: 0.4 % (ref 0–0.9)
INR PPP: 1.05 (ref 0.87–1.13)
KETONES UR STRIP.AUTO-MCNC: NEGATIVE MG/DL
LEUKOCYTE ESTERASE UR QL STRIP.AUTO: ABNORMAL
LYMPHOCYTES # BLD AUTO: 1.2 K/UL (ref 1–4.8)
LYMPHOCYTES # BLD AUTO: 1.39 K/UL (ref 1–4.8)
LYMPHOCYTES NFR BLD: 21.7 % (ref 22–41)
LYMPHOCYTES NFR BLD: 22.6 % (ref 22–41)
MCH RBC QN AUTO: 28.8 PG (ref 27–33)
MCH RBC QN AUTO: 29 PG (ref 27–33)
MCH RBC QN AUTO: 29.2 PG (ref 27–33)
MCHC RBC AUTO-ENTMCNC: 31 G/DL (ref 33.7–35.3)
MCHC RBC AUTO-ENTMCNC: 31.9 G/DL (ref 33.7–35.3)
MCHC RBC AUTO-ENTMCNC: 32.9 G/DL (ref 33.7–35.3)
MCV RBC AUTO: 88.2 FL (ref 81.4–97.8)
MCV RBC AUTO: 90.4 FL (ref 81.4–97.8)
MCV RBC AUTO: 94 FL (ref 81.4–97.8)
MICRO URNS: ABNORMAL
MONOCYTES # BLD AUTO: 0.45 K/UL (ref 0–0.85)
MONOCYTES # BLD AUTO: 0.47 K/UL (ref 0–0.85)
MONOCYTES NFR BLD AUTO: 7.6 % (ref 0–13.4)
MONOCYTES NFR BLD AUTO: 8.1 % (ref 0–13.4)
NEUTROPHILS # BLD AUTO: 3.69 K/UL (ref 1.82–7.42)
NEUTROPHILS # BLD AUTO: 4.17 K/UL (ref 1.82–7.42)
NEUTROPHILS NFR BLD: 66.7 % (ref 44–72)
NEUTROPHILS NFR BLD: 67.8 % (ref 44–72)
NITRITE UR QL STRIP.AUTO: NEGATIVE
NRBC # BLD AUTO: 0 K/UL
NRBC # BLD AUTO: 0 K/UL
NRBC BLD-RTO: 0 /100 WBC
NRBC BLD-RTO: 0 /100 WBC
PATHOLOGY CONSULT NOTE: NORMAL
PH UR STRIP.AUTO: 6 [PH] (ref 5–8)
PLATELET # BLD AUTO: 173 K/UL (ref 164–446)
PLATELET # BLD AUTO: 180 K/UL (ref 164–446)
PLATELET # BLD AUTO: 196 K/UL (ref 164–446)
PMV BLD AUTO: 10.2 FL (ref 9–12.9)
PMV BLD AUTO: 10.2 FL (ref 9–12.9)
PMV BLD AUTO: 9.9 FL (ref 9–12.9)
POTASSIUM SERPL-SCNC: 4.6 MMOL/L (ref 3.6–5.5)
POTASSIUM SERPL-SCNC: 4.6 MMOL/L (ref 3.6–5.5)
POTASSIUM SERPL-SCNC: 4.9 MMOL/L (ref 3.6–5.5)
PROT SERPL-MCNC: 6.9 G/DL (ref 6–8.2)
PROT UR QL STRIP: 30 MG/DL
PROTHROMBIN TIME: 13.4 SEC (ref 12–14.6)
RBC # BLD AUTO: 3.67 M/UL (ref 4.7–6.1)
RBC # BLD AUTO: 4.16 M/UL (ref 4.7–6.1)
RBC # BLD AUTO: 4.24 M/UL (ref 4.7–6.1)
RBC # URNS HPF: ABNORMAL /HPF
RBC UR QL AUTO: NEGATIVE
SARS-COV+SARS-COV-2 AG RESP QL IA.RAPID: NEGATIVE
SIGNIFICANT IND 70042: NORMAL
SIGNIFICANT IND 70042: NORMAL
SITE SITE: NORMAL
SITE SITE: NORMAL
SODIUM SERPL-SCNC: 139 MMOL/L (ref 135–145)
SODIUM SERPL-SCNC: 140 MMOL/L (ref 135–145)
SODIUM SERPL-SCNC: 141 MMOL/L (ref 135–145)
SOURCE SOURCE: NORMAL
SOURCE SOURCE: NORMAL
SP GR UR STRIP.AUTO: 1.02
UROBILINOGEN UR STRIP.AUTO-MCNC: 0.2 MG/DL
WBC # BLD AUTO: 5.5 K/UL (ref 4.8–10.8)
WBC # BLD AUTO: 6.2 K/UL (ref 4.8–10.8)
WBC # BLD AUTO: 9.5 K/UL (ref 4.8–10.8)
WBC #/AREA URNS HPF: ABNORMAL /HPF

## 2021-01-01 PROCEDURE — C1769 GUIDE WIRE: HCPCS | Performed by: UROLOGY

## 2021-01-01 PROCEDURE — 74178 CT ABD&PLV WO CNTR FLWD CNTR: CPT | Mod: ME

## 2021-01-01 PROCEDURE — 160048 HCHG OR STATISTICAL LEVEL 1-5: Performed by: UROLOGY

## 2021-01-01 PROCEDURE — 85610 PROTHROMBIN TIME: CPT

## 2021-01-01 PROCEDURE — 88305 TISSUE EXAM BY PATHOLOGIST: CPT | Mod: 59

## 2021-01-01 PROCEDURE — 82962 GLUCOSE BLOOD TEST: CPT

## 2021-01-01 PROCEDURE — 160036 HCHG PACU - EA ADDL 30 MINS PHASE I: Performed by: UROLOGY

## 2021-01-01 PROCEDURE — A9270 NON-COVERED ITEM OR SERVICE: HCPCS | Performed by: ANESTHESIOLOGY

## 2021-01-01 PROCEDURE — 500879 HCHG PACK, CYSTO: Performed by: UROLOGY

## 2021-01-01 PROCEDURE — 160047 HCHG PACU  - EA ADDL 30 MINS PHASE II: Performed by: UROLOGY

## 2021-01-01 PROCEDURE — 99214 OFFICE O/P EST MOD 30 MIN: CPT | Performed by: NURSE PRACTITIONER

## 2021-01-01 PROCEDURE — 700117 HCHG RX CONTRAST REV CODE 255: Performed by: UROLOGY

## 2021-01-01 PROCEDURE — 85025 COMPLETE CBC W/AUTO DIFF WBC: CPT

## 2021-01-01 PROCEDURE — 83036 HEMOGLOBIN GLYCOSYLATED A1C: CPT

## 2021-01-01 PROCEDURE — 99214 OFFICE O/P EST MOD 30 MIN: CPT | Performed by: RADIOLOGY

## 2021-01-01 PROCEDURE — 87086 URINE CULTURE/COLONY COUNT: CPT

## 2021-01-01 PROCEDURE — G0378 HOSPITAL OBSERVATION PER HR: HCPCS

## 2021-01-01 PROCEDURE — 71260 CT THORAX DX C+: CPT | Mod: MF

## 2021-01-01 PROCEDURE — C1758 CATHETER, URETERAL: HCPCS | Performed by: UROLOGY

## 2021-01-01 PROCEDURE — 700105 HCHG RX REV CODE 258: Performed by: ANESTHESIOLOGY

## 2021-01-01 PROCEDURE — 99212 OFFICE O/P EST SF 10 MIN: CPT | Performed by: RADIOLOGY

## 2021-01-01 PROCEDURE — 700102 HCHG RX REV CODE 250 W/ 637 OVERRIDE(OP): Performed by: ANESTHESIOLOGY

## 2021-01-01 PROCEDURE — 700102 HCHG RX REV CODE 250 W/ 637 OVERRIDE(OP): Performed by: INTERNAL MEDICINE

## 2021-01-01 PROCEDURE — 80048 BASIC METABOLIC PNL TOTAL CA: CPT

## 2021-01-01 PROCEDURE — 99205 OFFICE O/P NEW HI 60 MIN: CPT | Performed by: PSYCHIATRY & NEUROLOGY

## 2021-01-01 PROCEDURE — 93000 ELECTROCARDIOGRAM COMPLETE: CPT | Performed by: INTERNAL MEDICINE

## 2021-01-01 PROCEDURE — 99219 PR INITIAL OBSERVATION CARE,LEVL II: CPT | Performed by: INTERNAL MEDICINE

## 2021-01-01 PROCEDURE — 160039 HCHG SURGERY MINUTES - EA ADDL 1 MIN LEVEL 3: Performed by: UROLOGY

## 2021-01-01 PROCEDURE — 160009 HCHG ANES TIME/MIN: Performed by: UROLOGY

## 2021-01-01 PROCEDURE — 700101 HCHG RX REV CODE 250: Performed by: ANESTHESIOLOGY

## 2021-01-01 PROCEDURE — 160046 HCHG PACU - 1ST 60 MINS PHASE II: Performed by: UROLOGY

## 2021-01-01 PROCEDURE — 80053 COMPREHEN METABOLIC PANEL: CPT

## 2021-01-01 PROCEDURE — 501329 HCHG SET, CYSTO IRRIG Y TUR: Performed by: UROLOGY

## 2021-01-01 PROCEDURE — 160025 RECOVERY II MINUTES (STATS): Performed by: UROLOGY

## 2021-01-01 PROCEDURE — 160035 HCHG PACU - 1ST 60 MINS PHASE I: Performed by: UROLOGY

## 2021-01-01 PROCEDURE — 81001 URINALYSIS AUTO W/SCOPE: CPT

## 2021-01-01 PROCEDURE — A9270 NON-COVERED ITEM OR SERVICE: HCPCS | Performed by: INTERNAL MEDICINE

## 2021-01-01 PROCEDURE — 700111 HCHG RX REV CODE 636 W/ 250 OVERRIDE (IP): Performed by: ANESTHESIOLOGY

## 2021-01-01 PROCEDURE — 700117 HCHG RX CONTRAST REV CODE 255: Performed by: RADIOLOGY

## 2021-01-01 PROCEDURE — 85027 COMPLETE CBC AUTOMATED: CPT

## 2021-01-01 PROCEDURE — 36415 COLL VENOUS BLD VENIPUNCTURE: CPT

## 2021-01-01 PROCEDURE — 99217 PR OBSERVATION CARE DISCHARGE: CPT | Performed by: INTERNAL MEDICINE

## 2021-01-01 PROCEDURE — 160002 HCHG RECOVERY MINUTES (STAT): Performed by: UROLOGY

## 2021-01-01 PROCEDURE — 87426 SARSCOV CORONAVIRUS AG IA: CPT | Performed by: UROLOGY

## 2021-01-01 PROCEDURE — 160028 HCHG SURGERY MINUTES - 1ST 30 MINS LEVEL 3: Performed by: UROLOGY

## 2021-01-01 PROCEDURE — 99212 OFFICE O/P EST SF 10 MIN: CPT | Performed by: PSYCHIATRY & NEUROLOGY

## 2021-01-01 PROCEDURE — A4338 INDWELLING CATHETER LATEX: HCPCS | Performed by: UROLOGY

## 2021-01-01 RX ORDER — ATORVASTATIN CALCIUM 40 MG/1
40 TABLET, FILM COATED ORAL EVERY EVENING
Status: DISCONTINUED | OUTPATIENT
Start: 2021-01-01 | End: 2021-01-01 | Stop reason: HOSPADM

## 2021-01-01 RX ORDER — HALOPERIDOL 5 MG/ML
1 INJECTION INTRAMUSCULAR
Status: DISCONTINUED | OUTPATIENT
Start: 2021-01-01 | End: 2021-01-01 | Stop reason: HOSPADM

## 2021-01-01 RX ORDER — SODIUM CHLORIDE, SODIUM LACTATE, POTASSIUM CHLORIDE, CALCIUM CHLORIDE 600; 310; 30; 20 MG/100ML; MG/100ML; MG/100ML; MG/100ML
INJECTION, SOLUTION INTRAVENOUS
Status: DISCONTINUED | OUTPATIENT
Start: 2021-01-01 | End: 2021-01-01 | Stop reason: SURG

## 2021-01-01 RX ORDER — SODIUM CHLORIDE, SODIUM LACTATE, POTASSIUM CHLORIDE, CALCIUM CHLORIDE 600; 310; 30; 20 MG/100ML; MG/100ML; MG/100ML; MG/100ML
INJECTION, SOLUTION INTRAVENOUS CONTINUOUS
Status: ACTIVE | OUTPATIENT
Start: 2021-01-01 | End: 2021-01-01

## 2021-01-01 RX ORDER — AMOXICILLIN 250 MG
2 CAPSULE ORAL 2 TIMES DAILY
Status: DISCONTINUED | OUTPATIENT
Start: 2021-01-01 | End: 2021-01-01 | Stop reason: HOSPADM

## 2021-01-01 RX ORDER — LOSARTAN POTASSIUM 50 MG/1
100 TABLET ORAL DAILY
Status: DISCONTINUED | OUTPATIENT
Start: 2021-01-01 | End: 2021-01-01 | Stop reason: HOSPADM

## 2021-01-01 RX ORDER — DEXAMETHASONE SODIUM PHOSPHATE 4 MG/ML
INJECTION, SOLUTION INTRA-ARTICULAR; INTRALESIONAL; INTRAMUSCULAR; INTRAVENOUS; SOFT TISSUE PRN
Status: DISCONTINUED | OUTPATIENT
Start: 2021-01-01 | End: 2021-01-01 | Stop reason: SURG

## 2021-01-01 RX ORDER — ONDANSETRON 4 MG/1
4 TABLET, ORALLY DISINTEGRATING ORAL EVERY 4 HOURS PRN
Status: DISCONTINUED | OUTPATIENT
Start: 2021-01-01 | End: 2021-01-01 | Stop reason: HOSPADM

## 2021-01-01 RX ORDER — LORATADINE 10 MG/1
10 TABLET ORAL DAILY
Status: ON HOLD | COMMUNITY
End: 2022-01-01

## 2021-01-01 RX ORDER — DONEPEZIL HYDROCHLORIDE 10 MG/1
20 TABLET, FILM COATED ORAL NIGHTLY
COMMUNITY

## 2021-01-01 RX ORDER — ACETAMINOPHEN 325 MG/1
650 TABLET ORAL EVERY 6 HOURS PRN
Status: DISCONTINUED | OUTPATIENT
Start: 2021-01-01 | End: 2021-01-01 | Stop reason: HOSPADM

## 2021-01-01 RX ORDER — FERROUS SULFATE 325(65) MG
325 TABLET ORAL DAILY
COMMUNITY

## 2021-01-01 RX ORDER — POLYETHYLENE GLYCOL 3350 17 G/17G
1 POWDER, FOR SOLUTION ORAL
Status: DISCONTINUED | OUTPATIENT
Start: 2021-01-01 | End: 2021-01-01 | Stop reason: HOSPADM

## 2021-01-01 RX ORDER — ONDANSETRON 2 MG/ML
4 INJECTION INTRAMUSCULAR; INTRAVENOUS EVERY 4 HOURS PRN
Status: DISCONTINUED | OUTPATIENT
Start: 2021-01-01 | End: 2021-01-01 | Stop reason: HOSPADM

## 2021-01-01 RX ORDER — DIPHENHYDRAMINE HYDROCHLORIDE 50 MG/ML
12.5 INJECTION INTRAMUSCULAR; INTRAVENOUS
Status: DISCONTINUED | OUTPATIENT
Start: 2021-01-01 | End: 2021-01-01 | Stop reason: HOSPADM

## 2021-01-01 RX ORDER — SODIUM CHLORIDE, SODIUM LACTATE, POTASSIUM CHLORIDE, CALCIUM CHLORIDE 600; 310; 30; 20 MG/100ML; MG/100ML; MG/100ML; MG/100ML
INJECTION, SOLUTION INTRAVENOUS CONTINUOUS
Status: DISCONTINUED | OUTPATIENT
Start: 2021-01-01 | End: 2021-01-01 | Stop reason: HOSPADM

## 2021-01-01 RX ORDER — MEPERIDINE HYDROCHLORIDE 25 MG/ML
12.5 INJECTION INTRAMUSCULAR; INTRAVENOUS; SUBCUTANEOUS
Status: DISCONTINUED | OUTPATIENT
Start: 2021-01-01 | End: 2021-01-01 | Stop reason: HOSPADM

## 2021-01-01 RX ORDER — FERROUS SULFATE 325(65) MG
325 TABLET ORAL DAILY
Status: DISCONTINUED | OUTPATIENT
Start: 2021-01-01 | End: 2021-01-01 | Stop reason: HOSPADM

## 2021-01-01 RX ORDER — DEXTROSE MONOHYDRATE 25 G/50ML
50 INJECTION, SOLUTION INTRAVENOUS
Status: DISCONTINUED | OUTPATIENT
Start: 2021-01-01 | End: 2021-01-01 | Stop reason: HOSPADM

## 2021-01-01 RX ORDER — VITAMIN B COMPLEX
1000 TABLET ORAL DAILY
COMMUNITY

## 2021-01-01 RX ORDER — GABAPENTIN 300 MG/1
600 CAPSULE ORAL 2 TIMES DAILY
COMMUNITY

## 2021-01-01 RX ORDER — GABAPENTIN 300 MG/1
300-600 CAPSULE ORAL 2 TIMES DAILY
Status: DISCONTINUED | OUTPATIENT
Start: 2021-01-01 | End: 2021-01-01 | Stop reason: HOSPADM

## 2021-01-01 RX ORDER — BISACODYL 10 MG
10 SUPPOSITORY, RECTAL RECTAL
Status: DISCONTINUED | OUTPATIENT
Start: 2021-01-01 | End: 2021-01-01 | Stop reason: HOSPADM

## 2021-01-01 RX ORDER — ONDANSETRON 2 MG/ML
4 INJECTION INTRAMUSCULAR; INTRAVENOUS
Status: DISCONTINUED | OUTPATIENT
Start: 2021-01-01 | End: 2021-01-01 | Stop reason: HOSPADM

## 2021-01-01 RX ORDER — DONEPEZIL HYDROCHLORIDE 5 MG/1
20 TABLET, FILM COATED ORAL NIGHTLY
Status: DISCONTINUED | OUTPATIENT
Start: 2021-01-01 | End: 2021-01-01 | Stop reason: HOSPADM

## 2021-01-01 RX ORDER — CEFAZOLIN SODIUM 1 G/3ML
INJECTION, POWDER, FOR SOLUTION INTRAMUSCULAR; INTRAVENOUS PRN
Status: DISCONTINUED | OUTPATIENT
Start: 2021-01-01 | End: 2021-01-01 | Stop reason: SURG

## 2021-01-01 RX ADMIN — FENTANYL CITRATE 50 MCG: 50 INJECTION INTRAMUSCULAR; INTRAVENOUS at 14:45

## 2021-01-01 RX ADMIN — FENTANYL CITRATE 50 MCG: 50 INJECTION, SOLUTION INTRAMUSCULAR; INTRAVENOUS at 13:18

## 2021-01-01 RX ADMIN — ATORVASTATIN CALCIUM 40 MG: 40 TABLET, FILM COATED ORAL at 18:58

## 2021-01-01 RX ADMIN — ACETAMINOPHEN 650 MG: 325 TABLET ORAL at 01:21

## 2021-01-01 RX ADMIN — GABAPENTIN 300 MG: 300 CAPSULE ORAL at 18:58

## 2021-01-01 RX ADMIN — FERROUS SULFATE TAB 325 MG (65 MG ELEMENTAL FE) 325 MG: 325 (65 FE) TAB at 05:47

## 2021-01-01 RX ADMIN — ASPIRIN 81 MG: 81 TABLET, COATED ORAL at 05:47

## 2021-01-01 RX ADMIN — DOCUSATE SODIUM 50 MG AND SENNOSIDES 8.6 MG 2 TABLET: 8.6; 5 TABLET, FILM COATED ORAL at 05:47

## 2021-01-01 RX ADMIN — SUGAMMADEX 200 MG: 100 INJECTION, SOLUTION INTRAVENOUS at 13:55

## 2021-01-01 RX ADMIN — DONEPEZIL HYDROCHLORIDE 20 MG: 5 TABLET, FILM COATED ORAL at 20:49

## 2021-01-01 RX ADMIN — IOHEXOL 100 ML: 350 INJECTION, SOLUTION INTRAVENOUS at 13:34

## 2021-01-01 RX ADMIN — PROPOFOL 100 MG: 10 INJECTION, EMULSION INTRAVENOUS at 13:22

## 2021-01-01 RX ADMIN — LOSARTAN POTASSIUM 100 MG: 50 TABLET, FILM COATED ORAL at 05:47

## 2021-01-01 RX ADMIN — ROCURONIUM BROMIDE 30 MG: 10 INJECTION, SOLUTION INTRAVENOUS at 13:23

## 2021-01-01 RX ADMIN — HYDROCODONE BITARTRATE AND ACETAMINOPHEN 15 MG: 7.5; 325 SOLUTION ORAL at 14:40

## 2021-01-01 RX ADMIN — SODIUM CHLORIDE, POTASSIUM CHLORIDE, SODIUM LACTATE AND CALCIUM CHLORIDE: 600; 310; 30; 20 INJECTION, SOLUTION INTRAVENOUS at 13:27

## 2021-01-01 RX ADMIN — GABAPENTIN 300 MG: 300 CAPSULE ORAL at 05:48

## 2021-01-01 RX ADMIN — IOHEXOL 100 ML: 350 INJECTION, SOLUTION INTRAVENOUS at 13:38

## 2021-01-01 RX ADMIN — DOCUSATE SODIUM 50 MG AND SENNOSIDES 8.6 MG 2 TABLET: 8.6; 5 TABLET, FILM COATED ORAL at 18:57

## 2021-01-01 RX ADMIN — CEFAZOLIN 2 G: 330 INJECTION, POWDER, FOR SOLUTION INTRAMUSCULAR; INTRAVENOUS at 13:24

## 2021-01-01 RX ADMIN — LOSARTAN POTASSIUM 100 MG: 50 TABLET, FILM COATED ORAL at 18:45

## 2021-01-01 RX ADMIN — DEXAMETHASONE SODIUM PHOSPHATE 4 MG: 4 INJECTION, SOLUTION INTRA-ARTICULAR; INTRALESIONAL; INTRAMUSCULAR; INTRAVENOUS; SOFT TISSUE at 13:37

## 2021-01-01 ASSESSMENT — PAIN SCALES - GENERAL
PAINLEVEL: NO PAIN
PAIN_LEVEL: 0
PAINLEVEL: NO PAIN

## 2021-01-01 ASSESSMENT — FIBROSIS 4 INDEX
FIB4 SCORE: 2.68
FIB4 SCORE: 2.444444444444444444
FIB4 SCORE: 2.24
FIB4 SCORE: 2.68
FIB4 SCORE: 2.24
FIB4 SCORE: 2.68
FIB4 SCORE: 2.65

## 2021-01-01 ASSESSMENT — PAIN DESCRIPTION - PAIN TYPE
TYPE: ACUTE PAIN
TYPE: SURGICAL PAIN

## 2021-01-01 ASSESSMENT — ENCOUNTER SYMPTOMS
SHORTNESS OF BREATH: 0
COUGH: 0
ABDOMINAL PAIN: 0
FEVER: 0
NAUSEA: 0
INSOMNIA: 0
CHILLS: 0
DIZZINESS: 0
BRUISES/BLEEDS EASILY: 0
ORTHOPNEA: 0
PND: 0
HEADACHES: 0
LOSS OF CONSCIOUSNESS: 0
MYALGIAS: 0
PALPITATIONS: 0

## 2021-01-01 ASSESSMENT — PATIENT HEALTH QUESTIONNAIRE - PHQ9: CLINICAL INTERPRETATION OF PHQ2 SCORE: 0

## 2021-01-03 DIAGNOSIS — Z98.890 HISTORY OF PERCUTANEOUS ANGIOPLASTY: ICD-10-CM

## 2021-01-03 LAB
BACTERIA UR CULT: NORMAL
SIGNIFICANT IND 70042: NORMAL
SITE SITE: NORMAL
SOURCE SOURCE: NORMAL

## 2021-01-05 ENCOUNTER — TELEPHONE (OUTPATIENT)
Dept: CARDIOLOGY | Facility: MEDICAL CENTER | Age: 86
End: 2021-01-05

## 2021-01-05 RX ORDER — CLOPIDOGREL BISULFATE 75 MG/1
TABLET ORAL
Qty: 90 TAB | Refills: 0 | Status: SHIPPED | OUTPATIENT
Start: 2021-01-05 | End: 2021-01-12

## 2021-01-05 NOTE — TELEPHONE ENCOUNTER
TO: 12:30p/Tues/Office  NM: Phoebe Jauregui   PH: [phone]   PT NM: Filiberto aJuregui   : 33   REG DR: Dr Wilson   RE: Needing a refill of his  Clopidogrel 75mg. Requesting a call  back from Keke Longoria.   DISP HIST: 2021 12:04P [facility]  p/disp

## 2021-01-05 NOTE — TELEPHONE ENCOUNTER
Sent refill to pharmacy, called and LVM to notify patient and to have him schedule a follow up visit.

## 2021-01-12 ENCOUNTER — OFFICE VISIT (OUTPATIENT)
Dept: CARDIOLOGY | Facility: MEDICAL CENTER | Age: 86
End: 2021-01-12
Payer: MEDICARE

## 2021-01-12 ENCOUNTER — DOCUMENTATION (OUTPATIENT)
Dept: CARDIOLOGY | Facility: MEDICAL CENTER | Age: 86
End: 2021-01-12

## 2021-01-12 VITALS
DIASTOLIC BLOOD PRESSURE: 64 MMHG | SYSTOLIC BLOOD PRESSURE: 122 MMHG | OXYGEN SATURATION: 95 % | HEART RATE: 72 BPM | BODY MASS INDEX: 23.13 KG/M2 | RESPIRATION RATE: 12 BRPM | WEIGHT: 143.9 LBS | HEIGHT: 66 IN

## 2021-01-12 DIAGNOSIS — Z95.818 S/P MITRAL VALVE CLIP IMPLANTATION: ICD-10-CM

## 2021-01-12 DIAGNOSIS — Z95.820 S/P ANGIOPLASTY WITH STENT: ICD-10-CM

## 2021-01-12 DIAGNOSIS — I10 ESSENTIAL HYPERTENSION: ICD-10-CM

## 2021-01-12 DIAGNOSIS — I25.10 CORONARY ARTERY DISEASE INVOLVING NATIVE CORONARY ARTERY OF NATIVE HEART WITHOUT ANGINA PECTORIS: ICD-10-CM

## 2021-01-12 DIAGNOSIS — Z98.890 S/P MITRAL VALVE CLIP IMPLANTATION: ICD-10-CM

## 2021-01-12 PROCEDURE — 99214 OFFICE O/P EST MOD 30 MIN: CPT | Performed by: INTERNAL MEDICINE

## 2021-01-12 ASSESSMENT — ENCOUNTER SYMPTOMS
RESPIRATORY NEGATIVE: 1
DOUBLE VISION: 0
FEVER: 0
VOMITING: 0
NAUSEA: 0
HEADACHES: 0
PALPITATIONS: 0
MYALGIAS: 0
SHORTNESS OF BREATH: 0
WEIGHT LOSS: 0
FOCAL WEAKNESS: 0
BRUISES/BLEEDS EASILY: 0
GASTROINTESTINAL NEGATIVE: 1
EYES NEGATIVE: 1
CHILLS: 0
ABDOMINAL PAIN: 0
CLAUDICATION: 0
BLURRED VISION: 0
MUSCULOSKELETAL NEGATIVE: 1
CARDIOVASCULAR NEGATIVE: 1
CONSTITUTIONAL NEGATIVE: 1
DEPRESSION: 0
WEAKNESS: 0
NERVOUS/ANXIOUS: 0
COUGH: 0
NEUROLOGICAL NEGATIVE: 1
MEMORY LOSS: 1
DIZZINESS: 0

## 2021-01-12 ASSESSMENT — FIBROSIS 4 INDEX: FIB4 SCORE: 2.46

## 2021-01-12 NOTE — PROGRESS NOTES
"Chief Complaint   Patient presents with   • Mitral Valve Prolapse     s/p mitral valve clip implantation -1year       Subjective:   Filiberto Jauregui is a 87 y.o. male who presents today for one year MitraClip repair follow up.    Since the patient's last visit on 02/07/20, he has been doing well clinically. He denies fatigue, shortness of breath, dyspnea on exertion, chest pain, dizziness or syncope. He suffers with hematuria from prostate and bladder cancer. He also suffers with memory loss.    Past Medical History:   Diagnosis Date   • Bowel habit changes 03/10/2020    Constipation   • Cancer (HCC)     prostate cancer s/p prostatectomy (30 years ago)   • Cancer (HCC)     Bladder CA DX 2019   • Cataract 03/10/2020    OU   • Diabetes (HCC)     oral medication \"borderline\"    • Diabetes (HCC) 03/10/2020    Takes Oral Medication   • Hemorrhagic disorder (HCC) 03/10/2020    Takes Plavix   • High cholesterol    • Hypertension 10/23/2019   • Mitral regurgitation    • MVP (mitral valve prolapse)    • Pneumonia     hx x1    • Sciatica 03/10/2020   • Seasonal allergies    • Snoring 03/10/2020    Has not had a sleep study.   • Valvular heart disease      Past Surgical History:   Procedure Laterality Date   • TURBT (TRANSURETHRAL RESECTION OF BLADDER TUMOR)  3/20/2020    Procedure: TURBT (TRANSURETHRAL RESECTION OF BLADDER TUMOR)- INTRACESICAL GEMCITABINE;  Surgeon: Brad Jones M.D.;  Location: [facility];  Service: Urology   • CYSTOSCOPY Bilateral 3/20/2020    Procedure: CYSTOSCOPY WITH RETROGRADES;  Surgeon: Brad Jones M.D.;  Location: [facility];  Service: Urology   • OTHER CARDIAC SURGERY  03/10/2020    \"Mitral Valve Clip\"   • TRANSCATHETER MITRAL VALVE REPAIR  1/7/2020    Procedure: REPAIR, MITRAL VALVE, TRANSCATHETER;  Surgeon: Gary Wilson M.D.;  Location: [facility];  Service: Cardiac   • JOSÉ  1/7/2020    Procedure: ECHOCARDIOGRAM, TRANSESOPHAGEAL- POTENTIALLY;  Surgeon: " Gary Wilson M.D.;  Location: [facility];  Service: Cardiac   • PB CYSTOURETHROSCOPY,URETER CATHETER Bilateral 11/1/2019    Procedure: CYSTOSCOPY, WITH BILATERAL RETROGRADE PYELOGRAM-AND 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;  Surgeon: Brad Jones M.D.;  Location: [facility];  Service: Urology   • ANGIOGRAM      With Stent Placement   • PROSTATECTOMY, RADIAL     • TONSILLECTOMY       Family History   Problem Relation Age of Onset   • Heart Attack Father    • Lung Disease Mother    • Cancer Sister         ovaian cancer     Social History     Socioeconomic History   • Marital status:      Spouse name: Not on file   • Number of children: Not on file   • Years of education: Not on file   • Highest education level: Not on file   Occupational History   • Not on file   Social Needs   • Financial resource strain: Not on file   • Food insecurity     Worry: Not on file     Inability: Not on file   • Transportation needs     Medical: Not on file     Non-medical: Not on file   Tobacco Use   • Smoking status: Never Smoker   • Smokeless tobacco: Never Used   Substance and Sexual Activity   • Alcohol use: Yes     Alcohol/week: 0.6 oz     Types: 1 Glasses of wine per week     Frequency: Monthly or less     Comment: seldom   • Drug use: No   • Sexual activity: Not on file   Lifestyle   • Physical activity     Days per week: Not on file     Minutes per session: Not on file   • Stress: Not on file   Relationships   • Social connections     Talks on phone: Not on file     Gets together: Not on file     Attends Mandaeism service: Not on file     Active member of club or organization: Not on file     Attends meetings of clubs or organizations: Not on file     Relationship status: Not on file   • Intimate partner violence     Fear of current or ex partner: Not on file     Emotionally abused: Not on file     Physically abused: Not on file     Forced sexual activity: Not on file   Other  Topics Concern   • Not on file   Social History Narrative   • Not on file     Allergies   Allergen Reactions   • Contrast Media With Iodine [Iodine] Rash     RASH    • Iodide      Per pt broke out in rash   • Penicillins Rash     .     (Medications reviewed.)  Outpatient Encounter Medications as of 1/12/2021   Medication Sig Dispense Refill   • diphenoxylate-atropine (LOMOTIL) 2.5-0.025 MG Tab TAKE 1 TO 2 TABLETS BY MOUTH 4 TIMES DAILY AS NEEDED FOR DIARRHEA     • Multiple Vitamins-Minerals (CENTRUM SILVER 50+MEN PO) Take 1 tablet by mouth.     • metFORMIN (GLUCOPHAGE) 500 MG Tab Take 1-2 Tabs by mouth 2 times a day, with meals. 1000 mg in am and 500 mg in pm 90 Tab 0   • cetirizine (ZYRTEC) 10 MG Tab Take 10 mg by mouth as needed.     • atorvastatin (LIPITOR) 40 MG Tab Take 1 Tab by mouth every day. 30 Tab 11   • gabapentin (NEURONTIN) 100 MG Cap TAKE 1 CAPSULE BY MOUTH TWICE DAILY MAY INCREASE AS NEEDED EVERY 5 DAYS UP TO MAXIMUM OF 18 CAPSULES PER DAY  5   • docusate sodium (COLACE) 100 MG Cap Take 100 mg by mouth 2 times a day as needed for Constipation.     • donepezil (ARICEPT) 10 MG tablet Take 10 mg by mouth every evening.  3   • losartan (COZAAR) 100 MG Tab Take 100 mg by mouth every day.     • [DISCONTINUED] clopidogrel (PLAVIX) 75 MG Tab Take 1 tablet by mouth once daily 90 Tab 0   • [DISCONTINUED] predniSONE (DELTASONE) 50 MG Tab Please follow directions for prior to CT scans with contrast (Patient not taking: Reported on 1/12/2021) 15 Tab 0   • [DISCONTINUED] FERROUS SULFATE PO Take  by mouth.     • [DISCONTINUED] aspirin EC 81 MG EC tablet Take 1 Tab by mouth every day. (Patient not taking: Reported on 6/17/2020) 30 Tab 11     No facility-administered encounter medications on file as of 1/12/2021.      Review of Systems   Constitutional: Negative.  Negative for chills, fever, malaise/fatigue and weight loss.   HENT: Negative.  Negative for hearing loss.    Eyes: Negative.  Negative for blurred vision  "and double vision.   Respiratory: Negative.  Negative for cough and shortness of breath.    Cardiovascular: Negative.  Negative for chest pain, palpitations, claudication and leg swelling.   Gastrointestinal: Negative.  Negative for abdominal pain, nausea and vomiting.   Genitourinary: Positive for hematuria. Negative for dysuria and urgency.   Musculoskeletal: Negative.  Negative for joint pain and myalgias.   Skin: Negative.  Negative for itching and rash.   Neurological: Negative.  Negative for dizziness, focal weakness, weakness and headaches.   Endo/Heme/Allergies: Negative.  Does not bruise/bleed easily.   Psychiatric/Behavioral: Positive for memory loss. Negative for depression. The patient is not nervous/anxious.         Objective:   /64 (BP Location: Left arm, Patient Position: Sitting, BP Cuff Size: Adult)   Pulse 72   Resp 12   Ht 1.676 m (5' 6\")   Wt 65.3 kg (143 lb 14.4 oz)   SpO2 95%   BMI 23.23 kg/m²     Physical Exam   Constitutional: He is oriented to person, place, and time. He appears well-developed and well-nourished.   HENT:   Head: Normocephalic and atraumatic.   Eyes: EOM are normal.   Neck: No JVD present.   Cardiovascular: Normal rate, regular rhythm and normal heart sounds.   Pulmonary/Chest: Effort normal and breath sounds normal.   Abdominal: Soft. Bowel sounds are normal.   No hepatosplenomegaly.   Musculoskeletal: Normal range of motion.   Lymphadenopathy:     He has no cervical adenopathy.   Neurological: He is alert and oriented to person, place, and time.   Skin: Skin is warm and dry.   Psychiatric: He has a normal mood and affect.     CARDIAC STUDIES/PROCEDURES:     CARDIAC CATHETERIZATION CONCLUSIONS (12/03/19)  1.  A 4+ mitral regurgitation.  2.  Single-vessel coronary artery disease with high-grade proximal circumflex artery stenosis.  3.  Successful percutaneous transluminal coronary angioplasty/stent placement   of the proximal circumflex artery with 3.0x16 mm " Synergy drug-eluting stent.  4.  Normal left ventricular systolic function with ejection fraction of 60%.  5.  Elevated left ventricular end-diastolic pressure.  6.  Elevated right heart pressure with pulmonary artery systolic pressure of 55 mmHg.     CAROTID ULTRASOUND (01/02/20)  Mild bilateral internal carotid artery stenosis (<50%).   Antegrade flow, bilateral vertebral arteries.     ECHOCARDIOGRAM CONCLUSIONS (02/14/20)  Normal left ventricular systolic function  Left ventricular ejection fraction is visually estimated to be 65%.  There is a small defect in the septum secundum/fossa ovalis with left to right shunting.  Status post Susanne-Clip Mean transvalvular gradient is 4 mmHg at a heart rate of 56 BPM.   1-2+ MR.   Estimated right ventricular systolic pressure is 50 mmHg.  Compared to the images of the prior study done 01/08/2020 mitral   regurgitation looks more prominent on the present study.   (study result reviewed)     ECHOCARDIOGRAM CONCLUSIONS (01/08/20)  Compared to the images of the study done 8/14/19 - there has been   interval placement of MITRACLIP.  Normal left ventricular systolic function.  Left ventricular ejection fraction is visually estimated to be 65%.  The right ventricle was normal in size and function.  Status post MITRACLIP x2.  Moderate, highly eccentric mitral regurgitation.  Flow noted across the interatrial septum with color Doppler indicative   of left-to-right shunt.     ECHOCARDIOGRAM CONCLUSIONS (08/14/19)  Compared to the images of the prior study done on 01/22/18, MR is   severe, RV further dilated.  Left ventricular ejection fraction is visually estimated to be 55%.  Prolapse of the posterior mitral leaflet was present.  Probably severe, highly eccentric mitral regurgitation due to   generative MV disease, multiple jets.  Possible mobile echo density flowing into the left atrium, best seen in apical 2 C view.  Unable to estimate accurate RVSP, appears to be > 45 mmHg, RAP  normal.  Mildly dilated right ventricle.  Findings DW Dr. Johnson at time of report.  Consider MitraClip image JOSÉ if clinically relevant.      ECHOCARDIOGRAM CONCLUSIONS (01/22/19)  Normal left ventricular size, thickness, systolic function, and diastolic function.  Left ventricular ejection fraction is visually estimated to be 60%.  Normal inferior vena cava size and inspiratory collapse.  Prolapse of the posterior mitral leaflet was present. Significant   turbulence is noted across the mitral valve. difficult to accurately   quantify mitral regurgitation severity. overall appears to have   moderate mitral regurgitation.  Aortic sclerosis without stenosis.     EKG performed on (03/10?20) was reviewed: EKG personally interpreted shows sinus rhythm with early R/S transition.  EKG performed on (01/10/20) EKG shows sinus rhythm.  EKG performed on (01/08/20) EKG shows sinus bradycardia.  EKG performed on (12/04/19) EKG shows sinus bradycardia.  EKG performed on (08/29/19) EKG shows sinus rhythm.     Laboratory results of (01/08/20) Bun of 16 mg/dl, creatinine levels of 0.82 mg/dl noted.     TRANSESOPHAGEAL ECHOCARDIOGRAM CONCLUSIONS by Laverne Garces (10/24/19)  LV EF visually estimated to be 60%.  Biatrial enlargement.  Neetu class II severe mitral regurgitation, P2, P3 prolapse, possible very small A3 chordae in the left atrium.  Multiple MR jets, partially central, partially anteriorly directed.  Pulmonary vein systolic flow reversal present.     TRANSESOPHAGEAL ECHOCARDIOGRAM CONCLUSIONS by Dr. Dupont (01/07/20)  Intraoperative JOSÉ during mitraclip procedure.    Normal biventricular systolic function.    P2 posterior mitral leaflet prolapse with medial P2 flail and P2 cleft seen.    Severe mitral regurgitation with anteriorly directed jet.    Two Mitraclips placed at the medial A2/P2 position with reduction in MR to mild.    Normalization of pulmonary venous inflow to systolic dominant.    Mean gradient of  3 mm Hg.  No evidence of Complication.    Assessment:     1. S/P mitral valve clip implantation     2. Coronary artery disease involving native coronary artery of native heart without angina pectoris     3. S/P angioplasty with stent     4. Essential hypertension         Medical Decision Making:  Today's Assessment / Status / Plan:     1. Status post transcatheter percutaneous mitral valve repair (MitraClip) with 2 XTr clips, under general anesthesia (01/07/20): He is clinically doing well, NYHA class II. We will discontinue Plavix and continue with aspirin therapy only. We will repeat an echocardiogram and in one year.  2. Coronary artery disease with stent placement: He is clinically doing well without recurrence of his angina.  We will continue with current medical care including aspirin, losartan and atorvastatin.  3. Hypertension: Blood pressure is well controlled. We will continue with angiotensin receptor blockade.  4. Memory loss: His memory loss has improved on medication.  5. Hematuria: His hematuria has resolved. He is pending another surgery.     We will follow up the patient in one year with echocardiogram.     CC Mac Fox

## 2021-01-12 NOTE — PROGRESS NOTES
Valve Program Functional Assessment: 1 year s/p Mitral Clip    [id number]   1a) Showering/bathin  1b) Walking 1 block on ground: 5  1c) Hurrying or joggin  2) Swelling: 3  3) Fatigue: 3  4) Shortness of breath: 7  5) Sleep sitting up: 5  6) Limited enjoyment of life: 5  7) Spend the rest of your life with HF: 4  8a) Hobbies, recreational activities:4  8b) Working or doing household chores:4  8c) Visiting family or friends: 4    6 minute walk test-deferred due to COVID-19       Strength   1) _25_____ kg  2) _25_____ kg  3) _25_____ kg    SAENZ ADLs  Patient independently preforms...   - Bathing: Yes   - Dressing: Yes   - Toileting: Yes   - Transferring: Yes   - Continence: Yes   - Feeding: Yes     Living Situation  Patient lives: with spouse    Mobility Aids   Patient uses:  none

## 2021-01-15 DIAGNOSIS — Z23 NEED FOR VACCINATION: ICD-10-CM

## 2021-01-22 ENCOUNTER — HOSPITAL ENCOUNTER (OUTPATIENT)
Dept: CARDIOLOGY | Facility: MEDICAL CENTER | Age: 86
End: 2021-01-22
Attending: INTERNAL MEDICINE
Payer: MEDICARE

## 2021-01-22 DIAGNOSIS — Z98.890 S/P MITRAL VALVE CLIP IMPLANTATION: ICD-10-CM

## 2021-01-22 DIAGNOSIS — Z95.818 S/P MITRAL VALVE CLIP IMPLANTATION: ICD-10-CM

## 2021-01-22 PROCEDURE — 93306 TTE W/DOPPLER COMPLETE: CPT

## 2021-01-25 ENCOUNTER — TELEPHONE (OUTPATIENT)
Dept: CARDIOLOGY | Facility: MEDICAL CENTER | Age: 86
End: 2021-01-25

## 2021-01-25 LAB
LV EJECT FRACT  99904: 65
LV EJECT FRACT MOD 2C 99903: 72.88
LV EJECT FRACT MOD 4C 99902: 69.22
LV EJECT FRACT MOD BP 99901: 72.2

## 2021-01-25 PROCEDURE — 93306 TTE W/DOPPLER COMPLETE: CPT | Mod: 26 | Performed by: INTERNAL MEDICINE

## 2021-01-26 NOTE — TELEPHONE ENCOUNTER
----- Message -----  From: Gary Wilson M.D.  Sent: 1/25/2021   2:35 PM PST  To: Lina Morin R.N.    Please call patient with unremarkable echocardiogram results showing normally functioning MitraClip results with mild to moderate mitral regurgitation.    Thanks.  USAMA

## 2021-01-28 ENCOUNTER — HOSPITAL ENCOUNTER (OUTPATIENT)
Facility: MEDICAL CENTER | Age: 86
End: 2021-01-28
Attending: PHYSICIAN ASSISTANT
Payer: MEDICARE

## 2021-01-28 LAB — CYTOLOGY REG CYTOL: NORMAL

## 2021-01-28 PROCEDURE — 88112 CYTOPATH CELL ENHANCE TECH: CPT

## 2021-01-30 ENCOUNTER — HOSPITAL ENCOUNTER (OUTPATIENT)
Dept: LAB | Facility: MEDICAL CENTER | Age: 86
End: 2021-01-30
Attending: PHYSICIAN ASSISTANT
Payer: MEDICARE

## 2021-01-30 LAB
BASOPHILS # BLD AUTO: 0.6 % (ref 0–1.8)
BASOPHILS # BLD: 0.03 K/UL (ref 0–0.12)
EOSINOPHIL # BLD AUTO: 0.08 K/UL (ref 0–0.51)
EOSINOPHIL NFR BLD: 1.5 % (ref 0–6.9)
ERYTHROCYTE [DISTWIDTH] IN BLOOD BY AUTOMATED COUNT: 48.1 FL (ref 35.9–50)
HCT VFR BLD AUTO: 21.2 % (ref 42–52)
HGB BLD-MCNC: 6.4 G/DL (ref 14–18)
IMM GRANULOCYTES # BLD AUTO: 0.01 K/UL (ref 0–0.11)
IMM GRANULOCYTES NFR BLD AUTO: 0.2 % (ref 0–0.9)
LYMPHOCYTES # BLD AUTO: 1.56 K/UL (ref 1–4.8)
LYMPHOCYTES NFR BLD: 29.1 % (ref 22–41)
MCH RBC QN AUTO: 27.1 PG (ref 27–33)
MCHC RBC AUTO-ENTMCNC: 30.2 G/DL (ref 33.7–35.3)
MCV RBC AUTO: 89.8 FL (ref 81.4–97.8)
MONOCYTES # BLD AUTO: 0.37 K/UL (ref 0–0.85)
MONOCYTES NFR BLD AUTO: 6.9 % (ref 0–13.4)
NEUTROPHILS # BLD AUTO: 3.32 K/UL (ref 1.82–7.42)
NEUTROPHILS NFR BLD: 61.7 % (ref 44–72)
NRBC # BLD AUTO: 0 K/UL
NRBC BLD-RTO: 0 /100 WBC
PLATELET # BLD AUTO: 204 K/UL (ref 164–446)
PMV BLD AUTO: 9.8 FL (ref 9–12.9)
RBC # BLD AUTO: 2.36 M/UL (ref 4.7–6.1)
WBC # BLD AUTO: 5.4 K/UL (ref 4.8–10.8)

## 2021-01-30 PROCEDURE — 85025 COMPLETE CBC W/AUTO DIFF WBC: CPT

## 2021-01-30 PROCEDURE — 36415 COLL VENOUS BLD VENIPUNCTURE: CPT

## 2021-02-01 ENCOUNTER — HOSPITAL ENCOUNTER (INPATIENT)
Facility: MEDICAL CENTER | Age: 86
LOS: 3 days | DRG: 669 | End: 2021-02-04
Attending: EMERGENCY MEDICINE | Admitting: STUDENT IN AN ORGANIZED HEALTH CARE EDUCATION/TRAINING PROGRAM
Payer: MEDICARE

## 2021-02-01 ENCOUNTER — APPOINTMENT (OUTPATIENT)
Dept: RADIOLOGY | Facility: MEDICAL CENTER | Age: 86
DRG: 669 | End: 2021-02-01
Attending: EMERGENCY MEDICINE
Payer: MEDICARE

## 2021-02-01 DIAGNOSIS — I10 ESSENTIAL HYPERTENSION: ICD-10-CM

## 2021-02-01 DIAGNOSIS — C67.3 MALIGNANT NEOPLASM OF ANTERIOR WALL OF URINARY BLADDER (HCC): ICD-10-CM

## 2021-02-01 DIAGNOSIS — D63.8 ANEMIA IN OTHER CHRONIC DISEASES CLASSIFIED ELSEWHERE: ICD-10-CM

## 2021-02-01 DIAGNOSIS — C67.9 MALIGNANT NEOPLASM OF URINARY BLADDER, UNSPECIFIED SITE (HCC): ICD-10-CM

## 2021-02-01 DIAGNOSIS — R31.9 HEMATURIA, UNSPECIFIED TYPE: ICD-10-CM

## 2021-02-01 DIAGNOSIS — R53.1 GENERALIZED WEAKNESS: ICD-10-CM

## 2021-02-01 PROBLEM — D62 ACUTE BLOOD LOSS ANEMIA: Status: ACTIVE | Noted: 2021-02-01

## 2021-02-01 PROBLEM — F03.90 DEMENTIA (HCC): Status: ACTIVE | Noted: 2021-02-01

## 2021-02-01 PROBLEM — Z79.899 DVT PROPHYLAXIS: Status: ACTIVE | Noted: 2021-02-01

## 2021-02-01 LAB
ABO GROUP BLD: NORMAL
ALBUMIN SERPL BCP-MCNC: 4.1 G/DL (ref 3.2–4.9)
ALBUMIN/GLOB SERPL: 1.9 G/DL
ALP SERPL-CCNC: 55 U/L (ref 30–99)
ALT SERPL-CCNC: 31 U/L (ref 2–50)
ANION GAP SERPL CALC-SCNC: 9 MMOL/L (ref 7–16)
APPEARANCE UR: ABNORMAL
AST SERPL-CCNC: 33 U/L (ref 12–45)
BACTERIA #/AREA URNS HPF: NEGATIVE /HPF
BARCODED ABORH UBTYP: 9500
BARCODED PRD CODE UBPRD: NORMAL
BARCODED UNIT NUM UBUNT: NORMAL
BASOPHILS # BLD AUTO: 0.5 % (ref 0–1.8)
BASOPHILS # BLD: 0.03 K/UL (ref 0–0.12)
BILIRUB SERPL-MCNC: 1 MG/DL (ref 0.1–1.5)
BILIRUB UR QL STRIP.AUTO: ABNORMAL
BLD GP AB SCN SERPL QL: NORMAL
BUN SERPL-MCNC: 28 MG/DL (ref 8–22)
CALCIUM SERPL-MCNC: 8.7 MG/DL (ref 8.4–10.2)
CHLORIDE SERPL-SCNC: 100 MMOL/L (ref 96–112)
CO2 SERPL-SCNC: 26 MMOL/L (ref 20–33)
COLOR UR: ABNORMAL
COMPONENT R 8504R: NORMAL
CREAT SERPL-MCNC: 1.36 MG/DL (ref 0.5–1.4)
EOSINOPHIL # BLD AUTO: 0.07 K/UL (ref 0–0.51)
EOSINOPHIL NFR BLD: 1.2 % (ref 0–6.9)
EPI CELLS #/AREA URNS HPF: NEGATIVE /HPF
EPITHELIAL CELLS RENAL  1715R: ABNORMAL /HPF
ERYTHROCYTE [DISTWIDTH] IN BLOOD BY AUTOMATED COUNT: 47.4 FL (ref 35.9–50)
ERYTHROCYTE [DISTWIDTH] IN BLOOD BY AUTOMATED COUNT: 47.4 FL (ref 35.9–50)
GLOBULIN SER CALC-MCNC: 2.2 G/DL (ref 1.9–3.5)
GLUCOSE SERPL-MCNC: 159 MG/DL (ref 65–99)
GLUCOSE UR STRIP.AUTO-MCNC: NEGATIVE MG/DL
HCT VFR BLD AUTO: 20.3 % (ref 42–52)
HCT VFR BLD AUTO: 22.2 % (ref 42–52)
HCT VFR BLD AUTO: 23 % (ref 42–52)
HGB BLD-MCNC: 6.3 G/DL (ref 14–18)
HGB BLD-MCNC: 6.9 G/DL (ref 14–18)
HGB BLD-MCNC: 7.1 G/DL (ref 14–18)
HYALINE CASTS #/AREA URNS LPF: ABNORMAL /LPF
IMM GRANULOCYTES # BLD AUTO: 0.03 K/UL (ref 0–0.11)
IMM GRANULOCYTES NFR BLD AUTO: 0.5 % (ref 0–0.9)
KETONES UR STRIP.AUTO-MCNC: ABNORMAL MG/DL
LEUKOCYTE ESTERASE UR QL STRIP.AUTO: ABNORMAL
LYMPHOCYTES # BLD AUTO: 1.07 K/UL (ref 1–4.8)
LYMPHOCYTES NFR BLD: 18.7 % (ref 22–41)
MCH RBC QN AUTO: 27.2 PG (ref 27–33)
MCH RBC QN AUTO: 27.6 PG (ref 27–33)
MCHC RBC AUTO-ENTMCNC: 30.9 G/DL (ref 33.7–35.3)
MCHC RBC AUTO-ENTMCNC: 31 G/DL (ref 33.7–35.3)
MCV RBC AUTO: 88.1 FL (ref 81.4–97.8)
MCV RBC AUTO: 89 FL (ref 81.4–97.8)
MICRO URNS: ABNORMAL
MONOCYTES # BLD AUTO: 0.49 K/UL (ref 0–0.85)
MONOCYTES NFR BLD AUTO: 8.6 % (ref 0–13.4)
NEUTROPHILS # BLD AUTO: 4.03 K/UL (ref 1.82–7.42)
NEUTROPHILS NFR BLD: 70.5 % (ref 44–72)
NITRITE UR QL STRIP.AUTO: POSITIVE
NRBC # BLD AUTO: 0 K/UL
NRBC BLD-RTO: 0 /100 WBC
PH UR STRIP.AUTO: 6.5 [PH] (ref 5–8)
PLATELET # BLD AUTO: 166 K/UL (ref 164–446)
PLATELET # BLD AUTO: 191 K/UL (ref 164–446)
PMV BLD AUTO: 9.1 FL (ref 9–12.9)
PMV BLD AUTO: 9.2 FL (ref 9–12.9)
POTASSIUM SERPL-SCNC: 4.3 MMOL/L (ref 3.6–5.5)
PRODUCT TYPE UPROD: NORMAL
PROT SERPL-MCNC: 6.3 G/DL (ref 6–8.2)
PROT UR QL STRIP: >=300 MG/DL
RBC # BLD AUTO: 2.28 M/UL (ref 4.7–6.1)
RBC # BLD AUTO: 2.61 M/UL (ref 4.7–6.1)
RBC # URNS HPF: ABNORMAL /HPF
RBC UR QL AUTO: ABNORMAL
RH BLD: NORMAL
SARS-COV-2 RNA RESP QL NAA+PROBE: NOTDETECTED
SODIUM SERPL-SCNC: 135 MMOL/L (ref 135–145)
SP GR UR STRIP.AUTO: 1.01
SPECIMEN SOURCE: NORMAL
UNIT STATUS USTAT: NORMAL
WBC # BLD AUTO: 5.7 K/UL (ref 4.8–10.8)
WBC # BLD AUTO: 6.3 K/UL (ref 4.8–10.8)
WBC #/AREA URNS HPF: ABNORMAL /HPF

## 2021-02-01 PROCEDURE — A9270 NON-COVERED ITEM OR SERVICE: HCPCS | Performed by: STUDENT IN AN ORGANIZED HEALTH CARE EDUCATION/TRAINING PROGRAM

## 2021-02-01 PROCEDURE — 85018 HEMOGLOBIN: CPT

## 2021-02-01 PROCEDURE — 85025 COMPLETE CBC W/AUTO DIFF WBC: CPT

## 2021-02-01 PROCEDURE — 96374 THER/PROPH/DIAG INJ IV PUSH: CPT

## 2021-02-01 PROCEDURE — U0003 INFECTIOUS AGENT DETECTION BY NUCLEIC ACID (DNA OR RNA); SEVERE ACUTE RESPIRATORY SYNDROME CORONAVIRUS 2 (SARS-COV-2) (CORONAVIRUS DISEASE [COVID-19]), AMPLIFIED PROBE TECHNIQUE, MAKING USE OF HIGH THROUGHPUT TECHNOLOGIES AS DESCRIBED BY CMS-2020-01-R: HCPCS

## 2021-02-01 PROCEDURE — P9016 RBC LEUKOCYTES REDUCED: HCPCS

## 2021-02-01 PROCEDURE — 86923 COMPATIBILITY TEST ELECTRIC: CPT

## 2021-02-01 PROCEDURE — 700111 HCHG RX REV CODE 636 W/ 250 OVERRIDE (IP): Performed by: EMERGENCY MEDICINE

## 2021-02-01 PROCEDURE — 700105 HCHG RX REV CODE 258: Performed by: EMERGENCY MEDICINE

## 2021-02-01 PROCEDURE — 86850 RBC ANTIBODY SCREEN: CPT

## 2021-02-01 PROCEDURE — 81001 URINALYSIS AUTO W/SCOPE: CPT

## 2021-02-01 PROCEDURE — 86900 BLOOD TYPING SEROLOGIC ABO: CPT

## 2021-02-01 PROCEDURE — 86901 BLOOD TYPING SEROLOGIC RH(D): CPT

## 2021-02-01 PROCEDURE — 700102 HCHG RX REV CODE 250 W/ 637 OVERRIDE(OP): Performed by: STUDENT IN AN ORGANIZED HEALTH CARE EDUCATION/TRAINING PROGRAM

## 2021-02-01 PROCEDURE — 80053 COMPREHEN METABOLIC PANEL: CPT

## 2021-02-01 PROCEDURE — 700117 HCHG RX CONTRAST REV CODE 255: Performed by: STUDENT IN AN ORGANIZED HEALTH CARE EDUCATION/TRAINING PROGRAM

## 2021-02-01 PROCEDURE — 770006 HCHG ROOM/CARE - MED/SURG/GYN SEMI*

## 2021-02-01 PROCEDURE — 99285 EMERGENCY DEPT VISIT HI MDM: CPT

## 2021-02-01 PROCEDURE — 85027 COMPLETE CBC AUTOMATED: CPT

## 2021-02-01 PROCEDURE — 85014 HEMATOCRIT: CPT

## 2021-02-01 PROCEDURE — 74178 CT ABD&PLV WO CNTR FLWD CNTR: CPT

## 2021-02-01 PROCEDURE — 96375 TX/PRO/DX INJ NEW DRUG ADDON: CPT

## 2021-02-01 PROCEDURE — 30233N1 TRANSFUSION OF NONAUTOLOGOUS RED BLOOD CELLS INTO PERIPHERAL VEIN, PERCUTANEOUS APPROACH: ICD-10-PCS | Performed by: STUDENT IN AN ORGANIZED HEALTH CARE EDUCATION/TRAINING PROGRAM

## 2021-02-01 PROCEDURE — U0005 INFEC AGEN DETEC AMPLI PROBE: HCPCS

## 2021-02-01 PROCEDURE — 36430 TRANSFUSION BLD/BLD COMPNT: CPT

## 2021-02-01 PROCEDURE — 99222 1ST HOSP IP/OBS MODERATE 55: CPT | Mod: AI | Performed by: STUDENT IN AN ORGANIZED HEALTH CARE EDUCATION/TRAINING PROGRAM

## 2021-02-01 PROCEDURE — 700105 HCHG RX REV CODE 258: Performed by: STUDENT IN AN ORGANIZED HEALTH CARE EDUCATION/TRAINING PROGRAM

## 2021-02-01 RX ORDER — SODIUM CHLORIDE 9 MG/ML
INJECTION, SOLUTION INTRAVENOUS CONTINUOUS
Status: ACTIVE | OUTPATIENT
Start: 2021-02-01 | End: 2021-02-02

## 2021-02-01 RX ORDER — ENALAPRILAT 1.25 MG/ML
1.25 INJECTION INTRAVENOUS EVERY 6 HOURS PRN
Status: DISCONTINUED | OUTPATIENT
Start: 2021-02-01 | End: 2021-02-04 | Stop reason: HOSPADM

## 2021-02-01 RX ORDER — AMOXICILLIN 250 MG
2 CAPSULE ORAL 2 TIMES DAILY
Status: DISCONTINUED | OUTPATIENT
Start: 2021-02-01 | End: 2021-02-04 | Stop reason: HOSPADM

## 2021-02-01 RX ORDER — ACETAMINOPHEN 325 MG/1
650 TABLET ORAL EVERY 6 HOURS PRN
Status: DISCONTINUED | OUTPATIENT
Start: 2021-02-01 | End: 2021-02-04 | Stop reason: HOSPADM

## 2021-02-01 RX ORDER — POLYETHYLENE GLYCOL 3350 17 G/17G
1 POWDER, FOR SOLUTION ORAL
Status: DISCONTINUED | OUTPATIENT
Start: 2021-02-01 | End: 2021-02-04 | Stop reason: HOSPADM

## 2021-02-01 RX ORDER — BISACODYL 10 MG
10 SUPPOSITORY, RECTAL RECTAL
Status: DISCONTINUED | OUTPATIENT
Start: 2021-02-01 | End: 2021-02-04 | Stop reason: HOSPADM

## 2021-02-01 RX ORDER — SODIUM CHLORIDE, SODIUM LACTATE, POTASSIUM CHLORIDE, CALCIUM CHLORIDE 600; 310; 30; 20 MG/100ML; MG/100ML; MG/100ML; MG/100ML
INJECTION, SOLUTION INTRAVENOUS CONTINUOUS
Status: DISCONTINUED | OUTPATIENT
Start: 2021-02-01 | End: 2021-02-02

## 2021-02-01 RX ORDER — GABAPENTIN 300 MG/1
300 CAPSULE ORAL DAILY
Status: DISCONTINUED | OUTPATIENT
Start: 2021-02-02 | End: 2021-02-04 | Stop reason: HOSPADM

## 2021-02-01 RX ORDER — OXYCODONE HYDROCHLORIDE 5 MG/1
5 TABLET ORAL
Status: DISCONTINUED | OUTPATIENT
Start: 2021-02-01 | End: 2021-02-04 | Stop reason: HOSPADM

## 2021-02-01 RX ORDER — ATORVASTATIN CALCIUM 40 MG/1
40 TABLET, FILM COATED ORAL DAILY
Status: DISCONTINUED | OUTPATIENT
Start: 2021-02-02 | End: 2021-02-04 | Stop reason: HOSPADM

## 2021-02-01 RX ORDER — DIPHENHYDRAMINE HYDROCHLORIDE 50 MG/ML
50 INJECTION INTRAMUSCULAR; INTRAVENOUS ONCE
Status: COMPLETED | OUTPATIENT
Start: 2021-02-01 | End: 2021-02-01

## 2021-02-01 RX ORDER — LOSARTAN POTASSIUM 25 MG/1
100 TABLET ORAL DAILY
Status: DISCONTINUED | OUTPATIENT
Start: 2021-02-02 | End: 2021-02-04 | Stop reason: HOSPADM

## 2021-02-01 RX ORDER — METHYLPREDNISOLONE SODIUM SUCCINATE 125 MG/2ML
125 INJECTION, POWDER, LYOPHILIZED, FOR SOLUTION INTRAMUSCULAR; INTRAVENOUS ONCE
Status: COMPLETED | OUTPATIENT
Start: 2021-02-01 | End: 2021-02-01

## 2021-02-01 RX ORDER — DONEPEZIL HYDROCHLORIDE 5 MG/1
10 TABLET, FILM COATED ORAL EVERY EVENING
Status: DISCONTINUED | OUTPATIENT
Start: 2021-02-01 | End: 2021-02-04 | Stop reason: HOSPADM

## 2021-02-01 RX ORDER — OXYCODONE HYDROCHLORIDE 5 MG/1
2.5 TABLET ORAL
Status: DISCONTINUED | OUTPATIENT
Start: 2021-02-01 | End: 2021-02-04 | Stop reason: HOSPADM

## 2021-02-01 RX ADMIN — SODIUM CHLORIDE: 9 INJECTION, SOLUTION INTRAVENOUS at 13:43

## 2021-02-01 RX ADMIN — DONEPEZIL HYDROCHLORIDE 10 MG: 5 TABLET, FILM COATED ORAL at 17:13

## 2021-02-01 RX ADMIN — SODIUM CHLORIDE, POTASSIUM CHLORIDE, SODIUM LACTATE AND CALCIUM CHLORIDE: 600; 310; 30; 20 INJECTION, SOLUTION INTRAVENOUS at 17:18

## 2021-02-01 RX ADMIN — IOHEXOL 100 ML: 350 INJECTION, SOLUTION INTRAVENOUS at 15:16

## 2021-02-01 RX ADMIN — DIPHENHYDRAMINE HYDROCHLORIDE 50 MG: 50 INJECTION, SOLUTION INTRAMUSCULAR; INTRAVENOUS at 14:16

## 2021-02-01 RX ADMIN — METHYLPREDNISOLONE SODIUM SUCCINATE 125 MG: 125 INJECTION, POWDER, FOR SOLUTION INTRAMUSCULAR; INTRAVENOUS at 14:14

## 2021-02-01 ASSESSMENT — COGNITIVE AND FUNCTIONAL STATUS - GENERAL
SUGGESTED CMS G CODE MODIFIER MOBILITY: CJ
SUGGESTED CMS G CODE MODIFIER DAILY ACTIVITY: CH
MOBILITY SCORE: 22
STANDING UP FROM CHAIR USING ARMS: A LITTLE
WALKING IN HOSPITAL ROOM: A LITTLE
DAILY ACTIVITIY SCORE: 24

## 2021-02-01 ASSESSMENT — FIBROSIS 4 INDEX: FIB4 SCORE: 2.42

## 2021-02-01 ASSESSMENT — ENCOUNTER SYMPTOMS
FALLS: 0
DIARRHEA: 0
CONSTIPATION: 0
WEIGHT LOSS: 0
FLANK PAIN: 0
HEADACHES: 0
HEARTBURN: 0
INSOMNIA: 0
TINGLING: 0
WHEEZING: 0
ABDOMINAL PAIN: 0
NAUSEA: 0
CHILLS: 0
NERVOUS/ANXIOUS: 0
DEPRESSION: 0
SENSORY CHANGE: 0
EYE DISCHARGE: 0
EYE REDNESS: 0
VOMITING: 0
FOCAL WEAKNESS: 0
HALLUCINATIONS: 0
COUGH: 0
PALPITATIONS: 0
SORE THROAT: 0
WEAKNESS: 0
FEVER: 0
SHORTNESS OF BREATH: 0
BACK PAIN: 0
DIZZINESS: 0

## 2021-02-01 ASSESSMENT — LIFESTYLE VARIABLES
TOTAL SCORE: 0
HOW MANY TIMES IN THE PAST YEAR HAVE YOU HAD 5 OR MORE DRINKS IN A DAY: 0
EVER FELT BAD OR GUILTY ABOUT YOUR DRINKING: NO
TOTAL SCORE: 0
ALCOHOL_USE: NO
EVER HAD A DRINK FIRST THING IN THE MORNING TO STEADY YOUR NERVES TO GET RID OF A HANGOVER: NO
HAVE YOU EVER FELT YOU SHOULD CUT DOWN ON YOUR DRINKING: NO
AVERAGE NUMBER OF DAYS PER WEEK YOU HAVE A DRINK CONTAINING ALCOHOL: 0
ON A TYPICAL DAY WHEN YOU DRINK ALCOHOL HOW MANY DRINKS DO YOU HAVE: 0
TOTAL SCORE: 0
CONSUMPTION TOTAL: NEGATIVE
SUBSTANCE_ABUSE: 0
HAVE PEOPLE ANNOYED YOU BY CRITICIZING YOUR DRINKING: NO

## 2021-02-01 ASSESSMENT — PATIENT HEALTH QUESTIONNAIRE - PHQ9
1. LITTLE INTEREST OR PLEASURE IN DOING THINGS: NOT AT ALL
SUM OF ALL RESPONSES TO PHQ9 QUESTIONS 1 AND 2: 0
2. FEELING DOWN, DEPRESSED, IRRITABLE, OR HOPELESS: NOT AT ALL

## 2021-02-01 ASSESSMENT — PAIN DESCRIPTION - PAIN TYPE: TYPE: ACUTE PAIN

## 2021-02-01 NOTE — CONSULTS
"[facility] Consult/H&P Note    Primary Service:   Attending: Angel Alvarez M.D.  Patient's Name/MRN: Filiberto Jauregui, [id number]    Admit Date:2/1/2021  Today's Date: 2/1/2021   Length of stay:  LOS: 0 days   Room #: 3314/01      Reason for consult/chief complaint: gross hematuria, h/o bladder cancer  ID/HPI: Filiberto Jauregui is a 87 y.o. male patient presented to emergency department due to abnormal labs. Received a call from our office, Alan Echevarria, in regards to low hemoglobin. Has had ongoing gross hematuria for several months, remains able to urinate. NO symptoms of dizziness, lightheadedness. Does have some baseline mental confusion, since his chemotherapy treatment.      Known to [facility] as a patient of Dr. Arroyo. He has prior history of muscle invasive bladder cancer with radiation treatment as well as prior prostatectomy. Last cystoscopy done December 2020, with normal cytology.  Was planned for repeat procedure in March.     Wife is present at bedside to help with historical information. Urine has been variations of red with and without clots. He has been able to urinate the whole time. Off his anticoagulation for stent for 18 days with reported worsening of this gross hematuria. No complaints of dysuria, frequency or urgency. No prior history of kidney stones    CT shows bladder wall thickening, bladder diverticula and possible radiation changes versus recurrent bladder cancer. Patient is resting at bedside, awaiting transfusion. No current pain.        Past Medical History:   Past Medical History:   Diagnosis Date   • Bowel habit changes 03/10/2020    Constipation   • Cancer (HCC)     prostate cancer s/p prostatectomy (30 years ago)   • Cancer (HCC)     Bladder CA DX 2019   • Cataract 03/10/2020    OU   • Diabetes (HCC)     oral medication \"borderline\"    • Diabetes (HCC) 03/10/2020    Takes Oral Medication   • Hemorrhagic disorder (HCC) 03/10/2020    Takes Plavix   • High cholesterol    • " "Hypertension 10/23/2019   • Mitral regurgitation    • MVP (mitral valve prolapse)    • Pneumonia     hx x1    • Sciatica 03/10/2020   • Seasonal allergies    • Snoring 03/10/2020    Has not had a sleep study.   • Valvular heart disease         Past Surgical History:   Past Surgical History:   Procedure Laterality Date   • TURBT (TRANSURETHRAL RESECTION OF BLADDER TUMOR)  3/20/2020    Procedure: TURBT (TRANSURETHRAL RESECTION OF BLADDER TUMOR)- INTRACESICAL GEMCITABINE;  Surgeon: Brad Jones M.D.;  Location: SURGERY [facility];  Service: Urology   • CYSTOSCOPY Bilateral 3/20/2020    Procedure: CYSTOSCOPY WITH RETROGRADES;  Surgeon: Brad Jones M.D.;  Location: SURGERY [facility];  Service: Urology   • OTHER CARDIAC SURGERY  03/10/2020    \"Mitral Valve Clip\"   • TRANSCATHETER MITRAL VALVE REPAIR  1/7/2020    Procedure: REPAIR, MITRAL VALVE, TRANSCATHETER;  Surgeon: Gary Wilson M.D.;  Location: [facility];  Service: Cardiac   • JOSÉ  1/7/2020    Procedure: ECHOCARDIOGRAM, TRANSESOPHAGEAL- POTENTIALLY;  Surgeon: Gary Wilson M.D.;  Location: SURGERY [facility];  Service: Cardiac   • PB CYSTOURETHROSCOPY,URETER CATHETER Bilateral 11/1/2019    Procedure: CYSTOSCOPY, WITH BILATERAL RETROGRADE PYELOGRAM-AND 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;  Surgeon: Brad Jones M.D.;  Location: [facility];  Service: Urology   • ANGIOGRAM      With Stent Placement   • PROSTATECTOMY, RADIAL     • TONSILLECTOMY          Family History:   Family History   Problem Relation Age of Onset   • Heart Attack Father    • Lung Disease Mother    • Cancer Sister         ovaian cancer         Social History:   Social History     Tobacco Use   • Smoking status: Never Smoker   • Smokeless tobacco: Never Used   Substance Use Topics   • Alcohol use: Yes     Alcohol/week: 0.6 oz     Types: 1 Glasses of wine per week     Frequency: Monthly or less     Comment: seldom   • Drug use: No    " "  Social History     Social History Narrative   • Not on file        Allergies: he Contrast media with iodine [iodine], Iodide, and Penicillins    Medications:   Medications Prior to Admission   Medication Sig Dispense Refill Last Dose   • aspirin EC (ECOTRIN) 81 MG Tablet Delayed Response Take 81 mg by mouth every day.   2/1/2021 at AM   • Multiple Vitamins-Minerals (CENTRUM SILVER 50+MEN PO) Take 1 tablet by mouth.   2/1/2021 at AM   • metFORMIN (GLUCOPHAGE) 500 MG Tab Take 1-2 Tabs by mouth 2 times a day, with meals. 1000 mg in am and 500 mg in pm 90 Tab 0 2/1/2021 at AM   • atorvastatin (LIPITOR) 40 MG Tab Take 1 Tab by mouth every day. 30 Tab 11 2/1/2021 at AM   • gabapentin (NEURONTIN) 100 MG Cap Take 100-300 mg by mouth every day.  5 2/1/2021 at AM   • docusate sodium (COLACE) 100 MG Cap Take 100-200 mg by mouth 2 times a day as needed for Constipation.   1/31/2021 at PM   • donepezil (ARICEPT) 10 MG tablet Take 10 mg by mouth every evening.  3 1/31/2021 at PM   • losartan (COZAAR) 100 MG Tab Take 100 mg by mouth every day.   2/1/2021 at AM         Review of Systems  Review of Systems   Constitutional: Negative for chills and fever.   Respiratory: Negative for cough.    Cardiovascular: Negative for chest pain.   Gastrointestinal: Negative for nausea and vomiting.   Genitourinary: Positive for hematuria. Negative for dysuria, flank pain, frequency and urgency.   Neurological: Negative for dizziness and weakness.        Physical Exam  VITAL SIGNS: /56   Pulse 64   Temp 36.3 °C (97.4 °F) (Temporal)   Resp 19   Ht 1.651 m (5' 5\")   Wt 67 kg (147 lb 11.3 oz)   SpO2 96%   BMI 24.58 kg/m²   Physical Exam   Constitutional: He is oriented to person, place, and time and well-developed, well-nourished, and in no distress.   HENT:   Head: Normocephalic and atraumatic.   Eyes: EOM are normal.   Neck: Normal range of motion.   Abdominal: Soft.   Genitourinary:    Genitourinary Comments: No scott "     Musculoskeletal: Normal range of motion.   Neurological: He is alert and oriented to person, place, and time.   Skin: Skin is warm and dry.   Nursing note and vitals reviewed.        Labs:  Recent Labs     01/30/21  1129 02/01/21  1250   WBC 5.4 5.7   RBC 2.36* 2.28*   HEMOGLOBIN 6.4* 6.3*   HEMATOCRIT 21.2* 20.3*   MCV 89.8 89.0   MCH 27.1 27.6   MCHC 30.2* 31.0*   RDW 48.1 47.4   PLATELETCT 204 191   MPV 9.8 9.1     Recent Labs     02/01/21  1250   SODIUM 135   POTASSIUM 4.3   CHLORIDE 100   CO2 26   GLUCOSE 159*   BUN 28*   CREATININE 1.36   CALCIUM 8.7         Glucose:  Recent Labs     02/01/21  1250   GLUCOSE 159*     Coags:  No results for input(s): INR in the last 72 hours.      Urinalysis:   Recent Labs     02/01/21  1325   COLORURINE Red*   CLARITY Bloody*   SPECGRAVITY 1.010   PHURINE 6.5   GLUCOSEUR Negative   KETONES Trace*   NITRITE Positive*   OCCULTBLOOD Large*   RBCURINE 100-150*   BACTERIA Negative   EPITHELCELL Negative       Imaging:      CT-ABDOMEN & PELVIS UROGRAM   Final Result      1.  Thickened and irregular posterior bladder wall. Wall irregularity appears to somewhat more pronounced than on the prior exam. Perivesicular stranding may represent tumor infiltration or inflammation. There is mild dilatation of the left ureter.      2.  Right bladder wall diverticulum.      3.  Status post prostatectomy.      4.  Atherosclerosis.                                          Assessment/Recommendation   87 y.o.male with history of bladder cancer, now with gross hematuria    · CT reviewed possible posterior bladder mass  · No scott at this time, if any retention, scott can be placed  · NPO at MN for OR cystoscopy, fulguration of bleeders, clot evacuation and possible bladder tumor resection  · Continue to manage pain  · Follow labs  · Transfuse as needed    This case was discussed with Dr. Rain and he is in agreement with aforementioned plan.        Terrence Holder, P.A.-C.   [address]  [address]  [place] [id number]   [phone]

## 2021-02-01 NOTE — ED NOTES
Waiting blood for transfusion.  Admit MD at BS.  Wife at BS and call light in reach; aware to call for any needs/changes.

## 2021-02-01 NOTE — PROGRESS NOTES
Patient brought up from emergency department by Jean VALENCIA. Patient placed into room 314/1. Safety precautions used to transfer patient into bed. Personal belongings and wife at bedside.

## 2021-02-01 NOTE — ASSESSMENT & PLAN NOTE
Hx of invasive bladder cancer with radiation treatment as well as prior prostatectomy. Last cystoscopy done December 2020, with normal cytology

## 2021-02-01 NOTE — ASSESSMENT & PLAN NOTE
Had stent done last year  On aspirin 81mg, which is on hold since admission, discussed with urology, ok to resume, Resumed 2/3  Finished palvix about 2weeks ago fo 1 year

## 2021-02-01 NOTE — H&P
Hospital Medicine History & Physical Note    Date of Service  2/1/2021    Primary Care Physician  Mac Miller M.D.    Consultants  urology    Code Status  Full Code    Chief Complaint  Chief Complaint   Patient presents with   • Abnormal Labs     Here for a low H&H       History of Presenting Illness  Filiberto Jauregui is a 87 y.o. male who presents to the Emergency Department today by his wife with a chief complaint of hematuria.  This has been ongoing for the last few months.  Patient has a history of bladder cancer.  He also was on Plavix until about 18 days ago and his cardiologist took him off, partly because of the continued hematuria and he has been on 81 mg of aspirin.  His wife notes that in the last several days, he has felt very weak and tired.  Blood was drawn on 30 January and they were notified today to come to the emergency department with a low hemoglobin.  He has a scheduled repeat cystoscopy on February 11 with Dr. Jones, his urologist.  Prior to this, his most recent scope was December 10.  He has hematuria going back to at least this time.  He has had urine tests in the office, no evidence of UTI although his wife notes a different smell to his urine which may be related to the blood.  No fever cough or cold symptoms.  No chest pain or shortness of breath.  No trauma or falls.    Review of Systems  Review of Systems   Constitutional: Positive for malaise/fatigue. Negative for chills, fever and weight loss.   HENT: Negative for congestion and sore throat.    Eyes: Negative for discharge and redness.   Respiratory: Negative for cough, shortness of breath and wheezing.    Cardiovascular: Negative for chest pain, palpitations and leg swelling.   Gastrointestinal: Negative for abdominal pain, constipation, diarrhea, heartburn, nausea and vomiting.   Genitourinary: Positive for hematuria. Negative for dysuria, frequency and urgency.   Musculoskeletal: Negative for back pain, falls and joint pain.    Skin: Negative for itching and rash.   Neurological: Negative for dizziness, tingling, sensory change, focal weakness and headaches.   Psychiatric/Behavioral: Negative for depression, hallucinations and substance abuse. The patient is not nervous/anxious and does not have insomnia.    All other systems reviewed and are negative.      Past Medical History   has a past medical history of Bowel habit changes (03/10/2020), Cancer (HCC), Cancer (HCC), Cataract (03/10/2020), Diabetes (HCC), Diabetes (HCC) (03/10/2020), Hemorrhagic disorder (HCC) (03/10/2020), High cholesterol, Hypertension (10/23/2019), Mitral regurgitation, MVP (mitral valve prolapse), Pneumonia, Sciatica (03/10/2020), Seasonal allergies, Snoring (03/10/2020), and Valvular heart disease.    Surgical History   has a past surgical history that includes prostatectomy, radial; pr cystourethroscopy,ureter catheter (Bilateral, 11/1/2019); tonsillectomy; transcatheter mitral valve repair (1/7/2020); catina (1/7/2020); angiogram; other cardiac surgery (03/10/2020); turbt (transurethral resection of bladder tumor) (3/20/2020); and cystoscopy (Bilateral, 3/20/2020).   Mitral valve clip implantation, angioplasty with stent    Family History  family history includes Cancer in his sister; Heart Attack in his father; Lung Disease in his mother.   See above    Social History   reports that he has never smoked. He has never used smokeless tobacco. He reports current alcohol use of about 0.6 oz of alcohol per week. He reports that he does not use drugs.  See above, lives with wife    Allergies  Allergies   Allergen Reactions   • Contrast Media With Iodine [Iodine] Rash     RASH    • Iodide      Per pt broke out in rash   • Penicillins Rash     .       Medications  Prior to Admission Medications   Prescriptions Last Dose Informant Patient Reported? Taking?   Multiple Vitamins-Minerals (CENTRUM SILVER 50+MEN PO) 2/1/2021 at AM Family Member Yes No   Sig: Take 1 tablet by  mouth.   aspirin EC (ECOTRIN) 81 MG Tablet Delayed Response 2/1/2021 at AM Family Member Yes Yes   Sig: Take 81 mg by mouth every day.   atorvastatin (LIPITOR) 40 MG Tab 2/1/2021 at AM Family Member No No   Sig: Take 1 Tab by mouth every day.   docusate sodium (COLACE) 100 MG Cap 1/31/2021 at PM Family Member Yes No   Sig: Take 100-200 mg by mouth 2 times a day as needed for Constipation.   donepezil (ARICEPT) 10 MG tablet 1/31/2021 at PM Family Member Yes No   Sig: Take 10 mg by mouth every evening.   gabapentin (NEURONTIN) 100 MG Cap 2/1/2021 at AM Family Member Yes No   Sig: Take 100-300 mg by mouth every day.   losartan (COZAAR) 100 MG Tab 2/1/2021 at AM Family Member Yes No   Sig: Take 100 mg by mouth every day.   metFORMIN (GLUCOPHAGE) 500 MG Tab 2/1/2021 at AM Family Member No No   Sig: Take 1-2 Tabs by mouth 2 times a day, with meals. 1000 mg in am and 500 mg in pm      Facility-Administered Medications: None       Physical Exam  Temp:  [36.3 °C (97.4 °F)] 36.3 °C (97.4 °F)  Pulse:  [62-72] 64  Resp:  [16-19] 19  BP: (112-137)/(48-56) 119/56  SpO2:  [92 %-97 %] 96 %    Physical Exam  Vitals signs and nursing note reviewed.   Constitutional:       General: He is not in acute distress.     Appearance: Normal appearance.   HENT:      Head: Normocephalic and atraumatic.      Nose: Nose normal. No congestion or rhinorrhea.      Mouth/Throat:      Pharynx: Oropharynx is clear. No posterior oropharyngeal erythema.   Eyes:      Extraocular Movements: Extraocular movements intact.      Conjunctiva/sclera: Conjunctivae normal.      Pupils: Pupils are equal, round, and reactive to light.   Neck:      Musculoskeletal: Normal range of motion and neck supple.      Vascular: No carotid bruit.   Cardiovascular:      Rate and Rhythm: Normal rate and regular rhythm.      Pulses: Normal pulses.      Heart sounds: Normal heart sounds.   Pulmonary:      Effort: Pulmonary effort is normal. No respiratory distress.      Breath  sounds: Normal breath sounds. No wheezing.   Chest:      Chest wall: No tenderness.   Abdominal:      General: Abdomen is flat. Bowel sounds are normal. There is no distension.      Palpations: Abdomen is soft.      Tenderness: There is no abdominal tenderness. There is no guarding or rebound.   Musculoskeletal: Normal range of motion.      Left lower leg: No edema.      Comments: Mild weak left dorsalis pedalis   Skin:     General: Skin is warm.   Neurological:      General: No focal deficit present.      Mental Status: He is alert and oriented to person, place, and time.      Cranial Nerves: No cranial nerve deficit.      Motor: No weakness.      Gait: Gait normal.      Deep Tendon Reflexes: Reflexes normal.   Psychiatric:         Mood and Affect: Mood normal.         Behavior: Behavior normal.         Judgment: Judgment normal.         Laboratory:  Recent Labs     01/30/21  1129 02/01/21  1250   WBC 5.4 5.7   RBC 2.36* 2.28*   HEMOGLOBIN 6.4* 6.3*   HEMATOCRIT 21.2* 20.3*   MCV 89.8 89.0   MCH 27.1 27.6   MCHC 30.2* 31.0*   RDW 48.1 47.4   PLATELETCT 204 191   MPV 9.8 9.1     Recent Labs     02/01/21  1250   SODIUM 135   POTASSIUM 4.3   CHLORIDE 100   CO2 26   GLUCOSE 159*   BUN 28*   CREATININE 1.36   CALCIUM 8.7     Recent Labs     02/01/21  1250   ALTSGPT 31   ASTSGOT 33   ALKPHOSPHAT 55   TBILIRUBIN 1.0   GLUCOSE 159*         No results for input(s): NTPROBNP in the last 72 hours.      No results for input(s): TROPONINT in the last 72 hours.    Imaging:  CT-ABDOMEN & PELVIS UROGRAM    (Results Pending)         Assessment/Plan:  I anticipate this patient will require at least two midnights for appropriate medical management, necessitating inpatient admission.    * Malignant neoplasm of anterior wall of urinary bladder (HCC)- (present on admission)  Assessment & Plan  Had a TURBT March last year  Finished chemo and radiation therapy July last year  heumaturia again    CT urogram today  Urology on board for  cystoscopy tomorrow, which initially was scheduled on February 11  with Dr. Jones, his urologist  Hold aspirin  N.p.o. after midnight    Acute blood loss anemia  Assessment & Plan  Bladder cancer with hematuria worsening recently  Hemoglobin last check about 4 weeks ago was 10; today was 6  Blood transfusion  IV fluid  Iron supplement    S/P mitral valve clip implantation- (present on admission)  Assessment & Plan  F/u by cardiologist    S/P angioplasty with stent- (present on admission)  Assessment & Plan  Had stent done last year  On aspirin 81mg, which is on hold since admission  Finished palvix about 2weeks ago fo r1 year      Essential hypertension- (present on admission)  Assessment & Plan  C/w home medication with holding perimeter    Dementia (HCC)  Assessment & Plan  Donepezil 10mg qd    DVT prophylaxis  Assessment & Plan  scd

## 2021-02-01 NOTE — ED PROVIDER NOTES
ED Provider Note    ED Provider Note    Primary care provider: Mac Miller M.D.  Means of arrival: POV  History obtained from: patient, Wife  History limited by: None    CHIEF COMPLAINT  Chief Complaint   Patient presents with   • Abnormal Labs     Here for a low H&H       HPI  Filiberto Jauregui is a 87 y.o. male who presents to the Emergency Department today by his wife with a chief complaint of hematuria.  This has been ongoing for the last few months.  Patient has a history of bladder cancer.  He also was on Plavix until about 18 days ago and his cardiologist took him off, partly because of the continued hematuria and he has been on 81 mg of aspirin.  His wife notes that in the last several days, he has felt very weak and tired.  Blood was drawn on 30 January and they were notified today to come to the emergency department with a low hemoglobin.  He has a scheduled repeat cystoscopy on February 11 with Dr. Jones, his urologist.  Prior to this, his most recent scope was December 10.  He has hematuria going back to at least this time.  He has had urine tests in the office, no evidence of UTI although his wife notes a different smell to his urine which may be related to the blood.  No fever cough or cold symptoms.  No chest pain or shortness of breath.  No trauma or falls.    REVIEW OF SYSTEMS  Review of Systems   Constitutional: Positive for malaise/fatigue. Negative for chills and fever.   HENT: Negative for congestion.    Respiratory: Negative for cough and shortness of breath.    Cardiovascular: Negative for chest pain.   Gastrointestinal: Negative for nausea and vomiting.   Genitourinary: Positive for hematuria. Negative for flank pain.   Musculoskeletal: Negative for back pain.   Neurological: Negative for headaches.   All other systems reviewed and are negative.      PAST MEDICAL HISTORY   has a past medical history of Bowel habit changes (03/10/2020), Cancer (HCC), Cancer (HCC), Cataract (03/10/2020),  Diabetes (HCC), Diabetes (HCC) (03/10/2020), Hemorrhagic disorder (HCC) (03/10/2020), High cholesterol, Hypertension (10/23/2019), Mitral regurgitation, MVP (mitral valve prolapse), Pneumonia, Sciatica (03/10/2020), Seasonal allergies, Snoring (03/10/2020), and Valvular heart disease.    SURGICAL HISTORY   has a past surgical history that includes prostatectomy, radial; cystourethroscopy,ureter catheter (Bilateral, 11/1/2019); tonsillectomy; transcatheter mitral valve repair (1/7/2020); catina (1/7/2020); angiogram; other cardiac surgery (03/10/2020); turbt (transurethral resection of bladder tumor) (3/20/2020); and cystoscopy (Bilateral, 3/20/2020).    SOCIAL HISTORY  Social History     Tobacco Use   • Smoking status: Never Smoker   • Smokeless tobacco: Never Used   Substance Use Topics   • Alcohol use: Yes     Alcohol/week: 0.6 oz     Types: 1 Glasses of wine per week     Frequency: Monthly or less     Comment: seldom   • Drug use: No      Social History     Substance and Sexual Activity   Drug Use No       FAMILY HISTORY  Family History   Problem Relation Age of Onset   • Heart Attack Father    • Lung Disease Mother    • Cancer Sister         ovaian cancer       CURRENT MEDICATIONS  Home Medications     Reviewed by Jacklyn Magallanes PhT (Pharmacy Tech) on 02/01/21 at 1300  Med List Status: Complete   Medication Last Dose Status   aspirin EC (ECOTRIN) 81 MG Tablet Delayed Response 2/1/2021 Active   atorvastatin (LIPITOR) 40 MG Tab 2/1/2021 Active   docusate sodium (COLACE) 100 MG Cap 1/31/2021 Active   donepezil (ARICEPT) 10 MG tablet 1/31/2021 Active   gabapentin (NEURONTIN) 100 MG Cap 2/1/2021 Active   losartan (COZAAR) 100 MG Tab 2/1/2021 Active   metFORMIN (GLUCOPHAGE) 500 MG Tab 2/1/2021 Active   Multiple Vitamins-Minerals (CENTRUM SILVER 50+MEN PO) 2/1/2021 Active                ALLERGIES  Allergies   Allergen Reactions   • Contrast Media With Iodine [Iodine] Rash     RASH    • Iodide      Per pt broke out in rash  "  • Penicillins Rash     .       PHYSICAL EXAM  VITAL SIGNS: /53   Pulse 62   Temp 36.3 °C (97.4 °F) (Temporal)   Resp 19   Ht 1.651 m (5' 5\")   Wt 67 kg (147 lb 11.3 oz)   SpO2 97%   BMI 24.58 kg/m²   Vitals reviewed.  Constitutional: Patient is oriented to person, place, and time. Appears well-developed and well-nourished. No distress.    Head: Normocephalic and atraumatic.   Ears: Normal external ears bilaterally.   Mouth/Throat: Oropharynx is clear and moist  Eyes: Conjunctivae are normal. Pupils are equal, round, and reactive to light.   Neck: Normal range of motion. Neck supple.  Cardiovascular: Normal rate, regular rhythm and normal heart sounds. Normal peripheral pulses.  Pulmonary/Chest: Effort normal and breath sounds normal. No respiratory distress, no wheezes, rhonchi, or rales.   Abdominal: Soft. Bowel sounds are normal. There is no tenderness. No rebound or guarding, or peritoneal signs.  Musculoskeletal: No edema and no tenderness.   Lymphadenopathy: No cervical adenopathy.   Neurological: No focal deficits.   Skin: Skin is warm and dry. No erythema. + pallor.   Psychiatric: Patient has a normal mood and affect.     LABS  Results for orders placed or performed during the hospital encounter of 02/01/21   CBC WITH DIFFERENTIAL   Result Value Ref Range    WBC 5.7 4.8 - 10.8 K/uL    RBC 2.28 (L) 4.70 - 6.10 M/uL    Hemoglobin 6.3 (L) 14.0 - 18.0 g/dL    Hematocrit 20.3 (L) 42.0 - 52.0 %    MCV 89.0 81.4 - 97.8 fL    MCH 27.6 27.0 - 33.0 pg    MCHC 31.0 (L) 33.7 - 35.3 g/dL    RDW 47.4 35.9 - 50.0 fL    Platelet Count 191 164 - 446 K/uL    MPV 9.1 9.0 - 12.9 fL    Neutrophils-Polys 70.50 44.00 - 72.00 %    Lymphocytes 18.70 (L) 22.00 - 41.00 %    Monocytes 8.60 0.00 - 13.40 %    Eosinophils 1.20 0.00 - 6.90 %    Basophils 0.50 0.00 - 1.80 %    Immature Granulocytes 0.50 0.00 - 0.90 %    Nucleated RBC 0.00 /100 WBC    Neutrophils (Absolute) 4.03 1.82 - 7.42 K/uL    Lymphs (Absolute) 1.07 1.00 - " 4.80 K/uL    Monos (Absolute) 0.49 0.00 - 0.85 K/uL    Eos (Absolute) 0.07 0.00 - 0.51 K/uL    Baso (Absolute) 0.03 0.00 - 0.12 K/uL    Immature Granulocytes (abs) 0.03 0.00 - 0.11 K/uL    NRBC (Absolute) 0.00 K/uL   COD - Adult (Type and Screen)   Result Value Ref Range    ABO Grouping Only O     Rh Grouping Only NEG     Antibody Screen-Cod NEG     Component R       R99                 Red Cells, LR       [id number]   selected     02/01/21   13:35      Product Type R99     Dispense Status selected     Unit Number (Barcoded) [id number]     Product Code (Barcoded) [id number]     Blood Type (Barcoded) 9500    CMP   Result Value Ref Range    Sodium 135 135 - 145 mmol/L    Potassium 4.3 3.6 - 5.5 mmol/L    Chloride 100 96 - 112 mmol/L    Co2 26 20 - 33 mmol/L    Anion Gap 9.0 7.0 - 16.0    Glucose 159 (H) 65 - 99 mg/dL    Bun 28 (H) 8 - 22 mg/dL    Creatinine 1.36 0.50 - 1.40 mg/dL    Calcium 8.7 8.4 - 10.2 mg/dL    AST(SGOT) 33 12 - 45 U/L    ALT(SGPT) 31 2 - 50 U/L    Alkaline Phosphatase 55 30 - 99 U/L    Total Bilirubin 1.0 0.1 - 1.5 mg/dL    Albumin 4.1 3.2 - 4.9 g/dL    Total Protein 6.3 6.0 - 8.2 g/dL    Globulin 2.2 1.9 - 3.5 g/dL    A-G Ratio 1.9 g/dL   ESTIMATED GFR   Result Value Ref Range    GFR If African American 60 >60 mL/min/1.73 m 2    GFR If Non African American 50 (A) >60 mL/min/1.73 m 2   SARS-CoV-2 PCR (24 hour In-House): Collect NP swab in [facility]    Specimen: Nasopharyngeal; Respirate   Result Value Ref Range    SARS-CoV-2 Source NP Swab    URINALYSIS    Specimen: Urine, Clean Catch; Respirate   Result Value Ref Range    Color Red (A)     Character Bloody (A)     Specific Gravity 1.010 <1.035    Ph 6.5 5.0 - 8.0    Glucose Negative Negative mg/dL    Ketones Trace (A) Negative mg/dL    Protein >=300 (A) Negative mg/dL    Bilirubin Small (A) Negative    Nitrite Positive (A) Negative    Leukocyte Esterase Small (A) Negative    Occult Blood Large (A) Negative    Micro Urine Req Microscopic    URINE  MICROSCOPIC (W/UA)   Result Value Ref Range    WBC 2-5 (A) /hpf    -150 (A) /hpf    Bacteria Negative None /hpf    Epithelial Cells Negative Few /hpf    Epithelial Cells Renal Few /hpf    Hyaline Cast 0-2 /lpf       All labs reviewed by me.    RADIOLOGY  CT-ABDOMEN & PELVIS UROGRAM    (Results Pending)     The radiologist's interpretation of all radiological studies have been reviewed by me.    COURSE & MEDICAL DECISION MAKING  Pertinent Labs & Imaging studies reviewed. (See chart for details)    Obtained and reviewed past medical records.  Numerous outpatient encounters.  Last noted procedure in our EMR is from March 2020, patient underwent a rigid cystoscopy with Dr. Jones, for high-grade bladder cancer.  Admission to the hospital in January 2020 for successful mitral clip and JOSÉ.  Patient underwent echocardiogram at the time.  EF 65%.    12:53 PM - Patient seen and examined at bedside.  This is a pleasant 87-year-old male.  He has a history of bladder cancer.  Has been having gross hematuria for at least a few months.  Now he is anemic.  He was notified of low blood counts from a blood draw on January 30 and instructed to come to the emergency department.  Patient has felt generally weak lately.  He is pale consistent with reported labs.  An IV has been established.  Repeat labs including COPD, CBC and CMP as well as coags have been ordered.  I discussed with both the patient as well as his wife on initial evaluation, that he should be admitted to the hospital for blood transfusion and will contact his urologist for ongoing care.    1:25 PM discussed with Dr. Rain, urology on-call for Dr. Jones.  He is made aware of patient's presenting symptoms plan of care scheduled at this time.  He recommends stopping the aspirin.  Keeping the patient n.p.o. after midnight.  Obtaining a Covid swab, transfusing the patient getting a CT urogram and he will plan for repeat cystoscopy either tomorrow or the next day.   Hospitalist paged.    1:30 AM patient and patient's wife, are updated on plan of care and agreeable.  I discussed with them, the documented allergy.  Patient had a CT urogram in November and he was premedicated with steroids an benadryl.    2:09 PM Dr. Rueda, hospitalist, has entered orders and agreed to accept the patient to their service.    He is in stable condition.    FINAL IMPRESSION  1. Anemia in other chronic diseases classified elsewhere    2. Hematuria, unspecified type    3. Malignant neoplasm of urinary bladder, unspecified site (HCC)    4. Generalized weakness

## 2021-02-01 NOTE — ED NOTES
Med rec completed per pt's family   Allergies reviewed  No PO antibiotics in the last 14 days    Pt has been off of Plavix for about 18 days

## 2021-02-02 ENCOUNTER — ANESTHESIA (OUTPATIENT)
Dept: SURGERY | Facility: MEDICAL CENTER | Age: 86
DRG: 669 | End: 2021-02-02
Payer: MEDICARE

## 2021-02-02 ENCOUNTER — ANESTHESIA EVENT (OUTPATIENT)
Dept: SURGERY | Facility: MEDICAL CENTER | Age: 86
DRG: 669 | End: 2021-02-02
Payer: MEDICARE

## 2021-02-02 PROBLEM — Z79.899 DVT PROPHYLAXIS: Status: RESOLVED | Noted: 2021-02-01 | Resolved: 2021-02-02

## 2021-02-02 PROBLEM — N18.31 STAGE 3A CHRONIC KIDNEY DISEASE: Status: ACTIVE | Noted: 2021-02-02

## 2021-02-02 LAB
ALBUMIN SERPL BCP-MCNC: 3.7 G/DL (ref 3.2–4.9)
ALBUMIN/GLOB SERPL: 1.9 G/DL
ALP SERPL-CCNC: 48 U/L (ref 30–99)
ALT SERPL-CCNC: 25 U/L (ref 2–50)
ANION GAP SERPL CALC-SCNC: 10 MMOL/L (ref 7–16)
AST SERPL-CCNC: 25 U/L (ref 12–45)
BASOPHILS # BLD AUTO: 0 % (ref 0–1.8)
BASOPHILS # BLD: 0 K/UL (ref 0–0.12)
BILIRUB SERPL-MCNC: 0.9 MG/DL (ref 0.1–1.5)
BUN SERPL-MCNC: 26 MG/DL (ref 8–22)
CALCIUM SERPL-MCNC: 8.8 MG/DL (ref 8.4–10.2)
CHLORIDE SERPL-SCNC: 103 MMOL/L (ref 96–112)
CO2 SERPL-SCNC: 23 MMOL/L (ref 20–33)
CREAT SERPL-MCNC: 1.29 MG/DL (ref 0.5–1.4)
EOSINOPHIL # BLD AUTO: 0 K/UL (ref 0–0.51)
EOSINOPHIL NFR BLD: 0 % (ref 0–6.9)
ERYTHROCYTE [DISTWIDTH] IN BLOOD BY AUTOMATED COUNT: 47.1 FL (ref 35.9–50)
GLOBULIN SER CALC-MCNC: 1.9 G/DL (ref 1.9–3.5)
GLUCOSE SERPL-MCNC: 151 MG/DL (ref 65–99)
HCT VFR BLD AUTO: 21.6 % (ref 42–52)
HCT VFR BLD AUTO: 24.3 % (ref 42–52)
HGB BLD-MCNC: 6.7 G/DL (ref 14–18)
HGB BLD-MCNC: 7.5 G/DL (ref 14–18)
IMM GRANULOCYTES # BLD AUTO: 0.05 K/UL (ref 0–0.11)
IMM GRANULOCYTES NFR BLD AUTO: 0.9 % (ref 0–0.9)
LYMPHOCYTES # BLD AUTO: 0.62 K/UL (ref 1–4.8)
LYMPHOCYTES NFR BLD: 10.7 % (ref 22–41)
MCH RBC QN AUTO: 27.3 PG (ref 27–33)
MCHC RBC AUTO-ENTMCNC: 31 G/DL (ref 33.7–35.3)
MCV RBC AUTO: 88.2 FL (ref 81.4–97.8)
MONOCYTES # BLD AUTO: 0.1 K/UL (ref 0–0.85)
MONOCYTES NFR BLD AUTO: 1.7 % (ref 0–13.4)
NEUTROPHILS # BLD AUTO: 5.05 K/UL (ref 1.82–7.42)
NEUTROPHILS NFR BLD: 86.7 % (ref 44–72)
NRBC # BLD AUTO: 0 K/UL
NRBC BLD-RTO: 0 /100 WBC
PLATELET # BLD AUTO: 197 K/UL (ref 164–446)
PMV BLD AUTO: 9.9 FL (ref 9–12.9)
POTASSIUM SERPL-SCNC: 4.9 MMOL/L (ref 3.6–5.5)
PROT SERPL-MCNC: 5.6 G/DL (ref 6–8.2)
RBC # BLD AUTO: 2.45 M/UL (ref 4.7–6.1)
SODIUM SERPL-SCNC: 136 MMOL/L (ref 135–145)
WBC # BLD AUTO: 5.8 K/UL (ref 4.8–10.8)

## 2021-02-02 PROCEDURE — 86923 COMPATIBILITY TEST ELECTRIC: CPT

## 2021-02-02 PROCEDURE — 85025 COMPLETE CBC W/AUTO DIFF WBC: CPT

## 2021-02-02 PROCEDURE — A9270 NON-COVERED ITEM OR SERVICE: HCPCS | Performed by: STUDENT IN AN ORGANIZED HEALTH CARE EDUCATION/TRAINING PROGRAM

## 2021-02-02 PROCEDURE — 700105 HCHG RX REV CODE 258: Performed by: STUDENT IN AN ORGANIZED HEALTH CARE EDUCATION/TRAINING PROGRAM

## 2021-02-02 PROCEDURE — 160009 HCHG ANES TIME/MIN: Performed by: UROLOGY

## 2021-02-02 PROCEDURE — 99232 SBSQ HOSP IP/OBS MODERATE 35: CPT | Performed by: STUDENT IN AN ORGANIZED HEALTH CARE EDUCATION/TRAINING PROGRAM

## 2021-02-02 PROCEDURE — 160002 HCHG RECOVERY MINUTES (STAT): Performed by: UROLOGY

## 2021-02-02 PROCEDURE — 770006 HCHG ROOM/CARE - MED/SURG/GYN SEMI*

## 2021-02-02 PROCEDURE — 160036 HCHG PACU - EA ADDL 30 MINS PHASE I: Performed by: UROLOGY

## 2021-02-02 PROCEDURE — 85018 HEMOGLOBIN: CPT

## 2021-02-02 PROCEDURE — 0T5B8ZZ DESTRUCTION OF BLADDER, VIA NATURAL OR ARTIFICIAL OPENING ENDOSCOPIC: ICD-10-PCS | Performed by: UROLOGY

## 2021-02-02 PROCEDURE — 501329 HCHG SET, CYSTO IRRIG Y TUR: Performed by: UROLOGY

## 2021-02-02 PROCEDURE — 160048 HCHG OR STATISTICAL LEVEL 1-5: Performed by: UROLOGY

## 2021-02-02 PROCEDURE — 80053 COMPREHEN METABOLIC PANEL: CPT

## 2021-02-02 PROCEDURE — 36430 TRANSFUSION BLD/BLD COMPNT: CPT

## 2021-02-02 PROCEDURE — P9016 RBC LEUKOCYTES REDUCED: HCPCS

## 2021-02-02 PROCEDURE — 160035 HCHG PACU - 1ST 60 MINS PHASE I: Performed by: UROLOGY

## 2021-02-02 PROCEDURE — 500042 HCHG BAG, URINARY DRAINAGE (CLOSED): Performed by: UROLOGY

## 2021-02-02 PROCEDURE — 700111 HCHG RX REV CODE 636 W/ 250 OVERRIDE (IP): Performed by: ANESTHESIOLOGY

## 2021-02-02 PROCEDURE — 700101 HCHG RX REV CODE 250: Performed by: ANESTHESIOLOGY

## 2021-02-02 PROCEDURE — 160039 HCHG SURGERY MINUTES - EA ADDL 1 MIN LEVEL 3: Performed by: UROLOGY

## 2021-02-02 PROCEDURE — 700105 HCHG RX REV CODE 258: Performed by: ANESTHESIOLOGY

## 2021-02-02 PROCEDURE — 700105 HCHG RX REV CODE 258: Performed by: UROLOGY

## 2021-02-02 PROCEDURE — A4338 INDWELLING CATHETER LATEX: HCPCS | Performed by: UROLOGY

## 2021-02-02 PROCEDURE — 160028 HCHG SURGERY MINUTES - 1ST 30 MINS LEVEL 3: Performed by: UROLOGY

## 2021-02-02 PROCEDURE — 700102 HCHG RX REV CODE 250 W/ 637 OVERRIDE(OP): Performed by: STUDENT IN AN ORGANIZED HEALTH CARE EDUCATION/TRAINING PROGRAM

## 2021-02-02 PROCEDURE — 500879 HCHG PACK, CYSTO: Performed by: UROLOGY

## 2021-02-02 PROCEDURE — 85014 HEMATOCRIT: CPT

## 2021-02-02 RX ORDER — HALOPERIDOL 5 MG/ML
1 INJECTION INTRAMUSCULAR
Status: DISCONTINUED | OUTPATIENT
Start: 2021-02-02 | End: 2021-02-04 | Stop reason: HOSPADM

## 2021-02-02 RX ORDER — ONDANSETRON 2 MG/ML
4 INJECTION INTRAMUSCULAR; INTRAVENOUS
Status: DISCONTINUED | OUTPATIENT
Start: 2021-02-02 | End: 2021-02-04 | Stop reason: HOSPADM

## 2021-02-02 RX ORDER — SODIUM CHLORIDE, SODIUM LACTATE, POTASSIUM CHLORIDE, CALCIUM CHLORIDE 600; 310; 30; 20 MG/100ML; MG/100ML; MG/100ML; MG/100ML
INJECTION, SOLUTION INTRAVENOUS CONTINUOUS
Status: ACTIVE | OUTPATIENT
Start: 2021-02-02 | End: 2021-02-02

## 2021-02-02 RX ORDER — SODIUM CHLORIDE, SODIUM LACTATE, POTASSIUM CHLORIDE, CALCIUM CHLORIDE 600; 310; 30; 20 MG/100ML; MG/100ML; MG/100ML; MG/100ML
INJECTION, SOLUTION INTRAVENOUS CONTINUOUS
Status: DISCONTINUED | OUTPATIENT
Start: 2021-02-02 | End: 2021-02-03

## 2021-02-02 RX ORDER — IPRATROPIUM BROMIDE AND ALBUTEROL SULFATE 2.5; .5 MG/3ML; MG/3ML
3 SOLUTION RESPIRATORY (INHALATION)
Status: DISCONTINUED | OUTPATIENT
Start: 2021-02-02 | End: 2021-02-04 | Stop reason: HOSPADM

## 2021-02-02 RX ORDER — ONDANSETRON 2 MG/ML
INJECTION INTRAMUSCULAR; INTRAVENOUS PRN
Status: DISCONTINUED | OUTPATIENT
Start: 2021-02-02 | End: 2021-02-02 | Stop reason: SURG

## 2021-02-02 RX ORDER — LIDOCAINE HYDROCHLORIDE 20 MG/ML
INJECTION, SOLUTION EPIDURAL; INFILTRATION; INTRACAUDAL; PERINEURAL PRN
Status: DISCONTINUED | OUTPATIENT
Start: 2021-02-02 | End: 2021-02-02 | Stop reason: SURG

## 2021-02-02 RX ORDER — CEFAZOLIN SODIUM 1 G/3ML
INJECTION, POWDER, FOR SOLUTION INTRAMUSCULAR; INTRAVENOUS PRN
Status: DISCONTINUED | OUTPATIENT
Start: 2021-02-02 | End: 2021-02-02 | Stop reason: SURG

## 2021-02-02 RX ADMIN — SODIUM CHLORIDE, POTASSIUM CHLORIDE, SODIUM LACTATE AND CALCIUM CHLORIDE: 600; 310; 30; 20 INJECTION, SOLUTION INTRAVENOUS at 16:06

## 2021-02-02 RX ADMIN — SODIUM CHLORIDE, POTASSIUM CHLORIDE, SODIUM LACTATE AND CALCIUM CHLORIDE: 600; 310; 30; 20 INJECTION, SOLUTION INTRAVENOUS at 18:38

## 2021-02-02 RX ADMIN — GABAPENTIN 300 MG: 300 CAPSULE ORAL at 06:08

## 2021-02-02 RX ADMIN — CEFAZOLIN 2 G: 1 INJECTION, POWDER, FOR SOLUTION INTRAVENOUS at 13:29

## 2021-02-02 RX ADMIN — SODIUM CHLORIDE, POTASSIUM CHLORIDE, SODIUM LACTATE AND CALCIUM CHLORIDE: 600; 310; 30; 20 INJECTION, SOLUTION INTRAVENOUS at 13:10

## 2021-02-02 RX ADMIN — LIDOCAINE HYDROCHLORIDE 100 MG: 20 INJECTION, SOLUTION EPIDURAL; INFILTRATION; INTRACAUDAL; PERINEURAL at 13:41

## 2021-02-02 RX ADMIN — DONEPEZIL HYDROCHLORIDE 10 MG: 5 TABLET, FILM COATED ORAL at 17:12

## 2021-02-02 RX ADMIN — SODIUM CHLORIDE, POTASSIUM CHLORIDE, SODIUM LACTATE AND CALCIUM CHLORIDE: 600; 310; 30; 20 INJECTION, SOLUTION INTRAVENOUS at 03:15

## 2021-02-02 RX ADMIN — PROPOFOL 100 MG: 10 INJECTION, EMULSION INTRAVENOUS at 13:41

## 2021-02-02 RX ADMIN — ATORVASTATIN CALCIUM 40 MG: 40 TABLET, FILM COATED ORAL at 06:09

## 2021-02-02 RX ADMIN — ONDANSETRON 4 MG: 2 INJECTION INTRAMUSCULAR; INTRAVENOUS at 13:41

## 2021-02-02 RX ADMIN — LOSARTAN POTASSIUM 100 MG: 25 TABLET, FILM COATED ORAL at 06:08

## 2021-02-02 ASSESSMENT — COGNITIVE AND FUNCTIONAL STATUS - GENERAL
TOILETING: A LITTLE
SUGGESTED CMS G CODE MODIFIER DAILY ACTIVITY: CI
WALKING IN HOSPITAL ROOM: A LITTLE
SUGGESTED CMS G CODE MODIFIER MOBILITY: CJ
DAILY ACTIVITIY SCORE: 23
MOBILITY SCORE: 22
STANDING UP FROM CHAIR USING ARMS: A LITTLE

## 2021-02-02 ASSESSMENT — ENCOUNTER SYMPTOMS
DEPRESSION: 0
PALPITATIONS: 0
NAUSEA: 0
DIZZINESS: 0
VOMITING: 0
HEMOPTYSIS: 0
BRUISES/BLEEDS EASILY: 0
FEVER: 0
CHILLS: 0
FOCAL WEAKNESS: 0
BLURRED VISION: 0
MYALGIAS: 0
COUGH: 0

## 2021-02-02 ASSESSMENT — PAIN DESCRIPTION - PAIN TYPE: TYPE: ACUTE PAIN

## 2021-02-02 ASSESSMENT — PAIN SCALES - GENERAL: PAIN_LEVEL: 0

## 2021-02-02 NOTE — ANESTHESIA PREPROCEDURE EVALUATION
Hematuria with known bladder tumor.     Mitral valve clip, s/p angioplasty.     Denies angina, dyspnea, GERD, or problems with anesthesia.     Relevant Problems   CARDIAC   (+) Coronary artery disease involving native coronary artery of native heart without angina pectoris   (+) Essential hypertension       Physical Exam    Airway   Mallampati: II  TM distance: >3 FB  Neck ROM: full       Cardiovascular - normal exam  Rhythm: regular  Rate: normal  (-) murmur     Dental - normal exam           Pulmonary - normal exam  Breath sounds clear to auscultation     Abdominal    Neurological - normal exam                 Anesthesia Plan    ASA 2       Plan - general       Airway plan will be LMA        Induction: intravenous    Postoperative Plan: Postoperative administration of opioids is intended.    Pertinent diagnostic labs and testing reviewed    Informed Consent:    Anesthetic plan and risks discussed with patient.    Use of blood products discussed with: patient whom consented to blood products.

## 2021-02-02 NOTE — PROGRESS NOTES
Telemetry Strip     Strip printed: yes  Rhythm: SR w/ 1* AVB  HR: 61-79  Measurements: 0.24/ 0.12/ 0.40  Ectopy: r Coup, r PVC              Normal Values  Rhythm SR  HR Range    Measurements 0.12-0.20 / 0.06-0.10  / 0.30-0.52

## 2021-02-02 NOTE — OR NURSING
1429 received in pacu from OR via bed, cbi draining pale pink fluid  1440 resting quietly, changed soiled iv site dressing  1455 rouses easily, denies discomfort  1510 notified family with update    1525 report called to mirela    1538 transferred to med surg in stable condition via bed

## 2021-02-02 NOTE — PROGRESS NOTES
[facility] Medicine Daily Progress Note    Date of Service  2/2/2021    Chief Complaint  87 y.o. male admitted 2/1/2021 with   Chief Complaint   Patient presents with   • Abnormal Labs     Here for a low H&H         Hospital Course  87 y.o. male with PMHx significant for invasive bladder cancer with radiation treatment as well as prior prostatectomy. Last cystoscopy done December 2020, with normal cytology, s/p mitral valve clips, Hx of CAD s/p stents off plavix, who presented to the Emergency Department 2/1 by his wife with a chief complaint of hematuria.  This has been ongoing for the last few months.  He has been on Plavix until about 18 days ago and his cardiologist took him off, partly because of the continued hematuria and he has been on 81 mg of aspirin.  His wife notes that in the last several days, he has felt very weak and tired.  Blood was drawn on 30 January and they were notified today to come to the emergency department with a low hemoglobin.    Hb 7.1 on admission, s/p 2units PRBC transfusion 2/1-2/2.  CT abd showed Thickened and irregular posterior bladder wall. Wall irregularity appears to somewhat more pronounced than on the prior exam. Perivesicular stranding may represent tumor infiltration or inflammation. There is mild dilatation of the left ureter. Right bladder wall diverticulum. Status post prostatectomy.  Urology on the case, plan cystoscopy 2/2    Interval Problem Update  Patient was seen and examined at the bedside. No overnight events reported.  VS reviewed.   Patient received 1 unit PRBC this am, denies chest pain, shortness of breath, nauseous, vomiting, abdominal pain.  Cystoscopy p.m.    Consultants/Specialty  urology    Code Status  Full Code    Disposition  TBD    Review of Systems  Review of Systems   Constitutional: Positive for malaise/fatigue. Negative for chills and fever.   HENT: Negative for ear discharge.    Eyes: Negative for blurred vision.   Respiratory: Negative for cough  and hemoptysis.    Cardiovascular: Negative for chest pain and palpitations.   Gastrointestinal: Negative for nausea and vomiting.   Genitourinary: Positive for hematuria. Negative for dysuria.   Musculoskeletal: Negative for myalgias.   Skin: Negative for rash.   Neurological: Negative for dizziness and focal weakness.   Endo/Heme/Allergies: Does not bruise/bleed easily.   Psychiatric/Behavioral: Negative for depression.        Physical Exam  Temp:  [36.3 °C (97.4 °F)-37.3 °C (99.1 °F)] 36.8 °C (98.3 °F)  Pulse:  [57-94] 57  Resp:  [16-19] 18  BP: (107-141)/(45-80) 109/53  SpO2:  [91 %-99 %] 98 %    Physical Exam  Vitals signs and nursing note reviewed.   Constitutional:       General: He is not in acute distress.     Appearance: Normal appearance.   HENT:      Head: Normocephalic and atraumatic.      Mouth/Throat:      Mouth: Mucous membranes are dry.      Pharynx: Oropharynx is clear.   Eyes:      Pupils: Pupils are equal, round, and reactive to light.   Cardiovascular:      Rate and Rhythm: Normal rate and regular rhythm.   Pulmonary:      Effort: Pulmonary effort is normal.      Breath sounds: Normal breath sounds.   Abdominal:      General: Abdomen is flat. Bowel sounds are normal.      Palpations: Abdomen is soft.   Musculoskeletal: Normal range of motion.   Skin:     General: Skin is warm and dry.   Neurological:      General: No focal deficit present.      Mental Status: He is alert and oriented to person, place, and time.   Psychiatric:         Mood and Affect: Mood normal.         Behavior: Behavior normal.         Fluids    Intake/Output Summary (Last 24 hours) at 2/2/2021 1226  Last data filed at 2/2/2021 0926  Gross per 24 hour   Intake 562 ml   Output --   Net 562 ml       Laboratory  Recent Labs     02/01/21  1250 02/01/21  1909 02/01/21  2210 02/02/21  0134   WBC 5.7 6.3  --  5.8   RBC 2.28* 2.61*  --  2.45*   HEMOGLOBIN 6.3* 7.1* 6.9* 6.7*   HEMATOCRIT 20.3* 23.0* 22.2* 21.6*   MCV 89.0 88.1  --   88.2   MCH 27.6 27.2  --  27.3   MCHC 31.0* 30.9*  --  31.0*   RDW 47.4 47.4  --  47.1   PLATELETCT 191 166  --  197   MPV 9.1 9.2  --  9.9     Recent Labs     02/01/21  1250 02/02/21  0134   SODIUM 135 136   POTASSIUM 4.3 4.9   CHLORIDE 100 103   CO2 26 23   GLUCOSE 159* 151*   BUN 28* 26*   CREATININE 1.36 1.29   CALCIUM 8.7 8.8                   Imaging  CT-ABDOMEN & PELVIS UROGRAM   Final Result      1.  Thickened and irregular posterior bladder wall. Wall irregularity appears to somewhat more pronounced than on the prior exam. Perivesicular stranding may represent tumor infiltration or inflammation. There is mild dilatation of the left ureter.      2.  Right bladder wall diverticulum.      3.  Status post prostatectomy.      4.  Atherosclerosis.                    Assessment/Plan  * Malignant neoplasm of anterior wall of urinary bladder (HCC)- (present on admission)  Assessment & Plan  Hx of invasive bladder cancer with radiation treatment as well as prior prostatectomy. Last cystoscopy done December 2020, with normal cytology  CT abd showed Thickened and irregular posterior bladder wall. Wall irregularity appears to somewhat more pronounced than on the prior exam. Perivesicular stranding may represent tumor infiltration or inflammation. There is mild dilatation of the left ureter. Right bladder wall diverticulum. Status post prostatectomy.  Urology on the case, plan cystoscopy 2/2  Hold aspirin      Acute blood loss anemia  Assessment & Plan  Bladder cancer with hematuria worsening recently  S/p 2 units transfusion 2/1, 2/2  Urology on the board, plan cystoscopy 2/2  Blood transfusion if Hb <7  IVF    S/P mitral valve clip implantation- (present on admission)  Assessment & Plan  F/u by cardiologist    S/P angioplasty with stent- (present on admission)  Assessment & Plan  Had stent done last year  On aspirin 81mg, which is on hold since admission  Finished palvix about 2weeks ago fo 1 year      Essential  hypertension- (present on admission)  Assessment & Plan  C/w home medication with holding perimeter    Stage 3a chronic kidney disease  Assessment & Plan  Cr baseline 1.1-1.2  Avoid nephrotoxin  Closely monitoring    Dementia (HCC)  Assessment & Plan  Donepezil 10mg qd       VTE prophylaxis: SCD due to hematuria

## 2021-02-02 NOTE — PROGRESS NOTES
Telemetry Shift Summary     Measurement for strip printed at: 1146  Rhythm: SB  Rate: 57  Measurements: 0.28/0.12/0.40  Ectopy (reported by Monitor Tech and verified by RN): N/A     Normal Values  Rhythm: Sinus  HR:   Measurements: 0.12-0.20/0.06-0.10/0.30-0.52

## 2021-02-02 NOTE — CARE PLAN
Problem: Communication  Goal: The ability to communicate needs accurately and effectively will improve  Outcome: PROGRESSING AS EXPECTED     Problem: Safety  Goal: Will remain free from injury  Outcome: PROGRESSING AS EXPECTED     Problem: Bowel/Gastric:  Goal: Normal bowel function is maintained or improved  Outcome: PROGRESSING AS EXPECTED     Problem: Urinary Elimination:  Goal: Ability to reestablish a normal urinary elimination pattern will improve  Outcome: PROGRESSING AS EXPECTED

## 2021-02-02 NOTE — ANESTHESIA TIME REPORT
Anesthesia Start and Stop Event Times     Date Time Event    2/2/2021 1325 Ready for Procedure     1329 Anesthesia Start     1432 Anesthesia Stop        Responsible Staff  02/02/21    Name Role Begin End    Mahendra Lynch M.D. Anesth 1329 1432        Preop Diagnosis (Free Text):  Pre-op Diagnosis     GROSS HEMATURIA        Preop Diagnosis (Codes):    Post op Diagnosis  Radiation cystitis      Premium Reason  Non-Premium    Comments:

## 2021-02-02 NOTE — ANESTHESIA PROCEDURE NOTES
Airway    Date/Time: 2/2/2021 1:46 PM  Performed by: Mahendra Lynch M.D.  Authorized by: Mahendra Lynch M.D.     Location:  OR  Urgency:  Elective  Indications for Airway Management:  Anesthesia      Spontaneous Ventilation: absent    Sedation Level:  Deep  Preoxygenated: Yes    Mask Difficulty Assessment:  0 - not attempted  Final Airway Type:  Supraglottic airway  Final Supraglottic Airway:  Standard LMA    SGA Size:  4  Number of Attempts at Approach:  1

## 2021-02-02 NOTE — CARE PLAN
Problem: Knowledge Deficit  Goal: Knowledge of disease process/condition, treatment plan, diagnostic tests, and medications will improve  Outcome: PROGRESSING AS EXPECTED  Note: Updated pt on plan of care, no questions at this time.      Problem: Urinary Elimination:  Goal: Ability to reestablish a normal urinary elimination pattern will improve  Outcome: PROGRESSING SLOWER THAN EXPECTED  Note: Pt has urinary frequency and blood in urine.

## 2021-02-02 NOTE — OR NURSING
Pt transferred to pre op accompanied by his wife. Procedure, allergies and NPO status verified. Questions and concerns addressed. Pt dressed in a surgical gown and SCDs applied. LR infusion initiated to LAC PIV. Unable to flush RAC PIV, line removed. No pt belongings to account for.

## 2021-02-02 NOTE — ANESTHESIA POSTPROCEDURE EVALUATION
Patient: Filiberto Jauregui    Procedure Summary     Date: 02/02/21 Room / Location:  OR 02 / SURGERY [facility]    Anesthesia Start: 1329 Anesthesia Stop: 1432    Procedure: TURBT (TRANSURETHRAL RESECTION OF BLADDER TUMOR) - AND FULGURATION (Bladder) Diagnosis: (RADIATION CYSTITIS )    Surgeons: Alan Rain M.D. Responsible Provider: Mahendra Lynch M.D.    Anesthesia Type: general ASA Status: 2          Final Anesthesia Type: general  Last vitals  BP   Blood Pressure : 112/51    Temp   36.7 °C (98.1 °F)    Pulse   Pulse: (!) 56   Resp   16    SpO2   94 %      Anesthesia Post Evaluation    Patient location during evaluation: PACU  Patient participation: complete - patient participated  Level of consciousness: awake and alert  Pain score: 0    Airway patency: patent  Anesthetic complications: no  Cardiovascular status: hemodynamically stable  Respiratory status: acceptable  Hydration status: euvolemic    PONV: none           Nurse Pain Score: 0 (NPRS)

## 2021-02-03 PROBLEM — R31.9 HEMATURIA: Status: ACTIVE | Noted: 2021-02-03

## 2021-02-03 LAB
ANION GAP SERPL CALC-SCNC: 6 MMOL/L (ref 7–16)
BASOPHILS # BLD AUTO: 0.3 % (ref 0–1.8)
BASOPHILS # BLD AUTO: 0.4 % (ref 0–1.8)
BASOPHILS # BLD: 0.02 K/UL (ref 0–0.12)
BASOPHILS # BLD: 0.03 K/UL (ref 0–0.12)
BUN SERPL-MCNC: 21 MG/DL (ref 8–22)
CALCIUM SERPL-MCNC: 8.5 MG/DL (ref 8.4–10.2)
CHLORIDE SERPL-SCNC: 103 MMOL/L (ref 96–112)
CO2 SERPL-SCNC: 28 MMOL/L (ref 20–33)
CREAT SERPL-MCNC: 1.36 MG/DL (ref 0.5–1.4)
EOSINOPHIL # BLD AUTO: 0.12 K/UL (ref 0–0.51)
EOSINOPHIL # BLD AUTO: 0.13 K/UL (ref 0–0.51)
EOSINOPHIL NFR BLD: 1.6 % (ref 0–6.9)
EOSINOPHIL NFR BLD: 1.8 % (ref 0–6.9)
ERYTHROCYTE [DISTWIDTH] IN BLOOD BY AUTOMATED COUNT: 48.1 FL (ref 35.9–50)
ERYTHROCYTE [DISTWIDTH] IN BLOOD BY AUTOMATED COUNT: 48.9 FL (ref 35.9–50)
GLUCOSE SERPL-MCNC: 97 MG/DL (ref 65–99)
HCT VFR BLD AUTO: 23.5 % (ref 42–52)
HCT VFR BLD AUTO: 23.7 % (ref 42–52)
HGB BLD-MCNC: 7.2 G/DL (ref 14–18)
HGB BLD-MCNC: 7.3 G/DL (ref 14–18)
IMM GRANULOCYTES # BLD AUTO: 0.02 K/UL (ref 0–0.11)
IMM GRANULOCYTES # BLD AUTO: 0.03 K/UL (ref 0–0.11)
IMM GRANULOCYTES NFR BLD AUTO: 0.3 % (ref 0–0.9)
IMM GRANULOCYTES NFR BLD AUTO: 0.4 % (ref 0–0.9)
LYMPHOCYTES # BLD AUTO: 0.8 K/UL (ref 1–4.8)
LYMPHOCYTES # BLD AUTO: 0.97 K/UL (ref 1–4.8)
LYMPHOCYTES NFR BLD: 11.2 % (ref 22–41)
LYMPHOCYTES NFR BLD: 12.9 % (ref 22–41)
MCH RBC QN AUTO: 27.6 PG (ref 27–33)
MCH RBC QN AUTO: 28 PG (ref 27–33)
MCHC RBC AUTO-ENTMCNC: 30.4 G/DL (ref 33.7–35.3)
MCHC RBC AUTO-ENTMCNC: 31.1 G/DL (ref 33.7–35.3)
MCV RBC AUTO: 90 FL (ref 81.4–97.8)
MCV RBC AUTO: 90.8 FL (ref 81.4–97.8)
MONOCYTES # BLD AUTO: 0.49 K/UL (ref 0–0.85)
MONOCYTES # BLD AUTO: 0.57 K/UL (ref 0–0.85)
MONOCYTES NFR BLD AUTO: 6.9 % (ref 0–13.4)
MONOCYTES NFR BLD AUTO: 7.6 % (ref 0–13.4)
NEUTROPHILS # BLD AUTO: 5.66 K/UL (ref 1.82–7.42)
NEUTROPHILS # BLD AUTO: 5.82 K/UL (ref 1.82–7.42)
NEUTROPHILS NFR BLD: 77.2 % (ref 44–72)
NEUTROPHILS NFR BLD: 79.4 % (ref 44–72)
NRBC # BLD AUTO: 0 K/UL
NRBC # BLD AUTO: 0 K/UL
NRBC BLD-RTO: 0 /100 WBC
NRBC BLD-RTO: 0 /100 WBC
PHOSPHATE SERPL-MCNC: 4.7 MG/DL (ref 2.5–4.5)
PLATELET # BLD AUTO: 165 K/UL (ref 164–446)
PLATELET # BLD AUTO: 165 K/UL (ref 164–446)
PMV BLD AUTO: 9.2 FL (ref 9–12.9)
PMV BLD AUTO: 9.4 FL (ref 9–12.9)
POTASSIUM SERPL-SCNC: 5.1 MMOL/L (ref 3.6–5.5)
RBC # BLD AUTO: 2.61 M/UL (ref 4.7–6.1)
RBC # BLD AUTO: 2.61 M/UL (ref 4.7–6.1)
SODIUM SERPL-SCNC: 137 MMOL/L (ref 135–145)
WBC # BLD AUTO: 7.1 K/UL (ref 4.8–10.8)
WBC # BLD AUTO: 7.5 K/UL (ref 4.8–10.8)

## 2021-02-03 PROCEDURE — 770006 HCHG ROOM/CARE - MED/SURG/GYN SEMI*

## 2021-02-03 PROCEDURE — 80048 BASIC METABOLIC PNL TOTAL CA: CPT

## 2021-02-03 PROCEDURE — 86923 COMPATIBILITY TEST ELECTRIC: CPT

## 2021-02-03 PROCEDURE — 700102 HCHG RX REV CODE 250 W/ 637 OVERRIDE(OP): Performed by: STUDENT IN AN ORGANIZED HEALTH CARE EDUCATION/TRAINING PROGRAM

## 2021-02-03 PROCEDURE — 99232 SBSQ HOSP IP/OBS MODERATE 35: CPT | Performed by: STUDENT IN AN ORGANIZED HEALTH CARE EDUCATION/TRAINING PROGRAM

## 2021-02-03 PROCEDURE — A9270 NON-COVERED ITEM OR SERVICE: HCPCS | Performed by: STUDENT IN AN ORGANIZED HEALTH CARE EDUCATION/TRAINING PROGRAM

## 2021-02-03 PROCEDURE — 85025 COMPLETE CBC W/AUTO DIFF WBC: CPT

## 2021-02-03 PROCEDURE — 84100 ASSAY OF PHOSPHORUS: CPT

## 2021-02-03 PROCEDURE — P9016 RBC LEUKOCYTES REDUCED: HCPCS

## 2021-02-03 PROCEDURE — 36430 TRANSFUSION BLD/BLD COMPNT: CPT

## 2021-02-03 RX ADMIN — LOSARTAN POTASSIUM 100 MG: 25 TABLET, FILM COATED ORAL at 05:41

## 2021-02-03 RX ADMIN — GABAPENTIN 300 MG: 300 CAPSULE ORAL at 05:41

## 2021-02-03 RX ADMIN — DONEPEZIL HYDROCHLORIDE 10 MG: 5 TABLET, FILM COATED ORAL at 18:03

## 2021-02-03 RX ADMIN — SENNOSIDES-DOCUSATE SODIUM TAB 8.6-50 MG 2 TABLET: 8.6-5 TAB at 18:03

## 2021-02-03 RX ADMIN — ATORVASTATIN CALCIUM 40 MG: 40 TABLET, FILM COATED ORAL at 05:41

## 2021-02-03 RX ADMIN — ASPIRIN 81 MG: 81 TABLET, COATED ORAL at 14:08

## 2021-02-03 ASSESSMENT — ENCOUNTER SYMPTOMS
COUGH: 0
VOMITING: 0
DIZZINESS: 0
FEVER: 0
HEMOPTYSIS: 0
BRUISES/BLEEDS EASILY: 0
NAUSEA: 0
FOCAL WEAKNESS: 0
BLURRED VISION: 0
CHILLS: 0
PALPITATIONS: 0
DEPRESSION: 0
MYALGIAS: 0

## 2021-02-03 ASSESSMENT — PAIN DESCRIPTION - PAIN TYPE
TYPE: ACUTE PAIN
TYPE: ACUTE PAIN

## 2021-02-03 NOTE — PROGRESS NOTES
Pt returned to unit in bed. Vitals taken, vitals stable. Assessment completed no needs at this time.

## 2021-02-03 NOTE — PROGRESS NOTES
Telemetry Strip     Strip printed: 1328  Measurements from am strip were as follows:  Rhythm: SB-ST first degree AVB, BBB  HR:   Measurements: 0.24/ 0.14/ 0.40  Ectopy: r PVC, braulio down to 49             Normal Values  Rhythm SR  HR Range    Measurements 0.12-0.20 / 0.06-0.10  / 0.30-0.52

## 2021-02-03 NOTE — PROGRESS NOTES
Report received from ERIK Kat. Plan of care discussed. Patient resting comfortably in bed, declines any further needs at this time. Safety precautions in place.

## 2021-02-03 NOTE — PROGRESS NOTES
"[facility]    Progress Note    Service: [facility]  Patient's Name: Filiberto Jauregui  MRN: [id number]  Admit Date:2/1/2021  Today's Date: 2/3/2021   Room #: 3314/01      Identification:  87 y.o. male with gross hematuria and history of bladder cancer treated with radiation therapy s/p  Cystoscopy and fulguration of bleeders    POD#1     Overnight-Interval events:   - none Subjective/ROS:   Patient seen and examined. Cannot recall if his urine remained bloody yesterday. No issues voiding. Eating at bedside. Hemoglobin improving but remains lows  No CP/SOB/F/C     Physical Exam:  Current Vitals:   /52   Pulse 61   Temp 36.9 °C (98.4 °F) (Oral)   Resp 16   Ht 1.651 m (5' 5\")   Wt 67 kg (147 lb 11.3 oz)   SpO2 97%   BMI 24.58 kg/m²     02/01 1900 - 02/03 0659  In: 1282 [P.O.:240; I.V.:600]  Out: 5200 [Urine:5200]   GEN : NAD, A&O X4   RES:  no acute respiratory distress  ABD: Soft ND  :  Crenshaw with clear output  INC:     EXT:  No edema, SCD's in place     Labs:   Lab Results   Component Value Date/Time    WBC 7.5 02/03/2021 04:24 AM    HEMATOCRIT 23.5 (L) 02/03/2021 04:24 AM    SODIUM 137 02/03/2021 04:24 AM    POTASSIUM 5.1 02/03/2021 04:24 AM    CHLORIDE 103 02/03/2021 04:24 AM    CO2 28 02/03/2021 04:24 AM    BUN 21 02/03/2021 04:24 AM    CREATININE 1.36 02/03/2021 04:24 AM    CALCIUM 8.5 02/03/2021 04:24 AM        Lab Results   Component Value Date/Time    GLUCOSE 97 02/03/2021 04:24 AM    GLUCOSE 151 (H) 02/02/2021 01:34 AM    GLUCOSE 159 (H) 02/01/2021 12:50 PM    GLUCOSE 93 11/09/2020 12:25 PM          Assessment/Plan  Filiberto Jauregui is a 87 y.o. male with h/o bladder cancer treated with radiation and now with gross hematuria s/p cystoscopy and fulguration of the bleeders.    - cystoscopy did not reveal any tumors, radiation cystitis appreciated  - hemoglobin remains low despite fulguration, continue to monitor  - no surgical intervention or further urological intervention while " inpatient  - will keep cystoscopy appointment with Dr. Jones in March as planned  - can have voiding trial   - urology okay with discharge when medically cleared    Terrence Holder PA-C  [facility]

## 2021-02-03 NOTE — CARE PLAN
Problem: Communication  Goal: The ability to communicate needs accurately and effectively will improve  Outcome: PROGRESSING AS EXPECTED     Problem: Safety  Goal: Will remain free from injury  Outcome: PROGRESSING AS EXPECTED     Problem: Bowel/Gastric:  Goal: Normal bowel function is maintained or improved  Outcome: PROGRESSING AS EXPECTED     Problem: Urinary Elimination:  Goal: Ability to reestablish a normal urinary elimination pattern will improve  Outcome: PROGRESSING AS EXPECTED     Problem: Respiratory:  Goal: Respiratory status will improve  Outcome: PROGRESSING AS EXPECTED

## 2021-02-03 NOTE — CARE PLAN
Problem: Safety  Goal: Will remain free from falls  Outcome: PROGRESSING AS EXPECTED  Intervention: Implement fall precautions  Flowsheets (Taken 2/3/2021 0800)  Environmental Precautions:   Treaded Slipper Socks on Patient   Personal Belongings, Wastebasket, Call Bell etc. in Easy Reach   Transferred to Stronger Side   Report Given to Other Health Care Providers Regarding Fall Risk   Bed in Low Position   Communication Sign for Patients & Families   Mobility Assessed & Appropriate Sign Placed     Problem: Venous Thromboembolism (VTW)/Deep Vein Thrombosis (DVT) Prevention:  Goal: Patient will participate in Venous Thrombosis (VTE)/Deep Vein Thrombosis (DVT)Prevention Measures  Outcome: PROGRESSING AS EXPECTED  Flowsheets  Taken 2/3/2021 0940  SCDs, Sequential Compression Device: On  Taken 2/3/2021 0800  Mechanical Prophylaxis: SCDs, Sequential Compression Device

## 2021-02-03 NOTE — PROGRESS NOTES
[facility] Medicine Daily Progress Note    Date of Service  2/3/2021    Chief Complaint  87 y.o. male admitted 2/1/2021 with   Chief Complaint   Patient presents with   • Abnormal Labs     Here for a low H&H         Hospital Course  87 y.o. male with PMHx significant for invasive bladder cancer with radiation treatment as well as prior prostatectomy. Last cystoscopy done December 2020, with normal cytology, s/p mitral valve clips, Hx of CAD s/p stents off plavix, who presented to the Emergency Department 2/1 by his wife with a chief complaint of hematuria.  This has been ongoing for the last few months.  He has been on Plavix until about 18 days ago and his cardiologist took him off, partly because of the continued hematuria and he has been on 81 mg of aspirin.  His wife notes that in the last several days, he has felt very weak and tired.  Blood was drawn on 30 January and they were notified today to come to the emergency department with a low hemoglobin.      Hematuria: Hb 7.1 on admission, s/p 2units PRBC transfusion 2/1-2/2. CT abd showed Thickened and irregular posterior bladder wall. Wall irregularity appears to somewhat more pronounced than on the prior exam. Perivesicular stranding may represent tumor infiltration or inflammation. There is mild dilatation of the left ureter. Right bladder wall diverticulum. Status post prostatectomy.  Urology on the case, s/p cystoscopy 2/2 showing Gross hematuria secondary to hemorrhagic cystitis from radiation changes, no tumors. Rec keep cystoscopy appointment with Dr. Jones in March as planned. Voiding trial  Repeated hemoglobin 7.2, will order 1 unit of PRBC transfusion    Interval Problem Update  Patient was seen and examined at the bedside. No overnight events reported.  VS reviewed.   S/p cystoscopy 2/2  Crenshaw discontinued, voiding trial.  No hematuria noted  Continue monitor hemoglobin.     Consultants/Specialty  urology    Code Status  Full  Code    Disposition  TBD    Review of Systems  Review of Systems   Constitutional: Positive for malaise/fatigue. Negative for chills and fever.   HENT: Negative for ear discharge.    Eyes: Negative for blurred vision.   Respiratory: Negative for cough and hemoptysis.    Cardiovascular: Negative for chest pain and palpitations.   Gastrointestinal: Negative for nausea and vomiting.   Genitourinary: Negative for dysuria and hematuria.   Musculoskeletal: Negative for myalgias.   Skin: Negative for rash.   Neurological: Negative for dizziness and focal weakness.   Endo/Heme/Allergies: Does not bruise/bleed easily.   Psychiatric/Behavioral: Negative for depression.        Physical Exam  Temp:  [36.4 °C (97.5 °F)-37.5 °C (99.5 °F)] 37.1 °C (98.8 °F)  Pulse:  [54-64] 60  Resp:  [16-20] 18  BP: (104-116)/(49-64) 104/51  SpO2:  [91 %-100 %] 91 %    Physical Exam  Vitals signs and nursing note reviewed.   Constitutional:       General: He is not in acute distress.     Appearance: Normal appearance.   HENT:      Head: Normocephalic and atraumatic.      Mouth/Throat:      Mouth: Mucous membranes are dry.      Pharynx: Oropharynx is clear.   Eyes:      Pupils: Pupils are equal, round, and reactive to light.   Cardiovascular:      Rate and Rhythm: Normal rate and regular rhythm.   Pulmonary:      Effort: Pulmonary effort is normal.      Breath sounds: Normal breath sounds.   Abdominal:      General: Abdomen is flat. Bowel sounds are normal.      Palpations: Abdomen is soft.   Genitourinary:     Comments: On CBI, urine is clear, no hematuria noted  Musculoskeletal: Normal range of motion.   Skin:     General: Skin is warm and dry.   Neurological:      General: No focal deficit present.      Mental Status: He is alert and oriented to person, place, and time.   Psychiatric:         Mood and Affect: Mood normal.         Behavior: Behavior normal.         Fluids    Intake/Output Summary (Last 24 hours) at 2/3/2021 7898  Last data filed  at 2/3/2021 0900  Gross per 24 hour   Intake 920 ml   Output 5200 ml   Net -4280 ml       Laboratory  Recent Labs     02/02/21  0134 02/02/21  1231 02/03/21  0424 02/03/21  1039   WBC 5.8  --  7.5 7.1   RBC 2.45*  --  2.61* 2.61*   HEMOGLOBIN 6.7* 7.5* 7.3* 7.2*   HEMATOCRIT 21.6* 24.3* 23.5* 23.7*   MCV 88.2  --  90.0 90.8   MCH 27.3  --  28.0 27.6   MCHC 31.0*  --  31.1* 30.4*   RDW 47.1  --  48.1 48.9   PLATELETCT 197  --  165 165   MPV 9.9  --  9.2 9.4     Recent Labs     02/01/21  1250 02/02/21  0134 02/03/21  0424   SODIUM 135 136 137   POTASSIUM 4.3 4.9 5.1   CHLORIDE 100 103 103   CO2 26 23 28   GLUCOSE 159* 151* 97   BUN 28* 26* 21   CREATININE 1.36 1.29 1.36   CALCIUM 8.7 8.8 8.5                   Imaging  CT-ABDOMEN & PELVIS UROGRAM   Final Result      1.  Thickened and irregular posterior bladder wall. Wall irregularity appears to somewhat more pronounced than on the prior exam. Perivesicular stranding may represent tumor infiltration or inflammation. There is mild dilatation of the left ureter.      2.  Right bladder wall diverticulum.      3.  Status post prostatectomy.      4.  Atherosclerosis.                    Assessment/Plan  * Hematuria  Assessment & Plan  Hx of bladder cancer with radiation treatments as well as prior prostatectomy.  CT abd showed Thickened and irregular posterior bladder wall. Wall irregularity appears to somewhat more pronounced than on the prior exam. Perivesicular stranding may represent tumor infiltration or inflammation. There is mild dilatation of the left ureter. Right bladder wall diverticulum. Status post prostatectomy.  S/p cystoscopy 2/2: Gross hematuria secondary to hemorrhagic cystitis from radiation changes, no tumors.   Urology on the case: rec keep cystoscopy appointment with Dr. Jones in March as planned.  No hematuria 2/3  Crenshaw and CBI discontinued, voiding trial      Acute blood loss anemia  Assessment & Plan  Bladder cancer with hematuria worsening  recently  S/p 3 units transfusion 2/1, 2/2, 2/3  Urology on the board, s/p cystoscopy 2/2  Blood transfusion if Hb <7      Malignant neoplasm of anterior wall of urinary bladder (HCC)- (present on admission)  Assessment & Plan  Hx of invasive bladder cancer with radiation treatment as well as prior prostatectomy. Last cystoscopy done December 2020, with normal cytology        S/P mitral valve clip implantation- (present on admission)  Assessment & Plan  F/u by cardiologist    S/P angioplasty with stent- (present on admission)  Assessment & Plan  Had stent done last year  On aspirin 81mg, which is on hold since admission, discussed with urology, ok to resume, Resumed 2/3  Finished palvix about 2weeks ago fo 1 year      Essential hypertension- (present on admission)  Assessment & Plan  C/w home medication with holding perimeter    Stage 3a chronic kidney disease  Assessment & Plan  Cr baseline 1.1-1.3  Avoid nephrotoxin  Closely monitoring    Dementia (HCC)  Assessment & Plan  Donepezil 10mg qd       VTE prophylaxis: SCD due to hematuria

## 2021-02-03 NOTE — PROGRESS NOTES
Per MD request paged urology regarding it being ok to restart ASA.  Terrence with [facility] paged back and said there was no issue with resuming ASA.

## 2021-02-03 NOTE — ASSESSMENT & PLAN NOTE
Hx of bladder cancer with radiation treatments as well as prior prostatectomy.  CT abd showed Thickened and irregular posterior bladder wall. Wall irregularity appears to somewhat more pronounced than on the prior exam. Perivesicular stranding may represent tumor infiltration or inflammation. There is mild dilatation of the left ureter. Right bladder wall diverticulum. Status post prostatectomy.  S/p cystoscopy 2/2: Gross hematuria secondary to hemorrhagic cystitis from radiation changes, no tumors.   Urology on the case: rec keep cystoscopy appointment with Dr. Jones in March as planned.  No hematuria 2/3  Crenshaw and CBI discontinued, voiding trial

## 2021-02-04 VITALS
HEIGHT: 65 IN | WEIGHT: 148.59 LBS | TEMPERATURE: 97.8 F | OXYGEN SATURATION: 93 % | SYSTOLIC BLOOD PRESSURE: 116 MMHG | RESPIRATION RATE: 18 BRPM | DIASTOLIC BLOOD PRESSURE: 61 MMHG | HEART RATE: 64 BPM | BODY MASS INDEX: 24.76 KG/M2

## 2021-02-04 LAB
ANION GAP SERPL CALC-SCNC: 7 MMOL/L (ref 7–16)
BASOPHILS # BLD AUTO: 0.6 % (ref 0–1.8)
BASOPHILS # BLD: 0.04 K/UL (ref 0–0.12)
BUN SERPL-MCNC: 23 MG/DL (ref 8–22)
CALCIUM SERPL-MCNC: 8.5 MG/DL (ref 8.4–10.2)
CHLORIDE SERPL-SCNC: 105 MMOL/L (ref 96–112)
CO2 SERPL-SCNC: 27 MMOL/L (ref 20–33)
CREAT SERPL-MCNC: 1.24 MG/DL (ref 0.5–1.4)
EOSINOPHIL # BLD AUTO: 0.17 K/UL (ref 0–0.51)
EOSINOPHIL NFR BLD: 2.4 % (ref 0–6.9)
ERYTHROCYTE [DISTWIDTH] IN BLOOD BY AUTOMATED COUNT: 44.8 FL (ref 35.9–50)
ERYTHROCYTE [DISTWIDTH] IN BLOOD BY AUTOMATED COUNT: 45.6 FL (ref 35.9–50)
GLUCOSE SERPL-MCNC: 105 MG/DL (ref 65–99)
HCT VFR BLD AUTO: 27 % (ref 42–52)
HCT VFR BLD AUTO: 27.1 % (ref 42–52)
HGB BLD-MCNC: 8.4 G/DL (ref 14–18)
HGB BLD-MCNC: 8.4 G/DL (ref 14–18)
IMM GRANULOCYTES # BLD AUTO: 0.03 K/UL (ref 0–0.11)
IMM GRANULOCYTES NFR BLD AUTO: 0.4 % (ref 0–0.9)
LYMPHOCYTES # BLD AUTO: 0.93 K/UL (ref 1–4.8)
LYMPHOCYTES NFR BLD: 13 % (ref 22–41)
MCH RBC QN AUTO: 27.5 PG (ref 27–33)
MCH RBC QN AUTO: 27.6 PG (ref 27–33)
MCHC RBC AUTO-ENTMCNC: 31 G/DL (ref 33.7–35.3)
MCHC RBC AUTO-ENTMCNC: 31.1 G/DL (ref 33.7–35.3)
MCV RBC AUTO: 88.2 FL (ref 81.4–97.8)
MCV RBC AUTO: 89.1 FL (ref 81.4–97.8)
MONOCYTES # BLD AUTO: 0.71 K/UL (ref 0–0.85)
MONOCYTES NFR BLD AUTO: 9.9 % (ref 0–13.4)
NEUTROPHILS # BLD AUTO: 5.3 K/UL (ref 1.82–7.42)
NEUTROPHILS NFR BLD: 73.7 % (ref 44–72)
NRBC # BLD AUTO: 0 K/UL
NRBC BLD-RTO: 0 /100 WBC
PLATELET # BLD AUTO: 163 K/UL (ref 164–446)
PLATELET # BLD AUTO: 177 K/UL (ref 164–446)
PMV BLD AUTO: 9.1 FL (ref 9–12.9)
PMV BLD AUTO: 9.5 FL (ref 9–12.9)
POTASSIUM SERPL-SCNC: 4.5 MMOL/L (ref 3.6–5.5)
RBC # BLD AUTO: 3.04 M/UL (ref 4.7–6.1)
RBC # BLD AUTO: 3.06 M/UL (ref 4.7–6.1)
SODIUM SERPL-SCNC: 139 MMOL/L (ref 135–145)
WBC # BLD AUTO: 6.8 K/UL (ref 4.8–10.8)
WBC # BLD AUTO: 7.2 K/UL (ref 4.8–10.8)

## 2021-02-04 PROCEDURE — 85027 COMPLETE CBC AUTOMATED: CPT

## 2021-02-04 PROCEDURE — 80048 BASIC METABOLIC PNL TOTAL CA: CPT

## 2021-02-04 PROCEDURE — 99239 HOSP IP/OBS DSCHRG MGMT >30: CPT | Performed by: STUDENT IN AN ORGANIZED HEALTH CARE EDUCATION/TRAINING PROGRAM

## 2021-02-04 PROCEDURE — A9270 NON-COVERED ITEM OR SERVICE: HCPCS | Performed by: STUDENT IN AN ORGANIZED HEALTH CARE EDUCATION/TRAINING PROGRAM

## 2021-02-04 PROCEDURE — 700102 HCHG RX REV CODE 250 W/ 637 OVERRIDE(OP): Performed by: STUDENT IN AN ORGANIZED HEALTH CARE EDUCATION/TRAINING PROGRAM

## 2021-02-04 PROCEDURE — 85025 COMPLETE CBC W/AUTO DIFF WBC: CPT

## 2021-02-04 RX ADMIN — ASPIRIN 81 MG: 81 TABLET, COATED ORAL at 05:44

## 2021-02-04 RX ADMIN — GABAPENTIN 300 MG: 300 CAPSULE ORAL at 05:44

## 2021-02-04 RX ADMIN — SENNOSIDES-DOCUSATE SODIUM TAB 8.6-50 MG 2 TABLET: 8.6-5 TAB at 05:43

## 2021-02-04 RX ADMIN — ATORVASTATIN CALCIUM 40 MG: 40 TABLET, FILM COATED ORAL at 05:43

## 2021-02-04 RX ADMIN — LOSARTAN POTASSIUM 100 MG: 25 TABLET, FILM COATED ORAL at 05:43

## 2021-02-04 ASSESSMENT — FIBROSIS 4 INDEX: FIB4 SCORE: 2.46

## 2021-02-04 ASSESSMENT — PAIN DESCRIPTION - PAIN TYPE: TYPE: ACUTE PAIN

## 2021-02-04 NOTE — FACE TO FACE
Face to Face Supporting Documentation - Home Health    The encounter with this patient was in whole or in part the primary reason for home health admission.    Date of encounter:   Patient:                    MRN:                       YOB: 2021  Filiberto Jauregui  [id number]  7/29/1933     Home health to see patient for:  Skilled Nursing care for assessment, interventions & education    Skilled need for:  Exacerbation of Chronic Disease State hematuria, anemia, hx of urinary bladder cancer    Skilled nursing interventions to include:  Comment: debility    Homebound status evidenced by:  Need the aid of supportive devices such as crutches, canes, wheelchairs or walkers. Leaving home requires a considerable and taxing effort. There is a normal inability to leave the home.    Community Physician to provide follow up care: Mac Miller M.D.     Optional Interventions? No      I certify the face to face encounter for this home health care referral meets the CMS requirements and the encounter/clinical assessment with the patient was, in whole, or in part, for the medical condition(s) listed above, which is the primary reason for home health care. Based on my clinical findings: the service(s) are medically necessary, support the need for home health care, and the homebound criteria are met.  I certify that this patient has had a face to face encounter by myself.  Chung Jade M.D. - NPI: [id number]

## 2021-02-04 NOTE — DISCHARGE SUMMARY
Discharge Summary    CHIEF COMPLAINT ON ADMISSION  Chief Complaint   Patient presents with   • Abnormal Labs     Here for a low H&H       Reason for Admission  referral from PCP, low hemoglobin,      Admission Date  2/1/2021    CODE STATUS  Full Code    HPI & HOSPITAL COURSE  87 y.o. male with PMHx significant for invasive bladder cancer with radiation treatment as well as prior prostatectomy. Last cystoscopy done December 2020, with normal cytology, s/p mitral valve clips, Hx of CAD s/p stents off plavix, who presented to the Emergency Department 2/1 by his wife with a chief complaint of hematuria.  This has been ongoing for the last few months.  He has been on Plavix until about 18 days ago and his cardiologist took him off, partly because of the continued hematuria and he has been on 81 mg of aspirin.  His wife notes that in the last several days, he has felt very weak and tired.  Blood was drawn on 30 January and they were notified today to come to the emergency department with a low hemoglobin.       Hematuria: Hb 7.1 on admission, s/p 3 units PRBC transfusion 2/1-2/3. CT abd showed Thickened and irregular posterior bladder wall. Wall irregularity appears to somewhat more pronounced than on the prior exam. Perivesicular stranding may represent tumor infiltration or inflammation. There is mild dilatation of the left ureter. Right bladder wall diverticulum. Status post prostatectomy.  Urology on the case, s/p cystoscopy 2/2 showing Gross hematuria secondary to hemorrhagic cystitis from radiation changes, no tumors. Rec keep cystoscopy appointment with Dr. Jones in March as planned. Voiding trial    H&H have been closely monitored. Hb 8.4, stable.     Therefore, he is discharged in fair and stable condition to home with close outpatient follow-up.  He is advised to follow-up with PCP within 1 week and to follow-up with urology as scheduled appointment.  He is also advised that she take iron supplements.    The  patient met 2-midnight criteria for an inpatient stay at the time of discharge.    Discharge Date  2/4/2021    FOLLOW UP ITEMS POST DISCHARGE  pcp  urology    DISCHARGE DIAGNOSES  Principal Problem:    Hematuria POA: Unknown  Active Problems:    S/P mitral valve clip implantation POA: Yes    Malignant neoplasm of anterior wall of urinary bladder (HCC) POA: Yes    Acute blood loss anemia POA: Unknown    Essential hypertension POA: Yes    S/P angioplasty with stent POA: Yes    Dementia (HCC) POA: Unknown    Stage 3a chronic kidney disease POA: Unknown  Resolved Problems:    DVT prophylaxis POA: Unknown      FOLLOW UP  Future Appointments   Date Time Provider Department Center   2/26/2021 11:30 AM Gilson Dempsey M.D. [facility] None     Mac Miller M.D.  [address]  [address]  [place] [address]  [phone]    In 1 week      Brad Jones M.D.  [address]  [place] [address]  [phone]      as planned appointment      MEDICATIONS ON DISCHARGE     Medication List      CONTINUE taking these medications      Instructions   aspirin EC 81 MG Tbec  Commonly known as: ECOTRIN   Take 81 mg by mouth every day.  Dose: 81 mg     atorvastatin 40 MG Tabs  Commonly known as: LIPITOR   Take 1 Tab by mouth every day.  Dose: 40 mg     CENTRUM SILVER 50+MEN PO   Take 1 tablet by mouth.  Dose: 1 tablet     docusate sodium 100 MG Caps  Commonly known as: COLACE   Take 100-200 mg by mouth 2 times a day as needed for Constipation.  Dose: 100-200 mg     donepezil 10 MG tablet  Commonly known as: ARICEPT   Take 10 mg by mouth every evening.  Dose: 10 mg     gabapentin 100 MG Caps  Commonly known as: NEURONTIN   Take 100-300 mg by mouth every day.  Dose: 100-300 mg     losartan 100 MG Tabs  Commonly known as: COZAAR   Take 100 mg by mouth every day.  Dose: 100 mg     metFORMIN 500 MG Tabs  Commonly known as: GLUCOPHAGE   Take 1-2 Tabs by mouth 2 times a day, with meals. 1000 mg in am and 500 mg in pm  Dose: 500-1,000 mg             Allergies  Allergies   Allergen Reactions   • Contrast Media With Iodine [Iodine] Rash     RASH    • Iodide      Per pt broke out in rash   • Penicillins Rash     .       DIET  Orders Placed This Encounter   Procedures   • Diet Order Diet: Regular     Standing Status:   Standing     Number of Occurrences:   1     Order Specific Question:   Diet:     Answer:   Regular [1]       ACTIVITY  As tolerated.  Weight bearing as tolerated    CONSULTATIONS  urology    PROCEDURES  cystoscopy    LABORATORY  Lab Results   Component Value Date    SODIUM 139 02/04/2021    POTASSIUM 4.5 02/04/2021    CHLORIDE 105 02/04/2021    CO2 27 02/04/2021    GLUCOSE 105 (H) 02/04/2021    BUN 23 (H) 02/04/2021    CREATININE 1.24 02/04/2021        Lab Results   Component Value Date    WBC 6.8 02/04/2021    HEMOGLOBIN 8.4 (L) 02/04/2021    HEMATOCRIT 27.1 (L) 02/04/2021    PLATELETCT 163 (L) 02/04/2021        Total time of the discharge process exceeds 40 minutes.

## 2021-02-04 NOTE — CARE PLAN
Problem: Safety  Goal: Will remain free from injury  Outcome: PROGRESSING AS EXPECTED  Goal: Will remain free from falls  Outcome: PROGRESSING AS EXPECTED     Problem: Infection  Goal: Will remain free from infection  Outcome: PROGRESSING AS EXPECTED     Problem: Bowel/Gastric:  Goal: Normal bowel function is maintained or improved  Outcome: PROGRESSING AS EXPECTED       Pt remains free from falls and injury. Bed in low position, top 2 hand rails up. Call light in reach, tread non-slip socks on.    Pt remains free from infection. Infection prevention measures in place.    Normal bowel function, Pt had a large, normal, formed brown bowel movement today.

## 2021-02-04 NOTE — PROGRESS NOTES
Hector and I received report from Nataly. Pt. Is currently sleeping in bed. Bed in low position, top 2 hand rails up. Call light in reach.

## 2021-02-04 NOTE — PROGRESS NOTES
Report received from Hector VALENCIA . Pt sitting up in bed at the time of report. Plan of care assumed. Safety precautions in place and call light within reach.

## 2021-02-04 NOTE — CARE PLAN
Problem: Communication  Goal: The ability to communicate needs accurately and effectively will improve  Outcome: PROGRESSING AS EXPECTED   Pt uses call light to alert staff to his needs.    Problem: Safety  Goal: Will remain free from injury  Outcome: PROGRESSING AS EXPECTED  Goal: Will remain free from falls  Outcome: PROGRESSING AS EXPECTED   Safety measures in place.

## 2021-02-04 NOTE — DISCHARGE INSTRUCTIONS
Discharge Instructions per Chung Jade M.D.    1. Please follow-up with PCP as outpatient. Need to repeat CBC in one week  2. Please follow up with urologist Dr. Jones in March as planned  3. Iron supplements Hema-plex recommended. You can buy it as OTC    DIET: cardiac diet    ACTIVITY: As tolerated    DIAGNOSIS: Hematuria, history of malignant invasive bladder cancer, history of mitral valve clip, history of CAD, hypertension, dementia  Return to ER in the event of new or worsening symptoms. Please note importance of compliance and the patient has agreed to proceed with all medical recommendations and follow up plan indicated above. All medications come with benefits and risks. Risks may include permanent injury or death and these risks can be minimized with close reassessment and monitoring. Please make it to your scheduled follow ups with PCP and urology    Discharge Instructions    Discharged to home by car with wife. Discharged via wheelchair, hospital escort: Yes.  Special equipment needed: Not Applicable    Be sure to schedule a follow-up appointment with your primary care doctor or any specialists as instructed.     Discharge Plan:   Diet Plan: Discussed  Activity Level: Discussed  Confirmed Follow up Appointment: Patient to Call and Schedule Appointment  Confirmed Symptoms Management: Discussed  Medication Reconciliation Updated: Yes  Influenza Vaccine Indication: Indicated: 65 years and older, Not indicated: Previously immunized this influenza season and > 8 years of age    I understand that a diet low in cholesterol, fat, and sodium is recommended for good health. Unless I have been given specific instructions below for another diet, I accept this instruction as my diet prescription.   Other diet: na    Special Instructions: NA      · Is patient discharged on Warfarin / Coumadin?   No     Depression / Suicide Risk    As you are discharged from this [facility] facility, it is important to learn how to  keep safe from harming yourself.    Recognize the warning signs:  · Abrupt changes in personality, positive or negative- including increase in energy   · Giving away possessions  · Change in eating patterns- significant weight changes-  positive or negative  · Change in sleeping patterns- unable to sleep or sleeping all the time   · Unwillingness or inability to communicate  · Depression  · Unusual sadness, discouragement and loneliness  · Talk of wanting to die  · Neglect of personal appearance   · Rebelliousness- reckless behavior  · Withdrawal from people/activities they love  · Confusion- inability to concentrate     If you or a loved one observes any of these behaviors or has concerns about self-harm, here's what you can do:  · Talk about it- your feelings and reasons for harming yourself  · Remove any means that you might use to hurt yourself (examples: pills, rope, extension cords, firearm)  · Get professional help from the community (Mental Health, Substance Abuse, psychological counseling)  · Do not be alone:Call your Safe Contact- someone whom you trust who will be there for you.  · Call your local CRISIS HOTLINE 951-5434 or 146-063-9782  · Call your local Children's Mobile Crisis Response Team [place] (578) 055-7253 or www.[employer]  · Call the toll free National Suicide Prevention Hotlines   · National Suicide Prevention Lifeline 276-708-QHTT (5868)  · National Hope Line Network 800-SUICIDE (026-7610)    Hematuria, Adult  Hematuria is blood in the urine. Blood may be visible in the urine, or it may be identified with a test. This condition can be caused by infections of the bladder, urethra, kidney, or prostate. Other possible causes include:  · Kidney stones.  · Cancer of the urinary tract.  · Too much calcium in the urine.  · Conditions that are passed from parent to child (inherited conditions).  · Exercise that requires a lot of energy.  Infections can usually be treated with medicine,  and a kidney stone usually will pass through your urine. If neither of these is the cause of your hematuria, more tests may be needed to identify the cause of your symptoms.  It is very important to tell your health care provider about any blood in your urine, even if it is painless or the blood stops without treatment. Blood in the urine, when it happens and then stops and then happens again, can be a symptom of a very serious condition, including cancer. There is no pain in the initial stages of many urinary cancers.  Follow these instructions at home:  Medicines  · Take over-the-counter and prescription medicines only as told by your health care provider.  · If you were prescribed an antibiotic medicine, take it as told by your health care provider. Do not stop taking the antibiotic even if you start to feel better.  Eating and drinking  · Drink enough fluid to keep your urine clear or pale yellow. It is recommended that you drink 3-4 quarts (2.8-3.8 L) a day. If you have been diagnosed with an infection, it is recommended that you drink cranberry juice in addition to large amounts of water.  · Avoid caffeine, tea, and carbonated beverages. These tend to irritate the bladder.  · Avoid alcohol because it may irritate the prostate (men).  General instructions  · If you have been diagnosed with a kidney stone, follow your health care provider's instructions about straining your urine to catch the stone.  · Empty your bladder often. Avoid holding urine for long periods of time.  · If you are female:  ? After a bowel movement, wipe from front to back and use each piece of toilet paper only once.  ? Empty your bladder before and after sex.  · Pay attention to any changes in your symptoms. Tell your health care provider about any changes or any new symptoms.  · It is your responsibility to get your test results. Ask your health care provider, or the department performing the test, when your results will be  ready.  · Keep all follow-up visits as told by your health care provider. This is important.  Contact a health care provider if:  · You develop back pain.  · You have a fever.  · You have nausea or vomiting.  · Your symptoms do not improve after 3 days.  · Your symptoms get worse.  Get help right away if:  · You develop severe vomiting and are unable take medicine without vomiting.  · You develop severe pain in your back or abdomen even though you are taking medicine.  · You pass a large amount of blood in your urine.  · You pass blood clots in your urine.  · You feel very weak or like you might faint.  · You faint.  Summary  · Hematuria is blood in the urine. It has many possible causes.  · It is very important that you tell your health care provider about any blood in your urine, even if it is painless or the blood stops without treatment.  · Take over-the-counter and prescription medicines only as told by your health care provider.  · Drink enough fluid to keep your urine clear or pale yellow.  This information is not intended to replace advice given to you by your health care provider. Make sure you discuss any questions you have with your health care provider.  Document Released: 12/18/2006 Document Revised: 11/30/2018 Document Reviewed: 01/20/2018  [employer] Patient Education © 2020 [employer]      Anemia    Anemia is a condition in which you do not have enough red blood cells or hemoglobin. Hemoglobin is a substance in red blood cells that carries oxygen. When you do not have enough red blood cells or hemoglobin (are anemic), your body cannot get enough oxygen and your organs may not work properly. As a result, you may feel very tired or have other problems.  What are the causes?  Common causes of anemia include:  · Excessive bleeding. Anemia can be caused by excessive bleeding inside or outside the body, including bleeding from the intestine or from periods in women.  · Poor nutrition.  · Long-lasting  (chronic) kidney, thyroid, and liver disease.  · Bone marrow disorders.  · Cancer and treatments for cancer.  · HIV (human immunodeficiency virus) and AIDS (acquired immunodeficiency syndrome).  · Treatments for HIV and AIDS.  · Spleen problems.  · Blood disorders.  · Infections, medicines, and autoimmune disorders that destroy red blood cells.  What are the signs or symptoms?  Symptoms of this condition include:  · Minor weakness.  · Dizziness.  · Headache.  · Feeling heartbeats that are irregular or faster than normal (palpitations).  · Shortness of breath, especially with exercise.  · Paleness.  · Cold sensitivity.  · Indigestion.  · Nausea.  · Difficulty sleeping.  · Difficulty concentrating.  Symptoms may occur suddenly or develop slowly. If your anemia is mild, you may not have symptoms.  How is this diagnosed?  This condition is diagnosed based on:  · Blood tests.  · Your medical history.  · A physical exam.  · Bone marrow biopsy.  Your health care provider may also check your stool (feces) for blood and may do additional testing to look for the cause of your bleeding.  You may also have other tests, including:  · Imaging tests, such as a CT scan or MRI.  · Endoscopy.  · Colonoscopy.  How is this treated?  Treatment for this condition depends on the cause. If you continue to lose a lot of blood, you may need to be treated at a hospital. Treatment may include:  · Taking supplements of iron, vitamin B12, or folic acid.  · Taking a hormone medicine (erythropoietin) that can help to stimulate red blood cell growth.  · Having a blood transfusion. This may be needed if you lose a lot of blood.  · Making changes to your diet.  · Having surgery to remove your spleen.  Follow these instructions at home:  · Take over-the-counter and prescription medicines only as told by your health care provider.  · Take supplements only as told by your health care provider.  · Follow any diet instructions that you were given.  · Keep  all follow-up visits as told by your health care provider. This is important.  Contact a health care provider if:  · You develop new bleeding anywhere in the body.  Get help right away if:  · You are very weak.  · You are short of breath.  · You have pain in your abdomen or chest.  · You are dizzy or feel faint.  · You have trouble concentrating.  · You have bloody or black, tarry stools.  · You vomit repeatedly or you vomit up blood.  Summary  · Anemia is a condition in which you do not have enough red blood cells or enough of a substance in your red blood cells that carries oxygen (hemoglobin).  · Symptoms may occur suddenly or develop slowly.  · If your anemia is mild, you may not have symptoms.  · This condition is diagnosed with blood tests as well as a medical history and physical exam. Other tests may be needed.  · Treatment for this condition depends on the cause of the anemia.  This information is not intended to replace advice given to you by your health care provider. Make sure you discuss any questions you have with your health care provider.  Document Released: 01/25/2006 Document Revised: 11/30/2018 Document Reviewed: 01/19/2018  [employer] Patient Education © 2020 [employer]

## 2021-02-05 ENCOUNTER — TELEPHONE (OUTPATIENT)
Dept: CARDIOLOGY | Facility: MEDICAL CENTER | Age: 86
End: 2021-02-05

## 2021-02-05 ENCOUNTER — TELEPHONE (OUTPATIENT)
Dept: RADIATION ONCOLOGY | Facility: MEDICAL CENTER | Age: 86
End: 2021-02-05

## 2021-02-05 NOTE — PROGRESS NOTES
Pt discharged to home. Discharge instructions provided to pt. Pt verbalizes understanding. Pt states all questions have been answered. Signed copy placed in chart. Pt states that all personal belongs are in possession. Pt off unit via w/c, escorted by [facility] staff.

## 2021-02-05 NOTE — TELEPHONE ENCOUNTER
Spoke with patient's wife Phoebe and advised he was recommended to continue 81mg ASA in his DC notes and explained why this is necessary. Advised to schedule a visit with his urologist for his recommendations as well.    Phoebe requested I send this to Dr. Wilson so he is aware.

## 2021-02-05 NOTE — TELEPHONE ENCOUNTER
86 yo male with Stage II bladder cancer s/p chemoRT completed 8/3/20 with recent hematuria found to have hemorraghic cystitis from radiation on cystoscopy by Dr. Rain  -Left message with patient but would recommend holding anti-coagulation if possible including ASA after discussion with cardiology  -if persistent can consider hyperbaric oxygen treatments as outpatient

## 2021-02-05 NOTE — TELEPHONE ENCOUNTER
[facility]    Pts wife Lola called stating Pt went into the hospital on Monday and was released yesterday due to bleeding through his bladder. Lola would like to know if [facility] wants Pt to continue taking his aspirin. Please call Lola back at [phone].

## 2021-02-08 DIAGNOSIS — D62 ACUTE BLOOD LOSS ANEMIA: ICD-10-CM

## 2021-02-09 ENCOUNTER — HOSPITAL ENCOUNTER (OUTPATIENT)
Dept: LAB | Facility: MEDICAL CENTER | Age: 86
End: 2021-02-09
Attending: PSYCHIATRY & NEUROLOGY
Payer: MEDICARE

## 2021-02-09 DIAGNOSIS — D62 ACUTE BLOOD LOSS ANEMIA: ICD-10-CM

## 2021-02-09 LAB
BASOPHILS # BLD AUTO: 0.9 % (ref 0–1.8)
BASOPHILS # BLD: 0.06 K/UL (ref 0–0.12)
EOSINOPHIL # BLD AUTO: 0.19 K/UL (ref 0–0.51)
EOSINOPHIL NFR BLD: 2.9 % (ref 0–6.9)
ERYTHROCYTE [DISTWIDTH] IN BLOOD BY AUTOMATED COUNT: 47.8 FL (ref 35.9–50)
HCT VFR BLD AUTO: 29.8 % (ref 42–52)
HGB BLD-MCNC: 9.2 G/DL (ref 14–18)
IMM GRANULOCYTES # BLD AUTO: 0.02 K/UL (ref 0–0.11)
IMM GRANULOCYTES NFR BLD AUTO: 0.3 % (ref 0–0.9)
LYMPHOCYTES # BLD AUTO: 1.23 K/UL (ref 1–4.8)
LYMPHOCYTES NFR BLD: 18.9 % (ref 22–41)
MCH RBC QN AUTO: 28.3 PG (ref 27–33)
MCHC RBC AUTO-ENTMCNC: 30.9 G/DL (ref 33.7–35.3)
MCV RBC AUTO: 91.7 FL (ref 81.4–97.8)
MONOCYTES # BLD AUTO: 0.47 K/UL (ref 0–0.85)
MONOCYTES NFR BLD AUTO: 7.2 % (ref 0–13.4)
NEUTROPHILS # BLD AUTO: 4.55 K/UL (ref 1.82–7.42)
NEUTROPHILS NFR BLD: 69.8 % (ref 44–72)
NRBC # BLD AUTO: 0 K/UL
NRBC BLD-RTO: 0 /100 WBC
PLATELET # BLD AUTO: 237 K/UL (ref 164–446)
PMV BLD AUTO: 9.9 FL (ref 9–12.9)
RBC # BLD AUTO: 3.25 M/UL (ref 4.7–6.1)
WBC # BLD AUTO: 6.5 K/UL (ref 4.8–10.8)

## 2021-02-09 PROCEDURE — 36415 COLL VENOUS BLD VENIPUNCTURE: CPT

## 2021-02-09 PROCEDURE — 85025 COMPLETE CBC W/AUTO DIFF WBC: CPT

## 2021-02-10 RX ORDER — DONEPEZIL HYDROCHLORIDE 10 MG/1
20 TABLET, FILM COATED ORAL
Qty: 180 TABLET | Refills: 3 | Status: SHIPPED | OUTPATIENT
Start: 2021-02-10 | End: 2021-05-11

## 2021-02-23 DIAGNOSIS — C67.3 MALIGNANT NEOPLASM OF ANTERIOR WALL OF URINARY BLADDER (HCC): ICD-10-CM

## 2021-02-25 ENCOUNTER — HOSPITAL ENCOUNTER (OUTPATIENT)
Dept: LAB | Facility: MEDICAL CENTER | Age: 86
End: 2021-02-25
Attending: RADIOLOGY
Payer: MEDICARE

## 2021-02-25 DIAGNOSIS — C67.3 MALIGNANT NEOPLASM OF ANTERIOR WALL OF URINARY BLADDER (HCC): ICD-10-CM

## 2021-02-25 LAB
BASOPHILS # BLD AUTO: 0.6 % (ref 0–1.8)
BASOPHILS # BLD: 0.04 K/UL (ref 0–0.12)
EOSINOPHIL # BLD AUTO: 0.14 K/UL (ref 0–0.51)
EOSINOPHIL NFR BLD: 2 % (ref 0–6.9)
ERYTHROCYTE [DISTWIDTH] IN BLOOD BY AUTOMATED COUNT: 51 FL (ref 35.9–50)
HCT VFR BLD AUTO: 31.1 % (ref 42–52)
HGB BLD-MCNC: 9.5 G/DL (ref 14–18)
IMM GRANULOCYTES # BLD AUTO: 0.03 K/UL (ref 0–0.11)
IMM GRANULOCYTES NFR BLD AUTO: 0.4 % (ref 0–0.9)
LYMPHOCYTES # BLD AUTO: 1.13 K/UL (ref 1–4.8)
LYMPHOCYTES NFR BLD: 16.4 % (ref 22–41)
MCH RBC QN AUTO: 28 PG (ref 27–33)
MCHC RBC AUTO-ENTMCNC: 30.5 G/DL (ref 33.7–35.3)
MCV RBC AUTO: 91.7 FL (ref 81.4–97.8)
MONOCYTES # BLD AUTO: 0.51 K/UL (ref 0–0.85)
MONOCYTES NFR BLD AUTO: 7.4 % (ref 0–13.4)
NEUTROPHILS # BLD AUTO: 5.05 K/UL (ref 1.82–7.42)
NEUTROPHILS NFR BLD: 73.2 % (ref 44–72)
NRBC # BLD AUTO: 0 K/UL
NRBC BLD-RTO: 0 /100 WBC
PLATELET # BLD AUTO: 212 K/UL (ref 164–446)
PMV BLD AUTO: 10.8 FL (ref 9–12.9)
RBC # BLD AUTO: 3.39 M/UL (ref 4.7–6.1)
WBC # BLD AUTO: 6.9 K/UL (ref 4.8–10.8)

## 2021-02-25 PROCEDURE — 85025 COMPLETE CBC W/AUTO DIFF WBC: CPT

## 2021-02-25 PROCEDURE — 36415 COLL VENOUS BLD VENIPUNCTURE: CPT

## 2021-02-26 ENCOUNTER — HOSPITAL ENCOUNTER (OUTPATIENT)
Dept: RADIATION ONCOLOGY | Facility: MEDICAL CENTER | Age: 86
End: 2021-02-28
Attending: RADIOLOGY
Payer: MEDICARE

## 2021-02-26 VITALS
TEMPERATURE: 97.8 F | DIASTOLIC BLOOD PRESSURE: 60 MMHG | SYSTOLIC BLOOD PRESSURE: 137 MMHG | WEIGHT: 140.21 LBS | OXYGEN SATURATION: 99 % | HEART RATE: 66 BPM | BODY MASS INDEX: 23.33 KG/M2

## 2021-02-26 DIAGNOSIS — C67.2 MALIGNANT NEOPLASM OF LATERAL WALL OF URINARY BLADDER (HCC): ICD-10-CM

## 2021-02-26 DIAGNOSIS — N30.91 HEMORRHAGIC CYSTITIS: ICD-10-CM

## 2021-02-26 PROCEDURE — 99214 OFFICE O/P EST MOD 30 MIN: CPT | Performed by: RADIOLOGY

## 2021-02-26 PROCEDURE — 99212 OFFICE O/P EST SF 10 MIN: CPT | Performed by: RADIOLOGY

## 2021-02-26 ASSESSMENT — FIBROSIS 4 INDEX: FIB4 SCORE: 2.05

## 2021-02-26 ASSESSMENT — PAIN SCALES - GENERAL: PAINLEVEL: NO PAIN

## 2021-02-26 NOTE — PROGRESS NOTES
RADIATION ONCOLOGY FOLLOW-UP    DATE OF SERVICE: 2/26/2021    IDENTIFICATION:   A 87 y.o. 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  Patient had a PET CT scan on May 8, 2020 which showed no evidence of distant metastatic disease although there was some uptake in the ascending colon.  Patient will have last BCG installation on May 28, 2020.  Patient currently doing well he does have a little bit of weight loss and fatigue and occasional pain with urination as well as waking up at night.     9/21/20  Patient recently completed chemoradiation and had significant dehydration and acute kidney injury requiring hydration.  His kidney function has since returned to normal he still has significant frequency and urgency and dysuria which his Pyridium had not helped.  He has diarrhea which she takes Lomotil 2-3 times a day which she says is getting better.  Per his report he saw Dr. Jones who did a cystoscopy and said bladder appears healing and no evidence of disease recurrence.      11/13/20  Patient doing well he says that his diarrhea is improving Lomotil.  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  He said that the Pyridium and oxybutynin did not work.  He is seen Dr. Jones next month for repeat cystoscopy.  CT chest abdomen pelvis shows no evidence of distant metastatic disease there is some bladder wall thickening which will be evaluated on next cystoscopy.  Hematuria has essentially stopped although he does have  some occasional mild hematuria.    INTERVAL HISTORY:  Patient doing well overall he did have recent episode of urinary bleeding and was in the hospital and underwent cystoscopy type Dr. Rain who found bleeding hemorrhagic cystitis however no recurrent tumor.  Patient still has increased urinary frequency.  He was taken off Plavix but is on baby aspirin.  Overall patient is doing well.      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 180 tablet 3   • aspirin EC (ECOTRIN) 81 MG Tablet Delayed Response Take 81 mg by mouth every day.     • Multiple Vitamins-Minerals (CENTRUM SILVER 50+MEN PO) Take 1 tablet by mouth.     • metFORMIN (GLUCOPHAGE) 500 MG Tab Take 1-2 Tabs by mouth 2 times a day, with meals. 1000 mg in am and 500 mg in pm 90 Tab 0   • atorvastatin (LIPITOR) 40 MG Tab Take 1 Tab by mouth every day. 30 Tab 11   • gabapentin (NEURONTIN) 100 MG Cap Take 100-300 mg by mouth every day.  5   • docusate sodium (COLACE) 100 MG Cap Take 100-200 mg by mouth 2 times a day as needed for Constipation.     • losartan (COZAAR) 100 MG Tab Take 100 mg by mouth every day.       No current facility-administered medications for this encounter.       ALLERGIES:  Contrast media with iodine [iodine], Iodide, and Penicillins    REVIEW OF SYSTEMS:  A review of systems for today's date of service was reviewed and uploaded into the electronic medical record.    PHYSICAL  EXAM:  PERFORMANCE STATUS:  ECOG Performance Review 2/26/2021   ECOG Performance Status Fully active, able to carry on all pre-disease performance without restriction   Some recent data might be hidden     Karnofsky Score 2/26/2021 11/13/2020 9/21/2020   Karnofsky Score 80 80 70   Some recent data might be hidden     /60 (BP Location: Right arm, Patient Position: Sitting, BP Cuff Size: Adult)   Pulse 66   Temp 36.6 °C (97.8 °F)   Wt 63.6 kg (140 lb 3.4 oz)   SpO2 99%   BMI 23.33 kg/m²   Physical Exam  Vitals reviewed.   Constitutional:       Appearance: Normal appearance.   HENT:      Head: Normocephalic and atraumatic.      Mouth/Throat:      Mouth: Mucous membranes are moist.   Eyes:      Pupils: Pupils are equal, round, and reactive to light.   Cardiovascular:      Rate and Rhythm: Normal rate.   Pulmonary:      Effort: Pulmonary effort is normal.   Abdominal:      General: Abdomen is flat.   Musculoskeletal:         General: Normal range of motion.      Cervical back: Normal range of motion.   Neurological:      General: No focal deficit present.      Mental Status: He is alert.   Psychiatric:         Mood and Affect: Mood normal.         LABORATORY DATA:   Lab Results   Component Value Date/Time    WBC 6.9 02/25/2021 1228    WBC 6.5 02/09/2021 1327    WBC 6.8 02/04/2021 1351    HEMOGLOBIN 9.5 (L) 02/25/2021 1228    HEMOGLOBIN 9.2 (L) 02/09/2021 1327    HEMOGLOBIN 8.4 (L) 02/04/2021 1351    HEMATOCRIT 31.1 (L) 02/25/2021 1228    HEMATOCRIT 29.8 (L) 02/09/2021 1327    HEMATOCRIT 27.1 (L) 02/04/2021 1351    MCV 91.7 02/25/2021 1228    MCV 91.7 02/09/2021 1327    MCV 89.1 02/04/2021 1351    PLATELETCT 212 02/25/2021 1228    PLATELETCT 237 02/09/2021 1327    PLATELETCT 163 (L) 02/04/2021 1351    NEUTS 5.05 02/25/2021 1228    NEUTS 4.55 02/09/2021 1327    NEUTS 5.30 02/04/2021 0149      Lab Results   Component Value Date/Time    SODIUM 139 02/04/2021 0149    SODIUM 137 02/03/2021 0424    SODIUM 136  02/02/2021 0134    POTASSIUM 4.5 02/04/2021 0149    POTASSIUM 5.1 02/03/2021 0424    POTASSIUM 4.9 02/02/2021 0134    BUN 23 (H) 02/04/2021 0149    BUN 21 02/03/2021 0424    BUN 26 (H) 02/02/2021 0134    CREATININE 1.24 02/04/2021 0149    CREATININE 1.36 02/03/2021 0424    CREATININE 1.29 02/02/2021 0134    CALCIUM 8.5 02/04/2021 0149    CALCIUM 8.5 02/03/2021 0424    CALCIUM 8.8 02/02/2021 0134    ALBUMIN 3.7 02/02/2021 0134    ALBUMIN 4.1 02/01/2021 1250    ALBUMIN 4.0 11/09/2020 1225    ASTSGOT 25 02/02/2021 0134    ASTSGOT 33 02/01/2021 1250    ASTSGOT 35 11/09/2020 1225    ALKPHOSPHAT 48 02/02/2021 0134    ALKPHOSPHAT 55 02/01/2021 1250    ALKPHOSPHAT 59 11/09/2020 1225    IFNOTAFR 55 (A) 02/04/2021 0149    IFNOTAFR 50 (A) 02/03/2021 0424    IFNOTAFR 53 (A) 02/02/2021 0134       RADIOLOGY DATA:  CT-ABDOMEN & PELVIS UROGRAM    Result Date: 2/1/2021 2/1/2021 2:40 PM HISTORY/REASON FOR EXAM:  Hematuria. History of bladder cancer. TECHNIQUE/EXAM DESCRIPTION:  CT Urogram Initial precontrast images were obtained from the diaphragmatic domes through the pubic symphysis using helical technique. Following this, 100 mL of Omnipaque 350 nonionic contrast was administered, and postcontrast nephrographic phase thin-section helical scanning obtained from the diaphragmatic domes through the pubic symphysis. Additional 10-minute delayed imaging is performed from the diaphragmatic domes through the pubic symphysis to evaluate the ureters. Coronal MIP reconstructions of the delayed phase are also performed. Low dose optimization technique was utilized for this CT exam including automated exposure control and adjustment of the mA and/or kV according to patient size. COMPARISON: 11/12/2020 FINDINGS: Lung bases: Mild bibasilar atelectasis. Kidneys: There are no renal calcifications. The kidneys enhance symmetrically. No solid mass is identified. Renal collecting system: There are no ureter calcifications. There is no mass or  filling defect within the renal collecting system. The left ureter is mildly dilated. Bladder: There is wall thickening and nodularity in the posterior wall of the bladder, most pronounced on the left. Tiny nodule in the bladder dome is not appreciated on the current exam There is stranding in the perivesicular fat posteriorly. A diverticulum arises from the right posterior lateral bladder wall. The prostate gland has been removed. Liver: The liver is unremarkable. Spleen: Spleen is unremarkable. Biliary system: Gallbladder and biliary tree are unremarkable. Pancreas: Pancreas is unremarkable. Adrenals: Adrenal glands are unremarkable. Vasculature: The renal veins are patent. There are scattered arterial calcifications. The abdominal aorta is ectatic. Lymph nodes: There are no enlarged lymph nodes. Bowel: There is a moderate amount of colonic stool. No pneumoperitoneum is identified. There is a right inguinal hernia containing a small segment of small bowel. No secondary obstruction is identified. Bones: Degenerative changes are in the spine. Miscellaneous: There are postoperative changes from prior lymphadenectomy in the pelvis.     1.  Thickened and irregular posterior bladder wall. Wall irregularity appears to somewhat more pronounced than on the prior exam. Perivesicular stranding may represent tumor infiltration or inflammation. There is mild dilatation of the left ureter. 2.  Right bladder wall diverticulum. 3.  Status post prostatectomy. 4.  Atherosclerosis.       IMPRESSION:    A 87 y.o. 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 on 5/18/2020      CANCER STATUS:  No Evidence of Disease    RECOMMENDATIONS:   Patient doing well I reviewed with him his cystoscopy results and CT abdomen pelvis which shows no evidence of disease recurrence however  he does have complications of hemorrhagic cystitis which I agree with recommendation for taking him off Plavix.  I did explain that he could see hyperbaric oxygen doctors to discuss this as a potential option although he does say it is getting slightly better.  I will see him back in 3 months with repeat CT chest abdomen pelvis.  He will get another cystoscopy in 3 months.    Thank you for the opportunity to participate in his care.  If any questions or comments, please do not hesitate in calling.    Orders Placed This Encounter   • CT-CHEST (THORAX) WITH   • CT-ABDOMEN & PELVIS UROGRAM

## 2021-03-08 ENCOUNTER — HOSPITAL ENCOUNTER (OUTPATIENT)
Dept: LAB | Facility: MEDICAL CENTER | Age: 86
End: 2021-03-08
Attending: PSYCHIATRY & NEUROLOGY
Payer: MEDICARE

## 2021-03-08 DIAGNOSIS — D62 ACUTE BLOOD LOSS ANEMIA: ICD-10-CM

## 2021-03-08 LAB
BASOPHILS # BLD AUTO: 0.6 % (ref 0–1.8)
BASOPHILS # BLD: 0.04 K/UL (ref 0–0.12)
EOSINOPHIL # BLD AUTO: 0.08 K/UL (ref 0–0.51)
EOSINOPHIL NFR BLD: 1.2 % (ref 0–6.9)
ERYTHROCYTE [DISTWIDTH] IN BLOOD BY AUTOMATED COUNT: 52.7 FL (ref 35.9–50)
HCT VFR BLD AUTO: 31.3 % (ref 42–52)
HGB BLD-MCNC: 9.7 G/DL (ref 14–18)
IMM GRANULOCYTES # BLD AUTO: 0.02 K/UL (ref 0–0.11)
IMM GRANULOCYTES NFR BLD AUTO: 0.3 % (ref 0–0.9)
LYMPHOCYTES # BLD AUTO: 1.29 K/UL (ref 1–4.8)
LYMPHOCYTES NFR BLD: 18.7 % (ref 22–41)
MCH RBC QN AUTO: 28.1 PG (ref 27–33)
MCHC RBC AUTO-ENTMCNC: 31 G/DL (ref 33.7–35.3)
MCV RBC AUTO: 90.7 FL (ref 81.4–97.8)
MONOCYTES # BLD AUTO: 0.46 K/UL (ref 0–0.85)
MONOCYTES NFR BLD AUTO: 6.7 % (ref 0–13.4)
NEUTROPHILS # BLD AUTO: 5 K/UL (ref 1.82–7.42)
NEUTROPHILS NFR BLD: 72.5 % (ref 44–72)
NRBC # BLD AUTO: 0 K/UL
NRBC BLD-RTO: 0 /100 WBC
PLATELET # BLD AUTO: 164 K/UL (ref 164–446)
PMV BLD AUTO: 9.4 FL (ref 9–12.9)
RBC # BLD AUTO: 3.45 M/UL (ref 4.7–6.1)
WBC # BLD AUTO: 6.9 K/UL (ref 4.8–10.8)

## 2021-03-08 PROCEDURE — 36415 COLL VENOUS BLD VENIPUNCTURE: CPT

## 2021-03-08 PROCEDURE — 85025 COMPLETE CBC W/AUTO DIFF WBC: CPT

## 2021-03-09 DIAGNOSIS — C67.3 MALIGNANT NEOPLASM OF ANTERIOR WALL OF URINARY BLADDER (HCC): Primary | ICD-10-CM

## 2021-03-22 NOTE — ANESTHESIA TIME REPORT
Anesthesia Start and Stop Event Times     Date Time Event    10/24/2019 1105 Ready for Procedure     1105 Anesthesia Start     1202 Anesthesia Stop        Responsible Staff  10/24/19    Name Role Begin End    Rachel Sosa M.D. Anesth 1105 1202        Preop Diagnosis (Free Text):  Pre-op Diagnosis             Preop Diagnosis (Codes):    Post op Diagnosis  Mitral regurgitation      Premium Reason  Non-Premium    Comments:                                                                       
no

## 2021-04-06 ENCOUNTER — HOSPITAL ENCOUNTER (OUTPATIENT)
Dept: RADIOLOGY | Facility: MEDICAL CENTER | Age: 86
End: 2021-04-06
Attending: NEUROLOGICAL SURGERY
Payer: MEDICARE

## 2021-04-06 DIAGNOSIS — M48.061 SPINAL STENOSIS, LUMBAR REGION, WITHOUT NEUROGENIC CLAUDICATION: ICD-10-CM

## 2021-04-06 PROCEDURE — 72148 MRI LUMBAR SPINE W/O DYE: CPT | Mod: MH

## 2021-05-21 ENCOUNTER — HOSPITAL ENCOUNTER (OUTPATIENT)
Dept: LAB | Facility: MEDICAL CENTER | Age: 86
End: 2021-05-21
Attending: RADIOLOGY
Payer: MEDICARE

## 2021-05-21 DIAGNOSIS — C67.3 MALIGNANT NEOPLASM OF ANTERIOR WALL OF URINARY BLADDER (HCC): ICD-10-CM

## 2021-05-21 LAB
ANION GAP SERPL CALC-SCNC: 5 MMOL/L (ref 7–16)
BUN SERPL-MCNC: 25 MG/DL (ref 8–22)
CALCIUM SERPL-MCNC: 8.9 MG/DL (ref 8.5–10.5)
CHLORIDE SERPL-SCNC: 106 MMOL/L (ref 96–112)
CO2 SERPL-SCNC: 28 MMOL/L (ref 20–33)
CREAT SERPL-MCNC: 1.4 MG/DL (ref 0.5–1.4)
GLUCOSE SERPL-MCNC: 78 MG/DL (ref 65–99)
POTASSIUM SERPL-SCNC: 5.2 MMOL/L (ref 3.6–5.5)
SODIUM SERPL-SCNC: 139 MMOL/L (ref 135–145)

## 2021-05-21 PROCEDURE — 36415 COLL VENOUS BLD VENIPUNCTURE: CPT

## 2021-05-21 PROCEDURE — 80048 BASIC METABOLIC PNL TOTAL CA: CPT

## 2021-05-28 NOTE — PROGRESS NOTES
RADIATION ONCOLOGY FOLLOW-UP    DATE OF SERVICE: 5/28/2021    IDENTIFICATION:   A 87 y.o. male with completed chemoRT 64Gy in 32 fractions 2/26/21.    Visit Diagnoses     ICD-10-CM   1. Malignant neoplasm of anterior wall of urinary bladder (HCC)  C67.3     Malignant neoplasm of anterior wall of urinary bladder (HCC)  Staging form: Urinary Bladder, AJCC 8th Edition  - Clinical stage from 5/18/2020: Stage II (cT2, cN0, cM0) - Signed by Gilson Dempsey M.D. on 5/18/2020      RADIATION SUMMARY:  [facility] Treatment Information        Some values may be hidden. Unless noted otherwise, only the newest values recorded on each date are displayed.         [facility] Treatment Summary 7/31/20   Course First Treatment Date 06/17/2020    Course Last Treatment Date 07/31/2020    Bladder Plan from Course C1_bladder   Fraction 25 of 25   Elapsed Course Days 44 @ [id number]   Prescribed Fraction Dose 200 cGy   Prescribed Total Dose 5,000 cGy   Bladder_Bst Plan from Course C1_bladder   Fraction 7 of 7    Elapsed Course Days 44 @ [id number]    Prescribed Fraction Dose 200 cGy    Prescribed Total Dose 1,400 cGy    Bladder Reference Point from Course C1_bladder   Elapsed Course Days 44 @ [id number]   Session Dose -   Total Dose 5,000 cGy   Bladder CP Reference Point from Course C1_bladder   Elapsed Course Days 44 @ [id number]   Session Dose -   Total Dose 5,157 cGy   Bladder_Bst Reference Point from Course C1_bladder   Elapsed Course Days 44 @ [id number]    Session Dose -    Total Dose 1,400 cGy    Bladder_Bst CP Reference Point from Course C1_bladder   Elapsed Course Days 44 @ [id number]    Session Dose -    Total Dose 1,454 cGy     Some values recorded on this date have been omitted.              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   Patient had a PET CT scan on May 8, 2020 which showed no evidence of distant metastatic disease although there was some uptake in the ascending colon.  Patient will have last BCG installation on May 28, 2020.  Patient currently doing well he does have a little bit of weight loss and fatigue and occasional pain with urination as well as waking up at night.     9/21/20  Patient recently completed chemoradiation and had significant dehydration and acute kidney injury requiring hydration.  His kidney function has since returned to normal he still has significant frequency and urgency and dysuria which his Pyridium had not helped.  He has diarrhea which she takes Lomotil 2-3 times a day which she says is getting better.  Per his report he saw Dr. Jones who did a cystoscopy and said bladder appears healing and no evidence of disease recurrence.      11/13/20  Patient doing well he says that his diarrhea is improving Lomotil.  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  He said that the Pyridium and oxybutynin did not work.  He is seen Dr. Jones next month for repeat cystoscopy.  CT chest abdomen pelvis shows no evidence of distant metastatic disease there is some bladder wall thickening which will be evaluated on next cystoscopy.  Hematuria has essentially stopped although he does have some occasional mild hematuria.     2/26/21  Patient doing well overall he did have recent episode of urinary bleeding and was in the hospital and underwent cystoscopy type Dr. Rain who found bleeding hemorrhagic cystitis however no recurrent tumor.  Patient still has increased urinary frequency.  He was taken off Plavix but is on baby aspirin.  Overall patient is doing well.    INTERVAL HISTORY:  Patient doing well with no recurrent hematuria.  He had recent cystoscopy which does show some radiation changes in the posterior bladder by Dr. Jones in April however cytology was negative.  CT chest  abdomen pelvis shows no evidence of distant metastatic disease and posterior bladder shows decrease in thickness.  Overall appetite is improving and he started to gain some weight back.        PROBLEM LIST:  Patient Active Problem List   Diagnosis   • S/P mitral valve clip implantation   • Essential hypertension   • Coronary artery disease involving native coronary artery of native heart without angina pectoris   • S/P angioplasty with stent   • Malignant neoplasm of anterior wall of urinary bladder (HCC)   • Swelling   • Acute blood loss anemia   • Dementia (HCC)   • Stage 3a chronic kidney disease (HCC)   • Hematuria       CURRENT MEDICATIONS:  Current Outpatient Medications   Medication Sig Dispense Refill   • donepezil (ARICEPT) 10 MG tablet donepezil 10 mg tablet   TAKE 2 TABLETS BY MOUTH EVERY DAY AT BEDTIME FOR 90 DAYS     • ferrous sulfate 325 (65 Fe) MG tablet Take 325 mg by mouth every day.     • vitamin D (CHOLECALCIFEROL) 1000 Unit (25 mcg) Tab Take 1,000 Units by mouth every day.     • atorvastatin (LIPITOR) 40 MG Tab Take 1 tablet by mouth once daily 90 tablet 3   • aspirin EC (ECOTRIN) 81 MG Tablet Delayed Response Take 81 mg by mouth every day.     • Multiple Vitamins-Minerals (CENTRUM SILVER 50+MEN PO) Take 1 tablet by mouth.     • metFORMIN (GLUCOPHAGE) 500 MG Tab Take 1-2 Tabs by mouth 2 times a day, with meals. 1000 mg in am and 500 mg in pm 90 Tab 0   • gabapentin (NEURONTIN) 100 MG Cap Take 100-300 mg by mouth every day.  5   • docusate sodium (COLACE) 100 MG Cap Take 100-200 mg by mouth 2 times a day as needed for Constipation.     • losartan (COZAAR) 100 MG Tab Take 100 mg by mouth every day.       No current facility-administered medications for this encounter.       ALLERGIES:  Contrast media with iodine [iodine], Iodide, and Penicillins    REVIEW OF SYSTEMS:  A review of systems for today's date of service was reviewed and uploaded into the electronic medical record.    PHYSICAL  EXAM:  PERFORMANCE STATUS:  ECOG Performance Review 5/28/2021 2/26/2021   ECOG Performance Status Restricted in physically strenuous activity but ambulatory and able to carry out work of a light or sedentary nature, e.g., light house work, office work Fully active, able to carry on all pre-disease performance without restriction   Some recent data might be hidden     Karnofsky Score 5/28/2021 2/26/2021   Karnofsky Score 80 80   Some recent data might be hidden     /52 (BP Location: Left arm, Patient Position: Sitting, BP Cuff Size: Adult)   Pulse (!) 58   Temp 36.5 °C (97.7 °F)   Wt 67.3 kg (148 lb 5.9 oz)   SpO2 99%   BMI 24.69 kg/m²   Physical Exam  Vitals reviewed.   Constitutional:       Appearance: Normal appearance.   Cardiovascular:      Rate and Rhythm: Normal rate.   Pulmonary:      Effort: Pulmonary effort is normal.   Neurological:      General: No focal deficit present.      Mental Status: He is alert.   Psychiatric:         Mood and Affect: Mood normal.         LABORATORY DATA:   Lab Results   Component Value Date/Time    WBC 6.9 03/08/2021 1453    WBC 6.9 02/25/2021 1228    WBC 6.5 02/09/2021 1327    HEMOGLOBIN 9.7 (L) 03/08/2021 1453    HEMOGLOBIN 9.5 (L) 02/25/2021 1228    HEMOGLOBIN 9.2 (L) 02/09/2021 1327    HEMATOCRIT 31.3 (L) 03/08/2021 1453    HEMATOCRIT 31.1 (L) 02/25/2021 1228    HEMATOCRIT 29.8 (L) 02/09/2021 1327    MCV 90.7 03/08/2021 1453    MCV 91.7 02/25/2021 1228    MCV 91.7 02/09/2021 1327    PLATELETCT 164 03/08/2021 1453    PLATELETCT 212 02/25/2021 1228    PLATELETCT 237 02/09/2021 1327    NEUTS 5.00 03/08/2021 1453    NEUTS 5.05 02/25/2021 1228    NEUTS 4.55 02/09/2021 1327      Lab Results   Component Value Date/Time    SODIUM 139 05/21/2021 1450    SODIUM 139 02/04/2021 0149    SODIUM 137 02/03/2021 0424    POTASSIUM 5.2 05/21/2021 1450    POTASSIUM 4.5 02/04/2021 0149    POTASSIUM 5.1 02/03/2021 0424    BUN 25 (H) 05/21/2021 1450    BUN 23 (H) 02/04/2021 0149    BUN  21 02/03/2021 0424    CREATININE 1.40 05/21/2021 1450    CREATININE 1.24 02/04/2021 0149    CREATININE 1.36 02/03/2021 0424    CALCIUM 8.9 05/21/2021 1450    CALCIUM 8.5 02/04/2021 0149    CALCIUM 8.5 02/03/2021 0424    ALBUMIN 3.7 02/02/2021 0134    ALBUMIN 4.1 02/01/2021 1250    ALBUMIN 4.0 11/09/2020 1225    ASTSGOT 25 02/02/2021 0134    ASTSGOT 33 02/01/2021 1250    ASTSGOT 35 11/09/2020 1225    ALKPHOSPHAT 48 02/02/2021 0134    ALKPHOSPHAT 55 02/01/2021 1250    ALKPHOSPHAT 59 11/09/2020 1225    IFNOTAFR 48 (A) 05/21/2021 1450    IFNOTAFR 55 (A) 02/04/2021 0149    IFNOTAFR 50 (A) 02/03/2021 0424       RADIOLOGY DATA:  CT-CHEST (THORAX) WITH    Result Date: 5/26/2021 5/26/2021 1:11 PM HISTORY/REASON FOR EXAM:  Bladder cancer, invasive, treated; s/p radiotherapy TECHNIQUE/EXAM DESCRIPTION: CT thorax with contrast. Thin-section helical images were obtained from the lung apices through the adrenal glands following the bolus administration of 75 mL of nonionic (Omnipaque 350) contrast. Low dose optimization technique was utilized for this CT exam including automated exposure control and adjustment of the mA and/or kV according to patient size. COMPARISON:  11/12/2020 FINDINGS: THORACIC INLET: The thyroid gland appears unremarkable. No pathologic lymphadenopathy. AXILLA: No pathologic lymphadenopathy. MEDIASTINUM: No pathologic lymphadenopathy. HEART: Normal size. No pericardial effusion. VASCULAR STRUCTURES: Thoracic aorta appears unremarkable. Origins of the great vessels appear normal. PLEURA: No effusion or pneumothorax. LUNGS: The lungs are clear of active lung disease. No suspicious pulmonary nodules or masses. No significant interstitial thickening. BONES: No suspicious lytic or sclerotic bony lesions identified. UPPER ABDOMEN: Unremarkable     No evidence of metastatic disease in the chest     CT-ABDOMEN & PELVIS UROGRAM    Result Date: 5/26/2021 5/26/2021 1:11 PM HISTORY/REASON FOR EXAM:  Bladder cancer.  TECHNIQUE/EXAM DESCRIPTION:  CT Urogram Initial precontrast images were obtained from the diaphragmatic domes through the pubic symphysis using helical technique. Following this, 100 mL of Omnipaque 350 nonionic contrast was administered, and postcontrast nephrographic phase thin-section helical scanning obtained from the diaphragmatic domes through the pubic symphysis. Additional 10-minute delayed imaging is performed from the diaphragmatic domes through the pubic symphysis to evaluate the ureters. Coronal MIP reconstructions of the delayed phase are also performed. Low dose optimization technique was utilized for this CT exam including automated exposure control and adjustment of the mA and/or kV according to patient size. COMPARISON: None. FINDINGS: Lung bases: The lung bases are clear. Kidneys: There are no renal calcifications. There are no suspicious enhancing renal masses. Renal collecting system: There are no ureter calcifications. There is no mass or filling defect within the renal collecting system. There is mild LEFT hydroureter, as before. Bladder: Posterior wall thickening is less pronounced than on the prior study and no intraluminal nodularity is appreciated. Wall thickness measures approximately 11 mm, previously 15 mm. This is more pronounced on the LEFT than the RIGHT and the LEFT ureter traverses this abnormal area more distinctly than the RIGHT ureter does. RIGHT urinary bladder diverticulum. Prostate is absent. Liver: The liver is unremarkable. Spleen: Spleen is unremarkable. Biliary system: Gallbladder and biliary tree are unremarkable. Pancreas: Pancreas is unremarkable. Adrenals: Adrenal glands are unremarkable. Vasculature: Renal veins are patent. Lymph nodes: There are no enlarged lymph nodes. Surgical clips in the pelvis are compatible with lymph node dissection. Bowel: No obstruction inflammation. Small RIGHT inguinal hernia containing a loop of small bowel is less conspicuous than on the  prior study. Bones: Unremarkable.     1.  Decreased posterior bladder wall thickening and nodularity, likely indicating therapeutic response 2.  Mild LEFT hydroureter, similar to prior 3.  No evidence of metastatic disease 4.  Prior prostatectomy Bosniak classification: Not Applicable      IMPRESSION:    A 87 y.o. with completed chemoRT 64Gy in 32 fractions 2/26/21.  Visit Diagnoses     ICD-10-CM   1. Malignant neoplasm of anterior wall of urinary bladder (HCC)  C67.3     Malignant neoplasm of anterior wall of urinary bladder (HCC)  Staging form: Urinary Bladder, AJCC 8th Edition  - Clinical stage from 5/18/2020: Stage II (cT2, cN0, cM0) - Signed by Gilson Dempsey M.D. on 5/18/2020      CANCER STATUS:  No Evidence of Disease    RECOMMENDATIONS:   Patient doing well and I reviewed the NCCN guidelines for post bladder sparing surveillance.  I have recommended cystoscopy every 3 months and CT chest every 3 to 6 months and CT urogram every 3 to 6 months. I have ordered repeat scan for 4 months in 9/2021.        Thank you for the opportunity to participate in his care.  If any questions or comments, please do not hesitate in calling.    Orders Placed This Encounter   • CT-CHEST (THORAX) WITH   • CT-ABDOMEN & PELVIS UROGRAM   • Basic Metabolic Panel   • donepezil (ARICEPT) 10 MG tablet   • ferrous sulfate 325 (65 Fe) MG tablet   • vitamin D (CHOLECALCIFEROL) 1000 Unit (25 mcg) Tab     CC: Dr. Jones, Dr. Hamilton

## 2021-05-28 NOTE — NON-PROVIDER
Patient was seen today in clinic with Dr. Dempsey for follow up.  Vitals signs and weight were obtained and pain assessment was completed.  Allergies and medications were reviewed with the patient.  Toxicities of treatment assessed.     Vitals/Pain:  Vitals:    05/28/21 1130   BP: 121/52   BP Location: Left arm   Patient Position: Sitting   BP Cuff Size: Adult   Pulse: (!) 58   Temp: 36.5 °C (97.7 °F)   SpO2: 99%   Weight: 67.3 kg (148 lb 5.9 oz)   Pain Score: No pain        Allergies:   Contrast media with iodine [iodine], Iodide, and Penicillins    Current Medications:  Current Outpatient Medications   Medication Sig Dispense Refill   • donepezil (ARICEPT) 10 MG tablet donepezil 10 mg tablet   TAKE 2 TABLETS BY MOUTH EVERY DAY AT BEDTIME FOR 90 DAYS     • ferrous sulfate 325 (65 Fe) MG tablet Take 325 mg by mouth every day.     • vitamin D (CHOLECALCIFEROL) 1000 Unit (25 mcg) Tab Take 1,000 Units by mouth every day.     • atorvastatin (LIPITOR) 40 MG Tab Take 1 tablet by mouth once daily 90 tablet 3   • aspirin EC (ECOTRIN) 81 MG Tablet Delayed Response Take 81 mg by mouth every day.     • Multiple Vitamins-Minerals (CENTRUM SILVER 50+MEN PO) Take 1 tablet by mouth.     • metFORMIN (GLUCOPHAGE) 500 MG Tab Take 1-2 Tabs by mouth 2 times a day, with meals. 1000 mg in am and 500 mg in pm 90 Tab 0   • gabapentin (NEURONTIN) 100 MG Cap Take 100-300 mg by mouth every day.  5   • docusate sodium (COLACE) 100 MG Cap Take 100-200 mg by mouth 2 times a day as needed for Constipation.     • losartan (COZAAR) 100 MG Tab Take 100 mg by mouth every day.       No current facility-administered medications for this encounter.           Monika Peters, Med Ass't

## 2021-09-22 PROBLEM — Z98.62 HISTORY OF ANGIOPLASTY: Status: ACTIVE | Noted: 2019-12-04

## 2021-09-22 NOTE — PROGRESS NOTES
"Reason for Neurology Consult:  Concern for Dementia    History of present illness:    Filiberto Jauregui 88 y.o. 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    About 3 years or so, Filiberto's wife started to noticed for the 1st time  a change in Duy's word finding difficulties and was very mild without clear radiation. He then had treatment for bladder cancer (chemotherapy) and his wife and Duy seemed to have significant cognitive impairment(s) during the middle portion of the chemotherapy. He missed one chemotherapy treatment due to concern of chemotherapy but he continued with radiation Rx until completely.    Over the last 2 years or so, the wife has noticed more difficulty to get the sentences (he \"tries to get words out but can't easily\" but with prompting he knows and recognizes the words).    In addition, he has more difficulties with problem solving over the last 1-2 years. Example- \"Can you turn this light bulb in\" or getting the tooth paste out of the container and how to turn on lights> to the point that he needs help from his wife.    He has had 5 surgeries (4 of the bladder for cancer) and for of the heart- mitral valve with clip implantation    No family history of movement disorders, progressive gait changes, dementia issues known in 1st or 2nd degree issues in adult life.    No known significant tobacco or alcohol use in the adult life.    Duy seems to shuffle slightly in the last year. There has been a little problem with voice clarity.     No clear changes in writing ability.    There is some postural unsteadiness and tends to stoop forwards in the last 12 months.    He has commented about \"where do those people go who were here \" or \"who are people in the house\".  This occurs 2 times per week and this has been happening for the last 6-12 months.    There seems to be good vs bad days in terms of alertness and ability to be carry on a conversation.    He talks " "a great deal in his sleep and wife can understand what he half way in the sleep.     No snoring,apneic,snorting,gasping events when sleeping the last 3 months or so.    No subjective complaints or observations by wife to suggest agitation,physical aggressiveness, sundowning behaviors, suicidality or depression in the last 3 months or so.        Patient Active Problem List    Diagnosis Date Noted   • Hematuria 02/03/2021   • Stage 3a chronic kidney disease (HCC) 02/02/2021   • Acute blood loss anemia 02/01/2021   • Dementia (HCC) 02/01/2021   • Swelling 07/15/2020   • Malignant neoplasm of anterior wall of urinary bladder ([facility]) 05/18/2020   • Coronary artery disease involving native coronary artery of native heart without angina pectoris 12/04/2019   • S/P angioplasty with stent 12/04/2019   • Essential hypertension 08/14/2019   • S/P mitral valve clip implantation 07/17/2019       Past medical history:   Past Medical History:   Diagnosis Date   • Bowel habit changes 03/10/2020    Constipation   • Cancer (HCC)     prostate cancer s/p prostatectomy (30 years ago)   • Cancer (HCC)     Bladder CA DX 2019   • Cataract 03/10/2020    OU   • Diabetes (HCC)     oral medication \"borderline\"    • Diabetes (HCC) 03/10/2020    Takes Oral Medication   • Hemorrhagic disorder (HCC) 03/10/2020    Takes Plavix   • High cholesterol    • Hypertension 10/23/2019   • Mitral regurgitation    • MVP (mitral valve prolapse)    • Pneumonia     hx x1    • Sciatica 03/10/2020   • Seasonal allergies    • Snoring 03/10/2020    Has not had a sleep study.   • Valvular heart disease        Past surgical history:   Past Surgical History:   Procedure Laterality Date   • TURBT (TRANSURETHRAL RESECTION OF BLADDER TUMOR)  2/2/2021    Procedure: TURBT (TRANSURETHRAL RESECTION OF BLADDER TUMOR) - AND FULGURATION;  Surgeon: Alan Rain M.D.;  Location: SURGERY [facility];  Service: Urology   • TURBT (TRANSURETHRAL RESECTION OF BLADDER TUMOR)  " "3/20/2020    Procedure: TURBT (TRANSURETHRAL RESECTION OF BLADDER TUMOR)- INTRACESICAL GEMCITABINE;  Surgeon: Brad Jones M.D.;  Location: [facility];  Service: Urology   • CYSTOSCOPY Bilateral 3/20/2020    Procedure: CYSTOSCOPY WITH RETROGRADES;  Surgeon: Brad Jones M.D.;  Location: [facility];  Service: Urology   • OTHER CARDIAC SURGERY  03/10/2020    \"Mitral Valve Clip\"   • TRANSCATHETER MITRAL VALVE REPAIR  1/7/2020    Procedure: REPAIR, MITRAL VALVE, TRANSCATHETER;  Surgeon: Gary Wilson M.D.;  Location: [facility];  Service: Cardiac   • JOSÉ  1/7/2020    Procedure: ECHOCARDIOGRAM, TRANSESOPHAGEAL- POTENTIALLY;  Surgeon: Gary Wilson M.D.;  Location: [facility];  Service: Cardiac   • PB CYSTOURETHROSCOPY,URETER CATHETER Bilateral 11/1/2019    Procedure: CYSTOSCOPY, WITH BILATERAL RETROGRADE PYELOGRAM-AND 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;  Surgeon: Brad Jones M.D.;  Location: [facility];  Service: Urology   • ANGIOGRAM      With Stent Placement   • PROSTATECTOMY, RADIAL     • TONSILLECTOMY           Social history:   Social History     Socioeconomic History   • Marital status:      Spouse name: Not on file   • Number of children: Not on file   • Years of education: Not on file   • Highest education level: Not on file   Occupational History   • Not on file   Tobacco Use   • Smoking status: Never Smoker   • Smokeless tobacco: Never Used   Vaping Use   • Vaping Use: Never used   Substance and Sexual Activity   • Alcohol use: Yes     Alcohol/week: 0.6 oz     Types: 1 Glasses of wine per week     Comment: seldom   • Drug use: No   • Sexual activity: Not on file   Other Topics Concern   • Not on file   Social History Narrative   • Not on file     Social Determinants of Health     Financial Resource Strain:    • Difficulty of Paying Living Expenses:    Food Insecurity:    • Worried About Running Out of Food in the " Last Year:    • Ran Out of Food in the Last Year:    Transportation Needs:    • Lack of Transportation (Medical):    • Lack of Transportation (Non-Medical):    Physical Activity:    • Days of Exercise per Week:    • Minutes of Exercise per Session:    Stress:    • Feeling of Stress :    Social Connections:    • Frequency of Communication with Friends and Family:    • Frequency of Social Gatherings with Friends and Family:    • Attends Gnosticist Services:    • Active Member of Clubs or Organizations:    • Attends Club or Organization Meetings:    • Marital Status:    Intimate Partner Violence:    • Fear of Current or Ex-Partner:    • Emotionally Abused:    • Physically Abused:    • Sexually Abused:        Family history:   Family History   Problem Relation Age of Onset   • Heart Attack Father    • Lung Disease Mother    • Cancer Sister         ovaian cancer         Current medications:   Current Outpatient Medications   Medication   • gabapentin (NEURONTIN) 300 MG Cap   • meloxicam (MOBIC) 15 MG tablet   • donepezil (ARICEPT) 10 MG tablet   • ferrous sulfate 325 (65 Fe) MG tablet   • vitamin D (CHOLECALCIFEROL) 1000 Unit (25 mcg) Tab   • atorvastatin (LIPITOR) 40 MG Tab   • aspirin EC (ECOTRIN) 81 MG Tablet Delayed Response   • metFORMIN (GLUCOPHAGE) 500 MG Tab   • losartan (COZAAR) 100 MG Tab   • predniSONE (DELTASONE) 50 MG Tab   • Multiple Vitamins-Minerals (CENTRUM SILVER 50+MEN PO)   • gabapentin (NEURONTIN) 100 MG Cap   • docusate sodium (COLACE) 100 MG Cap     No current facility-administered medications for this visit.       Medication Allergy:  Allergies   Allergen Reactions   • Contrast Media With Iodine [Iodine] Rash     RASH    • Iodide      Per pt broke out in rash   • Penicillins Rash     .           Physical examination:   Vitals:    09/22/21 1532   BP: 120/50   BP Location: Left arm   Patient Position: Sitting   BP Cuff Size: Adult   Pulse: 70   Temp: 37.1 °C (98.8 °F)   TempSrc: Temporal   SpO2: 93%  "  Weight: 65.8 kg (145 lb 1 oz)   Height: 1.676 m (5' 6\")       Normal cephalic atraumatic.  There is full range of movement around the neck in all directions without restrictions or discrete pain evoked triggers.  No lower extremity edema.      Neurological  Exam:      [place] Cognitive Assessment (MOCA) Version 7.1    Years of Education: MD degree.    TOTAL SCORE: 3/30  (to be scanned into the MEDIA section in the E.M.R.)          Mental status: Awake, alert and fully oriented to person and place. Normal attention and concentration.  Did not appear/act combative,irritable,anxious,paranoid/delusional or aggressive to or with me.    Speech and language: Speech is non fluent with loss or lack of word fluency and clear cut word retrieval issues.     Follows 3 step motor commands in sequence without significant delay and correctly.    Cranial nerve exam:  II: Pupils are equally round and reactive to light. Visual fields are intact by confrontation.  III, IV, VI: EOMI, no diplopia, no ptosis.  V: Sensation to light touch is normal over V1-3 distributions bilaterally.  .  VII: Facial movements are symmetrical. There is no facial droop. .  VIII: Hearing intact to soft speech and finger rub bilaterally  IX: Palate elevates symmetrically, uvula is midline. Dysarthria is not present.  XI: Shoulder shrug are symmetrical and strong.   XII: Tongue protrudes midline.      Motor exam:  Muscle tone is normal in all 4 limbs.    Mild paratonic rigidity; very mild braydkinesia with finger tapping bilaterally.      Muscle strength:    Neck Flexors/Extensors: 5/5       Right  Left  Deltoid   5/5  5/5      Biceps   5/5  5/5  Triceps  5/5  5/5   Wrist extensors 5/5  5/5  Wrist flexors  5/5  5/5     5/5  5/5  Interossei  5/5  5/5  Thenar (APB)  5/5  5/5   Hip flexors  5/5  5/5  Quadriceps  5/5  5/5    Hamstrings  5/5  5/5  Dorsiflexors  5/5  5/5  Plantarflexors  5/5  5/5  Toe extension  5/5  5/5      Sensory exam:  Intact to " Vibration and Proprioception in bilaterally lower extremity.        Reflexes:       Right  Left  Biceps   2/4  2/4  Triceps  2/4  2/4  Brachioradialis 2/4  2/4  Knee jerk  2/4  2/4  Ankle jerk  2/4  2/4     Frontal release signs are absent    bilaterally toes are downgoing to plantar stimulation..    Coordination (finger-to-nose, heel/knee/shin, rapid alternating movements) was normal.     There was no ataxia, no tremors, and no dysmetria.     Station and gait >  Easily stands up from exam chair without retropulsion,veering,leaning,swaying (to either side).   No stopping,shuffing, very mild wide based at most stance.  No enblock turning.  Negative Pull Test.      Labs and Tests:     Reviewed from last 12 months.    NEUROIMAGING: No recent Brain imaging in the last 12 months.        Impression/Plans/Recommendations:    1. Moderate to Severe Stage of Dementia (likely Neurodegenerative).    Onset about 3 years or so ago.    I do feel that Duy can at this point make medical or financial decisions for himself. Wife agrees.    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    He upcoming surgeries including bladder surgery and catarcts.    We had an extensive review about the potential for this dementia issue to be Alz type Dementia or Lewy Body Disease though mild symmetrical parkinsonism can occur with either condition.    The fact the word finding issues started this process off and seemed to be the 1st primary features would go against Lewy Body Disease since was more than 1 to 1.5 years between cognitive/speech/memory changes did not seem to be followed within 12 months by parkinsonian features.    2. There is no evidence for a rapidly progressive or subacute encephalopathic condition nor any psychotic features evolving.    3. Bladder Cancer- undergoing Rx.    4. Follow up  "pending results of Brain MRI and then probably after that a Brain PET scan.      Today, I reviewed the clinical criteria and reviewed several  scenarios of the differences being using the medical terms of normal brain aging (age associated memory impairment),  Mild Cognitive Impairment (MCI) and Dementia.    Dementia  is a syndrome but statistically and for the majority of patients  occurs due  to a more rapid aging of the brain tissue or potentially from injury to certain parts of one's brain ( often from such contributing factors as  the cumulative effects of alcohol, from one or more ischemic or hemmorhagic stroke(s), from neurodegeneration or long standing with/without suboptimally controlled Hypertension). It is for some of these potential factors as to why I recommend a brain imaging test (Head CT or Brain MRI) be done for the 1st time or in certain circumstances repeated for comparison purposes  as such imaging can suggest one or more factors as to the reason(s) for the person's cognitive/memory changes. In fact, a normal or \"age related\" finding on a brain imaging test in and of itself is useful clinical and objective information to have in the evaluation of a person who has either an acute, evolving or even chronic (months to years) long cognitive/memory complaint.    Additional factors or contributors to Brain Health issues can be summarized in several books/references which I discussed as well today.     Goals going forwards include:    A. Paying attention to one's risk factors and reducing their prevalence or potential impact on one's changing memory/thinking> an excellent example would be to stop smoking, reduce or eliminate alcohol use (depending on the amount and frequency of usage), maintain good blood pressure control by buying a digital arm blood pressure cuff such as an OMRON Series 3 or 5 and checking one's blood pressure atleast 3 days per week (in the am and early afternoon) that the numbers " are under 140/90 and working as needed with the primary care doctor  to optimize blood pressure control).    B. Encouraging proper sleep hygiene which for most adults is 7 to 8 hours of uninterrupted sleep per night.      C. Encouraging some form of exercise preferable aerobic forms to be done (4 to 5 days per week- 30 minutes minimum per day)> 150 minutes per week as a goal. Example activities could include fast walking (up a slight incline), jogging, cycling (road or stationary), swimming,tennis. Essentially, even basic walking on a flat surface or a treadmill would be better than doing nothing.    D. Maintaining or forming new social contacts with family and friends  and a positive attitude despite the concerns and/or ongoing issues with thinking and/or memory.    E. Eating well which means a diet low in salt  (under 2 grams per day), sugar and saturated fat.    F. Maintaining one's BMI (Body Mass Index) under 30.    G. Consideration of the use of cognitive enhancers (acetylcholinerase inhibitors such as Aricept vs an NMDA Receptor agent like Namenda). Pros and cons of such compounds in terms of predicted efficacy and side effects profiles reviewed. At this time will hold off on such medications.    H. I reviewed the below issues and that I am keeping up with editorials and  comments from  many academic neurologists throughout the [place] who are writing reviews and summaries of the present state of this provisionally approved anti amyloid compound (aducanumab)    The FDA's Central and Peripheral Nervous System Advisory Committee voted 10-1 against the compound (the 1 person voted “uncertain”) in that the data did not confirm or support meaningful clinical benefit.      I have read that several senior research neurologists have even commented the compound did nothing clinically meaningful or useful and moreover there was no  clear evidence it prevented the clinical deterioration  of the patients' condition despite  potentially removing some amyloid from their brain(s)  tissue.      Based on the critique of the data so far,  there was no  clear scientific evidence this anti amyloid intravenous compound reduced or halted the underlying disease or slowed down the inherent clinical deterioration expected with the Alzheimer's type of Dementia. The clinical data did also not suggest that activities of daily living were improved upon with this compound.    I have discussed with the patient and family today that given  the great controversy about the study's data and analysis of the lack of clinical  efficacy to this point in time, I feel that I need to wait and see reasonable post marketing data and/or more robust efficacy data supported by the academic community and/or the American Academy of Neurology  before making a commitment to prescribe such a compound and  where there is more than  a minor/minimal amount of risk to the patient involved in being administered this compound on a chronic (monthly) basis.     I. Brain MRI will be ordered in the future but see urgency to do this; wife agrees to wait until upcoming surgeries completed (bladder and catarcts). Wife will let me know when to order such test by emailing me.  Then will after the Brain MRI plan to do a Brain PET scan.    J. Vitamin B1,B9 and B12 levels and TSH Level.    K. Ordered test for Audiogram.    I have performed  a history and physical exam and a directed /focused  ROS today.    Total time spent today or this patient's care was 86  minutes  and included reviewing  the diagnostic workup to date (such as labs and imaging as well as interpreting such tests relevant to this patient's neurological condition),  reviewing/obtaining separately obtained history (from patient and/or accompanying ffamily member)  for today's neurological problem(s) ,counseling and educating the patient and family member on issues related to cognition/memory and cognitive health factors and  documenting  the clinical information in the EMR.    Follow up PRN at this time> pending future results of Brain MRI or Brain PET scan        Dale Henderson MD  [facility].   [facility]

## 2021-09-22 NOTE — NON-PROVIDER
Patient was seen today in clinic with Dr. Dempsey for follow up.  Vitals signs and weight were obtained and pain assessment was completed.  Allergies and medications were reviewed with the patient.  Toxicities of treatment assessed.     Vitals/Pain:  Vitals:    02/26/21 1148   BP: 137/60   BP Location: Right arm   Patient Position: Sitting   BP Cuff Size: Adult   Pulse: 66   Temp: 36.6 °C (97.8 °F)   SpO2: 99%   Weight: 63.6 kg (140 lb 3.4 oz)   Pain Score: No pain(Burning with urination)        Allergies:   Contrast media with iodine [iodine], Iodide, and Penicillins    Current Medications:  Current Outpatient Medications   Medication Sig Dispense Refill   • donepezil (ARICEPT) 10 MG tablet Take 2 Tablets by mouth every bedtime for 90 days. 180 tablet 3   • aspirin EC (ECOTRIN) 81 MG Tablet Delayed Response Take 81 mg by mouth every day.     • Multiple Vitamins-Minerals (CENTRUM SILVER 50+MEN PO) Take 1 tablet by mouth.     • metFORMIN (GLUCOPHAGE) 500 MG Tab Take 1-2 Tabs by mouth 2 times a day, with meals. 1000 mg in am and 500 mg in pm 90 Tab 0   • atorvastatin (LIPITOR) 40 MG Tab Take 1 Tab by mouth every day. 30 Tab 11   • gabapentin (NEURONTIN) 100 MG Cap Take 100-300 mg by mouth every day.  5   • docusate sodium (COLACE) 100 MG Cap Take 100-200 mg by mouth 2 times a day as needed for Constipation.     • losartan (COZAAR) 100 MG Tab Take 100 mg by mouth every day.       No current facility-administered medications for this encounter.           Monika Peters, Med Ass't  
140

## 2021-09-22 NOTE — TELEPHONE ENCOUNTER
To Dr. Steve wise to proceed? Ok to hold ASA?    Received clearance request from [facility] for cystoscopy with monopolar transurethral resection of bladder tumor with intravesical gemcitabine treatment on 10/14/21. Requesting to hold ASA for 7 days.    HX s/p MitraClip, CAD, HTN, CKD

## 2021-09-23 NOTE — TELEPHONE ENCOUNTER
Message  Received: Today  TEOFILO Reyes R.N.  Caller: Unspecified (Yesterday,  2:55 PM)  OK to be off of aspirin as needed.  [place].  JAYNE

## 2021-09-27 PROBLEM — R60.9 SWELLING: Status: RESOLVED | Noted: 2020-07-15 | Resolved: 2021-01-01

## 2021-09-27 PROBLEM — E78.5 HYPERLIPIDEMIA: Status: ACTIVE | Noted: 2021-01-01

## 2021-09-27 NOTE — TELEPHONE ENCOUNTER
----- Message from MIGUEL Jolly sent at 9/27/2021  4:11 PM PDT -----  Regarding: Pre-op clearance  I saw this patient today at [facility] for clearance for procedure on 10/14/2021 with Dr. Jones.    You can use my note from today - he can proceed and is NOT high risk.    Thanks!  AB

## 2021-09-27 NOTE — PROGRESS NOTES
Chief Complaint   Patient presents with   • Preoperative CV Eval   • Bladder Cancer   • Coronary Artery Disease   • Prosthetic Heart Valve/Issue   • HTN (Controlled)   • Hyperlipidemia       Subjective     Filiberto Jauregui is a 88 y.o. male who presents today for cardiac pre-operative clearance for bladder tumor resection.    Filiberto is a 88 year old male with history of CAD, status post PCI/NADIRA to the LCx in December 2019, MV repair with Susanne-Clip x 2 in January 2020, hypertension, hyperlipidemia and memory loss, normally followed by Dr. Wilson and last seen in January 2021.    He has a history of bladder cancer, and is scheduled for cystoscopy with tumor resection on 10/14/2021.    From a cardiac standpoint, he is stable: no chest pain, pressure or discomfort; no palpitations or fluttering of his heart; no shortness of breath, orthopnea or PND; no dizziness or syncope; very mild LE edema, related to sciatica.    Past Medical History:   Diagnosis Date   • Bladder cancer (HCC) 2019        • Bowel habit changes     Constipation   • CAD (coronary artery disease) 12/2019    PCI/NADIRA (Synergy 3.0 x 16mm) the proximal LCx   • Cataract     OU   • Diabetes (HCC)      Takes Oral Medication   • Hemorrhagic disorder (HCC)      Takes Plavix   • Hyperlipidemia    • Hypertension    • Mitral regurgitation 01/2020    Status post Susanne-Clip x 2   • MVP (mitral valve prolapse)    • Pneumonia    • Prostate cancer (HCC)     S/P prostatectomy   • Sciatica    • Seasonal allergies    • Snoring     Has not had a sleep study.   • Valvular heart disease      Past Surgical History:   Procedure Laterality Date   • TURBT (TRANSURETHRAL RESECTION OF BLADDER TUMOR)  2/2/2021    Procedure: TURBT (TRANSURETHRAL RESECTION OF BLADDER TUMOR) - AND FULGURATION;  Surgeon: Alan Rain M.D.;  Location: SURGERY [facility];  Service: Urology   • TURBT (TRANSURETHRAL RESECTION OF BLADDER TUMOR)  3/20/2020    Procedure: TURBT (TRANSURETHRAL  "RESECTION OF BLADDER TUMOR)- INTRACESICAL GEMCITABINE;  Surgeon: Brad Jones M.D.;  Location: SURGERY [facility];  Service: Urology   • CYSTOSCOPY Bilateral 3/20/2020    Procedure: CYSTOSCOPY WITH RETROGRADES;  Surgeon: Brad Jones M.D.;  Location: [facility];  Service: Urology   • OTHER CARDIAC SURGERY  03/10/2020    \"Mitral Valve Clip\"   • TRANSCATHETER MITRAL VALVE REPAIR  1/7/2020    Procedure: REPAIR, MITRAL VALVE, TRANSCATHETER;  Surgeon: Gary Wilson M.D.;  Location: [facility];  Service: Cardiac   • JOSÉ  1/7/2020    Procedure: ECHOCARDIOGRAM, TRANSESOPHAGEAL- POTENTIALLY;  Surgeon: Gary Wilson M.D.;  Location: SURGERY [facility];  Service: Cardiac   • PB CYSTOURETHROSCOPY,URETER CATHETER Bilateral 11/1/2019    Procedure: CYSTOSCOPY, WITH BILATERAL RETROGRADE PYELOGRAM-AND 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;  Surgeon: Brad Jones M.D.;  Location: SURGERY [facility];  Service: Urology   • ANGIOGRAM      With Stent Placement   • PROSTATECTOMY, RADIAL     • TONSILLECTOMY       Family History   Problem Relation Age of Onset   • Heart Attack Father    • Lung Disease Mother    • Cancer Sister         ovaian cancer     Social History     Socioeconomic History   • Marital status:      Spouse name: Not on file   • Number of children: Not on file   • Years of education: Not on file   • Highest education level: Not on file   Occupational History   • Not on file   Tobacco Use   • Smoking status: Never Smoker   • Smokeless tobacco: Never Used   Vaping Use   • Vaping Use: Never used   Substance and Sexual Activity   • Alcohol use: Yes     Alcohol/week: 0.6 oz     Types: 1 Glasses of wine per week     Comment: seldom   • Drug use: No   • Sexual activity: Not on file   Other Topics Concern   • Not on file   Social History Narrative   • Not on file     Social Determinants of Health     Financial Resource Strain:    • Difficulty of Paying Living " Expenses:    Food Insecurity:    • Worried About Running Out of Food in the Last Year:    • Ran Out of Food in the Last Year:    Transportation Needs:    • Lack of Transportation (Medical):    • Lack of Transportation (Non-Medical):    Physical Activity:    • Days of Exercise per Week:    • Minutes of Exercise per Session:    Stress:    • Feeling of Stress :    Social Connections:    • Frequency of Communication with Friends and Family:    • Frequency of Social Gatherings with Friends and Family:    • Attends Church Services:    • Active Member of Clubs or Organizations:    • Attends Club or Organization Meetings:    • Marital Status:    Intimate Partner Violence:    • Fear of Current or Ex-Partner:    • Emotionally Abused:    • Physically Abused:    • Sexually Abused:      Allergies   Allergen Reactions   • Contrast Media With Iodine [Iodine] Rash     RASH    • Iodide      Per pt broke out in rash   • Penicillins Rash     .     Outpatient Encounter Medications as of 9/27/2021   Medication Sig Dispense Refill   • gabapentin (NEURONTIN) 300 MG Cap gabapentin 300 mg capsule   TAKE 1 CAPSULE BY MOUTH TWICE DAILY INCREASE EVERY 4 TO 5 DAYS TO A MAXIMUM OF 3 CAPSULES IN THE MORNING AND 3 CAPSULES AT NIGHT     • meloxicam (MOBIC) 15 MG tablet meloxicam 15 mg tablet   TAKE 1 TABLET BY MOUTH IN THE MORNING WITH FOOD     • donepezil (ARICEPT) 10 MG tablet donepezil 10 mg tablet   TAKE 2 TABLETS BY MOUTH EVERY DAY AT BEDTIME FOR 90 DAYS     • ferrous sulfate 325 (65 Fe) MG tablet Take 325 mg by mouth every day.     • vitamin D (CHOLECALCIFEROL) 1000 Unit (25 mcg) Tab Take 1,000 Units by mouth every day.     • atorvastatin (LIPITOR) 40 MG Tab Take 1 tablet by mouth once daily 90 tablet 3   • aspirin EC (ECOTRIN) 81 MG Tablet Delayed Response Take 81 mg by mouth every day.     • metFORMIN (GLUCOPHAGE) 500 MG Tab Take 1-2 Tabs by mouth 2 times a day, with meals. 1000 mg in am and 500 mg in pm 90 Tab 0   • losartan (COZAAR)  "100 MG Tab Take 100 mg by mouth every day.     • [DISCONTINUED] Multiple Vitamins-Minerals (CENTRUM SILVER 50+MEN PO) Take 1 tablet by mouth. (Patient not taking: Reported on 9/22/2021)     • [DISCONTINUED] gabapentin (NEURONTIN) 100 MG Cap Take 100-300 mg by mouth every day. (Patient not taking: Reported on 9/27/2021)  5     No facility-administered encounter medications on file as of 9/27/2021.     Review of Systems   Constitutional: Negative for chills and fever.   HENT: Negative for congestion.    Respiratory: Negative for cough and shortness of breath.    Cardiovascular: Negative for chest pain, palpitations, orthopnea, leg swelling and PND.   Gastrointestinal: Negative for abdominal pain and nausea.   Musculoskeletal: Negative for myalgias.   Skin: Negative for rash.   Neurological: Negative for dizziness, loss of consciousness and headaches.   Endo/Heme/Allergies: Does not bruise/bleed easily.   Psychiatric/Behavioral: The patient does not have insomnia.               Objective     /64 (BP Location: Left arm, Patient Position: Sitting, BP Cuff Size: Adult)   Pulse 63   Resp 12   Ht 1.676 m (5' 6\")   Wt 65.4 kg (144 lb 3.2 oz)   SpO2 97%   BMI 23.27 kg/m²     Physical Exam  Constitutional:       Appearance: He is well-developed.   HENT:      Head: Normocephalic.   Neck:      Vascular: No JVD.   Cardiovascular:      Rate and Rhythm: Normal rate and regular rhythm.      Heart sounds: Murmur heard.   Systolic murmur is present with a grade of 2/6.        Comments: Murmur at LLSB.  Pulmonary:      Effort: Pulmonary effort is normal. No respiratory distress.      Breath sounds: Normal breath sounds. No wheezing or rales.   Abdominal:      General: Bowel sounds are normal. There is no distension.      Palpations: Abdomen is soft.      Tenderness: There is no abdominal tenderness.   Musculoskeletal:         General: Normal range of motion.      Cervical back: Normal range of motion and neck supple. "   Skin:     General: Skin is warm and dry.      Findings: No rash.   Neurological:      Mental Status: He is alert and oriented to person, place, and time.     EKG ordered and interpreted by me today reveals sinus rhythm at 60bpm with LVH, unchanged from previous EKG.    CONCLUSIONS OF ECHOCARDIOGRAM OF 1/22/2021:  Prior echo 2/14/20; compared to the report of the study done - there   has been no significant change.   Normal left ventricular systolic function.  Left ventricular ejection fraction is visually estimated to be 65%.  Known mitral valve clip which is functioning normally with appropriate   transvalvular gradient.  Mean gradient of 4 mmHg.  Mild to moderate mitral regurgitation.  Mild tricuspid regurgitation.  Estimated right ventricular systolic pressure is 60 mmHg.      PROCEDURES OF 1/7/2020:  1. Transcatheter mitral valve repair (MitraClip) with 2 clips.  2. Transseptal puncture.  3. Ultrasound guided femoral vein access.  4. PerClose closure.  PRE PROCEDURAL DIAGNOSIS:  1. Severe symptomatic mitral regurgitation, NYHA III.  POST PROCEDURAL DIAGNOSIS:  1. Successful transcatheter percutaneous mitral valve repair (MitraClip) with 2 XTr clips, under general anesthesia.  2. Successful Preclose closure.    POSTPROCEDURE DIAGNOSES OF [facility] OF 12/3/2019:  1.  A 4+ mitral regurgitation.  2.  Single-vessel coronary artery disease with high-grade proximal circumflex   artery stenosis.  3.  Successful percutaneous transluminal coronary angioplasty/stent placement   of the proximal circumflex artery with 3.0x16 mm Synergy drug-eluting stent.  4.  Normal left ventricular systolic function with ejection fraction of 60%.  5.  Elevated left ventricular end-diastolic pressure.  6.  Elevated right heart pressure with pulmonary artery systolic pressure of   55 mmHg.    Lab Results   Component Value Date/Time    CHOLSTRLTOT 89 (L) 06/26/2019 10:42 AM    LDL 36 06/26/2019 10:42 AM    HDL 29 (A) 06/26/2019 10:42 AM     TRIGLYCERIDE 118 06/26/2019 10:42 AM       Lab Results   Component Value Date/Time    SODIUM 139 05/21/2021 02:50 PM    POTASSIUM 5.2 05/21/2021 02:50 PM    CHLORIDE 106 05/21/2021 02:50 PM    CO2 28 05/21/2021 02:50 PM    GLUCOSE 78 05/21/2021 02:50 PM    BUN 25 (H) 05/21/2021 02:50 PM    CREATININE 1.40 05/21/2021 02:50 PM     Lab Results   Component Value Date/Time    ALKPHOSPHAT 48 02/02/2021 01:34 AM    ASTSGOT 25 02/02/2021 01:34 AM    ALTSGPT 25 02/02/2021 01:34 AM    TBILIRUBIN 0.9 02/02/2021 01:34 AM        Assessment & Plan     1. Malignant neoplasm of anterior wall of urinary bladder (HCC)     2. Atherosclerosis of native coronary artery of native heart without angina pectoris  EKG   3. History of heart valve repair  EKG   4. Essential hypertension     5. Mixed hyperlipidemia  EKG   6. Dementia without behavioral disturbance, unspecified dementia type (HCC)         Medical Decision Making: Today's Assessment/Status/Plan:       1. Bladder tumor, with cystoscopy and tumor resection on 10/14/2021. He is NOT high risk and proceed with procedure. He can stop ASA 7 days prior to procedure, and then resume it afterwards.     2. CAD, status post PCI/NADIRA to the LCx in December 2019. He remains on ASA 81mg once daily, Losartan 100mg once daily and Lipitor 40mg once daily.    3. History of severe MR, status post MV repair with MitraClip x 2 in January 2020, with normal function on echo in January 2021, with mild-moderate MR.    4. Hypertension, treated with Losartan 100mg once daily. BP is stable.    5. Hyperlipidemia, treated with Lipitor 40mg once daily.    6. Dementia, treated with Aricept 10mg once daily.    As above, he can proceed with procedure. To follow-up with Dr. Wilson in January 2022. Same medications for now.

## 2021-10-14 PROBLEM — R09.02 HYPOXIA: Status: ACTIVE | Noted: 2021-01-01

## 2021-10-14 NOTE — ANESTHESIA PROCEDURE NOTES
Airway    Date/Time: 10/14/2021 1:22 PM  Performed by: Chandler Fan M.D.  Authorized by: Chandler Fan M.D.     Location:  OR  Urgency:  Elective  Indications for Airway Management:  Anesthesia      Spontaneous Ventilation: absent    Sedation Level:  Deep  Preoxygenated: Yes    Final Airway Type:  Supraglottic airway  Final Supraglottic Airway:  Standard LMA    SGA Size:  4  Number of Attempts at Approach:  1

## 2021-10-14 NOTE — OR NURSING
1406: Pt arrived from OR, handoff received from anesthesiologist and RN. Crenshaw cath in place with plug upon arrival.     1440: Patient medicated with oral pain meds per anesthesia orders.     1454:Crenshaw to gravity bag to drain gemcitabine.     1505: Crenshaw bag replaced.     1510: Call made to patient's wife to update patient status.     1548: Handoff to ERIK Sepulveda in phase 2.

## 2021-10-14 NOTE — ANESTHESIA PREPROCEDURE EVALUATION
Relevant Problems   CARDIAC   (positive) Coronary atherosclerosis   (positive) Essential hypertension         (positive) Stage 3a chronic kidney disease (HCC)       Physical Exam    Airway   Mallampati: II  TM distance: >3 FB  Neck ROM: full       Cardiovascular - normal exam  Rhythm: regular  Rate: normal  (-) murmur     Dental - normal exam           Pulmonary - normal exam  Breath sounds clear to auscultation     Abdominal    Neurological - normal exam                 Anesthesia Plan    ASA 4       Plan - general       Airway plan will be LMA          Induction: intravenous    Postoperative Plan: Postoperative administration of opioids is intended.    Pertinent diagnostic labs and testing reviewed    Informed Consent:    Anesthetic plan and risks discussed with patient.    Use of blood products discussed with: patient whom consented to blood products.

## 2021-10-14 NOTE — ANESTHESIA TIME REPORT
Anesthesia Start and Stop Event Times     Date Time Event    10/14/2021 1245 Ready for Procedure     1314 Anesthesia Start     1408 Anesthesia Stop        Responsible Staff  10/14/21    Name Role Begin End    Chandler Fan M.D. Anesth 1314 1408        Preop Diagnosis (Free Text):  Pre-op Diagnosis     MASA OF URINARY BLADDER        Preop Diagnosis (Codes):    Premium Reason  Non-Premium    Comments:

## 2021-10-14 NOTE — DISCHARGE INSTRUCTIONS
ACTIVITY: Rest and take it easy for the first 24 hours.  A responsible adult is recommended to remain with you during that time.  It is normal to feel sleepy.  We encourage you to not do anything that requires balance, judgment or coordination.    MILD FLU-LIKE SYMPTOMS ARE NORMAL. YOU MAY EXPERIENCE GENERALIZED MUSCLE ACHES, THROAT IRRITATION, HEADACHE AND/OR SOME NAUSEA.    FOR 24 HOURS DO NOT:  Drive, operate machinery or run household appliances.  Drink beer or alcoholic beverages.   Make important decisions or sign legal documents.    Cystoscopy  Cystoscopy is a procedure that is used to help diagnose and sometimes treat conditions that affect the lower urinary tract. The lower urinary tract includes the bladder and the urethra. The urethra is the tube that drains urine from the bladder. Cystoscopy is done using a thin, tube-shaped instrument with a light and camera at the end (cystoscope). The cystoscope may be hard or flexible, depending on the goal of the procedure. The cystoscope is inserted through the urethra, into the bladder.  What can I expect after the procedure?  After the procedure, it is common to have:  · Some soreness or pain in your abdomen and urethra.  · Urinary symptoms. These include:  ? Mild pain or burning when you urinate. Pain should stop within a few minutes after you urinate. This may last for up to 1 week.  ? A small amount of blood in your urine for several days.  ? Feeling like you need to urinate but producing only a small amount of urine.  Follow these instructions at home:  Medicines  · Take over-the-counter and prescription medicines only as told by your health care provider.  · If you were prescribed an antibiotic medicine, take it as told by your health care provider. Do not stop taking the antibiotic even if you start to feel better.  General instructions  · Return to your normal activities as told by your health care provider. Ask your health care provider what activities  are safe for you.  · Do not drive for 24 hours if you were given a sedative during your procedure.  · Watch for any blood in your urine. If the amount of blood in your urine increases, call your health care provider.  · Follow instructions from your health care provider about eating or drinking restrictions.  · If a tissue sample was removed for testing (biopsy) during your procedure, it is up to you to get your test results. Ask your health care provider, or the department that is doing the test, when your results will be ready.  · Drink enough fluid to keep your urine pale yellow.  · Keep all follow-up visits as told by your health care provider. This is important.  Contact a health care provider if you:  · Have pain that gets worse or does not get better with medicine, especially pain when you urinate.  · Have trouble urinating.  · Have more blood in your urine.  Get help right away if you:  · Have blood clots in your urine.  · Have abdominal pain.  · Have a fever or chills.  · Are unable to urinate.  Summary  · Cystoscopy is a procedure that is used to help diagnose and sometimes treat conditions that affect the lower urinary tract.  · Cystoscopy is done using a thin, tube-shaped instrument with a light and camera at the end.  · After the procedure, it is common to have some soreness or pain in your abdomen and urethra.  · Watch for any blood in your urine. If the amount of blood in your urine increases, call your health care provider.  · If you were prescribed an antibiotic medicine, take it as told by your health care provider. Do not stop taking the antibiotic even if you start to feel better.  This information is not intended to replace advice given to you by your health care provider. Make sure you discuss any questions you have with your health care provider.    Transurethral Resection of Bladder Tumor, Care After  This sheet gives you information about how to care for yourself after your procedure. Your  health care provider may also give you more specific instructions. If you have problems or questions, contact your health care provider.  What can I expect after the procedure?  After the procedure, it is common to have:  · A small amount of blood in your urine for up to 2 weeks.  · Soreness or mild pain from your catheter. After your catheter is removed, you may have mild soreness, especially when urinating.  · Pain in your lower abdomen.  Follow these instructions at home:  Medicines  · Take over-the-counter and prescription medicines only as told by your health care provider.  · If you were prescribed an antibiotic medicine, take it as told by your health care provider. Do not stop taking the antibiotic even if you start to feel better.  · Do not drive for 24 hours if you were given a sedative during your procedure.  · Ask your health care provider if the medicine prescribed to you:  ? Requires you to avoid driving or using heavy machinery.  ? Can cause constipation. You may need to take these actions to prevent or treat constipation:  § Take over-the-counter or prescription medicines.  § Eat foods that are high in fiber, such as beans, whole grains, and fresh fruits and vegetables.  § Limit foods that are high in fat and processed sugars, such as fried or sweet foods.  Activity  · Return to your normal activities as told by your health care provider. Ask your health care provider what activities are safe for you.  · Do not lift anything that is heavier than 10 lb (4.5 kg), or the limit that you are told, until your health care provider says that it is safe.  · Avoid intense physical activity for as long as told by your health care provider.  · Rest as told by your health care provider.  · Avoid sitting for a long time without moving. Get up to take short walks every 1-2 hours. This is important to improve blood flow and breathing. Ask for help if you feel weak or unsteady.  General instructions  · Do not drink  alcohol for as long as told by your health care provider. This is especially important if you are taking prescription pain medicines.  · Do not take baths, swim, or use a hot tub until your health care provider approves. Ask your health care provider if you may take showers. You may only be allowed to take sponge baths.  · If you have a catheter, follow instructions from your health care provider about caring for your catheter and your drainage bag.  · Drink enough fluid to keep your urine pale yellow.  · Wear compression stockings as told by your health care provider. These stockings help to prevent blood clots and reduce swelling in your legs.  · Keep all follow-up visits as told by your health care provider. This is important.  ? You will need to be followed closely with regular checks of your bladder and urethra (cystoscopies) to make sure that the cancer does not come back.  Contact a health care provider if:  · You have pain that gets worse or does not improve with medicine.  · You have blood in your urine for more than 2 weeks.  · You have cloudy or bad-smelling urine.  · You become constipated. Signs of constipation may include having:  ? Fewer than three bowel movements in a week.  ? Difficulty having a bowel movement.  ? Stools that are dry, hard, or larger than normal.  · You have a fever.  Get help right away if:  · You have:  ? Severe pain.  ? Bright red blood in your urine.  ? Blood clots in your urine.  ? A lot of blood in your urine.  · Your catheter has been removed and you are not able to urinate.  · You have a catheter in place and the catheter is not draining urine.  Summary  · After your procedure, it is common to have a small amount of blood in your urine, soreness or mild pain from your catheter, and pain in your lower abdomen.  · Take over-the-counter and prescription medicines only as told by your health care provider.  · Rest as told by your health care provider. Follow your health care  provider's instructions about returning to normal activities. Ask what activities are safe for you.  · If you have a catheter, follow instructions from your health care provider about caring for your catheter and your drainage bag.  · Get help right away if you cannot urinate, you have severe pain, or you have bright red blood or blood clots in your urine.  This information is not intended to replace advice given to you by your health care provider. Make sure you discuss any questions you have with your health care provider.    Indwelling Urinary Catheter Care, Adult  An indwelling urinary catheter is a thin, flexible, germ-free (sterile) tube that is placed into the bladder to help drain urine out of the body. The catheter is inserted into the part of the body that drains urine from the bladder (urethra). Urine drains from the catheter into a drainage bag outside of the body.  Taking good care of your catheter will keep it working properly and help to prevent problems from developing.  What are the risks?  · Bacteria may get into your bladder and cause a urinary tract infection.  · Urine flow can become blocked. This can happen if the catheter is not working correctly, or if you have sediment or a blood clot in your bladder or the catheter.  · Tissue near the catheter may become irritated and bleed.  How to wear your catheter and your drainage bag  Supplies needed  · Adhesive tape or a leg strap.  · Alcohol wipe or soap and water (if you use tape).  · A clean towel (if you use tape).  · Overnight drainage bag.  · Smaller drainage bag (leg bag).  Wearing your catheter and bag  Use adhesive tape or a leg strap to attach your catheter to your leg.  · Make sure the catheter is not pulled tight.  · If a leg strap gets wet, replace it with a dry one.  · If you use adhesive tape:  1. Use an alcohol wipe or soap and water to wash off any stickiness on your skin where you had tape before.  2. Use a clean towel to pat-dry  the area.  3. Apply the new tape.  You should have received a large overnight drainage bag and a smaller leg bag that fits underneath clothing.  · You may wear the overnight bag at any time, but you should not wear the leg bag at night.  · Always wear the leg bag below your knee.  · Make sure the overnight drainage bag is always lower than the level of your bladder, but do not let it touch the floor. Before you go to sleep, hang the bag inside a wastebasket that is covered by a clean plastic bag.  How to care for your skin around the catheter  Supplies needed  · A clean washcloth.  · Water and mild soap.  · A clean towel.  Caring for your skin and catheter  · Every day, use a clean washcloth and soapy water to clean the skin around your catheter.  ? Wash your hands with soap and water.  ? Wet a washcloth in warm water and mild soap.  ? Clean the skin around your urethra.  ? If you are female:  ? Use one hand to gently spread the folds of skin around your vagina (labia).  ? With the washcloth in your other hand, wipe the inner side of your labia on each side. Do this in a front-to-back direction.  ? If you are male:  ? Use one hand to pull back any skin that covers the end of your penis (foreskin).  ? With the washcloth in your other hand, wipe your penis in small circles. Start wiping at the tip of your penis, then move outward from the catheter.  ? Move the foreskin back in place, if this applies.  ? With your free hand, hold the catheter close to where it enters your body. Keep holding the catheter during cleaning so it does not get pulled out.  ? Use your other hand to clean the catheter with the washcloth.  ? Only wipe downward on the catheter.  ? Do not wipe upward toward your body, because that may push bacteria into your urethra and cause infection.  ? Use a clean towel to pat-dry the catheter and the skin around it. Make sure to wipe off all soap.  ? Wash your hands with soap and water.  · Shower every day.  Do not take baths.  · Do not use cream, ointment, or lotion on the area where the catheter enters your body, unless your health care provider tells you to do that.  · Do not use powders, sprays, or lotions on your genital area.  · Check your skin around the catheter every day for signs of infection. Check for:  ? Redness, swelling, or pain.  ? Fluid or blood.  ? Warmth.  ? Pus or a bad smell.  How to empty the drainage bag  Supplies needed  · Rubbing alcohol.  · Gauze pad or cotton ball.  · Adhesive tape or a leg strap.  Emptying the bag  Empty your drainage bag (your overnight drainage bag or your leg bag) when it is ?-½ full, or at least 2-3 times a day. Clean the drainage bag according to the 's instructions or as told by your health care provider.  1. Wash your hands with soap and water.  2. Detach the drainage bag from your leg.  3. Hold the drainage bag over the toilet or a clean container. Make sure the drainage bag is lower than your hips and bladder. This stops urine from going back into the tubing and into your bladder.  4. Open the pour spout at the bottom of the bag.  5. Empty the urine into the toilet or container. Do not let the pour spout touch any surface. This precaution is important to prevent bacteria from getting in the bag and causing infection.  6. Apply rubbing alcohol to a gauze pad or cotton ball.  7. Use the gauze pad or cotton ball to clean the pour spout.  8. Close the pour spout.  9. Attach the bag to your leg with adhesive tape or a leg strap.  10. Wash your hands with soap and water.  How to change the drainage bag  Supplies needed:  · Alcohol wipes.  · A clean drainage bag.  · Adhesive tape or a leg strap.  Changing the bag  Replace your drainage bag with a clean bag if it leaks, starts to smell bad, or looks dirty.  1. Wash your hands with soap and water.  2. Detach the dirty drainage bag from your leg.  3. Pinch the catheter with your fingers so that urine does not spill  out.  4. Disconnect the catheter tube from the drainage tube at the connection valve. Do not let the tubes touch any surface.  5. Clean the end of the catheter tube with an alcohol wipe. Use a different alcohol wipe to clean the end of the drainage tube.  6. Connect the catheter tube to the drainage tube of the clean bag.  7. Attach the clean bag to your leg with adhesive tape or a leg strap. Avoid attaching the new bag too tightly.  8. Wash your hands with soap and water.  General instructions  · Never pull on your catheter or try to remove it. Pulling can damage your internal tissues.  · Always wash your hands before and after you handle your catheter or drainage bag. Use a mild, fragrance-free soap. If soap and water are not available, use hand .  · Always make sure there are no twists or bends (kinks) in the catheter tube.  · Always make sure there are no leaks in the catheter or drainage bag.  · Drink enough fluid to keep your urine pale yellow.  · Do not take baths, swim, or use a hot tub.  · If you are female, wipe from front to back after having a bowel movement.  Contact a health care provider if:  · Your urine is cloudy.  · Your urine smells unusually bad.  · Your catheter gets clogged.  · Your catheter starts to leak.  · Your bladder feels full.  Get help right away if:  · You have redness, swelling, or pain where the catheter enters your body.  · You have fluid, blood, pus, or a bad smell coming from the area where the catheter enters your body.  · The area where the catheter enters your body feels warm to the touch.  · You have a fever.  · You have pain in your abdomen, legs, lower back, or bladder.  · You see blood in the catheter.  · Your urine is pink or red.  · You have nausea, vomiting, or chills.  · Your urine is not draining into the bag.  · Your catheter gets pulled out.  Summary  · An indwelling urinary catheter is a thin, flexible, germ-free (sterile) tube that is placed into the  bladder to help drain urine out of the body.  · The catheter is inserted into the part of the body that drains urine from the bladder (urethra).  · Take good care of your catheter to keep it working properly and help prevent problems from developing.  · Always wash your hands before and after you handle your catheter or drainage bag.  · Never pull on your catheter or try to remove it.  This information is not intended to replace advice given to you by your health care provider. Make sure you discuss any questions you have with your health care provider.    DIET: To avoid nausea, slowly advance diet as tolerated, avoiding spicy or greasy foods for the first day.  Add more substantial food to your diet according to your physician's instructions.  Babies can be fed formula or breast milk as soon as they are hungry.  INCREASE FLUIDS AND FIBER TO AVOID CONSTIPATION.    SURGICAL DRESSING/BATHING: OK to shower. No submerging in water (baths, hot tubs, or pools) until cleared.    FOLLOW-UP APPOINTMENT:Follow-up in Dr. Jnoes's office for catheter removal on Monday 10/18.    You should CALL YOUR PHYSICIAN if you develop:  Fever greater than 101 degrees F.  Pain not relieved by medication, or persistent nausea or vomiting.  Excessive bleeding (blood soaking through dressing) or unexpected drainage from the wound.  Extreme redness or swelling around the incision site, drainage of pus or foul smelling drainage.  Inability to urinate or empty your bladder within 8 hours.  Problems with breathing or chest pain.    You should call 911 if you develop problems with breathing or chest pain.  If you are unable to contact your doctor or surgical center, you should go to the nearest emergency room or urgent care center.  Physician's telephone #: Dr. Jones [phone]    If any questions arise, call your doctor.  If your doctor is not available, please feel free to call the Surgical Center at [phone]. The Contact Center is open  Monday through Friday 7AM to 5PM and may speak to a nurse at [phone], or toll free at [phone].     A registered nurse may call you a few days after your surgery to see how you are doing after your procedure.    MEDICATIONS: Resume taking daily medication.  Take prescribed pain medication with food.  If no medication is prescribed, you may take non-aspirin pain medication if needed.  PAIN MEDICATION CAN BE VERY CONSTIPATING.  Take a stool softener or laxative such as senokot, pericolace, or milk of magnesia if needed.    Last pain medication given at 2:40 pm.    If your physician has prescribed pain medication that includes Acetaminophen (Tylenol), do not take additional Acetaminophen (Tylenol) while taking the prescribed medication.    Depression / Suicide Risk    As you are discharged from this [facility] facility, it is important to learn how to keep safe from harming yourself.    Recognize the warning signs:  · Abrupt changes in personality, positive or negative- including increase in energy   · Giving away possessions  · Change in eating patterns- significant weight changes-  positive or negative  · Change in sleeping patterns- unable to sleep or sleeping all the time   · Unwillingness or inability to communicate  · Depression  · Unusual sadness, discouragement and loneliness  · Talk of wanting to die  · Neglect of personal appearance   · Rebelliousness- reckless behavior  · Withdrawal from people/activities they love  · Confusion- inability to concentrate     If you or a loved one observes any of these behaviors or has concerns about self-harm, here's what you can do:  · Talk about it- your feelings and reasons for harming yourself  · Remove any means that you might use to hurt yourself (examples: pills, rope, extension cords, firearm)  · Get professional help from the community (Mental Health, Substance Abuse, psychological counseling)  · Do not be alone:Call your Safe Contact- someone whom you  trust who will be there for you.  · Call your local CRISIS HOTLINE 991-5035 or 895-018-2825  · Call your local Children's Mobile Crisis Response Team [place] (485) 131-9327 or www.[employer]  · Call the toll free National Suicide Prevention Hotlines   · National Suicide Prevention Lifeline 532-995-VXOS (9616)  · National Hope Line Network 800-SUICIDE (522-7903)        Discharge Instructions    Discharged to home by car with relative. Discharged via wheelchair, hospital escort: Yes.  Special equipment needed: Not Applicable    Be sure to schedule a follow-up appointment with your primary care doctor or any specialists as instructed.     Discharge Plan:   Diet Plan: Discussed  Activity Level: Discussed  Confirmed Follow up Appointment: Patient to Call and Schedule Appointment  Confirmed Symptoms Management: Discussed  Medication Reconciliation Updated: Yes  Influenza Vaccine Indication: Not indicated: Previously immunized this influenza season and > 8 years of age    I understand that a diet low in cholesterol, fat, and sodium is recommended for good health. Unless I have been given specific instructions below for another diet, I accept this instruction as my diet prescription.   Other diet: Heart healthy     Special Instructions: None    · Is patient discharged on Warfarin / Coumadin?   No     Depression / Suicide Risk    As you are discharged from this [facility] facility, it is important to learn how to keep safe from harming yourself.    Recognize the warning signs:  · Abrupt changes in personality, positive or negative- including increase in energy   · Giving away possessions  · Change in eating patterns- significant weight changes-  positive or negative  · Change in sleeping patterns- unable to sleep or sleeping all the time   · Unwillingness or inability to communicate  · Depression  · Unusual sadness, discouragement and loneliness  · Talk of wanting to die  · Neglect of personal  appearance   · Rebelliousness- reckless behavior  · Withdrawal from people/activities they love  · Confusion- inability to concentrate     If you or a loved one observes any of these behaviors or has concerns about self-harm, here's what you can do:  · Talk about it- your feelings and reasons for harming yourself  · Remove any means that you might use to hurt yourself (examples: pills, rope, extension cords, firearm)  · Get professional help from the community (Mental Health, Substance Abuse, psychological counseling)  · Do not be alone:Call your Safe Contact- someone whom you trust who will be there for you.  · Call your local CRISIS HOTLINE 619-9332 or 774-015-4132  · Call your local Children's Mobile Crisis Response Team [place] (799) 627-0087 or www.[employer]  · Call the toll free National Suicide Prevention Hotlines   · National Suicide Prevention Lifeline 288-492-YNYG (8812)  · National Hope Line Network 800-SUICIDE (297-6562)

## 2021-10-14 NOTE — PROGRESS NOTES
DX: Bladder Cancer     Cycle 1  Previous treatment = N/A     Regimen: Intravesical Gemcitabine  Gemcitabine 2000 mg in  ml instilled intravesically within 3 hours after TURBT  gemcitabine 1,000 mg in NS 50 mL syringe x 2  Label comments: **Please send in CATH TIPPED SYRINGE** Syringe # _ of 2      Yarelis GOMEZ, et al. Effect of Intravesical Instillation of Gemcitabine vs Saline Immediately Following Resection of Suspected Low-Grade Non-Muscle-Invasive Bladder Cancer on Tumor Recurrence SWOG  Randomized Clinical Trial. BRIDGETTE. 2018;319(18):3706-4057     LABS: not required      Gemcitabine 1000 mg in NS 50ml intravesically via cath tipped syringe x 2 syringes              Fixed dose, no calculation required. To be administered by MD. Allison Martinez, PharmD, BCOP, BCPS

## 2021-10-14 NOTE — PROGRESS NOTES
"Pharmacy Chemotherapy Calculation:    Pt Name: Filiberto Jauregui  DX: Bladder Cancer    Regimen: Intravesical Gemcitabine  Gemcitabine 2000 mg in  ml instilled intravesically within 3 hours after TURBT  Yarelis GOMEZ, et al. Effect of Intravesical Instillation of Gemcitabine vs Saline Immediately Following Resection of Suspected Low-Grade Non-Muscle-Invasive Bladder Cancer on Tumor Recurrence SWOG  Randomized Clinical Trial. BRIDGETTE. 2018;319(18):8996-4355     BP (!) 165/79   Pulse 63   Temp 36.2 °C (97.2 °F) (Temporal)   Resp 18   Ht 1.676 m (5' 6\")   Wt 66.6 kg (146 lb 13.2 oz)   SpO2 95%   BMI 23.70 kg/m²   Body surface area is 1.76 meters squared.    LABS: not required        Drug Order   (Drug name, dose, route, IV Fluid & volume, frequency, number of doses) Cycle 1      Previous treatment: n/a     Medication = Gemcitabine (Gemzar)  Base Dose= 1000 mg  Calc Dose: Fixed dose, no calculation required  Final Dose = 1000 mg x 2 syringes  Route = intravesically   Fluid & Volume = NS 50 mL x 2 syringes  Admin Duration = To be administered by MD   To be administered by MD       Fixed dose, no calculation required. Okay to treat with final dose.     By my signature below, I confirm this process was performed independently with the BSA and all final chemotherapy dosing calculations congruent. I have reviewed the above chemotherapy order and that my calculation of the final dose and BSA (when applicable) corroborate those calculations of the  pharmacist. Discrepancies of 10% or greater in the written dose have been addressed and documented within the [facility] Progress notes.    Greg Palma, PharmD          "

## 2021-10-14 NOTE — OR SURGEON
Immediate Post OP Note    PreOp Diagnosis: History of High Grade TCC of the bladder                                Bladder lesion                                Positive urine cytology      PostOp Diagnosis: As above      Procedure(s):  CYSTOSCOPY - Wound Class: Clean Contaminated  BILATERAL RETROGRADE PYELOGRAM   TURBT (TRANSURETHRAL RESECTION OF BLADDER TUMOR 2cm ) - MONOPOLAR  INTRAVESICAL GEMCITABINE TREATMENT - Wound Class: Clean Contaminated      Surgeon(s):  Brad Jones M.D.    Anesthesiologist/Type of Anesthesia:  Anesthesiologist: Chandler Fan M.D./General LMA    Surgical Staff:  Circulator: La Nena Miller R.N.  Scrub Person: Sanju Gould    Specimens removed if any:  ID Type Source Tests Collected by Time Destination   A : RIGHT POSTERIOR BLADDER WALL LESION Tissue Bladder PATHOLOGY SPECIMEN Brad Jones M.D. 10/14/2021 1342    B : RIGHT LATERAL BLADDER WALL Tissue Bladder PATHOLOGY SPECIMEN Brad Jones M.D. 10/14/2021 1346        Estimated Blood Loss: 25 ml    Findings: Normal retrograde pyelograms                 Bladder erythema on the medial edge of bladder diverticulum suspicious for recurrent cancer                 Right bladder wall erythematous lesion    Complications: None  Drains: 20 Amharic scott with gemcitabine placement at 1355          10/14/2021 2:08 PM Brad Jones M.D.

## 2021-10-14 NOTE — ANESTHESIA POSTPROCEDURE EVALUATION
Patient: Filiberto Jauregui    Procedure Summary     Date: 10/14/21 Room / Location: [place] [address] / SURGERY [facility]    Anesthesia Start: 1314 Anesthesia Stop: 1408    Procedures:       CYSTOSCOPY (N/A Bladder)      TURBT (TRANSURETHRAL RESECTION OF BLADDER TUMOR) - MONOPOLAR WITH INTRAVESICAL GEMCITABINE TREATMENT (N/A Bladder)      CYSTOSCOPY, WITH BILATERAL RETROGRADE PYELOGRAM (N/A Bladder) Diagnosis: (MASA OF URINARY BLADDER)    Surgeons: Brad Jones M.D. Responsible Provider: Chandler Fan M.D.    Anesthesia Type: general ASA Status: 4          Final Anesthesia Type: general  Last vitals  BP   Blood Pressure : 139/63    Temp   36.3 °C (97.4 °F)    Pulse   (!) 47   Resp   16    SpO2   95 %      Anesthesia Post Evaluation    Patient location during evaluation: PACU  Patient participation: complete - patient participated  Level of consciousness: awake and alert  Pain score: 0    Airway patency: patent  Anesthetic complications: no  Cardiovascular status: hemodynamically stable  Respiratory status: acceptable  Hydration status: euvolemic    PONV: none          No complications documented.     Nurse Pain Score: 0 (NPRS)

## 2021-10-14 NOTE — OR NURSING
Assume care for pt in pre-op. Patient allergies and NPO status verified. Belongings secured. Patient verbalizes understanding of pain scale, expected course of stay and plan of care. Surgical site verified with patient. IV access established. FBS=86, negative rapid covid test. Call light within reach. No further needs at this time. Hourly rounding in place.

## 2021-10-15 PROBLEM — R09.02 HYPOXIA: Status: RESOLVED | Noted: 2021-01-01 | Resolved: 2021-01-01

## 2021-10-15 NOTE — PROGRESS NOTES
Pt ambulated around unit with use of FWW, stand-by assist, tolerated well. Pt's wife reports she has multiple walkers at home, pt is ambulating at baseline per wife.

## 2021-10-15 NOTE — OR NURSING
Report called to CDU RN. Transport at bedside to take pt to T214 on 2 L O2 with wife at bedside with all belongings.

## 2021-10-15 NOTE — DISCHARGE SUMMARY
Discharge Summary    CHIEF COMPLAINT ON ADMISSION  No chief complaint on file.      Reason for Admission  MASA OF URINARY BLADDER     Admission Date  10/14/2021    CODE STATUS  Full Code    HPI & HOSPITAL COURSE  This is Filiberto Jauregui is a 88 y.o. male who presented 10/14/2021 for elective procedure for his bladder cancer. Patient has long history of bladder wall cancer s/p gemcitabine. Patient also has history of dementia, CAD, s/p mitral valve clip. Post op, patient was found to be hypoxic requiring oxygen therapy. Patient did not have any fever and was being monitored in post op area for few hours however once oxygen turns off, patient's saturation drops to 81% on RA. Patient likely has atelectasis from the surgery. Was admitted to monitor and to titrate oxygen off.     Patient improved by next day, oxygen was titrated off.  Patient did not need home oxygen.    Patient's confusion was described by his wife as a common occurrence after surgery and patient would return to baseline at home. Spoke with wife at bedside and she does not appear worried and stated she is comfortable with the patient at home.    Patient discharged with scott catheter and to have follow up with [facility] as outpatient on Monday 10/18/21.    Therefore, he is discharged in good and stable condition to home with close outpatient follow-up.    The patient recovered much more quickly than anticipated on admission.    Discharge Date  10/15/21    FOLLOW UP ITEMS POST DISCHARGE  Listed below    DISCHARGE DIAGNOSES  Principal Problem (Resolved):    Hypoxia POA: Yes  Active Problems:    History of heart valve repair POA: Yes      Overview: January 2020: MV repair with 2 XTr MitraClips.    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 [facility] None     [facility]  [address] [address]  [phone]  On 10/18/2021  follow up with appointment for STEFFANIE Miller M.D.  [address]  [address]  [place] [address]  [phone]    Schedule an appointment as soon as possible for a visit in 2 weeks  follow up for post hospital visit if needed      MEDICATIONS ON DISCHARGE     Medication List      CONTINUE taking these medications      Instructions   aspirin EC 81 MG Tbec  Commonly known as: ECOTRIN   Take 81 mg by mouth every day.  Dose: 81 mg     atorvastatin 40 MG Tabs  Commonly known as: LIPITOR   Take 1 tablet by mouth once daily     donepezil 10 MG tablet  Commonly known as: ARICEPT   donepezil 10 mg tablet   TAKE 2 TABLETS BY MOUTH EVERY DAY AT BEDTIME FOR 90 DAYS     ferrous sulfate 325 (65 Fe) MG tablet   Take 325 mg by mouth every day.  Dose: 325 mg     gabapentin 300 MG Caps  Commonly known as: NEURONTIN   Take 300-600 mg by mouth 2 times a day.  Dose: 300-600 mg     loratadine 10 MG Tabs  Commonly known as: CLARITIN   Take 10 mg by mouth every day.  Dose: 10 mg     losartan 100 MG Tabs  Commonly known as: COZAAR   Take 100 mg by mouth every day.  Dose: 100 mg     metFORMIN 500 MG Tabs  Commonly known as: GLUCOPHAGE   Take 1,000 mg by mouth every evening.  Dose: 1,000 mg     vitamin D3 1000 Unit (25 mcg) Tabs  Commonly known as: cholecalciferol   Take 1,000 Units by mouth every day.  Dose: 1,000 Units            Allergies  Allergies   Allergen Reactions   • Contrast Media With Iodine [Iodine] Rash     RASH    • Iodide      Per pt broke out in rash   • Penicillins Rash     .Uncertain reaction-long time ago       DIET  Orders Placed This Encounter   Procedures   • Diet Order Diet: Cardiac     Standing Status:   Standing     Number of Occurrences:   1     Order Specific Question:   Diet:     Answer:   Cardiac [6]       ACTIVITY  As tolerated. with assistance and walker  Weight bearing as  tolerated    CONSULTATIONS  [facility]     PROCEDURES  OPERATIONS AND PROCEDURES PERFORMED:  (Dr. Jones 10/14)  1.  Rigid cystourethroscopy.  2.  Bilateral retrograde ureteral pyelograms with drainage films.  3.  Transurethral resection of 2 cm bladder tumor near the posterior bladder   wall diverticulum and transurethral biopsy of right bladder wall lesion with   monopolar current.  4.  Intravesical gemcitabine chemotherapy placement.    LABORATORY  Lab Results   Component Value Date    SODIUM 139 10/15/2021    POTASSIUM 4.6 10/15/2021    CHLORIDE 101 10/15/2021    CO2 28 10/15/2021    GLUCOSE 169 (H) 10/15/2021    BUN 21 10/15/2021    CREATININE 1.20 10/15/2021        Lab Results   Component Value Date    WBC 9.5 10/15/2021    HEMOGLOBIN 12.3 (L) 10/15/2021    HEMATOCRIT 37.4 (L) 10/15/2021    PLATELETCT 180 10/15/2021        Total time of the discharge process exceeds 23 minutes.    DX-CYSTO FLUORO > 1 HOUR   Final Result      Cysto fluoroscopy utilized for 34 seconds.         INTERPRETING LOCATION: [address] [place], [place] [address], [address]

## 2021-10-15 NOTE — H&P
[facility] Medicine History & Physical Note    Date of Service  10/14/2021    Primary Care Physician  Mac Miller M.D.    Code Status  Full Code    Chief Complaint  Hypoxia     History of Presenting Illness  Filiberto Jauregui is a 88 y.o. male who presented 10/14/2021 for elective procedure for his bladder cancer. Patient has long history of bladder wall cancer s/p gemcitabine. Patient also has history of dementia, CAD, s/p mitral valve clip. Post op, patient was found to be hypoxic requiring oxygen therapy. Patient did not have any fever and was being monitored in post op area for few hours however once oxygen turns off, patient's saturation drops to 81% on RA. Patient likely has atelectasis from the surgery. Will admit patient to monitor and to titrate oxygen off.     I discussed the plan of care with patient, family and bedside RN.    Review of Systems  Review of Systems   Unable to perform ROS: Dementia       Past Medical History   has a past medical history of Bladder cancer ([facility]) (2019), Bowel habit changes, CAD (coronary artery disease) (12/2019), Cataract, Diabetes ([facility]) ( ), Hemorrhagic disorder ([facility]) ( ), Hyperlipidemia, Hypertension (10/04/2021), Mitral regurgitation (01/2020), MVP (mitral valve prolapse), Pneumonia, Prostate cancer (HCC), Sciatica, Seasonal allergies, Snoring, and Valvular heart disease.    Surgical History   has a past surgical history that includes prostatectomy, radial; pr cystourethroscopy,ureter catheter (Bilateral, 11/1/2019); tonsillectomy; transcatheter mitral valve repair (1/7/2020); catina (1/7/2020); angiogram; other cardiac surgery (03/10/2020); turbt (transurethral resection of bladder tumor) (3/20/2020); cystoscopy (Bilateral, 3/20/2020); and turbt (transurethral resection of bladder tumor) (2/2/2021).     Family History  family history includes Cancer in his sister; Heart Attack in his father; Lung Disease in his mother.   Family history reviewed with patient. There is no  family history that is pertinent to the chief complaint.     Social History   reports that he has never smoked. He has never used smokeless tobacco. He reports current alcohol use of about 0.6 oz of alcohol per week. He reports that he does not use drugs.    Allergies  Allergies   Allergen Reactions   • Contrast Media With Iodine [Iodine] Rash     RASH    • Iodide      Per pt broke out in rash   • Penicillins Rash     .Uncertain reaction-long time ago       Medications  Prior to Admission Medications   Prescriptions Last Dose Informant Patient Reported? Taking?   aspirin EC (ECOTRIN) 81 MG Tablet Delayed Response 10/10/2021 at am Family Member Yes No   Sig: Take 81 mg by mouth every day.   atorvastatin (LIPITOR) 40 MG Tab 10/13/2021 at 0900 Family Member No No   Sig: Take 1 tablet by mouth once daily   donepezil (ARICEPT) 10 MG tablet 10/13/2021 at 2200 Family Member Yes No   Sig: donepezil 10 mg tablet   TAKE 2 TABLETS BY MOUTH EVERY DAY AT BEDTIME FOR 90 DAYS   ferrous sulfate 325 (65 Fe) MG tablet 10/12/2021 at 0900 Family Member Yes No   Sig: Take 325 mg by mouth every day.   gabapentin (NEURONTIN) 300 MG Cap 10/14/2021 at 0900 Family Member Yes Yes   Sig: Take 300-600 mg by mouth 2 times a day.   loratadine (CLARITIN) 10 MG Tab >2 weeks at unk Family Member Yes Yes   Sig: Take 10 mg by mouth every day.   losartan (COZAAR) 100 MG Tab 10/13/2021 at 0900 Family Member Yes No   Sig: Take 100 mg by mouth every day.   metFORMIN (GLUCOPHAGE) 500 MG Tab 10/13/2021 at 2200 Family Member Yes Yes   Sig: Take 1,000 mg by mouth every evening.   vitamin D (CHOLECALCIFEROL) 1000 Unit (25 mcg) Tab 10/12/2021 at 0900 Family Member Yes No   Sig: Take 1,000 Units by mouth every day.      Facility-Administered Medications: None       Physical Exam  Temp:  [35.5 °C (95.9 °F)-36.3 °C (97.4 °F)] 36.1 °C (97 °F)  Pulse:  [47-74] 74  Resp:  [12-29] 16  BP: (139-188)/(57-79) 155/66  SpO2:  [77 %-100 %] 99 %  Blood Pressure : 155/66    Temperature: 36.1 °C (97 °F)   Pulse: 74   Respiration: 16   Pulse Oximetry: 99 %       Physical Exam  Vitals and nursing note reviewed.   Constitutional:       General: He is not in acute distress.     Appearance: Normal appearance. He is not ill-appearing.   HENT:      Head: Normocephalic and atraumatic.      Mouth/Throat:      Mouth: Mucous membranes are moist.      Pharynx: Oropharynx is clear.   Eyes:      General: No scleral icterus.     Extraocular Movements: Extraocular movements intact.      Conjunctiva/sclera: Conjunctivae normal.      Pupils: Pupils are equal, round, and reactive to light.   Cardiovascular:      Rate and Rhythm: Normal rate and regular rhythm.      Pulses: Normal pulses.      Heart sounds: Murmur heard.     Pulmonary:      Effort: Pulmonary effort is normal. No respiratory distress.      Breath sounds: Normal breath sounds. No wheezing, rhonchi or rales.   Abdominal:      General: Abdomen is flat. Bowel sounds are normal. There is no distension.      Palpations: Abdomen is soft.      Tenderness: There is no abdominal tenderness.   Genitourinary:     Comments: Crenshaw in place, minimal output at the time of exam  Musculoskeletal:         General: No swelling. Normal range of motion.      Cervical back: Normal range of motion and neck supple. No rigidity.   Neurological:      Mental Status: He is alert.         Laboratory:          No results for input(s): ALTSGPT, ASTSGOT, ALKPHOSPHAT, TBILIRUBIN, DBILIRUBIN, GAMMAGT, AMYLASE, LIPASE, ALB, PREALBUMIN, GLUCOSE in the last 72 hours.      No results for input(s): NTPROBNP in the last 72 hours.      No results for input(s): TROPONINT in the last 72 hours.    Imaging:  DX-CYSTO FLUORO > 1 HOUR   Final Result      Cysto fluoroscopy utilized for 34 seconds.         INTERPRETING LOCATION: [address] [place] [address], [id number]          no X-Ray or EKG requiring interpretation    Assessment/Plan:  I anticipate this patient is appropriate for observation  status at this time.    * Hypoxia- (present on admission)  Assessment & Plan  -Likely due to post-op atelectasis  -Continue incentive spirometry  -Monitor SpO2 and keep above 88%, currently on 2L, will titrate down      Stage 3a chronic kidney disease (HCC)- (present on admission)  Assessment & Plan  -Monitor BUN/Cr    Dementia (HCC)- (present on admission)  Assessment & Plan  -Continue home meds  -Given his dementia, patient will need frequent reminder to do incentive spirometry, very flat affect, may be parkinsonian feature    Malignant neoplasm of anterior wall of urinary bladder (HCC)- (present on admission)  Assessment & Plan  -Avoid opioids  -Urology will continue to follow     Essential hypertension- (present on admission)  Assessment & Plan  -Continue home meds    History of heart valve repair- (present on admission)  Assessment & Plan  -Resume ASA and monitor CBC for H/H      VTE prophylaxis: SCDs/TEDs

## 2021-10-15 NOTE — PROGRESS NOTES
IV dc'd.  Discharge instructions given to patient and wife; patient and wife verbalize understanding, all questions answered.  Copy of DC summary provided, signed copy in chart.  Pt states personal belongings are in possession.  Pt escorted off unit by this RN without incident.

## 2021-10-15 NOTE — PROGRESS NOTES
Assumed care of pt. Call light in reach. Bed in lowest position. Care of plan discussed with pt with pt agreeing to care of plan. Communication board updated. All questions answered. Assessment completed. Crenshaw with bloody urine. Wife at bedside

## 2021-10-15 NOTE — PROGRESS NOTES
COVID-19 surge in effect.    Assumed care of patient. Patient is resting comfortably in bed and is AOx1, only oriented to self. Crenshaw catheter in place with with bloody urine noted. Communication board updated and plan of care discussed with patient. Bed locked and in lowest position, bed frame alarm in place, call light and personal belongings within reach.

## 2021-10-15 NOTE — OP REPORT
DATE OF SERVICE:  10/14/2021     PREOPERATIVE DIAGNOSES:   1.  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  2.  History of prostate cancer status post radical retrograde prostatectomy.  3.  Erythematous bladder lesion in posterior bladder wall.  4.  Positive urine cytology.  5.  Status post chemotherapy and radiation treatment for muscle invasive   bladder cancer.     OPERATIONS AND PROCEDURES PERFORMED:    1.  Rigid cystourethroscopy.  2.  Bilateral retrograde ureteral pyelograms with drainage films.  3.  Transurethral resection of 2 cm bladder tumor near the posterior bladder   wall diverticulum and transurethral biopsy of right bladder wall lesion with   monopolar current.  4.  Intravesical gemcitabine chemotherapy placement.     SURGEON:  Brad Jones MD     ANESTHESIOLOGIST:  Chandler Fan MD     ANESTHESIA:  General laryngeal mask.     POSTOPERATIVE DIAGNOSES:  1.  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  2.  History of prostate cancer status post radical retrograde prostatectomy.  3.  Erythematous bladder lesion in posterior bladder wall.  4.  Positive urine cytology.  5.  Status post chemotherapy and radiation treatment for muscle invasive   bladder cancer.     SPECIMENS:  From the right posterior bladder wall and the right lateral   bladder wall.     ESTIMATED BLOOD LOSS:  25 mL.     INTRAOPERATIVE FINDINGS: The patient has normal retrograde ureteral pyelograms   with drainage films.  There was an air bubble present in the mid ureter,   which changed position with drainage consistent with an air bubble.  The   bladder has bladder erythema on the medial edge of a bladder diverticulum,   which is suspicious for recurrent cancer and also in that diverticulum, there   are some papillary changes that likely are the source of recurrence and this   is near his old surgical scar from the original tumor in 2019.  In addition,   there is a right bladder wall  erythematous lesion and he is status post a   radical prostatectomy with slight bladder neck narrowing requiring gentle   dilation with the resectoscope.     COMPLICATIONS:  None.     DRAINS:  A 20-Montserratian Crenshaw with gemcitabine placement at 1355 hours.     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 He had significant cardiac   disease.  He is a poor candidate for cystectomy and urinary diversion. As   such, he has been managed for his muscle invasive bladder cancer with   radiation and chemotherapy and he has had several recurrences.  Recently, he   had positive cytology and at office endoscopy was found to have a posterior   erythematous lesion.  I recommended to the patient that rather fulgurating   this in the office, we go ahead and do retrograde ureteral pyelograms to make   sure there are no other areas of concern with the positive cytology and a   biopsy of this region would be performed and gemcitabine chemotherapy is   recommended to hopefully help prevent local recurrence.  Prior to the surgery,   I discussed with the patient and his wife, who is his primary caregiver, as   he has developed some dementia, the risk of the procedure including but not   limited to risk of urethral stricture, urosepsis, urinary tract infection,   bladder perforation, the fact that this is a high-grade cancer which   recurrence is concerning for further recurrence and even spread outside the   bladder.  In addition, we discussed the perioperative risk of deep vein   thrombosis, pulmonary embolism, aspiration pneumonia, heart attack, stroke and   death.  Informed consent was given to me to proceed by the patient in the   presence of his wife who also understood all risks and benefits.     DESCRIPTION OF DETAIL:  After informed consent was obtained, the patient was   brought to the operating room and placed supine.  Bilateral sequential    compression device in place and operational.  General laryngeal mask   anesthetic administered in balanced fashion by Dr. Chandler Fan.  The patient   received intravenous Ancef.  He was positioned in modified lithotomy. In the   lithotomy position, he was prepped with Hibiclens as he has an IODINE ALLERGY.    At this point in time, with the operative area prepped and draped, a   surgical timeout was called.  All members of the operative team agree as the   patient's name, procedure to be performed without objections, attention was   directed to the procedure.     I passed a 22-Sudanese rigid cystoscope per urethra.  The anterior urethra was   normal.  He is status post prostatectomy.  The bladder neck was at about   20-Sudanese.  I was able to push the scope through with gentle dilation and then   I performed a right retrograde ureteral pyelogram with half dilute   Cysto-Conray.  There were no persistent filling defects.  I performed the same   procedure on the left side, there was an air bubble in the mid ureter, which   did move with repositioning and drainage.  The entire renal and ureteral   anatomy was completely opacified.  There was no upper tract tumors and general   inspection of bladder showed predominantly erythematous area on the medial   edge of the bladder diverticulum and some papillary changes in the   diverticulum and in the right bladder wall lesion.  At this point in time, I   removed the cystoscope.  I passed a 26-Sudanese resectoscope into the bladder,   resected the posterior bladder wall.  The bladder was quite thin.  I used a   rollerball to fulgurate this and I fulgurated the inner surface of the   diverticulum due to the erythematous area.  I also biopsied the right bladder   wall and fulgurated the base of this lesion and at the end of the case, no   other abnormalities were seen.  I subsequently drained the bladder after   hemostasis was obtained and positioned 2 grams intravesical  gemcitabine   chemotherapy at 1354 hours.  At the end the case, he tolerated the procedure   well.  His specimens were documented and sent to pathology for permanent   section and he did arrive in the recovery room in stable condition with   post-chemotherapy discharge orders and instructions for followup.        ______________________________  MD STEPAN Venegas/CRESENCIO/RONNY    DD:  10/14/2021 14:55  DT:  10/14/2021 17:02    Job#:  [id number]

## 2021-10-15 NOTE — ASSESSMENT & PLAN NOTE
-Likely due to post-op atelectasis  -Continue incentive spirometry  -Monitor SpO2 and keep above 88%, currently on 2L, will titrate down

## 2021-10-15 NOTE — PROGRESS NOTES
"[facility] Progress Note    Service: [facility]  Patient's Name: Filiberto Jauregui  MRN: [id number]  Admit Date:10/14/2021  Today's Date: 10/15/2021   Room #: T214/00      Identification:  Patient with T2 bladder cancer s/p chemotherapy and radiation therapy; not a candidate for cystectomy.     Subjective/ROS:   He was kept overnight for hypoxia, this has resolved and patient >90 on RA.     Patient is confused this morning and A/Ox1 only. He is cooperative and following directions. Hospitalist contacted wife who reports that this is very common for patient after anesthesia and has occurred multiple times. His labs are reassuring with stable Hgb and Cr. He has a scott catheter in place draining clear yellow urine. He denies abdominal or flank pain. He denies fever, chills.       Physical Exam:  Current Vitals:   /62   Pulse 62   Temp 36.1 °C (96.9 °F) (Temporal)   Resp 16   Ht 1.676 m (5' 6\")   Wt 68.9 kg (151 lb 14.4 oz)   SpO2 93%   BMI 24.52 kg/m²     10/13 1900 - 10/15 0659  In: 800 [I.V.:800]  Out: 2100 [Urine:2100]    GEN : NAD, A&O X4   RES:  no acute respiratory distress  ABD: Soft ND, no TTP   :   Scott with clear yellow urine in tubing.       Labs:   Recent Labs     10/15/21  0500   SODIUM 139   POTASSIUM 4.6   CHLORIDE 101   CO2 28   GLUCOSE 169*   BUN 21   CREATININE 1.20   CALCIUM 9.5     Recent Labs     10/15/21  0500   WBC 9.5   RBC 4.24*   HEMOGLOBIN 12.3*   HEMATOCRIT 37.4*   MCV 88.2   MCH 29.0   MCHC 32.9*   RDW 43.8   PLATELETCT 180   MPV 9.9     Lab Results   Component Value Date/Time    GLUCOSE 169 (H) 10/15/2021 05:00 AM    GLUCOSE 140 (H) 10/04/2021 01:06 PM    GLUCOSE 78 05/21/2021 02:50 PM    GLUCOSE 105 (H) 02/04/2021 01:49 AM       Assessment/Plan  Patient with T2 bladder cancer s/p chemotherapy and radiation therapy; not a candidate for cystectomy.     -Hospitalist to manage patient's confusion, apparently very common following anesthesia per wife and can take a few " days to resolve. She will visit him this afternoon and if patient is safe to discharge from Hospitalist perspective then okay to discharge from Urology perspective.    -Patient to discharge with scott in place, will have TOV in office Monday.    -Continue with daily labs if patient stays overnight. Please contact us with any questions or issues.          [email or web address] [email or web address].   [address]  [place] [id number]   [phone]

## 2021-10-15 NOTE — PROGRESS NOTES
Pt. Is confused, shouting, wanting to get out of bed, wanting to get near the garbage can. Instructed back to bed, complied, fall precautions in place.

## 2021-10-15 NOTE — ASSESSMENT & PLAN NOTE
-Continue home meds  -Given his dementia, patient will need frequent reminder to do incentive spirometry, very flat affect, may be parkinsonian feature

## 2021-10-15 NOTE — PROGRESS NOTES
Assessment completed. Patient A&Ox1 (person).  This RN contacted pt's wife who reports that this is common for patient after anesthesia and has occurred numerous times. Crenshaw catheter in place, draining clear yellow urine. Pt denies any pain at this time. POC discussed; communication board updated.    Fall risk in place. Bed in locked, lowest position; bed alarm on. Call light and belongings within reach.  Needs met.

## 2021-11-22 NOTE — NON-PROVIDER
Patient was seen today in clinic with Dr. Dempsey for follow-up.  Vitals signs and weight were obtained and pain assessment was completed.  Allergies and medications were reviewed with the patient.      Vitals/Pain:  Vitals:    11/22/21 1437   BP: 123/67   BP Location: Right arm   Patient Position: Sitting   BP Cuff Size: Adult   Pulse: (!) 59   Temp: 36.2 °C (97.2 °F)   TempSrc: Temporal   SpO2: 96%   Weight: 67.6 kg (149 lb)   Pain Score: No pain    Allergies:   Contrast media with iodine [iodine], Iodide, and Penicillins    Current Medications:  Current Outpatient Medications   Medication Sig Dispense Refill   • gabapentin (NEURONTIN) 300 MG Cap Take 300-600 mg by mouth 2 times a day.     • metFORMIN (GLUCOPHAGE) 500 MG Tab Take 1,000 mg by mouth every evening.     • loratadine (CLARITIN) 10 MG Tab Take 10 mg by mouth every day.     • donepezil (ARICEPT) 10 MG tablet donepezil 10 mg tablet   TAKE 2 TABLETS BY MOUTH EVERY DAY AT BEDTIME FOR 90 DAYS     • ferrous sulfate 325 (65 Fe) MG tablet Take 325 mg by mouth every day.     • vitamin D (CHOLECALCIFEROL) 1000 Unit (25 mcg) Tab Take 1,000 Units by mouth every day.     • atorvastatin (LIPITOR) 40 MG Tab Take 1 tablet by mouth once daily 90 tablet 3   • aspirin EC (ECOTRIN) 81 MG Tablet Delayed Response Take 81 mg by mouth every day.     • losartan (COZAAR) 100 MG Tab Take 100 mg by mouth every day.       No current facility-administered medications for this encounter.         PCP:  Paul Hill R.N.

## 2021-11-22 NOTE — PROGRESS NOTES
RADIATION ONCOLOGY FOLLOW-UP    DATE OF SERVICE: 11/22/2021    IDENTIFICATION:   A 88 y.o. male with   Malignant neoplasm of anterior wall of urinary bladder (HCC)  Staging form: Urinary Bladder, AJCC 8th Edition  - Clinical stage from 5/18/2020: Stage II (cT2, cN0, cM0) - Signed by Gilson Dempsey M.D. on 5/18/2020      RADIATION SUMMARY:  Radiation Treatments     Historical Treatments (Plans: 2)    Plan Last Treated On Elapsed Days Fractions Treated Prescribed Fraction Dose (cGy) Prescribed Total Dose (cGy)   Bladder 7/31/2020 44 @ [id number] 25 of 25 200 5,000   Bladder_Bst 7/31/2020 44 @ [id number] 7 of 7 200 1,400              Reference Point Last Treated On Elapsed Days Most Recent Session Dose (cGy) Total Dose (cGy)   Bladder 7/31/2020 44 @ [id number] -- 5,000   Bladder CP 7/31/2020 44 @ [id number] -- 5,157   Bladder_Bst 7/31/2020 44 @ [id number] -- 1,400   Bladder_Bst CP 7/31/2020 44 @ [id number] -- 1,454                      HISTORY OF PRESENT ILLNESS:   Patient originally presented with gross hematuria and underwent TURBT with instillation of gemcitabine on March 20, 2020 and was found to have an anterior bladder wall mass muscle invasive urothelial cancer.  Patient had a PET CT scan on May 8, 2020 which showed no evidence of distant metastatic disease although there was some uptake in the ascending colon.  Patient will have last BCG installation on May 28, 2020.  Patient currently doing well he does have a little bit of weight loss and fatigue and occasional pain with urination as well as waking up at night.     9/21/20  Patient recently completed chemoradiation and had significant dehydration and acute kidney injury requiring hydration.  His kidney function has since returned to normal he still has significant frequency and urgency and dysuria which his Pyridium had not helped.  He has diarrhea which she takes Lomotil 2-3 times a day which she says is getting better.  Per his report he saw  Dr. Jones who did a cystoscopy and said bladder appears healing and no evidence of disease recurrence.      11/13/20  Patient doing well he says that his diarrhea is improving Lomotil.  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  He said that the Pyridium and oxybutynin did not work.  He is seen Dr. Jones next month for repeat cystoscopy.  CT chest abdomen pelvis shows no evidence of distant metastatic disease there is some bladder wall thickening which will be evaluated on next cystoscopy.  Hematuria has essentially stopped although he does have some occasional mild hematuria.     2/26/21  Patient doing well overall he did have recent episode of urinary bleeding and was in the hospital and underwent cystoscopy type Dr. Rain who found bleeding hemorrhagic cystitis however no recurrent tumor.  Patient still has increased urinary frequency.  He was taken off Plavix but is on baby aspirin.  Overall patient is doing well.    5/28/21  Patient doing well with no recurrent hematuria.  He had recent cystoscopy which does show some radiation changes in the posterior bladder by Dr. Jones in April however cytology was negative.  CT chest abdomen pelvis shows no evidence of distant metastatic disease and posterior bladder shows decrease in thickness.  Overall appetite is improving and he started to gain some weight back    INTERVAL HISTORY:  Patient is doing well with no hematuria.  He had recent TURBT October 14, 2021 which showed 2 cm bladder tumor near the posterior bladder wall diverticulum with biopsy showing no evidence of disease.  Intravesicular gemcitabine was placed.  Patient was found to have positive urine cytology prior to this procedure.  Patient had repeat CT chest and urogram which showed no evidence of disease recurrence either.    CURRENT MEDICATIONS:  Current Outpatient Medications   Medication Sig Dispense Refill   • gabapentin (NEURONTIN) 300 MG Cap Take 300-600 mg by  mouth 2 times a day.     • metFORMIN (GLUCOPHAGE) 500 MG Tab Take 1,000 mg by mouth every evening.     • loratadine (CLARITIN) 10 MG Tab Take 10 mg by mouth every day.     • donepezil (ARICEPT) 10 MG tablet donepezil 10 mg tablet   TAKE 2 TABLETS BY MOUTH EVERY DAY AT BEDTIME FOR 90 DAYS     • ferrous sulfate 325 (65 Fe) MG tablet Take 325 mg by mouth every day.     • vitamin D (CHOLECALCIFEROL) 1000 Unit (25 mcg) Tab Take 1,000 Units by mouth every day.     • atorvastatin (LIPITOR) 40 MG Tab Take 1 tablet by mouth once daily 90 tablet 3   • aspirin EC (ECOTRIN) 81 MG Tablet Delayed Response Take 81 mg by mouth every day.     • losartan (COZAAR) 100 MG Tab Take 100 mg by mouth every day.       No current facility-administered medications for this encounter.       ALLERGIES:  Contrast media with iodine [iodine], Iodide, and Penicillins    PHYSICAL EXAM:   ECOG PERFORMANCE STATUS:  ECOG Performance Review 5/28/2021   ECOG Performance Status Restricted in physically strenuous activity but ambulatory and able to carry out work of a light or sedentary nature, e.g., light house work, office work   Some recent data might be hidden     /67 (BP Location: Right arm, Patient Position: Sitting, BP Cuff Size: Adult)   Pulse (!) 59   Temp 36.2 °C (97.2 °F) (Temporal)   Wt 67.6 kg (149 lb)   SpO2 96%   BMI 24.05 kg/m²   Physical Exam  Constitutional:       Appearance: Normal appearance.   Abdominal:      General: There is no distension.      Palpations: Abdomen is soft.   Skin:     Findings: No erythema.   Neurological:      Mental Status: He is alert.               INTERNATIONAL PROSTATE SYMPTOM SCORE (I-PSS):  I-PSS Review 7/1/2020 11/22/2021   Incomplete Emptying- How often have you had the sensation of not emptying your bladder? 0 5   How often have you had to urinate less than every tow hours? 1 3   Intermittency- How often have you found you stopped and started again several times when you urinated? 0 1   Urgency-  How often have you found it difficult to postpone urination? 1 5   Weak Stream- How often have you had a weak urinary stream? 0 4   Straining- How often have you had to strain to start urination? 0 2   Nocturia- How many times did you typically get up at night to urinate? 2 3   Score: 4 23       QUALITY OF LIFE DUE TO URINARY SYMPTOMS:   If you were to spend the rest of your life with your urinary condition just the way it is now, how would you feel about that? 4 = Mostly dissatisfied      LABORATORY DATA:   Lab Results   Component Value Date/Time    WBC 9.5 10/15/2021 05:00 AM    RBC 4.24 (L) 10/15/2021 05:00 AM    HEMOGLOBIN 12.3 (L) 10/15/2021 05:00 AM    HEMATOCRIT 37.4 (L) 10/15/2021 05:00 AM    MCV 88.2 10/15/2021 05:00 AM    MCH 29.0 10/15/2021 05:00 AM    MCHC 32.9 (L) 10/15/2021 05:00 AM    RDW 43.8 10/15/2021 05:00 AM    PLATELETCT 180 10/15/2021 05:00 AM    MPV 9.9 10/15/2021 05:00 AM    NEUTSPOLYS 67.80 10/04/2021 01:06 PM    LYMPHOCYTES 22.60 10/04/2021 01:06 PM    MONOCYTES 7.60 10/04/2021 01:06 PM    EOSINOPHILS 1.00 10/04/2021 01:06 PM    BASOPHILS 0.80 10/04/2021 01:06 PM    HYPOCHROMIA 1+ 01/22/2014 08:00 AM      Lab Results   Component Value Date/Time    SODIUM 139 10/15/2021 05:00 AM    POTASSIUM 4.6 10/15/2021 05:00 AM    CHLORIDE 101 10/15/2021 05:00 AM    CO2 28 10/15/2021 05:00 AM    GLUCOSE 169 (H) 10/15/2021 05:00 AM    BUN 21 10/15/2021 05:00 AM    CREATININE 1.20 10/15/2021 05:00 AM       Lab Results   Component Value Date/Time    PSATOTAL 0.01 01/17/2018 09:45 AM    PSATOTAL 0.01 11/16/2016 09:46 AM    PSATOTAL <0.01 06/17/2015 08:22 AM    PSATOTAL <0.01 09/24/2014 08:37 AM    PSATOTAL <0.01 01/22/2014 08:00 AM    PSATOTAL 0.01 02/27/2013 08:46 AM       RADIOLOGY DATA:  CT-CHEST (THORAX) WITH    Result Date: 11/16/2021 11/16/2021 1:07 PM HISTORY/REASON FOR EXAM:  Bladder cancer, invasive, treated; s/p radiotherapy. TECHNIQUE/EXAM DESCRIPTION: CT scan of the chest with contrast.  Thin-section helical images were obtained from the lung apices through the adrenal glands following the bolus administration of contrast. 100 mL of Omnipaque 350 nonionic contrast was utilized. Low dose optimization technique was utilized for this CT exam including automated exposure control and adjustment of the mA and/or kV according to patient size. COMPARISON:  5/26/2021. FINDINGS: Vascular: The aorta is not aneurysmal. Atherosclerotic plaque is seen including coronary artery calcification. . Mediastinum/Livia: No significant adenopathy. Cardiac: The heart is not enlarged. No pericardial effusion is seen. . Pleura: No pleural effusion or pneumothorax identified. . Lungs: There is mild lingula, right middle lobe and bibasilar atelectasis or scarring. Central airways are patent. Soft tissues: Unremarkable. Bones: Degenerative changes are seen in the spine. .     No evidence of metastatic disease is identified.     CT-ABDOMEN & PELVIS UROGRAM    Result Date: 11/16/2021 11/16/2021 1:05 PM HISTORY/REASON FOR EXAM:  Bladder cancer, invasive or metastatic, monitor. TECHNIQUE/EXAM DESCRIPTION:  CT Urogram Initial precontrast images were obtained from the diaphragmatic domes through the pubic symphysis using helical technique. Following this, 100 mL of Omnipaque 350 nonionic contrast was administered, and postcontrast nephrographic phase thin-section helical scanning obtained from the diaphragmatic domes through the pubic symphysis. Additional 10-minute delayed imaging is performed from the diaphragmatic domes through the pubic symphysis to evaluate the ureters. Coronal MIP reconstructions of the delayed phase are also performed. Low dose optimization technique was utilized for this CT exam including automated exposure control and adjustment of the mA and/or kV according to patient size. COMPARISON: 5/26/2021. FINDINGS: Lung bases: There is minimal basilar atelectasis. Kidneys: No renal calculi are identified. There are  no enhancing renal masses. There is no hydronephrosis. Renal collecting system: No ureteral calcifications are seen. There is tortuosity of the proximal right ureter. The mid to distal left ureter is again noted to be mildly dilated. Bladder: Bladder is thick-walled. There are surgical clips in the pelvis. Liver: The liver is unremarkable. Spleen: Spleen is unremarkable. Biliary system: No calcified gallstones are seen. There is no biliary ductal dilatation. Pancreas: Pancreas is unremarkable. Adrenals: No adrenal mass is identified. Vasculature: Atherosclerotic plaque is seen in the aorta. Renal veins are patent. Lymph nodes: There are small inguinal lymph nodes bilaterally. Small retroperitoneal and mesenteric lymph nodes are seen. Bowel: There is no evidence of bowel obstruction. There are colonic diverticula. There is a moderate amount of colonic stool. Terminal ileum appears unremarkable. There is a right inguinal hernia containing loops of small bowel without evidence of obstruction. The appendix is not identified. Bones: There are degenerative changes in the spine and hips. Sclerotic focus in the proximal left femur is unchanged.. There is grade 1 anterolisthesis of L4 on L5.     1.  Bladder wall thickening is again noted. 2.  Mildly dilated mid to distal left ureter is again seen and unchanged. 3.  No evidence of metastatic disease is identified. 4.  Prior prostatectomy. 5.  Right inguinal hernia containing loops of distal ileum without evidence of obstruction.       IMPRESSION:    A 88 y.o. 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 on 5/18/2020        CANCER STATUS:  No Evidence of Disease    RECOMMENDATIONS:   I reviewed with patient the pathology from his recent TURBT which showed no evidence of disease which is discordant with the urine  cytology.  I have explained that I would still recommend surveillance with every 3 month cystoscopy and every 6 month urine cytology per NCCN guidelines.  I have ordered a repeat CT chest and CT urogram for 6 months as well.  I have also ordered a CBC and CMP.    Thank you for the opportunity to participate in his care.  If any questions or comments, please do not hesitate in calling.    Orders Placed This Encounter   • CT-CHEST (THORAX) WITH   • CT-ABDOMEN & PELVIS UROGRAM   • CBC WITH DIFFERENTIAL   • Comp Metabolic Panel       CC: Dr. Jones, Dr. Hamilton

## 2022-01-01 ENCOUNTER — HOSPITAL ENCOUNTER (INPATIENT)
Facility: MEDICAL CENTER | Age: 87
LOS: 2 days | DRG: 378 | End: 2022-03-28
Attending: EMERGENCY MEDICINE | Admitting: HOSPITALIST
Payer: MEDICARE

## 2022-01-01 ENCOUNTER — APPOINTMENT (OUTPATIENT)
Dept: RADIOLOGY | Facility: MEDICAL CENTER | Age: 87
End: 2022-01-01
Attending: EMERGENCY MEDICINE
Payer: MEDICARE

## 2022-01-01 ENCOUNTER — TELEPHONE (OUTPATIENT)
Dept: CARDIOLOGY | Facility: MEDICAL CENTER | Age: 87
End: 2022-01-01

## 2022-01-01 ENCOUNTER — ANESTHESIA EVENT (OUTPATIENT)
Dept: SURGERY | Facility: MEDICAL CENTER | Age: 87
DRG: 378 | End: 2022-01-01
Payer: MEDICARE

## 2022-01-01 ENCOUNTER — TELEPHONE (OUTPATIENT)
Dept: RADIATION ONCOLOGY | Facility: MEDICAL CENTER | Age: 87
End: 2022-01-01
Payer: MEDICARE

## 2022-01-01 ENCOUNTER — APPOINTMENT (OUTPATIENT)
Dept: RADIOLOGY | Facility: MEDICAL CENTER | Age: 87
DRG: 378 | End: 2022-01-01
Attending: INTERNAL MEDICINE
Payer: MEDICARE

## 2022-01-01 ENCOUNTER — HOSPITAL ENCOUNTER (OUTPATIENT)
Dept: LAB | Facility: MEDICAL CENTER | Age: 87
End: 2022-03-02
Attending: REGISTERED NURSE
Payer: MEDICARE

## 2022-01-01 ENCOUNTER — HOSPITAL ENCOUNTER (EMERGENCY)
Facility: MEDICAL CENTER | Age: 87
End: 2022-01-27
Attending: EMERGENCY MEDICINE
Payer: MEDICARE

## 2022-01-01 ENCOUNTER — HOSPITAL ENCOUNTER (OUTPATIENT)
Facility: MEDICAL CENTER | Age: 87
End: 2022-02-28
Attending: PHYSICIAN ASSISTANT
Payer: MEDICARE

## 2022-01-01 ENCOUNTER — ANESTHESIA (OUTPATIENT)
Dept: SURGERY | Facility: MEDICAL CENTER | Age: 87
DRG: 378 | End: 2022-01-01
Payer: MEDICARE

## 2022-01-01 VITALS
HEART RATE: 58 BPM | OXYGEN SATURATION: 93 % | BODY MASS INDEX: 23.99 KG/M2 | WEIGHT: 149.25 LBS | SYSTOLIC BLOOD PRESSURE: 116 MMHG | RESPIRATION RATE: 17 BRPM | HEIGHT: 66 IN | DIASTOLIC BLOOD PRESSURE: 49 MMHG | TEMPERATURE: 97.7 F

## 2022-01-01 VITALS
OXYGEN SATURATION: 96 % | TEMPERATURE: 97.7 F | HEART RATE: 54 BPM | WEIGHT: 150 LBS | BODY MASS INDEX: 22.73 KG/M2 | SYSTOLIC BLOOD PRESSURE: 137 MMHG | DIASTOLIC BLOOD PRESSURE: 58 MMHG | HEIGHT: 68 IN | RESPIRATION RATE: 18 BRPM

## 2022-01-01 DIAGNOSIS — Z98.62 HISTORY OF ANGIOPLASTY: ICD-10-CM

## 2022-01-01 DIAGNOSIS — K92.2 GASTROINTESTINAL HEMORRHAGE, UNSPECIFIED GASTROINTESTINAL HEMORRHAGE TYPE: ICD-10-CM

## 2022-01-01 DIAGNOSIS — R31.9 HEMATURIA, UNSPECIFIED TYPE: ICD-10-CM

## 2022-01-01 DIAGNOSIS — I10 ESSENTIAL HYPERTENSION: ICD-10-CM

## 2022-01-01 DIAGNOSIS — Z98.890 HISTORY OF HEART VALVE REPAIR: ICD-10-CM

## 2022-01-01 DIAGNOSIS — D64.9 ANEMIA, UNSPECIFIED TYPE: ICD-10-CM

## 2022-01-01 DIAGNOSIS — R53.81 DEBILITY: ICD-10-CM

## 2022-01-01 DIAGNOSIS — C67.3 MALIGNANT NEOPLASM OF ANTERIOR WALL OF URINARY BLADDER (HCC): ICD-10-CM

## 2022-01-01 DIAGNOSIS — M54.41 ACUTE RIGHT-SIDED LOW BACK PAIN WITH RIGHT-SIDED SCIATICA: ICD-10-CM

## 2022-01-01 DIAGNOSIS — F03.90 DEMENTIA WITHOUT BEHAVIORAL DISTURBANCE, UNSPECIFIED DEMENTIA TYPE: ICD-10-CM

## 2022-01-01 DIAGNOSIS — N39.0 ACUTE UTI: ICD-10-CM

## 2022-01-01 DIAGNOSIS — N30.01 ACUTE CYSTITIS WITH HEMATURIA: ICD-10-CM
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
## 2022-01-01 LAB
ALBUMIN SERPL BCP-MCNC: 3.7 G/DL (ref 3.2–4.9)
ALBUMIN SERPL BCP-MCNC: 4.1 G/DL (ref 3.2–4.9)
ALBUMIN SERPL BCP-MCNC: 4.1 G/DL (ref 3.2–4.9)
ALBUMIN/GLOB SERPL: 1.5 G/DL
ALBUMIN/GLOB SERPL: 1.6 G/DL
ALBUMIN/GLOB SERPL: 1.6 G/DL
ALP SERPL-CCNC: 60 U/L (ref 30–99)
ALP SERPL-CCNC: 65 U/L (ref 30–99)
ALP SERPL-CCNC: 70 U/L (ref 30–99)
ALT SERPL-CCNC: 24 U/L (ref 2–50)
ALT SERPL-CCNC: 27 U/L (ref 2–50)
ALT SERPL-CCNC: 32 U/L (ref 2–50)
ANION GAP SERPL CALC-SCNC: 10 MMOL/L (ref 7–16)
ANION GAP SERPL CALC-SCNC: 10 MMOL/L (ref 7–16)
ANION GAP SERPL CALC-SCNC: 12 MMOL/L (ref 7–16)
ANION GAP SERPL CALC-SCNC: 8 MMOL/L (ref 7–16)
ANION GAP SERPL CALC-SCNC: 9 MMOL/L (ref 7–16)
APPEARANCE UR: ABNORMAL
AST SERPL-CCNC: 29 U/L (ref 12–45)
AST SERPL-CCNC: 32 U/L (ref 12–45)
AST SERPL-CCNC: 35 U/L (ref 12–45)
BACTERIA #/AREA URNS HPF: ABNORMAL /HPF
BACTERIA #/AREA URNS HPF: ABNORMAL /HPF
BACTERIA #/AREA URNS HPF: NEGATIVE /HPF
BACTERIA BLD CULT: ABNORMAL
BACTERIA BLD CULT: ABNORMAL
BACTERIA BLD CULT: NORMAL
BACTERIA UR CULT: NORMAL
BASOPHILS # BLD AUTO: 0.5 % (ref 0–1.8)
BASOPHILS # BLD AUTO: 0.5 % (ref 0–1.8)
BASOPHILS # BLD AUTO: 0.7 % (ref 0–1.8)
BASOPHILS # BLD AUTO: 1 % (ref 0–1.8)
BASOPHILS # BLD: 0.03 K/UL (ref 0–0.12)
BASOPHILS # BLD: 0.04 K/UL (ref 0–0.12)
BASOPHILS # BLD: 0.05 K/UL (ref 0–0.12)
BASOPHILS # BLD: 0.05 K/UL (ref 0–0.12)
BILIRUB SERPL-MCNC: 1.1 MG/DL (ref 0.1–1.5)
BILIRUB SERPL-MCNC: 1.1 MG/DL (ref 0.1–1.5)
BILIRUB SERPL-MCNC: 1.2 MG/DL (ref 0.1–1.5)
BILIRUB UR QL STRIP.AUTO: NEGATIVE
BUN SERPL-MCNC: 16 MG/DL (ref 8–22)
BUN SERPL-MCNC: 19 MG/DL (ref 8–22)
BUN SERPL-MCNC: 26 MG/DL (ref 8–22)
BUN SERPL-MCNC: 31 MG/DL (ref 8–22)
BUN SERPL-MCNC: 33 MG/DL (ref 8–22)
CALCIUM SERPL-MCNC: 8.6 MG/DL (ref 8.4–10.2)
CALCIUM SERPL-MCNC: 8.8 MG/DL (ref 8.4–10.2)
CALCIUM SERPL-MCNC: 8.9 MG/DL (ref 8.4–10.2)
CHLORIDE SERPL-SCNC: 102 MMOL/L (ref 96–112)
CHLORIDE SERPL-SCNC: 103 MMOL/L (ref 96–112)
CHLORIDE SERPL-SCNC: 104 MMOL/L (ref 96–112)
CHLORIDE SERPL-SCNC: 104 MMOL/L (ref 96–112)
CHLORIDE SERPL-SCNC: 107 MMOL/L (ref 96–112)
CO2 SERPL-SCNC: 24 MMOL/L (ref 20–33)
CO2 SERPL-SCNC: 26 MMOL/L (ref 20–33)
CO2 SERPL-SCNC: 27 MMOL/L (ref 20–33)
COLOR UR: YELLOW
CREAT SERPL-MCNC: 1.13 MG/DL (ref 0.5–1.4)
CREAT SERPL-MCNC: 1.3 MG/DL (ref 0.5–1.4)
CREAT SERPL-MCNC: 1.42 MG/DL (ref 0.5–1.4)
CREAT SERPL-MCNC: 1.43 MG/DL (ref 0.5–1.4)
CREAT SERPL-MCNC: 1.43 MG/DL (ref 0.5–1.4)
EKG IMPRESSION: NORMAL
EKG IMPRESSION: NORMAL
EOSINOPHIL # BLD AUTO: 0.04 K/UL (ref 0–0.51)
EOSINOPHIL # BLD AUTO: 0.1 K/UL (ref 0–0.51)
EOSINOPHIL # BLD AUTO: 0.11 K/UL (ref 0–0.51)
EOSINOPHIL # BLD AUTO: 0.14 K/UL (ref 0–0.51)
EOSINOPHIL NFR BLD: 0.7 % (ref 0–6.9)
EOSINOPHIL NFR BLD: 1.5 % (ref 0–6.9)
EOSINOPHIL NFR BLD: 1.8 % (ref 0–6.9)
EOSINOPHIL NFR BLD: 2.2 % (ref 0–6.9)
EPI CELLS #/AREA URNS HPF: ABNORMAL /HPF
ERYTHROCYTE [DISTWIDTH] IN BLOOD BY AUTOMATED COUNT: 42.5 FL (ref 35.9–50)
ERYTHROCYTE [DISTWIDTH] IN BLOOD BY AUTOMATED COUNT: 43.4 FL (ref 35.9–50)
ERYTHROCYTE [DISTWIDTH] IN BLOOD BY AUTOMATED COUNT: 43.5 FL (ref 35.9–50)
ERYTHROCYTE [DISTWIDTH] IN BLOOD BY AUTOMATED COUNT: 43.8 FL (ref 35.9–50)
ERYTHROCYTE [DISTWIDTH] IN BLOOD BY AUTOMATED COUNT: 43.9 FL (ref 35.9–50)
ERYTHROCYTE [DISTWIDTH] IN BLOOD BY AUTOMATED COUNT: 46.5 FL (ref 35.9–50)
FERRITIN SERPL-MCNC: 12.6 NG/ML (ref 22–322)
GFR SERPLBLD CREATININE-BSD FMLA CKD-EPI: 47 ML/MIN/1.73 M 2
GFR SERPLBLD CREATININE-BSD FMLA CKD-EPI: 62 ML/MIN/1.73 M 2
GLOBULIN SER CALC-MCNC: 2.5 G/DL (ref 1.9–3.5)
GLOBULIN SER CALC-MCNC: 2.5 G/DL (ref 1.9–3.5)
GLOBULIN SER CALC-MCNC: 2.6 G/DL (ref 1.9–3.5)
GLUCOSE SERPL-MCNC: 104 MG/DL (ref 65–99)
GLUCOSE SERPL-MCNC: 105 MG/DL (ref 65–99)
GLUCOSE SERPL-MCNC: 123 MG/DL (ref 65–99)
GLUCOSE SERPL-MCNC: 144 MG/DL (ref 65–99)
GLUCOSE SERPL-MCNC: 89 MG/DL (ref 65–99)
GLUCOSE UR STRIP.AUTO-MCNC: NEGATIVE MG/DL
HCT VFR BLD AUTO: 25.8 % (ref 42–52)
HCT VFR BLD AUTO: 26 % (ref 42–52)
HCT VFR BLD AUTO: 27.6 % (ref 42–52)
HCT VFR BLD AUTO: 29.8 % (ref 42–52)
HCT VFR BLD AUTO: 32.3 % (ref 42–52)
HCT VFR BLD AUTO: 32.9 % (ref 42–52)
HGB BLD-MCNC: 10.1 G/DL (ref 14–18)
HGB BLD-MCNC: 10.3 G/DL (ref 14–18)
HGB BLD-MCNC: 8.1 G/DL (ref 14–18)
HGB BLD-MCNC: 8.1 G/DL (ref 14–18)
HGB BLD-MCNC: 8.3 G/DL (ref 14–18)
HGB BLD-MCNC: 8.6 G/DL (ref 14–18)
HGB BLD-MCNC: 8.6 G/DL (ref 14–18)
HGB BLD-MCNC: 8.7 G/DL (ref 14–18)
HGB BLD-MCNC: 9 G/DL (ref 14–18)
HGB BLD-MCNC: 9.1 G/DL (ref 14–18)
HGB RETIC QN AUTO: 28.4 PG/CELL (ref 29–35)
HYALINE CASTS #/AREA URNS LPF: ABNORMAL /LPF
HYALINE CASTS #/AREA URNS LPF: ABNORMAL /LPF
IMM GRANULOCYTES # BLD AUTO: 0.01 K/UL (ref 0–0.11)
IMM GRANULOCYTES # BLD AUTO: 0.02 K/UL (ref 0–0.11)
IMM GRANULOCYTES # BLD AUTO: 0.02 K/UL (ref 0–0.11)
IMM GRANULOCYTES # BLD AUTO: 0.04 K/UL (ref 0–0.11)
IMM GRANULOCYTES NFR BLD AUTO: 0.2 % (ref 0–0.9)
IMM GRANULOCYTES NFR BLD AUTO: 0.3 % (ref 0–0.9)
IMM GRANULOCYTES NFR BLD AUTO: 0.4 % (ref 0–0.9)
IMM GRANULOCYTES NFR BLD AUTO: 0.4 % (ref 0–0.9)
IMM RETICS NFR: 22.8 % (ref 9.3–17.4)
IRON SATN MFR SERPL: 11 % (ref 15–55)
IRON SERPL-MCNC: 31 UG/DL (ref 50–180)
KETONES UR STRIP.AUTO-MCNC: NEGATIVE MG/DL
LACTATE BLD-SCNC: 1.1 MMOL/L (ref 0.5–2)
LACTATE BLD-SCNC: 1.2 MMOL/L (ref 0.5–2)
LACTATE BLD-SCNC: 1.4 MMOL/L (ref 0.5–2)
LEUKOCYTE ESTERASE UR QL STRIP.AUTO: ABNORMAL
LYMPHOCYTES # BLD AUTO: 0.96 K/UL (ref 1–4.8)
LYMPHOCYTES # BLD AUTO: 0.99 K/UL (ref 1–4.8)
LYMPHOCYTES # BLD AUTO: 1.1 K/UL (ref 1–4.8)
LYMPHOCYTES # BLD AUTO: 1.7 K/UL (ref 1–4.8)
LYMPHOCYTES NFR BLD: 16.3 % (ref 22–41)
LYMPHOCYTES NFR BLD: 18.1 % (ref 22–41)
LYMPHOCYTES NFR BLD: 19.3 % (ref 22–41)
LYMPHOCYTES NFR BLD: 19.8 % (ref 22–41)
MCH RBC QN AUTO: 26.8 PG (ref 27–33)
MCH RBC QN AUTO: 27.1 PG (ref 27–33)
MCH RBC QN AUTO: 27.2 PG (ref 27–33)
MCH RBC QN AUTO: 27.7 PG (ref 27–33)
MCH RBC QN AUTO: 28.1 PG (ref 27–33)
MCH RBC QN AUTO: 29.3 PG (ref 27–33)
MCHC RBC AUTO-ENTMCNC: 30.5 G/DL (ref 33.7–35.3)
MCHC RBC AUTO-ENTMCNC: 31.2 G/DL (ref 33.7–35.3)
MCHC RBC AUTO-ENTMCNC: 31.2 G/DL (ref 33.7–35.3)
MCHC RBC AUTO-ENTMCNC: 31.3 G/DL (ref 33.7–35.3)
MCHC RBC AUTO-ENTMCNC: 31.3 G/DL (ref 33.7–35.3)
MCHC RBC AUTO-ENTMCNC: 31.4 G/DL (ref 33.7–35.3)
MCV RBC AUTO: 86.1 FL (ref 81.4–97.8)
MCV RBC AUTO: 86.6 FL (ref 81.4–97.8)
MCV RBC AUTO: 88.7 FL (ref 81.4–97.8)
MCV RBC AUTO: 88.7 FL (ref 81.4–97.8)
MCV RBC AUTO: 90 FL (ref 81.4–97.8)
MCV RBC AUTO: 93.7 FL (ref 81.4–97.8)
MICRO URNS: ABNORMAL
MONOCYTES # BLD AUTO: 0.44 K/UL (ref 0–0.85)
MONOCYTES # BLD AUTO: 0.5 K/UL (ref 0–0.85)
MONOCYTES # BLD AUTO: 0.51 K/UL (ref 0–0.85)
MONOCYTES # BLD AUTO: 0.59 K/UL (ref 0–0.85)
MONOCYTES NFR BLD AUTO: 10.3 % (ref 0–13.4)
MONOCYTES NFR BLD AUTO: 6.3 % (ref 0–13.4)
MONOCYTES NFR BLD AUTO: 7.2 % (ref 0–13.4)
MONOCYTES NFR BLD AUTO: 9 % (ref 0–13.4)
MUCOUS THREADS #/AREA URNS HPF: ABNORMAL /HPF
MUCOUS THREADS #/AREA URNS HPF: ABNORMAL /HPF
NEUTROPHILS # BLD AUTO: 3.33 K/UL (ref 1.82–7.42)
NEUTROPHILS # BLD AUTO: 3.81 K/UL (ref 1.82–7.42)
NEUTROPHILS # BLD AUTO: 4.56 K/UL (ref 1.82–7.42)
NEUTROPHILS # BLD AUTO: 6.85 K/UL (ref 1.82–7.42)
NEUTROPHILS NFR BLD: 67 % (ref 44–72)
NEUTROPHILS NFR BLD: 68.5 % (ref 44–72)
NEUTROPHILS NFR BLD: 73.2 % (ref 44–72)
NEUTROPHILS NFR BLD: 74.8 % (ref 44–72)
NITRITE UR QL STRIP.AUTO: NEGATIVE
NRBC # BLD AUTO: 0 K/UL
NRBC BLD-RTO: 0 /100 WBC
PATHOLOGY CONSULT NOTE: NORMAL
PH UR STRIP.AUTO: 5.5 [PH] (ref 5–8)
PLATELET # BLD AUTO: 168 K/UL (ref 164–446)
PLATELET # BLD AUTO: 198 K/UL (ref 164–446)
PLATELET # BLD AUTO: 201 K/UL (ref 164–446)
PLATELET # BLD AUTO: 208 K/UL (ref 164–446)
PLATELET # BLD AUTO: 221 K/UL (ref 164–446)
PLATELET # BLD AUTO: 236 K/UL (ref 164–446)
PMV BLD AUTO: 10.1 FL (ref 9–12.9)
PMV BLD AUTO: 10.2 FL (ref 9–12.9)
PMV BLD AUTO: 10.4 FL (ref 9–12.9)
PMV BLD AUTO: 9.5 FL (ref 9–12.9)
PMV BLD AUTO: 9.5 FL (ref 9–12.9)
PMV BLD AUTO: 9.7 FL (ref 9–12.9)
POTASSIUM SERPL-SCNC: 4.2 MMOL/L (ref 3.6–5.5)
POTASSIUM SERPL-SCNC: 4.4 MMOL/L (ref 3.6–5.5)
POTASSIUM SERPL-SCNC: 4.6 MMOL/L (ref 3.6–5.5)
POTASSIUM SERPL-SCNC: 4.6 MMOL/L (ref 3.6–5.5)
POTASSIUM SERPL-SCNC: 4.7 MMOL/L (ref 3.6–5.5)
PROCALCITONIN SERPL-MCNC: 0.13 NG/ML
PROT SERPL-MCNC: 6.2 G/DL (ref 6–8.2)
PROT SERPL-MCNC: 6.6 G/DL (ref 6–8.2)
PROT SERPL-MCNC: 6.7 G/DL (ref 6–8.2)
PROT UR QL STRIP: 100 MG/DL
PROT UR QL STRIP: 30 MG/DL
PROT UR QL STRIP: NEGATIVE MG/DL
RBC # BLD AUTO: 2.98 M/UL (ref 4.7–6.1)
RBC # BLD AUTO: 3.02 M/UL (ref 4.7–6.1)
RBC # BLD AUTO: 3.11 M/UL (ref 4.7–6.1)
RBC # BLD AUTO: 3.36 M/UL (ref 4.7–6.1)
RBC # BLD AUTO: 3.51 M/UL (ref 4.7–6.1)
RBC # BLD AUTO: 3.59 M/UL (ref 4.7–6.1)
RBC # URNS HPF: ABNORMAL /HPF
RBC UR QL AUTO: ABNORMAL
RETICS # AUTO: 0.06 M/UL (ref 0.04–0.06)
RETICS/RBC NFR: 2.1 % (ref 0.8–2.1)
SIGNIFICANT IND 70042: ABNORMAL
SIGNIFICANT IND 70042: NORMAL
SITE SITE: ABNORMAL
SITE SITE: NORMAL
SODIUM SERPL-SCNC: 136 MMOL/L (ref 135–145)
SODIUM SERPL-SCNC: 138 MMOL/L (ref 135–145)
SODIUM SERPL-SCNC: 138 MMOL/L (ref 135–145)
SODIUM SERPL-SCNC: 140 MMOL/L (ref 135–145)
SODIUM SERPL-SCNC: 142 MMOL/L (ref 135–145)
SOURCE SOURCE: ABNORMAL
SOURCE SOURCE: NORMAL
SP GR UR STRIP.AUTO: 1.01
SP GR UR STRIP.AUTO: 1.02
SP GR UR STRIP.AUTO: >=1.03
TIBC SERPL-MCNC: 275 UG/DL (ref 250–450)
TRANSFERRIN SERPL-MCNC: 262 MG/DL (ref 200–370)
TROPONIN T SERPL-MCNC: 32 NG/L (ref 6–19)
TROPONIN T SERPL-MCNC: 32 NG/L (ref 6–19)
UIBC SERPL-MCNC: 244 UG/DL (ref 110–370)
UNIDENT CRYS URNS QL MICRO: ABNORMAL /HPF
VIT B12 SERPL-MCNC: 478 PG/ML (ref 211–911)
WBC # BLD AUTO: 5 K/UL (ref 4.8–10.8)
WBC # BLD AUTO: 5 K/UL (ref 4.8–10.8)
WBC # BLD AUTO: 5.6 K/UL (ref 4.8–10.8)
WBC # BLD AUTO: 6.1 K/UL (ref 4.8–10.8)
WBC # BLD AUTO: 6.5 K/UL (ref 4.8–10.8)
WBC # BLD AUTO: 9.4 K/UL (ref 4.8–10.8)
WBC #/AREA URNS HPF: ABNORMAL /HPF

## 2022-01-01 PROCEDURE — 99226 PR SUBSEQUENT OBSERVATION CARE,LEVEL III: CPT | Performed by: HOSPITALIST

## 2022-01-01 PROCEDURE — 700102 HCHG RX REV CODE 250 W/ 637 OVERRIDE(OP): Performed by: INTERNAL MEDICINE

## 2022-01-01 PROCEDURE — 83605 ASSAY OF LACTIC ACID: CPT

## 2022-01-01 PROCEDURE — A9270 NON-COVERED ITEM OR SERVICE: HCPCS | Performed by: INTERNAL MEDICINE

## 2022-01-01 PROCEDURE — G0378 HOSPITAL OBSERVATION PER HR: HCPCS

## 2022-01-01 PROCEDURE — 99232 SBSQ HOSP IP/OBS MODERATE 35: CPT | Performed by: HOSPITALIST

## 2022-01-01 PROCEDURE — 83540 ASSAY OF IRON: CPT

## 2022-01-01 PROCEDURE — 700111 HCHG RX REV CODE 636 W/ 250 OVERRIDE (IP): Performed by: ANESTHESIOLOGY

## 2022-01-01 PROCEDURE — 0W3P8ZZ CONTROL BLEEDING IN GASTROINTESTINAL TRACT, VIA NATURAL OR ARTIFICIAL OPENING ENDOSCOPIC: ICD-10-PCS | Performed by: INTERNAL MEDICINE

## 2022-01-01 PROCEDURE — 94760 N-INVAS EAR/PLS OXIMETRY 1: CPT

## 2022-01-01 PROCEDURE — 85018 HEMOGLOBIN: CPT | Mod: 91

## 2022-01-01 PROCEDURE — 85025 COMPLETE CBC W/AUTO DIFF WBC: CPT

## 2022-01-01 PROCEDURE — 71045 X-RAY EXAM CHEST 1 VIEW: CPT

## 2022-01-01 PROCEDURE — 93005 ELECTROCARDIOGRAM TRACING: CPT | Performed by: EMERGENCY MEDICINE

## 2022-01-01 PROCEDURE — 99285 EMERGENCY DEPT VISIT HI MDM: CPT

## 2022-01-01 PROCEDURE — 0DB78ZX EXCISION OF STOMACH, PYLORUS, VIA NATURAL OR ARTIFICIAL OPENING ENDOSCOPIC, DIAGNOSTIC: ICD-10-PCS | Performed by: INTERNAL MEDICINE

## 2022-01-01 PROCEDURE — 700111 HCHG RX REV CODE 636 W/ 250 OVERRIDE (IP): Performed by: EMERGENCY MEDICINE

## 2022-01-01 PROCEDURE — 700102 HCHG RX REV CODE 250 W/ 637 OVERRIDE(OP): Performed by: HOSPITALIST

## 2022-01-01 PROCEDURE — 85027 COMPLETE CBC AUTOMATED: CPT

## 2022-01-01 PROCEDURE — 88305 TISSUE EXAM BY PATHOLOGIST: CPT

## 2022-01-01 PROCEDURE — 99239 HOSP IP/OBS DSCHRG MGMT >30: CPT | Performed by: HOSPITALIST

## 2022-01-01 PROCEDURE — 36415 COLL VENOUS BLD VENIPUNCTURE: CPT

## 2022-01-01 PROCEDURE — 70450 CT HEAD/BRAIN W/O DYE: CPT | Mod: ME

## 2022-01-01 PROCEDURE — 80053 COMPREHEN METABOLIC PANEL: CPT

## 2022-01-01 PROCEDURE — A9270 NON-COVERED ITEM OR SERVICE: HCPCS | Performed by: HOSPITALIST

## 2022-01-01 PROCEDURE — 0DB98ZX EXCISION OF DUODENUM, VIA NATURAL OR ARTIFICIAL OPENING ENDOSCOPIC, DIAGNOSTIC: ICD-10-PCS | Performed by: INTERNAL MEDICINE

## 2022-01-01 PROCEDURE — 84145 PROCALCITONIN (PCT): CPT

## 2022-01-01 PROCEDURE — 160002 HCHG RECOVERY MINUTES (STAT): Performed by: INTERNAL MEDICINE

## 2022-01-01 PROCEDURE — 96365 THER/PROPH/DIAG IV INF INIT: CPT

## 2022-01-01 PROCEDURE — 160202 HCHG ENDO MINUTES - 1ST 30 MINS LEVEL 3: Performed by: INTERNAL MEDICINE

## 2022-01-01 PROCEDURE — 87040 BLOOD CULTURE FOR BACTERIA: CPT | Mod: 91

## 2022-01-01 PROCEDURE — 84466 ASSAY OF TRANSFERRIN: CPT

## 2022-01-01 PROCEDURE — 80048 BASIC METABOLIC PNL TOTAL CA: CPT

## 2022-01-01 PROCEDURE — 81001 URINALYSIS AUTO W/SCOPE: CPT

## 2022-01-01 PROCEDURE — 84484 ASSAY OF TROPONIN QUANT: CPT | Mod: 91

## 2022-01-01 PROCEDURE — 700105 HCHG RX REV CODE 258: Performed by: EMERGENCY MEDICINE

## 2022-01-01 PROCEDURE — 770006 HCHG ROOM/CARE - MED/SURG/GYN SEMI*

## 2022-01-01 PROCEDURE — 82607 VITAMIN B-12: CPT

## 2022-01-01 PROCEDURE — 85018 HEMOGLOBIN: CPT

## 2022-01-01 PROCEDURE — 87086 URINE CULTURE/COLONY COUNT: CPT

## 2022-01-01 PROCEDURE — 160035 HCHG PACU - 1ST 60 MINS PHASE I: Performed by: INTERNAL MEDICINE

## 2022-01-01 PROCEDURE — 700105 HCHG RX REV CODE 258: Performed by: INTERNAL MEDICINE

## 2022-01-01 PROCEDURE — 83550 IRON BINDING TEST: CPT

## 2022-01-01 PROCEDURE — 99220 PR INITIAL OBSERVATION CARE,LEVL III: CPT | Performed by: INTERNAL MEDICINE

## 2022-01-01 PROCEDURE — 87040 BLOOD CULTURE FOR BACTERIA: CPT

## 2022-01-01 PROCEDURE — 88312 SPECIAL STAINS GROUP 1: CPT

## 2022-01-01 PROCEDURE — 97166 OT EVAL MOD COMPLEX 45 MIN: CPT

## 2022-01-01 PROCEDURE — 82728 ASSAY OF FERRITIN: CPT

## 2022-01-01 PROCEDURE — 92526 ORAL FUNCTION THERAPY: CPT

## 2022-01-01 PROCEDURE — 51798 US URINE CAPACITY MEASURE: CPT

## 2022-01-01 PROCEDURE — 87077 CULTURE AEROBIC IDENTIFY: CPT

## 2022-01-01 PROCEDURE — 85046 RETICYTE/HGB CONCENTRATE: CPT

## 2022-01-01 PROCEDURE — 160048 HCHG OR STATISTICAL LEVEL 1-5: Performed by: INTERNAL MEDICINE

## 2022-01-01 PROCEDURE — 0T9B70Z DRAINAGE OF BLADDER WITH DRAINAGE DEVICE, VIA NATURAL OR ARTIFICIAL OPENING: ICD-10-PCS | Performed by: HOSPITALIST

## 2022-01-01 PROCEDURE — 160009 HCHG ANES TIME/MIN: Performed by: INTERNAL MEDICINE

## 2022-01-01 PROCEDURE — 93005 ELECTROCARDIOGRAM TRACING: CPT

## 2022-01-01 PROCEDURE — 99233 SBSQ HOSP IP/OBS HIGH 50: CPT | Performed by: HOSPITALIST

## 2022-01-01 PROCEDURE — 92610 EVALUATE SWALLOWING FUNCTION: CPT

## 2022-01-01 PROCEDURE — 87150 DNA/RNA AMPLIFIED PROBE: CPT

## 2022-01-01 PROCEDURE — 97162 PT EVAL MOD COMPLEX 30 MIN: CPT

## 2022-01-01 RX ORDER — BISACODYL 10 MG
10 SUPPOSITORY, RECTAL RECTAL
Status: DISCONTINUED | OUTPATIENT
Start: 2022-01-01 | End: 2022-01-01 | Stop reason: HOSPADM

## 2022-01-01 RX ORDER — NITROFURANTOIN 25; 75 MG/1; MG/1
100 CAPSULE ORAL 2 TIMES DAILY
Qty: 10 CAPSULE | Refills: 0 | Status: SHIPPED | OUTPATIENT
Start: 2022-01-01 | End: 2022-01-01

## 2022-01-01 RX ORDER — ONDANSETRON 2 MG/ML
4 INJECTION INTRAMUSCULAR; INTRAVENOUS EVERY 4 HOURS PRN
Status: DISCONTINUED | OUTPATIENT
Start: 2022-01-01 | End: 2022-01-01 | Stop reason: HOSPADM

## 2022-01-01 RX ORDER — ATORVASTATIN CALCIUM 40 MG/1
40 TABLET, FILM COATED ORAL DAILY
Status: DISCONTINUED | OUTPATIENT
Start: 2022-01-01 | End: 2022-01-01 | Stop reason: HOSPADM

## 2022-01-01 RX ORDER — LOSARTAN POTASSIUM 25 MG/1
100 TABLET ORAL DAILY
Status: DISCONTINUED | OUTPATIENT
Start: 2022-01-01 | End: 2022-01-01 | Stop reason: HOSPADM

## 2022-01-01 RX ORDER — ACETAMINOPHEN 325 MG/1
650 TABLET ORAL EVERY 6 HOURS PRN
Status: DISCONTINUED | OUTPATIENT
Start: 2022-01-01 | End: 2022-01-01 | Stop reason: HOSPADM

## 2022-01-01 RX ORDER — CEFDINIR 300 MG/1
300 CAPSULE ORAL 2 TIMES DAILY
Qty: 14 CAPSULE | Refills: 0 | Status: SHIPPED | OUTPATIENT
Start: 2022-01-01 | End: 2022-01-01

## 2022-01-01 RX ORDER — LABETALOL HYDROCHLORIDE 5 MG/ML
5 INJECTION, SOLUTION INTRAVENOUS
Status: DISCONTINUED | OUTPATIENT
Start: 2022-01-01 | End: 2022-01-01 | Stop reason: HOSPADM

## 2022-01-01 RX ORDER — OMEPRAZOLE 40 MG/1
40 CAPSULE, DELAYED RELEASE ORAL 2 TIMES DAILY
Qty: 60 CAPSULE | Refills: 0 | Status: SHIPPED | OUTPATIENT
Start: 2022-01-01 | End: 2023-08-10 | Stop reason: CLARIF

## 2022-01-01 RX ORDER — ONDANSETRON 4 MG/1
4 TABLET, ORALLY DISINTEGRATING ORAL EVERY 4 HOURS PRN
Status: DISCONTINUED | OUTPATIENT
Start: 2022-01-01 | End: 2022-01-01 | Stop reason: HOSPADM

## 2022-01-01 RX ORDER — SUCRALFATE ORAL 1 G/10ML
1 SUSPENSION ORAL EVERY 6 HOURS
Qty: 1200 ML | Refills: 1 | Status: SHIPPED | OUTPATIENT
Start: 2022-01-01 | End: 2022-01-01

## 2022-01-01 RX ORDER — OMEPRAZOLE 20 MG/1
20 CAPSULE, DELAYED RELEASE ORAL 2 TIMES DAILY
Status: DISCONTINUED | OUTPATIENT
Start: 2022-01-01 | End: 2022-01-01

## 2022-01-01 RX ORDER — FERROUS SULFATE 325(65) MG
325 TABLET ORAL DAILY
Status: DISCONTINUED | OUTPATIENT
Start: 2022-01-01 | End: 2022-01-01 | Stop reason: HOSPADM

## 2022-01-01 RX ORDER — SODIUM CHLORIDE 9 MG/ML
INJECTION, SOLUTION INTRAVENOUS CONTINUOUS
Status: DISCONTINUED | OUTPATIENT
Start: 2022-01-01 | End: 2022-01-01

## 2022-01-01 RX ORDER — DIPHENHYDRAMINE HYDROCHLORIDE 50 MG/ML
12.5 INJECTION INTRAMUSCULAR; INTRAVENOUS
Status: DISCONTINUED | OUTPATIENT
Start: 2022-01-01 | End: 2022-01-01 | Stop reason: HOSPADM

## 2022-01-01 RX ORDER — POLYETHYLENE GLYCOL 3350 17 G/17G
1 POWDER, FOR SOLUTION ORAL
Status: DISCONTINUED | OUTPATIENT
Start: 2022-01-01 | End: 2022-01-01 | Stop reason: HOSPADM

## 2022-01-01 RX ORDER — SUCRALFATE ORAL 1 G/10ML
1 SUSPENSION ORAL EVERY 6 HOURS
Status: DISCONTINUED | OUTPATIENT
Start: 2022-01-01 | End: 2022-01-01 | Stop reason: HOSPADM

## 2022-01-01 RX ORDER — HYDRALAZINE HYDROCHLORIDE 20 MG/ML
5 INJECTION INTRAMUSCULAR; INTRAVENOUS
Status: DISCONTINUED | OUTPATIENT
Start: 2022-01-01 | End: 2022-01-01 | Stop reason: HOSPADM

## 2022-01-01 RX ORDER — HYDRALAZINE HYDROCHLORIDE 20 MG/ML
10 INJECTION INTRAMUSCULAR; INTRAVENOUS EVERY 4 HOURS PRN
Status: DISCONTINUED | OUTPATIENT
Start: 2022-01-01 | End: 2022-01-01 | Stop reason: HOSPADM

## 2022-01-01 RX ORDER — GABAPENTIN 300 MG/1
600 CAPSULE ORAL 2 TIMES DAILY
Status: DISCONTINUED | OUTPATIENT
Start: 2022-01-01 | End: 2022-01-01 | Stop reason: HOSPADM

## 2022-01-01 RX ORDER — HYDROMORPHONE HYDROCHLORIDE 1 MG/ML
0.2 INJECTION, SOLUTION INTRAMUSCULAR; INTRAVENOUS; SUBCUTANEOUS
Status: DISCONTINUED | OUTPATIENT
Start: 2022-01-01 | End: 2022-01-01 | Stop reason: HOSPADM

## 2022-01-01 RX ORDER — HYDROMORPHONE HYDROCHLORIDE 1 MG/ML
0.1 INJECTION, SOLUTION INTRAMUSCULAR; INTRAVENOUS; SUBCUTANEOUS
Status: DISCONTINUED | OUTPATIENT
Start: 2022-01-01 | End: 2022-01-01 | Stop reason: HOSPADM

## 2022-01-01 RX ORDER — SODIUM CHLORIDE, SODIUM LACTATE, POTASSIUM CHLORIDE, CALCIUM CHLORIDE 600; 310; 30; 20 MG/100ML; MG/100ML; MG/100ML; MG/100ML
INJECTION, SOLUTION INTRAVENOUS CONTINUOUS
Status: DISCONTINUED | OUTPATIENT
Start: 2022-01-01 | End: 2022-01-01

## 2022-01-01 RX ORDER — DONEPEZIL HYDROCHLORIDE 5 MG/1
10 TABLET, FILM COATED ORAL NIGHTLY
Status: DISCONTINUED | OUTPATIENT
Start: 2022-01-01 | End: 2022-01-01 | Stop reason: HOSPADM

## 2022-01-01 RX ORDER — HYDROMORPHONE HYDROCHLORIDE 1 MG/ML
0.4 INJECTION, SOLUTION INTRAMUSCULAR; INTRAVENOUS; SUBCUTANEOUS
Status: DISCONTINUED | OUTPATIENT
Start: 2022-01-01 | End: 2022-01-01 | Stop reason: HOSPADM

## 2022-01-01 RX ORDER — SODIUM CHLORIDE, SODIUM LACTATE, POTASSIUM CHLORIDE, CALCIUM CHLORIDE 600; 310; 30; 20 MG/100ML; MG/100ML; MG/100ML; MG/100ML
INJECTION, SOLUTION INTRAVENOUS CONTINUOUS
Status: DISCONTINUED | OUTPATIENT
Start: 2022-01-01 | End: 2022-01-01 | Stop reason: HOSPADM

## 2022-01-01 RX ORDER — AMOXICILLIN 250 MG
2 CAPSULE ORAL 2 TIMES DAILY
Status: DISCONTINUED | OUTPATIENT
Start: 2022-01-01 | End: 2022-01-01 | Stop reason: HOSPADM

## 2022-01-01 RX ORDER — ONDANSETRON 2 MG/ML
4 INJECTION INTRAMUSCULAR; INTRAVENOUS
Status: DISCONTINUED | OUTPATIENT
Start: 2022-01-01 | End: 2022-01-01 | Stop reason: HOSPADM

## 2022-01-01 RX ORDER — OMEPRAZOLE 20 MG/1
40 CAPSULE, DELAYED RELEASE ORAL 2 TIMES DAILY
Status: DISCONTINUED | OUTPATIENT
Start: 2022-01-01 | End: 2022-01-01 | Stop reason: HOSPADM

## 2022-01-01 RX ORDER — CEFDINIR 300 MG/1
300 CAPSULE ORAL 2 TIMES DAILY
Status: ON HOLD | COMMUNITY
End: 2022-01-01

## 2022-01-01 RX ORDER — GRANULES FOR ORAL 3 G/1
3 POWDER ORAL ONCE
COMMUNITY

## 2022-01-01 RX ADMIN — SUCRALFATE 1 G: 1 SUSPENSION ORAL at 00:35

## 2022-01-01 RX ADMIN — SUCRALFATE 1 G: 1 SUSPENSION ORAL at 17:57

## 2022-01-01 RX ADMIN — SENNOSIDES AND DOCUSATE SODIUM 2 TABLET: 50; 8.6 TABLET ORAL at 18:21

## 2022-01-01 RX ADMIN — SODIUM CHLORIDE: 9 INJECTION, SOLUTION INTRAVENOUS at 14:16

## 2022-01-01 RX ADMIN — SUCRALFATE 1 G: 1 SUSPENSION ORAL at 06:05

## 2022-01-01 RX ADMIN — OMEPRAZOLE 40 MG: 20 CAPSULE, DELAYED RELEASE ORAL at 05:24

## 2022-01-01 RX ADMIN — OMEPRAZOLE 20 MG: 20 CAPSULE, DELAYED RELEASE ORAL at 05:36

## 2022-01-01 RX ADMIN — ACETAMINOPHEN 650 MG: 325 TABLET, FILM COATED ORAL at 18:08

## 2022-01-01 RX ADMIN — ACETAMINOPHEN 650 MG: 325 TABLET, FILM COATED ORAL at 01:31

## 2022-01-01 RX ADMIN — FERROUS SULFATE TAB 325 MG (65 MG ELEMENTAL FE) 325 MG: 325 (65 FE) TAB at 05:36

## 2022-01-01 RX ADMIN — ATORVASTATIN CALCIUM 40 MG: 40 TABLET, FILM COATED ORAL at 05:11

## 2022-01-01 RX ADMIN — GABAPENTIN 600 MG: 300 CAPSULE ORAL at 17:37

## 2022-01-01 RX ADMIN — POLYETHYLENE GLYCOL 3350 1 PACKET: 17 POWDER, FOR SOLUTION ORAL at 18:22

## 2022-01-01 RX ADMIN — PROPOFOL 100 MCG/KG/MIN: 10 INJECTION, EMULSION INTRAVENOUS at 12:59

## 2022-01-01 RX ADMIN — FERROUS SULFATE TAB 325 MG (65 MG ELEMENTAL FE) 325 MG: 325 (65 FE) TAB at 05:24

## 2022-01-01 RX ADMIN — ATORVASTATIN CALCIUM 40 MG: 40 TABLET, FILM COATED ORAL at 12:18

## 2022-01-01 RX ADMIN — FERROUS SULFATE TAB 325 MG (65 MG ELEMENTAL FE) 325 MG: 325 (65 FE) TAB at 09:40

## 2022-01-01 RX ADMIN — FERROUS SULFATE TAB 325 MG (65 MG ELEMENTAL FE) 325 MG: 325 (65 FE) TAB at 05:42

## 2022-01-01 RX ADMIN — SODIUM CHLORIDE 1000 ML: 9 INJECTION, SOLUTION INTRAVENOUS at 22:51

## 2022-01-01 RX ADMIN — DONEPEZIL HYDROCHLORIDE 10 MG: 5 TABLET, FILM COATED ORAL at 20:53

## 2022-01-01 RX ADMIN — SUCRALFATE 1 G: 1 SUSPENSION ORAL at 00:18

## 2022-01-01 RX ADMIN — CEFTRIAXONE SODIUM 1 G: 1 INJECTION, POWDER, FOR SOLUTION INTRAMUSCULAR; INTRAVENOUS at 17:36

## 2022-01-01 RX ADMIN — OMEPRAZOLE 40 MG: 20 CAPSULE, DELAYED RELEASE ORAL at 17:55

## 2022-01-01 RX ADMIN — ATORVASTATIN CALCIUM 40 MG: 40 TABLET, FILM COATED ORAL at 09:41

## 2022-01-01 RX ADMIN — SUCRALFATE 1 G: 1 SUSPENSION ORAL at 18:22

## 2022-01-01 RX ADMIN — SUCRALFATE 1 G: 1 SUSPENSION ORAL at 05:11

## 2022-01-01 RX ADMIN — OMEPRAZOLE 40 MG: 20 CAPSULE, DELAYED RELEASE ORAL at 05:42

## 2022-01-01 RX ADMIN — SUCRALFATE 1 G: 1 SUSPENSION ORAL at 12:50

## 2022-01-01 RX ADMIN — GABAPENTIN 600 MG: 300 CAPSULE ORAL at 05:23

## 2022-01-01 RX ADMIN — SENNOSIDES AND DOCUSATE SODIUM 2 TABLET: 50; 8.6 TABLET ORAL at 05:11

## 2022-01-01 RX ADMIN — DONEPEZIL HYDROCHLORIDE 10 MG: 5 TABLET, FILM COATED ORAL at 20:38

## 2022-01-01 RX ADMIN — GABAPENTIN 600 MG: 300 CAPSULE ORAL at 17:55

## 2022-01-01 RX ADMIN — GABAPENTIN 600 MG: 300 CAPSULE ORAL at 09:40

## 2022-01-01 RX ADMIN — FERROUS SULFATE TAB 325 MG (65 MG ELEMENTAL FE) 325 MG: 325 (65 FE) TAB at 12:18

## 2022-01-01 RX ADMIN — OMEPRAZOLE 40 MG: 20 CAPSULE, DELAYED RELEASE ORAL at 09:39

## 2022-01-01 RX ADMIN — SODIUM CHLORIDE: 9 INJECTION, SOLUTION INTRAVENOUS at 05:36

## 2022-01-01 RX ADMIN — SUCRALFATE 1 G: 1 SUSPENSION ORAL at 00:27

## 2022-01-01 RX ADMIN — SUCRALFATE 1 G: 1 SUSPENSION ORAL at 17:59

## 2022-01-01 RX ADMIN — ATORVASTATIN CALCIUM 40 MG: 40 TABLET, FILM COATED ORAL at 05:36

## 2022-01-01 RX ADMIN — SUCRALFATE 1 G: 1 SUSPENSION ORAL at 17:55

## 2022-01-01 RX ADMIN — GABAPENTIN 600 MG: 300 CAPSULE ORAL at 05:11

## 2022-01-01 RX ADMIN — LOSARTAN POTASSIUM 100 MG: 25 TABLET, FILM COATED ORAL at 05:42

## 2022-01-01 RX ADMIN — LOSARTAN POTASSIUM 100 MG: 25 TABLET, FILM COATED ORAL at 09:41

## 2022-01-01 RX ADMIN — DONEPEZIL HYDROCHLORIDE 10 MG: 5 TABLET, FILM COATED ORAL at 20:12

## 2022-01-01 RX ADMIN — SUCRALFATE 1 G: 1 SUSPENSION ORAL at 11:15

## 2022-01-01 RX ADMIN — GABAPENTIN 600 MG: 300 CAPSULE ORAL at 17:57

## 2022-01-01 RX ADMIN — SUCRALFATE 1 G: 1 SUSPENSION ORAL at 00:21

## 2022-01-01 RX ADMIN — SODIUM CHLORIDE, POTASSIUM CHLORIDE, SODIUM LACTATE AND CALCIUM CHLORIDE: 600; 310; 30; 20 INJECTION, SOLUTION INTRAVENOUS at 12:56

## 2022-01-01 RX ADMIN — SUCRALFATE 1 G: 1 SUSPENSION ORAL at 05:23

## 2022-01-01 RX ADMIN — LOSARTAN POTASSIUM 100 MG: 25 TABLET, FILM COATED ORAL at 12:18

## 2022-01-01 RX ADMIN — LOSARTAN POTASSIUM 100 MG: 25 TABLET, FILM COATED ORAL at 05:36

## 2022-01-01 RX ADMIN — ATORVASTATIN CALCIUM 40 MG: 40 TABLET, FILM COATED ORAL at 05:42

## 2022-01-01 RX ADMIN — GABAPENTIN 600 MG: 300 CAPSULE ORAL at 05:35

## 2022-01-01 RX ADMIN — FERROUS SULFATE TAB 325 MG (65 MG ELEMENTAL FE) 325 MG: 325 (65 FE) TAB at 05:11

## 2022-01-01 RX ADMIN — OMEPRAZOLE 40 MG: 20 CAPSULE, DELAYED RELEASE ORAL at 17:59

## 2022-01-01 RX ADMIN — OMEPRAZOLE 40 MG: 20 CAPSULE, DELAYED RELEASE ORAL at 18:21

## 2022-01-01 RX ADMIN — SENNOSIDES AND DOCUSATE SODIUM 2 TABLET: 50; 8.6 TABLET ORAL at 05:36

## 2022-01-01 RX ADMIN — LOSARTAN POTASSIUM 100 MG: 25 TABLET, FILM COATED ORAL at 05:12

## 2022-01-01 RX ADMIN — ATORVASTATIN CALCIUM 40 MG: 40 TABLET, FILM COATED ORAL at 05:24

## 2022-01-01 RX ADMIN — OMEPRAZOLE 40 MG: 20 CAPSULE, DELAYED RELEASE ORAL at 17:57

## 2022-01-01 RX ADMIN — OMEPRAZOLE 20 MG: 20 CAPSULE, DELAYED RELEASE ORAL at 17:36

## 2022-01-01 RX ADMIN — SODIUM CHLORIDE: 9 INJECTION, SOLUTION INTRAVENOUS at 01:30

## 2022-01-01 RX ADMIN — OMEPRAZOLE 40 MG: 20 CAPSULE, DELAYED RELEASE ORAL at 05:12

## 2022-01-01 RX ADMIN — GABAPENTIN 600 MG: 300 CAPSULE ORAL at 05:42

## 2022-01-01 RX ADMIN — OMEPRAZOLE 20 MG: 20 CAPSULE, DELAYED RELEASE ORAL at 01:31

## 2022-01-01 RX ADMIN — SENNOSIDES AND DOCUSATE SODIUM 2 TABLET: 50; 8.6 TABLET ORAL at 17:37

## 2022-01-01 RX ADMIN — SENNOSIDES AND DOCUSATE SODIUM 2 TABLET: 50; 8.6 TABLET ORAL at 09:00

## 2022-01-01 RX ADMIN — SUCRALFATE 1 G: 1 SUSPENSION ORAL at 05:42

## 2022-01-01 RX ADMIN — LOSARTAN POTASSIUM 100 MG: 25 TABLET, FILM COATED ORAL at 05:24

## 2022-01-01 RX ADMIN — DONEPEZIL HYDROCHLORIDE 10 MG: 5 TABLET, FILM COATED ORAL at 21:26

## 2022-01-01 RX ADMIN — SUCRALFATE 1 G: 1 SUSPENSION ORAL at 12:35

## 2022-01-01 RX ADMIN — GABAPENTIN 600 MG: 300 CAPSULE ORAL at 17:59

## 2022-01-01 RX ADMIN — SENNOSIDES AND DOCUSATE SODIUM 2 TABLET: 50; 8.6 TABLET ORAL at 05:23

## 2022-01-01 RX ADMIN — DONEPEZIL HYDROCHLORIDE 10 MG: 5 TABLET, FILM COATED ORAL at 20:49

## 2022-01-01 ASSESSMENT — COGNITIVE AND FUNCTIONAL STATUS - GENERAL
MOBILITY SCORE: 7
DRESSING REGULAR UPPER BODY CLOTHING: A LITTLE
DRESSING REGULAR LOWER BODY CLOTHING: A LITTLE
DRESSING REGULAR UPPER BODY CLOTHING: A LITTLE
PERSONAL GROOMING: A LOT
STANDING UP FROM CHAIR USING ARMS: A LOT
DRESSING REGULAR LOWER BODY CLOTHING: A LOT
TURNING FROM BACK TO SIDE WHILE IN FLAT BAD: A LITTLE
SUGGESTED CMS G CODE MODIFIER MOBILITY: CM
EATING MEALS: A LITTLE
DRESSING REGULAR UPPER BODY CLOTHING: TOTAL
SUGGESTED CMS G CODE MODIFIER DAILY ACTIVITY: CK
EATING MEALS: A LITTLE
TOILETING: A LOT
MOVING TO AND FROM BED TO CHAIR: A LOT
MOVING FROM LYING ON BACK TO SITTING ON SIDE OF FLAT BED: UNABLE
MOVING FROM LYING ON BACK TO SITTING ON SIDE OF FLAT BED: A LOT
DAILY ACTIVITIY SCORE: 11
DAILY ACTIVITIY SCORE: 18
PERSONAL GROOMING: A LITTLE
EATING MEALS: A LITTLE
TOILETING: A LITTLE
WALKING IN HOSPITAL ROOM: A LOT
DAILY ACTIVITIY SCORE: 15
HELP NEEDED FOR BATHING: TOTAL
TOILETING: A LOT
CLIMB 3 TO 5 STEPS WITH RAILING: TOTAL
HELP NEEDED FOR BATHING: A LITTLE
SUGGESTED CMS G CODE MODIFIER MOBILITY: CL
WALKING IN HOSPITAL ROOM: TOTAL
STANDING UP FROM CHAIR USING ARMS: TOTAL
TURNING FROM BACK TO SIDE WHILE IN FLAT BAD: UNABLE
MOBILITY SCORE: 11
HELP NEEDED FOR BATHING: A LOT
DRESSING REGULAR LOWER BODY CLOTHING: A LOT
PERSONAL GROOMING: A LITTLE
MOVING TO AND FROM BED TO CHAIR: UNABLE
SUGGESTED CMS G CODE MODIFIER DAILY ACTIVITY: CK
CLIMB 3 TO 5 STEPS WITH RAILING: TOTAL
SUGGESTED CMS G CODE MODIFIER DAILY ACTIVITY: CL

## 2022-01-01 ASSESSMENT — GAIT ASSESSMENTS
ASSISTIVE DEVICE: FRONT WHEEL WALKER
DISTANCE (FEET): 50
DEVIATION: BRADYKINETIC
GAIT LEVEL OF ASSIST: CONTACT GUARD ASSIST
DISTANCE (FEET): 50

## 2022-01-01 ASSESSMENT — ENCOUNTER SYMPTOMS
MUSCULOSKELETAL NEGATIVE: 1
FALLS: 0
DIARRHEA: 0
FEVER: 0
DIZZINESS: 0
HEADACHES: 0
HEARTBURN: 0
COUGH: 0
MYALGIAS: 0
VOMITING: 0
WHEEZING: 0
HEMOPTYSIS: 0
HEADACHES: 0
LOSS OF CONSCIOUSNESS: 0
CHILLS: 0
VOMITING: 0
DOUBLE VISION: 0
BRUISES/BLEEDS EASILY: 0
SHORTNESS OF BREATH: 0
ABDOMINAL PAIN: 0
CONSTIPATION: 0
DIAPHORESIS: 0
NAUSEA: 0
NEUROLOGICAL NEGATIVE: 1
VOMITING: 0
DIARRHEA: 0
MEMORY LOSS: 1
FEVER: 0
CARDIOVASCULAR NEGATIVE: 1
CHILLS: 0
BLOOD IN STOOL: 0
SEIZURES: 0
BLOOD IN STOOL: 1
NAUSEA: 0
SPUTUM PRODUCTION: 0
FOCAL WEAKNESS: 0
LOSS OF CONSCIOUSNESS: 0
NAUSEA: 0
DIZZINESS: 0
CONSTIPATION: 0
CHILLS: 0
ABDOMINAL PAIN: 0
EYES NEGATIVE: 1
PALPITATIONS: 0
STRIDOR: 0
DIARRHEA: 0
DEPRESSION: 0
TINGLING: 0
SHORTNESS OF BREATH: 0
ABDOMINAL PAIN: 0
FEVER: 0
RESPIRATORY NEGATIVE: 1
NERVOUS/ANXIOUS: 0
CONSTIPATION: 0
PALPITATIONS: 0
COUGH: 0

## 2022-01-01 ASSESSMENT — PAIN DESCRIPTION - PAIN TYPE
TYPE: ACUTE PAIN

## 2022-01-01 ASSESSMENT — LIFESTYLE VARIABLES
EVER HAD A DRINK FIRST THING IN THE MORNING TO STEADY YOUR NERVES TO GET RID OF A HANGOVER: NO
AVERAGE NUMBER OF DAYS PER WEEK YOU HAVE A DRINK CONTAINING ALCOHOL: 0
ALCOHOL_USE: NO
TOTAL SCORE: 0
HOW MANY TIMES IN THE PAST YEAR HAVE YOU HAD 5 OR MORE DRINKS IN A DAY: 0
TOTAL SCORE: 0
EVER FELT BAD OR GUILTY ABOUT YOUR DRINKING: NO
CONSUMPTION TOTAL: NEGATIVE
ON A TYPICAL DAY WHEN YOU DRINK ALCOHOL HOW MANY DRINKS DO YOU HAVE: 0
TOTAL SCORE: 0
HAVE YOU EVER FELT YOU SHOULD CUT DOWN ON YOUR DRINKING: NO
HAVE PEOPLE ANNOYED YOU BY CRITICIZING YOUR DRINKING: NO

## 2022-01-01 ASSESSMENT — FIBROSIS 4 INDEX
FIB4 SCORE: 2.36
FIB4 SCORE: 2.36
FIB4 SCORE: 2.46
FIB4 SCORE: 2.36
FIB4 SCORE: 3.1
FIB4 SCORE: 2.9

## 2022-01-01 ASSESSMENT — PATIENT HEALTH QUESTIONNAIRE - PHQ9
1. LITTLE INTEREST OR PLEASURE IN DOING THINGS: NOT AT ALL
SUM OF ALL RESPONSES TO PHQ9 QUESTIONS 1 AND 2: 0
SUM OF ALL RESPONSES TO PHQ9 QUESTIONS 1 AND 2: 0
2. FEELING DOWN, DEPRESSED, IRRITABLE, OR HOPELESS: NOT AT ALL
1. LITTLE INTEREST OR PLEASURE IN DOING THINGS: NOT AT ALL

## 2022-01-01 ASSESSMENT — PAIN SCALES - GENERAL: PAIN_LEVEL: 0

## 2022-01-01 ASSESSMENT — ACTIVITIES OF DAILY LIVING (ADL): TOILETING: REQUIRES ASSIST

## 2022-01-27 NOTE — ED NOTES
Patient back from CT scan wife at bedside    Cheek Interpolation Flap Text: A decision was made to reconstruct the defect utilizing an interpolation axial flap and a staged reconstruction.  A telfa template was made of the defect.  This telfa template was then used to outline the Cheek Interpolation flap.  The donor area for the pedicle flap was then injected with anesthesia.  The flap was excised through the skin and subcutaneous tissue down to the layer of the underlying musculature.  The interpolation flap was carefully excised within this deep plane to maintain its blood supply.  The edges of the donor site were undermined.   The donor site was closed in a primary fashion.  The pedicle was then rotated into position and sutured.  Once the tube was sutured into place, adequate blood supply was confirmed with blanching and refill.  The pedicle was then wrapped with xeroform gauze and dressed appropriately with a telfa and gauze bandage to ensure continued blood supply and protect the attached pedicle.

## 2022-01-27 NOTE — ED TRIAGE NOTES
87 yo male bib remsa with reports of weakness in legs Right greater than left over past few days with a fall down one step today.  Patient has a history of dementia. Denies pain at this time.     Patient is Covid vaccinated

## 2022-01-27 NOTE — ED PROVIDER NOTES
ED Provider Note    Scribed for Earnest Bañuelos M.D. by Cathleen Gomez. 1/27/2022  3:21 PM    Primary care provider: Mac Miller M.D.  Means of arrival: EMS  History obtained from: Patient and family member  History limited by: None    CHIEF COMPLAINT  Chief Complaint   Patient presents with    Weakness    T-5000 FALL       HPI  Filiberto Jauregui is a 88 y.o. male with a history of sciatica who presents to the Emergency Department for a fall earlier today. Patient notes their legs suddenly gave out on them while brushing their dog. He denies any head trauma or sharp pain prior to the fall. Patient endorses associated right leg weakness, but denies loss of consciousness, chest pain, dyspnea, nausea, vomiting, or dysuria. Patient's family notes the patient has been feeling weak and his hip has been unsteady lately but that he has not been using his walker or cane. Family adds that he has a history of 2 heart surgeries and 5 bladder surgeries in the past 2 years. Since chemotherapy, the patient has been having amnesia and apparently is in the early stages of dementia. He is followed by Dr. Muñoz, neuro for sciatica.     REVIEW OF SYSTEMS  Pertinent positives include right leg weakness. Pertinent negatives include no  loss of consciousness, chest pain, dyspnea, nausea, vomiting, or dysuria.  All other systems reviewed and negative.  C    PAST MEDICAL HISTORY   has a past medical history of Bladder cancer ([facility]) (2019), Bowel habit changes, CAD (coronary artery disease) (12/2019), Cataract, Dementia ([facility]), Diabetes ([facility]) ( ), Hemorrhagic disorder ([facility]) ( ), Hyperlipidemia, Hypertension (10/04/2021), Mitral regurgitation (01/2020), MVP (mitral valve prolapse), Pneumonia, Prostate cancer ([facility]), Sciatica, Seasonal allergies, Snoring, and Valvular heart disease.    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    SOCIAL HISTORY  Social History     Tobacco Use    Smoking status: Never Smoker    Smokeless tobacco: Never Used   Vaping Use    Vaping Use: Never used   Substance Use Topics    Alcohol use: Yes     Alcohol/week: 0.6 oz     Types: 1 Glasses of wine per week     Comment: seldom    Drug use: No      Social History     Substance and Sexual Activity   Drug Use No       FAMILY HISTORY  Family History   Problem Relation Age of Onset    Heart Attack Father     Lung Disease Mother     Cancer Sister         ovaian cancer       CURRENT MEDICATIONS  Home Medications       Reviewed by Leonie Perry R.N. (Registered Nurse) on 01/27/22 at 1512  Med List Status: Not Addressed     Medication Last Dose Status   aspirin EC (ECOTRIN) 81 MG Tablet Delayed Response  Active   atorvastatin (LIPITOR) 40 MG Tab  Active   donepezil (ARICEPT) 10 MG tablet  Active   ferrous sulfate 325 (65 Fe) MG tablet  Active   gabapentin (NEURONTIN) 300 MG Cap  Active   loratadine (CLARITIN) 10 MG Tab  Active   losartan (COZAAR) 100 MG Tab  Active   metFORMIN (GLUCOPHAGE) 500 MG Tab  Active   vitamin D (CHOLECALCIFEROL) 1000 Unit (25 mcg) Tab  Active                    ALLERGIES  Allergies   Allergen Reactions    Contrast Media With Iodine [Iodine] Rash     RASH     Iodide      Per pt broke out in rash    Penicillins Rash     .Uncertain reaction-long time ago       PHYSICAL EXAM  VITAL SIGNS: /60   Pulse 60   Temp 36.4 °C (97.6 °F) (Temporal)   Resp 18   Ht 1.727 m (5' 8\")   Wt 68 kg (150 lb)   SpO2 96%   BMI 22.81 kg/m² "     Constitutional: Well developed, Well nourished, mild distress, Non-toxic appearance.   HENT: Normocephalic, Atraumatic, Bilateral external ears normal, Oropharynx moist, No oral exudates.   Eyes: PERRLA, EOMI, Conjunctiva normal, No discharge.   Neck: No tenderness, Supple, No stridor.   Lymphatic: No lymphadenopathy noted.   Cardiovascular: Normal heart rate, Normal rhythm.   Thorax & Lungs:  Clear to auscultation bilaterally, No respiratory distress, No wheezing, No crackles.   Abdomen: Soft, No tenderness, No masses, No pulsatile masses.   Skin: Warm, Dry, No erythema, No rash.   Extremities:  No edema No cyanosis.   Musculoskeletal: No C-Spine tenderness, Non-tender across lower back, No tenderness to palpation or major deformities noted.  Intact distal pulses  Neurologic: 5/5 muscle strength in bilateral upper extremities and left lower extremity, 4/5 muscle strength in right lower extremity. Awake, alert. Moves all extremities spontaneously.  Psychiatric: Affect normal, Judgment normal, Mood normal.     LABS  Results for orders placed or performed during the hospital encounter of 01/27/22   CBC w/ Differential   Result Value Ref Range    WBC 6.1 4.8 - 10.8 K/uL    RBC 3.51 (L) 4.70 - 6.10 M/uL    Hemoglobin 10.3 (L) 14.0 - 18.0 g/dL    Hematocrit 32.9 (L) 42.0 - 52.0 %    MCV 93.7 81.4 - 97.8 fL    MCH 29.3 27.0 - 33.0 pg    MCHC 31.3 (L) 33.7 - 35.3 g/dL    RDW 46.5 35.9 - 50.0 fL    Platelet Count 168 164 - 446 K/uL    MPV 10.2 9.0 - 12.9 fL    Neutrophils-Polys 74.80 (H) 44.00 - 72.00 %    Lymphocytes 16.30 (L) 22.00 - 41.00 %    Monocytes 7.20 0.00 - 13.40 %    Eosinophils 0.70 0.00 - 6.90 %    Basophils 0.70 0.00 - 1.80 %    Immature Granulocytes 0.30 0.00 - 0.90 %    Nucleated RBC 0.00 /100 WBC    Neutrophils (Absolute) 4.56 1.82 - 7.42 K/uL    Lymphs (Absolute) 0.99 (L) 1.00 - 4.80 K/uL    Monos (Absolute) 0.44 0.00 - 0.85 K/uL    Eos (Absolute) 0.04 0.00 - 0.51 K/uL    Baso (Absolute) 0.04 0.00 -  0.12 K/uL    Immature Granulocytes (abs) 0.02 0.00 - 0.11 K/uL    NRBC (Absolute) 0.00 K/uL   Complete Metabolic Panel (CMP)   Result Value Ref Range    Sodium 142 135 - 145 mmol/L    Potassium 4.6 3.6 - 5.5 mmol/L    Chloride 103 96 - 112 mmol/L    Co2 27 20 - 33 mmol/L    Anion Gap 12.0 7.0 - 16.0    Glucose 144 (H) 65 - 99 mg/dL    Bun 26 (H) 8 - 22 mg/dL    Creatinine 1.30 0.50 - 1.40 mg/dL    Calcium 8.9 8.4 - 10.2 mg/dL    AST(SGOT) 35 12 - 45 U/L    ALT(SGPT) 32 2 - 50 U/L    Alkaline Phosphatase 70 30 - 99 U/L    Total Bilirubin 1.2 0.1 - 1.5 mg/dL    Albumin 4.1 3.2 - 4.9 g/dL    Total Protein 6.6 6.0 - 8.2 g/dL    Globulin 2.5 1.9 - 3.5 g/dL    A-G Ratio 1.6 g/dL   Troponin STAT   Result Value Ref Range    Troponin T 32 (H) 6 - 19 ng/L   URINALYSIS    Specimen: Urine   Result Value Ref Range    Color Yellow     Character Cloudy (A)     Specific Gravity 1.025 <1.035    Ph 5.5 5.0 - 8.0    Glucose Negative Negative mg/dL    Ketones Negative Negative mg/dL    Protein 30 (A) Negative mg/dL    Bilirubin Negative Negative    Nitrite Negative Negative    Leukocyte Esterase Small (A) Negative    Occult Blood Large (A) Negative    Micro Urine Req Microscopic    URINE MICROSCOPIC (W/UA)   Result Value Ref Range    WBC 10-20 (A) /hpf    RBC  (A) /hpf    Bacteria Few (A) None /hpf    Epithelial Cells Rare Few /hpf    Mucous Threads Few /hpf   ESTIMATED GFR   Result Value Ref Range    GFR If African American >60 >60 mL/min/1.73 m 2    GFR If Non African American 52 (A) >60 mL/min/1.73 m 2   TROPONIN   Result Value Ref Range    Troponin T 32 (H) 6 - 19 ng/L   EKG   Result Value Ref Range    Report       [facility] Emergency Dept.    Test Date:  2022  Pt Name:    HILARIA CRESPO                Department: [facility]  MRN:        [id number]                      Room:       SM-ROOM 6  Gender:     Male                         Technician: KARIME  :        [id number]                   Requested By:MAGI  TRIAGE PROTOCOL  Order #:    [id number]                    Reading MD: ESPINOZA WELLS MD    Measurements  Intervals                                Axis  Rate:       61                           P:          -45  MT:         251                          QRS:        -52  QRSD:       125                          T:          52  QT:         437  QTc:        441    Interpretive Statements  Sinus or ectopic atrial rhythm  Prolonged MT interval  Nonspecific IVCD with LAD  Left ventricular hypertrophy  Compared to ECG 09/27/2021 14:30:54  Ectopic atrial rhythm now present  First degree AV block now present  Sinus rhythm no longer present  Electronically Signed On 1- 15:31:0 5 PST by ESPINOZA WELLS MD          RADIOLOGY  CT-HEAD W/O   Final Result      1.  No acute intracranial abnormality      2.  Cerebral volume loss and white matter change           The radiologist's interpretation of all radiological studies have been reviewed by me.      COURSE & MEDICAL DECISION MAKING  Pertinent Labs & Imaging studies reviewed. (See chart for details)    3:21 PM - Patient seen and examined at bedside.  Ordered CBC with Differential, CMP, troponin, UA, and C to evaluate his symptoms. The differential diagnoses include but are not limited to: metabolic, infectious, or sciatica.    4:44 PM - Patient was reevaluated at bedside. Discussed lab and radiology results with the patient and informed them that their urine is positive for UTI. Updated family on plan for second troponin and plans for discharge. Patient verbalizes understanding and agreement to this plan of care.       Decision Making:  Patient with a fall, questionable right leg weakness with some sciatica.  Laboratory tests are unremarkable however the patient does have a urinary tract infection, the patient had a slightly elevated troponin, repeat troponin was unchanged.  We will put patient on antibiotics, give the patient Rocephin, will put the patient on  Omnicef, have the patient follow-up with her cardiologist tomorrow, have the patient return with worsening symptoms.      The patient will return for new or worsening symptoms and is stable at the time of discharge.    The patient is referred to a primary physician for blood pressure management, diabetic screening, and for all other preventative health concerns.      DISPOSITION:  Patient will be discharged home in stable condition.    FOLLOW UP:  [facility], Emergency Dept  [address]  [place] [id number] [phone]    If symptoms worsen      OUTPATIENT MEDICATIONS:  Discharge Medication List as of 1/27/2022  5:59 PM        START taking these medications    Details   cefdinir (OMNICEF) 300 MG Cap Take 1 Capsule by mouth 2 times a day for 7 days., Disp-14 Capsule, R-0, Normal               FINAL IMPRESSION  1. Fall, initial encounter    2. Acute right-sided low back pain with right-sided sciatica    3. Acute cystitis with hematuria          Cathleen MORE (Alex), am scribing for, and in the presence of, Earnest Bañuelos M.D..    Electronically signed by: Cathleen Gomez (Scribe), 1/27/2022    Earnest MORE M.D. personally performed the services described in this documentation, as scribed by Cathleen Gomez in my presence, and it is both accurate and complete.    The note accurately reflects work and decisions made by me.  Earnest Bañuelos M.D.  1/27/2022  10:36 PM

## 2022-01-28 NOTE — TELEPHONE ENCOUNTER
AB    Pt wife, Phoebe called and states that pt is sick and was in the hospital yesterday 1/27. Phoebe states that pt had 2 EKG's and some other testing that she was hoping JI could take a look at and hopefully call back to further discuss at [phone].    Thank you.

## 2022-01-28 NOTE — TELEPHONE ENCOUNTER
Spoke to patient's spouse, Phoebe, over phone. Phoebe states, patient was very weak and fell outside yesterday. Patient was taken to the ER where it was confirmed he had UTI and was given IV antibiotics. Patient's vitals were stable, but EKG was irregular. Per Phoebe, patient was supposed to have appointment with [facility] today. Advised Phoebe to call and reschedule first available appointment to follow up on ER visit. Phoebe verbalizes understanding.

## 2022-01-28 NOTE — ED NOTES
Pt and spouse given d/c paperwork and RX p/u information, pt and spouse verbalized understanding all information given. Pt assisted out of the ER w/o difficulty in w/c w/ family

## 2022-03-22 PROBLEM — K92.2 GI BLEED: Status: ACTIVE | Noted: 2022-01-01

## 2022-03-23 NOTE — CONSULTS
Gastroenterology Initial Consult Note               Author:  Effie Carmen D.O. Date & Time Created: 3/23/2022 1:07 PM       Patient ID:  Name:             Filiberto Jauregui  YOB: 1933  Age:                 88 y.o.  male  MRN:               [id number]      Referring Provider:  Ismael Izquierdo MD        Medical Decision Making, by Problem:  Active Hospital Problems    Diagnosis    • GI bleed [K92.2]    • Hyperlipidemia [E78.5]    • Hematuria [R31.9]    • Stage 3a chronic kidney disease (HCC) [N18.31]    • Dementia (HCC) [F03.90]    • Acute blood loss anemia [D62]    • Essential hypertension [I10]            Assessment/Recommendations:  The patient is a very pleasant 88-year-old man with a history of diverticulosis, dementia, prostate cancer (status post total prostatectomy), bladder cancer (status post XRT), stage III CKD, chronic iron deficiency anemia, cataracts, diabetes, HTN/HLD, mitral valve regurgitation (status post mitral valve repair), mitral valve prolapse, and LUCIE who initially presented with recurrent UTI.  GI was consulted for a positive FOBT.    Normocytic anemia: The patient's baseline hemoglobin is in the 10's.  On admission, his hemoglobin was 8.6 and has remained stable.  No reports of overt GI bleeding but he did have a positive FOBT.  Of note, he does have hematuria which is the most likely etiology of his anemia given recurrent UTIs which have failed multiple courses of antibiotics.   -Diet as tolerated (defer to the primary team)  -Continue supportive care and IVF  -Trend hemoglobin  -Continue omeprazole 20 mg twice daily taken 30 minutes prior to meal  -Continue ferrous sulfate 325 mg daily  -Avoid NSAIDs  -Transfuse for goal hemoglobin > 7  -Transfuse for goal platelet count > 50  -Transfuse for goal INR <2.5  -Continue management of hematuria and UTI (defer to the primary team)    Positive FOBT with melena: He has associated chronic iron deficiency anemia.  I suspect his  current anemia is multifactorial secondary to hematuria and possible GI blood loss.  He had a colonoscopy 2 years ago (had iron deficiency anemia at that time) which demonstrated diverticulosis but was negative for colon masses or polyps.  His history is most suggestive of gastric/small bowel angiodysplasias, gastritis/duodenitis, and less likely PUD, Dieulafoy's lesion, malignancy, small bowel bleeding, and right-sided colon bleeding.  I discussed the differential and management to include diagnostic EGD versus conservative management with the patient and his wife (medical POA).  They both are in agreement and would like to proceed with EGD.  Plan for diagnostic EGD with therapeutic intent.  The patient is a suitable candidate for endoscopy and sedation.  The risk/benefits/alternatives of EGD with possible intervention to include perforation, bleeding, infection, aspiration, missed lesions, need for repeat procedure, need for surgery, and need for additional intervention were discussed with the patient who agrees to these risk and would like to proceed.  -Clear liquid diet  -N.p.o. after midnight  -Diagnostic EGD with possible intervention tomorrow at 1300  -Continue supportive care and IVF  -Trend hemoglobin  -Continue omeprazole 20 mg twice daily taken 30 minutes prior to meal  -Continue ferrous sulfate 325 mg daily  -Avoid NSAIDs  -Transfuse for goal hemoglobin > 7  -Transfuse for goal platelet count > 50  -Transfuse for goal INR <2.5      Effie Carmen D.O.      Thank you for inviting me to participate in the care of this patient. Please do not hesitate to call GI consultants with additional questions/concerns or changes in the patient's clinical status at [phone].    ________________________________________________________________________________________________________________________________________________________      Presenting Chief Complaint:  Recurrent UTI with weakness      History of Present  Illness:    This is a very pleasant 88 y.o. male with the past medical history as listed below.  No family was at bedside during my evaluation.  The majority of his history was obtained from his medical record.  The patient is a poor historian due to dementia.  He does not think he has abdominal pain or nausea/vomiting.    ER physician HPI:  Filiberto Jauregui is a 88 y.o. male who presents to the Emergency Department for acute, moderate UTI symptoms onset a month ago. Per family member, the patient was seen last week for a UTI and was placed on three courses of antibiotics which has not alleviated his symptoms. Family member notes he has had multiple recurrent UTIs and treatments have not been helping. He has associated symptoms of shakiness, dizziness, weakness, hematuria, and disorientation, but denies fever or generalized pain. No alleviating or exacerbating factors reported. Patient has a history of bladder cancer which was diagnosed in October 2019.    Interval update:  I was able to speak to the patient's wife at bedside.  At that time she reported 2-3 weeks of intermittent black stools (patient was not taking iron supplement at that time).  She denies other GI symptoms.       Review of Systems:  Review of Systems   Unable to perform ROS: Dementia   Gastrointestinal: Positive for melena.             Past Medical History:  Past Medical History:   Diagnosis Date   • Bladder cancer (HCC) 2019        • Bowel habit changes     Constipation   • CAD (coronary artery disease) 12/2019    PCI/NADIRA (Synergy 3.0 x 16mm) the proximal LCx   • Cataract     OU   • Dementia (HCC)    • Diabetes (HCC)      Takes Oral Medication   • Hemorrhagic disorder (HCC)      Takes Plavix   • Hyperlipidemia    • Hypertension 10/04/2021    states well controlled on meds   • Mitral regurgitation 01/2020    Status post Susanne-Clip x 2   • MVP (mitral valve prolapse)    • Pneumonia    • Prostate cancer (HCC)     S/P prostatectomy   • Sciatica    •  "Seasonal allergies    • Snoring     Has not had a sleep study.   • Valvular heart disease    -Diverticulosis      Active Hospital Problems    Diagnosis    • GI bleed [K92.2]    • Hyperlipidemia [E78.5]    • Hematuria [R31.9]    • Stage 3a chronic kidney disease (HCC) [N18.31]    • Dementia (HCC) [F03.90]    • Acute blood loss anemia [D62]    • Essential hypertension [I10]          Past Surgical History:  Past Surgical History:   Procedure Laterality Date   • OK CYSTOURETHROSCOPY N/A 10/14/2021    Procedure: CYSTOSCOPY;  Surgeon: Brad Jones M.D.;  Location: [facility];  Service: Urology   • OK CYSTOURETHROSCOPY,URETER CATHETER N/A 10/14/2021    Procedure: CYSTOSCOPY, WITH BILATERAL RETROGRADE PYELOGRAM;  Surgeon: Brad Jones M.D.;  Location: [facility];  Service: Urology   • TURBT (TRANSURETHRAL RESECTION OF BLADDER TUMOR) N/A 10/14/2021    Procedure: TURBT (TRANSURETHRAL RESECTION OF BLADDER TUMOR) - MONOPOLAR WITH INTRAVESICAL GEMCITABINE TREATMENT;  Surgeon: Brad Jones M.D.;  Location: [facility];  Service: Urology   • TURBT (TRANSURETHRAL RESECTION OF BLADDER TUMOR)  2/2/2021    Procedure: TURBT (TRANSURETHRAL RESECTION OF BLADDER TUMOR) - AND FULGURATION;  Surgeon: Alan Rain M.D.;  Location: [facility];  Service: Urology   • TURBT (TRANSURETHRAL RESECTION OF BLADDER TUMOR)  3/20/2020    Procedure: TURBT (TRANSURETHRAL RESECTION OF BLADDER TUMOR)- INTRACESICAL GEMCITABINE;  Surgeon: Brad Jones M.D.;  Location: [facility];  Service: Urology   • CYSTOSCOPY Bilateral 3/20/2020    Procedure: CYSTOSCOPY WITH RETROGRADES;  Surgeon: Brad Jones M.D.;  Location: [facility];  Service: Urology   • OTHER CARDIAC SURGERY  03/10/2020    \"Mitral Valve Clip\"   • TRANSCATHETER MITRAL VALVE REPAIR  1/7/2020    Procedure: REPAIR, MITRAL VALVE, TRANSCATHETER;  Surgeon: Gary Wilson M.D.;  Location: [facility];  Service: Cardiac   • " JOSÉ  1/7/2020    Procedure: ECHOCARDIOGRAM, TRANSESOPHAGEAL- POTENTIALLY;  Surgeon: Gary Wilson M.D.;  Location: [facility];  Service: Cardiac   • [place] CYSTOURETHROSCOPY,URETER CATHETER Bilateral 11/1/2019    Procedure: CYSTOSCOPY, WITH BILATERAL RETROGRADE PYELOGRAM-AND 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;  Surgeon: Brad Jones M.D.;  Location: [facility];  Service: Urology   • ANGIOGRAM      With Stent Placement   • PROSTATECTOMY, RADIAL     • TONSILLECTOMY           Prior Endoscopic Procedures:  Colonoscopy 5/2020:  Diverticulosis, absent prostate      Hospital Medications:  Current Facility-Administered Medications   Medication Dose Frequency Provider Last Rate Last Admin   • atorvastatin (LIPITOR) tablet 40 mg  40 mg DAILY RANGEL Villar.O.   40 mg at 03/23/22 1218   • donepezil (ARICEPT) tablet 10 mg  10 mg Nightly TOSHIA VillarO.       • ferrous sulfate tablet 325 mg  325 mg DAILY TOSHIA VillarO.   325 mg at 03/23/22 1218   • gabapentin (NEURONTIN) capsule 600 mg  600 mg BID TOSHIA VillarO.       • losartan (COZAAR) tablet 100 mg  100 mg DAILY RANGEL Villar.O.   100 mg at 03/23/22 1218   • senna-docusate (PERICOLACE or SENOKOT S) 8.6-50 MG per tablet 2 Tablet  2 Tablet BID Omer Anaya D.O.        And   • polyethylene glycol/lytes (MIRALAX) PACKET 1 Packet  1 Packet QDAY PRN Omer Anaya D.O.        And   • magnesium hydroxide (MILK OF MAGNESIA) suspension 30 mL  30 mL QDAY PRN TOSHIA VillarO.        And   • bisacodyl (DULCOLAX) suppository 10 mg  10 mg QDAY PRN TOSHIA VillarO.       • NS infusion   Continuous TOSHIA VillarO. 75 mL/hr at 03/23/22 0130 New Bag at 03/23/22 0130   • acetaminophen (Tylenol) tablet 650 mg  650 mg Q6HRS PRN TOSHIA VillarO.   650 mg at 03/23/22 0131   • hydrALAZINE (APRESOLINE) injection 10 mg  10 mg Q4HRS PRN Omer R Anaya, D.O.       • ondansetron (ZOFRAN) syringe/vial  injection 4 mg  4 mg Q4HRS PRN Omer Anaya D.O.       • ondansetron (ZOFRAN ODT) dispertab 4 mg  4 mg Q4HRS PRN Omer Anaya D.O.       • omeprazole (PRILOSEC) capsule 20 mg  20 mg BID Omer Anaya D.O.   20 mg at 03/23/22 0131   Last reviewed on 3/23/2022 10:22 AM by Marc Sanabria        Current Outpatient Medications:  Medications Prior to Admission   Medication Sig Dispense Refill Last Dose   • Fexofenadine HCl (ALLEGRA PO) Take 1 Tablet by mouth every day.   3/22/2022 at 1000   • cefdinir (OMNICEF) 300 MG Cap Take 300 mg by mouth 2 times a day. Pt started on 3/4/2022 for 10 day course   3/12/2022 at STOPPED   • fosfomycin (MONUROL) 3 GM Pack Take 3 g by mouth one time.   3/15/2022 at FINISHED   • gabapentin (NEURONTIN) 300 MG Cap Take 600 mg by mouth 2 times a day.   3/22/2022 at 1000   • metFORMIN (GLUCOPHAGE) 500 MG Tab Take 1,000 mg by mouth every evening.   3/21/2022 at PM   • donepezil (ARICEPT) 10 MG tablet Take 20 mg by mouth every evening.   3/21/2022 at PM   • ferrous sulfate 325 (65 Fe) MG tablet Take 325 mg by mouth every day.   > 1 month at HOLD   • vitamin D (CHOLECALCIFEROL) 1000 Unit (25 mcg) Tab Take 1,000 Units by mouth every day.   > 1 month at HOLD   • atorvastatin (LIPITOR) 40 MG Tab Take 1 tablet by mouth once daily (Patient taking differently: Take 40 mg by mouth every day.) 90 tablet 3 3/22/2022 at 1000   • aspirin EC (ECOTRIN) 81 MG Tablet Delayed Response Take 81 mg by mouth every day.   3/22/2022 at 1000   • losartan (COZAAR) 100 MG Tab Take 100 mg by mouth every day.   3/22/2022 at 1000         Medication Allergies:  Allergies   Allergen Reactions   • Contrast Media With Iodine [Iodine] Rash     RASH    • Iodide      Per pt broke out in rash   • Penicillins      .Uncertain reaction-long time ago         Family Medical History:  Family History   Problem Relation Age of Onset   • Heart Attack Father    • Lung Disease Mother    • Cancer Sister         ovaian cancer  "        Social History:  Social History     Socioeconomic History   • Marital status:      Spouse name: Not on file   • Number of children: Not on file   • Years of education: Not on file   • Highest education level: Not on file   Occupational History   • Not on file   Tobacco Use   • Smoking status: Never Smoker   • Smokeless tobacco: Never Used   Vaping Use   • Vaping Use: Never used   Substance and Sexual Activity   • Alcohol use: Not Currently     Alcohol/week: 0.6 oz     Types: 1 Glasses of wine per week     Comment: seldom   • Drug use: No   • Sexual activity: Not on file   Other Topics Concern   • Not on file   Social History Narrative   • Not on file     Social Determinants of Health     Financial Resource Strain: Not on file   Food Insecurity: Not on file   Transportation Needs: Not on file   Physical Activity: Not on file   Stress: Not on file   Social Connections: Not on file   Intimate Partner Violence: Not on file   Housing Stability: Not on file         Vital signs:  Weight/BMI: Body mass index is 22.84 kg/m².  /51   Pulse (!) 56   Temp 36.4 °C (97.5 °F) (Oral)   Resp 16   Ht 1.676 m (5' 6\")   Wt 64.2 kg (141 lb 8.6 oz)   SpO2 96%   Vitals:    03/23/22 0015 03/23/22 0347 03/23/22 0819 03/23/22 1223   BP: 118/64 110/77 137/59 126/51   Pulse: (!) 59 61 (!) 58 (!) 56   Resp: 18 16 16 16   Temp: 36.6 °C (97.8 °F) 36.4 °C (97.6 °F) 36.4 °C (97.5 °F) 36.4 °C (97.5 °F)   TempSrc: Oral Oral Oral Oral   SpO2: 92% 92% 95% 96%   Weight: 65.5 kg (144 lb 6.4 oz) 64.2 kg (141 lb 8.6 oz)     Height:         Oxygen Therapy:  Pulse Oximetry: 96 %, O2 (LPM): 0, O2 Delivery Device: None - Room Air    Intake/Output Summary (Last 24 hours) at 3/23/2022 1307  Last data filed at 3/23/2022 1112  Gross per 24 hour   Intake 877.5 ml   Output 450 ml   Net 427.5 ml         Physical Exam:  Physical Exam  Vitals and nursing note reviewed.   Constitutional:       General: He is not in acute distress.     " Appearance: Normal appearance. He is ill-appearing.   HENT:      Head: Normocephalic and atraumatic.      Nose: Nose normal.      Mouth/Throat:      Mouth: Mucous membranes are moist.      Pharynx: Oropharynx is clear.   Eyes:      General: No scleral icterus.     Extraocular Movements: Extraocular movements intact.      Conjunctiva/sclera: Conjunctivae normal.   Cardiovascular:      Rate and Rhythm: Normal rate and regular rhythm.      Heart sounds: Murmur heard.    Systolic murmur is present with a grade of 4/6.    No gallop.   Pulmonary:      Effort: Pulmonary effort is normal. No respiratory distress.      Breath sounds: Normal breath sounds. No wheezing, rhonchi or rales.   Abdominal:      General: Abdomen is flat. Bowel sounds are normal. There is no distension.      Palpations: Abdomen is soft. There is no mass.      Tenderness: There is no abdominal tenderness. There is no guarding or rebound.   Musculoskeletal:         General: Normal range of motion.      Cervical back: Normal range of motion.      Right lower leg: No edema.      Left lower leg: No edema.   Skin:     General: Skin is warm and dry.      Coloration: Skin is not jaundiced.   Neurological:      General: No focal deficit present.      Mental Status: He is alert. Mental status is at baseline.   Psychiatric:         Mood and Affect: Mood normal.         Behavior: Behavior normal.             Labs:  Recent Labs     03/22/22  1900 03/23/22  0104   SODIUM 136 138   POTASSIUM 4.7 4.4   CHLORIDE 102 104   CO2 24 26   BUN 33* 31*   CREATININE 1.43* 1.42*   CALCIUM 8.9 8.8     Recent Labs     03/22/22 1900 03/23/22  0104   ALTSGPT 27  --    ASTSGOT 29  --    ALKPHOSPHAT 65  --    TBILIRUBIN 1.1  --    GLUCOSE 104* 123*     Recent Labs     03/22/22  1900 03/23/22  0104   WBC 5.6 5.0   NEUTSPOLYS 68.50  --    LYMPHOCYTES 19.80*  --    MONOCYTES 9.00  --    EOSINOPHILS 1.80  --    BASOPHILS 0.50  --    ASTSGOT 29  --    ALTSGPT 27  --    ALKPHOSPHAT 65   --    TBILIRUBIN 1.1  --      Recent Labs     03/22/22  1900 03/23/22  0104 03/23/22  0823   RBC 3.11* 2.98*  --    HEMOGLOBIN 8.6* 8.1* 8.7*   HEMATOCRIT 27.6* 25.8*  --    PLATELETCT 236 208  --    IRON  --  31*  --    FERRITIN  --  12.6*  --    TOTIRONBC  --  275  --      Recent Results (from the past 24 hour(s))   CBC With Differential    Collection Time: 03/22/22  7:00 PM   Result Value Ref Range    WBC 5.6 4.8 - 10.8 K/uL    RBC 3.11 (L) 4.70 - 6.10 M/uL    Hemoglobin 8.6 (L) 14.0 - 18.0 g/dL    Hematocrit 27.6 (L) 42.0 - 52.0 %    MCV 88.7 81.4 - 97.8 fL    MCH 27.7 27.0 - 33.0 pg    MCHC 31.2 (L) 33.7 - 35.3 g/dL    RDW 43.8 35.9 - 50.0 fL    Platelet Count 236 164 - 446 K/uL    MPV 9.5 9.0 - 12.9 fL    Neutrophils-Polys 68.50 44.00 - 72.00 %    Lymphocytes 19.80 (L) 22.00 - 41.00 %    Monocytes 9.00 0.00 - 13.40 %    Eosinophils 1.80 0.00 - 6.90 %    Basophils 0.50 0.00 - 1.80 %    Immature Granulocytes 0.40 0.00 - 0.90 %    Nucleated RBC 0.00 /100 WBC    Neutrophils (Absolute) 3.81 1.82 - 7.42 K/uL    Lymphs (Absolute) 1.10 1.00 - 4.80 K/uL    Monos (Absolute) 0.50 0.00 - 0.85 K/uL    Eos (Absolute) 0.10 0.00 - 0.51 K/uL    Baso (Absolute) 0.03 0.00 - 0.12 K/uL    Immature Granulocytes (abs) 0.02 0.00 - 0.11 K/uL    NRBC (Absolute) 0.00 K/uL   Comp Metabolic Panel    Collection Time: 03/22/22  7:00 PM   Result Value Ref Range    Sodium 136 135 - 145 mmol/L    Potassium 4.7 3.6 - 5.5 mmol/L    Chloride 102 96 - 112 mmol/L    Co2 24 20 - 33 mmol/L    Anion Gap 10.0 7.0 - 16.0    Glucose 104 (H) 65 - 99 mg/dL    Bun 33 (H) 8 - 22 mg/dL    Creatinine 1.43 (H) 0.50 - 1.40 mg/dL    Calcium 8.9 8.4 - 10.2 mg/dL    AST(SGOT) 29 12 - 45 U/L    ALT(SGPT) 27 2 - 50 U/L    Alkaline Phosphatase 65 30 - 99 U/L    Total Bilirubin 1.1 0.1 - 1.5 mg/dL    Albumin 4.1 3.2 - 4.9 g/dL    Total Protein 6.7 6.0 - 8.2 g/dL    Globulin 2.6 1.9 - 3.5 g/dL    A-G Ratio 1.6 g/dL   Blood Culture    Collection Time: 03/22/22  7:00 PM     Specimen: Peripheral; Blood   Result Value Ref Range    Significant Indicator NEG     Source BLD     Site PERIPHERAL     Culture Result       No Growth  Note: Blood cultures are incubated for 5 days and  are monitored continuously.Positive blood cultures  are called to the RN and reported as soon as  they are identified.  Blood culture testing and Gram stain, if indicated, are  performed at [facility], [address] [place] [address].  Positive blood cultures are  sent to [facility], [address] [place] [address], for organism identification and  susceptibility testing.     LACTIC ACID    Collection Time: 22  7:00 PM   Result Value Ref Range    Lactic Acid 1.4 0.5 - 2.0 mmol/L   ESTIMATED GFR    Collection Time: 22  7:00 PM   Result Value Ref Range    GFR (CKD-EPI) 47 (A) >60 mL/min/1.73 m 2   EKG    Collection Time: 22  7:06 PM   Result Value Ref Range    Report       [facility] Emergency Dept.    Test Date:  2022  Pt Name:    HILARIA CRESPO                Department: [facility]  MRN:        [id number]                      [facility]  Gender:     Male                         Technician: TG  :        [id number]                   Requested By:SMITA ESCOBEDO  Order #:    [id number]                    Reading MD:    Measurements  Intervals                                Axis  Rate:       59                           P:  MI:                                      QRS:        -51  QRSD:       130                          T:          45  QT:         464  QTc:        460    Interpretive Statements  JUNCTIONAL ESCAPE RHYTHM  NONSPECIFIC IVCD WITH LAD  LEFT VENTRICULAR HYPERTROPHY  ANTERIOR Q WAVES, POSSIBLY DUE TO LVH  Compared to ECG 2022 14:53:40  Junctional rhythm now present  Q waves now present  Ectopic atrial rhythm no longer present  First degree AV block no longer present     Blood Culture    Collection  Time: 03/22/22  7:50 PM    Specimen: Peripheral; Blood   Result Value Ref Range    Significant Indicator NEG     Source BLD     Site PERIPHERAL     Culture Result       No Growth  Note: Blood cultures are incubated for 5 days and  are monitored continuously.Positive blood cultures  are called to the RN and reported as soon as  they are identified.  Blood culture testing and Gram stain, if indicated, are  performed at [facility], [address] [place] [address].  Positive blood cultures are  sent to [facility], [address] [place] [address], for organism identification and  susceptibility testing.     URINALYSIS,CULTURE IF INDICATED    Collection Time: 03/22/22  7:50 PM    Specimen: Urine   Result Value Ref Range    Color Yellow     Character Hazy (A)     Specific Gravity 1.010 <1.035    Ph 5.5 5.0 - 8.0    Glucose Negative Negative mg/dL    Ketones Negative Negative mg/dL    Protein Negative Negative mg/dL    Bilirubin Negative Negative    Nitrite Negative Negative    Leukocyte Esterase Small (A) Negative    Occult Blood Large (A) Negative    Micro Urine Req Microscopic    URINE MICROSCOPIC (W/UA)    Collection Time: 03/22/22  7:50 PM   Result Value Ref Range    WBC 5-10 (A) /hpf    RBC 20-50 (A) /hpf    Bacteria Negative None /hpf    Epithelial Cells Few Few /hpf    Hyaline Cast 0-2 /lpf   CBC without Differential    Collection Time: 03/23/22  1:04 AM   Result Value Ref Range    WBC 5.0 4.8 - 10.8 K/uL    RBC 2.98 (L) 4.70 - 6.10 M/uL    Hemoglobin 8.1 (L) 14.0 - 18.0 g/dL    Hematocrit 25.8 (L) 42.0 - 52.0 %    MCV 86.6 81.4 - 97.8 fL    MCH 27.2 27.0 - 33.0 pg    MCHC 31.4 (L) 33.7 - 35.3 g/dL    RDW 42.5 35.9 - 50.0 fL    Platelet Count 208 164 - 446 K/uL    MPV 9.7 9.0 - 12.9 fL   Basic Metabolic Panel (BMP)    Collection Time: 03/23/22  1:04 AM   Result Value Ref Range    Sodium 138 135 - 145 mmol/L    Potassium 4.4 3.6 - 5.5 mmol/L    Chloride 104 96 - 112  mmol/L    Co2 26 20 - 33 mmol/L    Glucose 123 (H) 65 - 99 mg/dL    Bun 31 (H) 8 - 22 mg/dL    Creatinine 1.42 (H) 0.50 - 1.40 mg/dL    Calcium 8.8 8.4 - 10.2 mg/dL    Anion Gap 8.0 7.0 - 16.0   LACTIC ACID    Collection Time: 03/23/22  1:04 AM   Result Value Ref Range    Lactic Acid 1.2 0.5 - 2.0 mmol/L   IRON/TOTAL IRON BIND    Collection Time: 03/23/22  1:04 AM   Result Value Ref Range    Iron 31 (L) 50 - 180 ug/dL    Total Iron Binding 275 250 - 450 ug/dL    Unsat Iron Binding 244 110 - 370 ug/dL    % Saturation 11 (L) 15 - 55 %   ESTIMATED GFR    Collection Time: 03/23/22  1:04 AM   Result Value Ref Range    GFR (CKD-EPI) 47 (A) >60 mL/min/1.73 m 2   FERRITIN    Collection Time: 03/23/22  1:04 AM   Result Value Ref Range    Ferritin 12.6 (L) 22.0 - 322.0 ng/mL   RETICULOCYTES COUNT    Collection Time: 03/23/22  1:04 AM   Result Value Ref Range    Reticulocyte Count 2.1 0.8 - 2.1 %    Retic, Absolute 0.06 0.04 - 0.06 M/uL    Imm. Reticulocyte Fraction 22.8 (H) 9.3 - 17.4 %    Retic Hgb Equivalent 28.4 (L) 29.0 - 35.0 pg/cell   VITAMIN B12    Collection Time: 03/23/22  1:04 AM   Result Value Ref Range    Vitamin B12 -True Cobalamin 478 211 - 911 pg/mL   HGB (Hemoglobin) for 48 hours    Collection Time: 03/23/22  8:23 AM   Result Value Ref Range    Hemoglobin 8.7 (L) 14.0 - 18.0 g/dL   TRANSFERRIN    Collection Time: 03/23/22  8:23 AM   Result Value Ref Range    Transferrin 262 200 - 370 mg/dL         Radiology Review:  No orders to display         MDM (Data Review):   -Records reviewed and summarized in current documentation  -I personally reviewed and interpreted the laboratory results  -I personally reviewed the radiology images        Core Quality Measures   Reviewed items:  Labs and Medications reviewed

## 2022-03-23 NOTE — PROGRESS NOTES
Attempted to complete med rec; pt with dementia is unable to state what he takes at home. No family currently at bedside. Will ask day team to f/u.

## 2022-03-23 NOTE — H&P
Hospital Medicine History & Physical Note    Date of Service  3/22/2022    Primary Care Physician  Mac Miller M.D.    Consultants  None    Specialist Names: None however ERP did discuss the case with urology    Code Status  Full Code    Chief Complaint  Chief Complaint   Patient presents with   • UTI     Recurrent UTI  Here several times but keeps coming back. Now weak        History of Presenting Illness  Filiberto Jauregui is a 88 y.o. male who presented 3/22/2022 with weakness.  Patient does have a history of bladder cancer, has had recurrent urinary tract infections.  Has been dealing with them recently, starting approximately 1 month ago, has been on 3 courses of antibiotics but his symptoms persist.  He has been having shakiness, dizziness, weakness, hematuria and some confusion.  No fever, chills or pain.  Patient also had noted some dark stool.  ERP did perform a rectal exam, patient was guaiac positive.  His hemoglobin is generally pretty stable at around 10, now 8.6.  When ERP did discuss the case with on-call urology, plan was to discharge the patient however then there was guaiac positive stool so admission was decided upon.  Patient denies any nausea or vomiting.  There is no family here at present, patient is somewhat of a poor historian so some of the information obtained from the chart.  I did discuss the case including labs with the ER physician.    I discussed the plan of care with patient.    Review of Systems  Review of Systems   Constitutional: Positive for malaise/fatigue. Negative for chills and fever.   HENT: Negative for congestion.    Respiratory: Negative for cough, sputum production, shortness of breath and stridor.    Cardiovascular: Negative for chest pain, palpitations and leg swelling.   Gastrointestinal: Positive for blood in stool and melena. Negative for abdominal pain, constipation, diarrhea, nausea and vomiting.   Genitourinary: Positive for hematuria. Negative for dysuria  and urgency.   Musculoskeletal: Negative for falls and myalgias.   Neurological: Negative for dizziness, tingling, loss of consciousness and headaches.   Psychiatric/Behavioral: Negative for depression and suicidal ideas.   All other systems reviewed and are negative.      Past Medical History   has a past medical history of Bladder cancer ([facility]) (2019), Bowel habit changes, CAD (coronary artery disease) (12/2019), Cataract, Dementia (HCC), Diabetes (HCC) ( ), Hemorrhagic disorder ([facility]) ( ), Hyperlipidemia, Hypertension (10/04/2021), Mitral regurgitation (01/2020), MVP (mitral valve prolapse), Pneumonia, Prostate cancer ([facility]), Sciatica, Seasonal allergies, Snoring, and Valvular heart disease.    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     Family History  family history includes Cancer in his sister; Heart Attack in his father; Lung Disease in his mother.   Family history reviewed with patient. There is no family history that is pertinent to the chief complaint.     Social History   reports that he has never smoked. He has never used smokeless tobacco. He reports previous alcohol use of about 0.6 oz of alcohol per week. He reports that he does not use drugs.    Allergies  Allergies   Allergen Reactions   • Contrast Media With Iodine [Iodine] Rash     RASH    • Iodide      Per pt broke out in rash   • Penicillins Rash     .Uncertain reaction-long time ago        Medications  Prior to Admission Medications   Prescriptions Last Dose Informant Patient Reported? Taking?   aspirin EC (ECOTRIN) 81 MG Tablet Delayed Response  Family Member Yes No   Sig: Take 81 mg by mouth every day.   atorvastatin (LIPITOR) 40 MG Tab  Family Member No No   Sig: Take 1 tablet by mouth once daily   donepezil (ARICEPT) 10 MG tablet  Family Member Yes No   Sig: donepezil 10 mg tablet   TAKE 2 TABLETS BY MOUTH EVERY DAY AT BEDTIME FOR 90 DAYS   ferrous sulfate 325 (65 Fe) MG tablet  Family Member Yes No   Sig: Take 325 mg by mouth every day.   gabapentin (NEURONTIN) 300 MG Cap  Family Member Yes No   Sig: Take 300-600 mg by mouth 2 times a day.   loratadine (CLARITIN) 10 MG Tab  Family Member Yes No   Sig: Take 10 mg by mouth every day.   losartan (COZAAR) 100 MG Tab  Family Member Yes No   Sig: Take 100 mg by mouth every day.   metFORMIN (GLUCOPHAGE) 500 MG Tab  Family Member Yes No   Sig: Take 1,000 mg by mouth every evening.   vitamin D (CHOLECALCIFEROL) 1000 Unit (25 mcg) Tab  Family Member Yes No   Sig: Take 1,000 Units by mouth every day.      Facility-Administered Medications: None       Physical Exam  Temp:  [36.6 °C (97.8 °F)] 36.6 °C (97.8 °F)  Pulse:  [59-70] 63  Resp:  [16] 16  BP: (108-126)/(57-62) 115/58  SpO2:  [94 %-95 %] 94 %  Blood Pressure : 115/58   Temperature: 36.6 °C (97.8 °F)   Pulse: 63   Respiration: 16   Pulse Oximetry: 94 %       Physical Exam  Vitals and nursing note reviewed.   Constitutional:       General: He is not in acute distress.     Appearance: He is well-developed. He is not toxic-appearing or diaphoretic.   HENT:      Head: Normocephalic and atraumatic.      Right Ear: External ear normal.      Left Ear: External ear normal.      Nose: Nose normal. No congestion or rhinorrhea.      Mouth/Throat:      Mouth: Mucous membranes are dry.      Pharynx: No oropharyngeal exudate.   Eyes:      General:         Right eye: No discharge.         Left eye: No  discharge.   Neck:      Trachea: No tracheal deviation.   Cardiovascular:      Rate and Rhythm: Normal rate and regular rhythm.      Heart sounds: Murmur heard.     No friction rub. No gallop.   Pulmonary:      Effort: Pulmonary effort is normal. No respiratory distress.      Breath sounds: Normal breath sounds. No stridor. No wheezing or rales.   Chest:      Chest wall: No tenderness.   Abdominal:      General: Bowel sounds are normal. There is no distension.      Palpations: Abdomen is soft.      Tenderness: There is no abdominal tenderness.   Musculoskeletal:         General: No tenderness. Normal range of motion.      Cervical back: Normal range of motion and neck supple. No edema or erythema.      Right lower leg: No edema.      Left lower leg: No edema.   Lymphadenopathy:      Cervical: No cervical adenopathy.   Skin:     General: Skin is warm and dry.      Findings: No erythema or rash.   Neurological:      Mental Status: He is alert.      Cranial Nerves: No cranial nerve deficit.      Comments: Awake but some confusion   Psychiatric:         Behavior: Behavior is slowed.         Cognition and Memory: Cognition is impaired. He exhibits impaired recent memory.         Laboratory:  Recent Labs     03/22/22  1900   WBC 5.6   RBC 3.11*   HEMOGLOBIN 8.6*   HEMATOCRIT 27.6*   MCV 88.7   MCH 27.7   MCHC 31.2*   RDW 43.8   PLATELETCT 236   MPV 9.5     Recent Labs     03/22/22  1900   SODIUM 136   POTASSIUM 4.7   CHLORIDE 102   CO2 24   GLUCOSE 104*   BUN 33*   CREATININE 1.43*   CALCIUM 8.9     Recent Labs     03/22/22  1900   ALTSGPT 27   ASTSGOT 29   ALKPHOSPHAT 65   TBILIRUBIN 1.1   GLUCOSE 104*         No results for input(s): NTPROBNP in the last 72 hours.      No results for input(s): TROPONINT in the last 72 hours.    Imaging:  No orders to display       EKG:  I have personally reviewed the images and compared with prior images.    Assessment/Plan:  I anticipate this patient is appropriate for observation  status at this time.    * GI bleed- (present on admission)  Assessment & Plan  -Patient was guaiac positive on exam by ERP  -Patient denies any pain, start omeprazole twice daily  -No significant history of alcohol or liver failure  -Obtain frequent hemoglobins and transfuse as needed  -Patient is on home iron, continue and obtain an iron panel  -If he has significant worsening, will need GI consultation but none needed at this time    Acute blood loss anemia- (present on admission)  Assessment & Plan  -Due to both GI bleed and hematuria  -Continue to trend hemoglobin  -Transfuse as needed    Hyperlipidemia- (present on admission)  Assessment & Plan  -Continue home statin    Hematuria- (present on admission)  Assessment & Plan  -This is likely somewhat chronic, could be causing his urinary symptoms  -Does have a history of bladder cancer  -Continue outpatient follow-up with urology  -Has been on frequent antibiotics, currently has no bacteria in his urine, no need for antibiotics at this time    Stage 3a chronic kidney disease (HCC)- (present on admission)  Assessment & Plan  -Slightly worse than his baseline due to dehydration  -Start IV fluids  -Repeat BMP in the morning  -At this time, I do feel its safe to continue home losartan, if kidney function continues to worsen this would need to be stopped    Dementia (HCC)- (present on admission)  Assessment & Plan  -Continue home Aricept  -Currently somewhat confused, unfortunately is at significant risk for becoming more confused overnight, monitor closely, certainly a fall risk    Essential hypertension- (present on admission)  Assessment & Plan  -Continue home losartan  -Start as needed hydralazine  -Adjust as needed      VTE prophylaxis: SCDs/TEDs

## 2022-03-23 NOTE — ED PROVIDER NOTES
ER Provider Note     Scribed for Brad Marie M.D. by Homero Armenta. 3/22/2022, 6:54 PM.    Primary Care Provider: Mac Miller M.D.  Means of Arrival: Walk-in   History obtained from: Family Member  History limited by: None     CHIEF COMPLAINT  Chief Complaint   Patient presents with    UTI     Recurrent UTI  Here several times but keeps coming back. Now weak      HPI  Filiberto Jauregui is a 88 y.o. male who presents to the Emergency Department for acute, moderate UTI symptoms onset a month ago. Per family member, the patient was seen last week for a UTI and was placed on three courses of antibiotics which has not alleviated his symptoms. Family member notes he has had multiple recurrent UTIs and treatments have not been helping. He has associated symptoms of shakiness, dizziness, weakness, hematuria, and disorientation, but denies fever or generalized pain. No alleviating or exacerbating factors reported. Patient has a history of bladder cancer which was diagnosed in October 2019.    REVIEW OF SYSTEMS  See HPI for further details. All other systems are negative.     PAST MEDICAL HISTORY   has a past medical history of Bladder cancer ([facility]) (2019), Bowel habit changes, CAD (coronary artery disease) (12/2019), Cataract, Dementia (HCC), Diabetes (HCC) ( ), Hemorrhagic disorder ([facility]) ( ), Hyperlipidemia, Hypertension (10/04/2021), Mitral regurgitation (01/2020), MVP (mitral valve prolapse), Pneumonia, Prostate cancer (HCC), Sciatica, Seasonal allergies, Snoring, and Valvular heart disease.    SURGICAL HISTORY   has a past surgical history that includes prostatectomy, radial; cystourethroscopy,ureter catheter (Bilateral, 11/1/2019); tonsillectomy; transcatheter mitral valve repair (1/7/2020); catina (1/7/2020); angiogram; other cardiac surgery (03/10/2020); turbt (transurethral resection of bladder tumor) (3/20/2020); cystoscopy (Bilateral, 3/20/2020); turbt (transurethral resection of bladder tumor) (2/2/2021);  "cystourethroscopy (N/A, 10/14/2021); cystourethroscopy,ureter catheter (N/A, 10/14/2021); and turbt (transurethral resection of bladder tumor) (N/A, 10/14/2021).    SOCIAL HISTORY  Social History     Tobacco Use    Smoking status: Never Smoker    Smokeless tobacco: Never Used   Vaping Use    Vaping Use: Never used   Substance Use Topics    Alcohol use: Not Currently     Alcohol/week: 0.6 oz     Types: 1 Glasses of wine per week     Comment: seldom    Drug use: No      Social History     Substance and Sexual Activity   Drug Use No       FAMILY HISTORY  Family History   Problem Relation Age of Onset    Heart Attack Father     Lung Disease Mother     Cancer Sister         ovaian cancer       CURRENT MEDICATIONS  Current Outpatient Medications   Medication Instructions    aspirin EC (ECOTRIN) 81 mg, Oral, DAILY    atorvastatin (LIPITOR) 40 MG Tab Take 1 tablet by mouth once daily    donepezil (ARICEPT) 10 MG tablet donepezil 10 mg tablet   TAKE 2 TABLETS BY MOUTH EVERY DAY AT BEDTIME FOR 90 DAYS    ferrous sulfate 325 mg, Oral, DAILY    gabapentin (NEURONTIN) 300-600 mg, Oral, 2 TIMES DAILY    loratadine (CLARITIN) 10 mg, Oral, DAILY    losartan (COZAAR) 100 mg, Oral, DAILY    metFORMIN (GLUCOPHAGE) 1,000 mg, Oral, EVERY EVENING    vitamin D3 (CHOLECALCIFEROL) 1,000 Units, Oral, DAILY     ALLERGIES  Allergies   Allergen Reactions    Contrast Media With Iodine [Iodine] Rash     RASH     Iodide      Per pt broke out in rash    Penicillins Rash     .Uncertain reaction-long time ago       PHYSICAL EXAM  VITAL SIGNS: /62   Pulse 70   Temp 36.6 °C (97.8 °F) (Temporal)   Resp 16   Ht 1.676 m (5' 6\")   Wt 66.3 kg (146 lb 2.6 oz)   SpO2 95%   BMI 23.59 kg/m²      Constitutional: Alert in no apparent distress.  HENT: No signs of trauma, Bilateral external ears normal, Nose normal.   Eyes: Pupils are equal and reactive, Conjunctiva normal, Non-icteric.   Neck: Normal range of motion, No tenderness, Supple, No stridor. "   Lymphatic: No lymphadenopathy noted.   Cardiovascular: Regular rate and rhythm, no palpable thrill  Thorax & Lungs: No respiratory distress,  No chest tenderness.  CTAB  Abdomen: Bowel sounds normal, Soft, No tenderness, No masses, No pulsatile masses. No peritoneal signs.  Skin: Warm, Dry, No erythema, No rash.   Back: No bony tenderness, No CVA tenderness.   Extremities: Intact distal pulses, No edema, No tenderness, No cyanosis.  Musculoskeletal: Good range of motion in all major joints. No tenderness to palpation or major deformities noted.   Neurologic: Alert , Normal motor function, Normal sensory function, No focal deficits noted.  Psychiatric: Affect normal, Judgement Normal, Mood normal, Mild confusion.    DIAGNOSTIC STUDIES / PROCEDURES    EKG Interpretation:  Interpreted by me    12 Lead EKG interpreted by me to show:  Normal sinus rhythm  Rate 59  Axis: Normal  Intervals: Normal  Normal T waves  Normal ST segments  My impression of this EKG: Does not indicate ischemia or arrhythmia at this time.     LABS  Labs Reviewed   CBC WITH DIFFERENTIAL - Abnormal; Notable for the following components:       Result Value    RBC 3.11 (*)     Hemoglobin 8.6 (*)     Hematocrit 27.6 (*)     MCHC 31.2 (*)     Lymphocytes 19.80 (*)     All other components within normal limits   COMP METABOLIC PANEL - Abnormal; Notable for the following components:    Glucose 104 (*)     Bun 33 (*)     Creatinine 1.43 (*)     All other components within normal limits   ESTIMATED GFR - Abnormal; Notable for the following components:    GFR (CKD-EPI) 47 (*)     All other components within normal limits   URINALYSIS,CULTURE IF INDICATED - Abnormal; Notable for the following components:    Character Hazy (*)     Leukocyte Esterase Small (*)     Occult Blood Large (*)     All other components within normal limits    Narrative:     Indication for culture:->Patient WITHOUT an indwelling Crenshaw  catheter in place with new onset of Dysuria,  "Frequency,  Urgency, and/or Suprapubic pain   URINE MICROSCOPIC (W/UA) - Abnormal; Notable for the following components:    WBC 5-10 (*)     RBC 20-50 (*)     All other components within normal limits    Narrative:     Indication for culture:->Patient WITHOUT an indwelling Crenshaw  catheter in place with new onset of Dysuria, Frequency,  Urgency, and/or Suprapubic pain   LACTIC ACID   BLOOD CULTURE    Narrative:     1 of 2 for Blood Culture x 2 sites order. Per Hospital  Policy: Only change Specimen Src: to \"Line\" if specified by  physician order.   BLOOD CULTURE    Narrative:     2 of 2 blood culture x2  Sites order. Per Hospital Policy:  Only change Specimen Src: to \"Line\" if specified by physician  order.   LACTIC ACID   LACTIC ACID   CBC WITHOUT DIFFERENTIAL   BASIC METABOLIC PANEL   HGB   IRON/TOTAL IRON BIND     All labs reviewed by me.    COURSE & MEDICAL DECISION MAKING  Pertinent Labs & Imaging studies reviewed. (See chart for details)    This is a 88 y.o. male that presents with what appears to be chronic urinary tract infections versus hematuria.  The patient did have a guaiac positive stool.  We will evaluate for anemia as well as urinary tract infection and reassess..     6:54 PM - Patient seen and examined at bedside. Ordered Lactic Acid, CBC w/ diff, CMP, Blood Culture, and EKG.    9:10 PM - Paged Urology    9:17 PM - I discussed the patient's case and the above findings with Dr. Rain (Urology) who recommends getting a culture and starting the patient on antibiotics. He will consult with the patient in his office.    9:26 PM - Patient was reevaluated at bedside. Discussed plan of care and discharge. Patient and his family members are requesting the patient be admitted to the hospital for further evaluation.    10:35 PM - I discussed the patient's case and the above findings with Dr. Anaya (Hospitalist) who agrees to admit the patient for further evaluation.    The patient will return for new or " worsening symptoms and is stable at the time of discharge.    The patient has no infection in the urine but does have red and white cells.  The patient is guaiac positive.  I spoke to the urologist who does not feel the patient is septic nor has an infection.  At this time I will admit the patient given he is feeling very weak and has a drop in hemoglobin as well as the a GI bleed as well as hematuria.    DISPOSITION:  Patient will be hospitalized by Dr. Anaya in guarded condition.    FINAL IMPRESSION  1. Acute UTI    2. Anemia, unspecified type    3. Gastrointestinal hemorrhage, unspecified gastrointestinal hemorrhage type       I, Homero Armenta (Alex), am scribing for, and in the presence of, Brad Marie M.D..    Electronically signed by: Homero Armenta (Alex), 3/22/2022    IBrad M.D. personally performed the services described in this documentation, as scribed by Homero Armenta in my presence, and it is both accurate and complete.     The note accurately reflects work and decisions made by me.  Brad Marie M.D.  3/23/2022  12:16 AM

## 2022-03-23 NOTE — PROGRESS NOTES
Telemetry Shift Summary     Rhythm: SB, 1st degree AVB, BBB  HR: 55-57   Ectopy: rPVC    Measurements: 0.26/0.14/0.40    Normal Values  Rhythm: SR  HR:   Measurements: 0.12-0.20/0.08-0.10/0.30-0.52

## 2022-03-23 NOTE — ASSESSMENT & PLAN NOTE
Low-fat diet  Statin  Lab Results   Component Value Date/Time    CHOLSTRLTOT 89 (L) 06/26/2019 10:42 AM    LDL 36 06/26/2019 10:42 AM    HDL 29 (A) 06/26/2019 10:42 AM    TRIGLYCERIDE 118 06/26/2019 10:42 AM

## 2022-03-23 NOTE — DISCHARGE PLANNING
Anticipated Discharge Disposition: Anticipate home with HH VS SNF      Action: Discussed pt during IDT rounds. Pt has 6 clicks score of 11/18 and wound to L lateral heel. Wound RN consulted by bedside RN.     Barriers to Discharge: Medical Clearance     Plan: Case management to follow up with wound notes and ADLS

## 2022-03-23 NOTE — ASSESSMENT & PLAN NOTE
Status post EGD  Oozing in the gastric antrum 1 cm area of irregular tissue, APC applied and biopsies taken  Hiatal hernia  Nonobstructing distal esophageal stricture  Erythematous duodenal bulb  Overall pale mucosa  Recommendations are to continue PPI and Carafate therapy

## 2022-03-23 NOTE — CARE PLAN
The patient is Stable - Low risk of patient condition declining or worsening    Shift Goals  Clinical Goals: H&H monitoring  Patient Goals: DEBORAH    Progress made toward(s) clinical / shift goals:  H&H without need for transfusion at time of this note; frequent monitoring.       Problem: Psychosocial  Goal: Patient's level of anxiety will decrease  Outcome: Progressing     Problem: Fall Risk  Goal: Patient will remain free from falls  Outcome: Progressing     Problem: Hemodynamics  Goal: Patient's hemodynamics, fluid balance and neurologic status will be stable or improve  Outcome: Progressing

## 2022-03-23 NOTE — PROGRESS NOTES
4 Eyes Skin Assessment Completed by ERIK Mendez and ERIK Salamanca     Head WDL  Ears WDL  Nose WDL  Mouth Red/dry/rash, crusted areas to chin and mustache  Neck WDL  Breast/Chest WDL  Shoulder Blades WDL  Spine WDL  (R) Arm/Elbow/Hand WDL  (L) Arm/Elbow/Hand WDL  Abdomen WDL   Groin WDL  Scrotum/Coccyx/Buttocks - slow to maged, small denuded area to L side of coccyx  (R) Leg WDL  (L) Leg  WDL  (R) Heel/Foot/Toe WDL  (L) Heel/Foot/Toe wound to L lateral heel covered with dressing and painful to patient; will ask wound care to assess. Present on admit.       Devices In Places Tele Box, PIV              Interventions In Place- pressure distributing mattress, barrier cream     Possible Skin Injury Yes     Pictures Uploaded Into Epic No  Wound Consult Placed Yes  RN Wound Prevention Protocol Ordered No

## 2022-03-23 NOTE — PROGRESS NOTES
[facility] Medicine Daily Progress Note    Date of Service  3/23/2022    Chief Complaint  Filiberto Jauregui is a 88 y.o. male admitted 3/22/2022 with black stools    Hospital Course  Filiberto is an 88-year-old gentleman comes in with his wife to the emergency room complaining of black tarry stools.  Patient is admitted for evaluation of gastrointestinal bleeding.  His hemoglobin from November which was at that time 12 has dropped down to 8.1.  Gastroenterology at this point has been consulted.  We will continue to monitor his H&H over he has not needed a transfusion.    Interval Problem Update  Monitor H&H  GI consultation  Chronic hematuria monitor  Chronic dementia continue with Aricept  Possible EGD versus colonoscopy depending on GI considerations.    I have personally seen and examined the patient at bedside. I discussed the plan of care with patient, family, bedside RN, charge RN,  and pharmacy.    Consultants/Specialty  GI    Code Status  Full Code    Disposition  Patient is not medically cleared for discharge.   Anticipate discharge to to home with close outpatient follow-up.  I have placed the appropriate orders for post-discharge needs.    Review of Systems  Review of Systems   Constitutional: Positive for malaise/fatigue. Negative for chills, diaphoresis and fever.   HENT: Negative.    Eyes: Negative.  Negative for double vision.   Respiratory: Negative.  Negative for cough, hemoptysis and wheezing.    Cardiovascular: Negative.  Negative for chest pain, palpitations and leg swelling.   Gastrointestinal: Positive for melena. Negative for abdominal pain, constipation, diarrhea, heartburn, nausea and vomiting.   Genitourinary: Positive for hematuria. Negative for frequency and urgency.   Musculoskeletal: Negative.  Negative for joint pain.   Skin: Negative.  Negative for itching and rash.   Neurological: Negative.  Negative for dizziness, focal weakness, seizures, loss of consciousness and  headaches.   Endo/Heme/Allergies: Negative.  Does not bruise/bleed easily.   Psychiatric/Behavioral: Positive for memory loss. Negative for suicidal ideas. The patient is not nervous/anxious.    All other systems reviewed and are negative.       Physical Exam  Temp:  [36.4 °C (97.5 °F)-36.6 °C (97.8 °F)] 36.4 °C (97.5 °F)  Pulse:  [56-70] 56  Resp:  [16-18] 16  BP: (108-149)/(51-77) 126/51  SpO2:  [92 %-96 %] 96 %    Physical Exam  Vitals and nursing note reviewed. Exam conducted with a chaperone present.   Constitutional:       General: He is not in acute distress.     Appearance: Normal appearance. He is normal weight. He is not ill-appearing or toxic-appearing.   HENT:      Head: Normocephalic and atraumatic.      Right Ear: External ear normal.      Left Ear: External ear normal.      Nose: Nose normal.      Mouth/Throat:      Mouth: Mucous membranes are moist.      Pharynx: Oropharynx is clear.   Eyes:      Extraocular Movements: Extraocular movements intact.      Conjunctiva/sclera: Conjunctivae normal.      Pupils: Pupils are equal, round, and reactive to light.   Neck:      Thyroid: No thyromegaly.      Vascular: No JVD.   Cardiovascular:      Rate and Rhythm: Normal rate and regular rhythm.      Pulses: Normal pulses.      Heart sounds: Normal heart sounds. No murmur heard.  Pulmonary:      Effort: Pulmonary effort is normal.      Breath sounds: Normal breath sounds. No wheezing or rales.   Chest:      Chest wall: No tenderness.   Abdominal:      General: Abdomen is flat. Bowel sounds are normal. There is no distension.      Palpations: Abdomen is soft. There is no mass.      Tenderness: There is no abdominal tenderness. There is no guarding or rebound.   Musculoskeletal:         General: No tenderness. Normal range of motion.      Cervical back: Normal range of motion and neck supple.   Lymphadenopathy:      Cervical: No cervical adenopathy.   Skin:     General: Skin is warm and dry.      Capillary Refill:  Capillary refill takes less than 2 seconds.      Findings: No erythema or rash.   Neurological:      General: No focal deficit present.      Mental Status: He is alert and oriented to person, place, and time. Mental status is at baseline.      GCS: GCS eye subscore is 4. GCS verbal subscore is 5. GCS motor subscore is 6.      Cranial Nerves: No cranial nerve deficit.      Deep Tendon Reflexes: Reflexes are normal and symmetric.   Psychiatric:         Mood and Affect: Mood normal.         Behavior: Behavior normal.         Thought Content: Thought content normal.         Judgment: Judgment normal.         Fluids    Intake/Output Summary (Last 24 hours) at 3/23/2022 1405  Last data filed at 3/23/2022 1316  Gross per 24 hour   Intake 877.5 ml   Output 625 ml   Net 252.5 ml       Laboratory  Recent Labs     03/22/22  1900 03/23/22  0104 03/23/22  0823   WBC 5.6 5.0  --    RBC 3.11* 2.98*  --    HEMOGLOBIN 8.6* 8.1* 8.7*   HEMATOCRIT 27.6* 25.8*  --    MCV 88.7 86.6  --    MCH 27.7 27.2  --    MCHC 31.2* 31.4*  --    RDW 43.8 42.5  --    PLATELETCT 236 208  --    MPV 9.5 9.7  --      Recent Labs     03/22/22  1900 03/23/22  0104   SODIUM 136 138   POTASSIUM 4.7 4.4   CHLORIDE 102 104   CO2 24 26   GLUCOSE 104* 123*   BUN 33* 31*   CREATININE 1.43* 1.42*   CALCIUM 8.9 8.8                   Imaging  No orders to display        Assessment/Plan  * GI bleed- (present on admission)  Assessment & Plan  Gastrointestinal bleeding, H&H is dropped from November being 12 down to 8.1 today  Gastroenterology consulted  Wife and patient report black tarry stools  GI will decide on possible EGD versus colonoscopy or both    Acute blood loss anemia- (present on admission)  Assessment & Plan  Secondary to combination of hematuria and and GI bleed  Continue monitoring H&H approximately 7 or 21 transfuse    Hematuria- (present on admission)  Assessment & Plan  Chronic hematuria since his bladder cancer and radiation treatments  Urology has  seen the patient and at this point has stated that they will continue following was an outpatient    Stage 3a chronic kidney disease (HCC)- (present on admission)  Assessment & Plan  Monitor renal functions avoid nephrotoxic medications.    Essential hypertension- (present on admission)  Assessment & Plan  Monitor blood pressure at this point adjust blood pressure medications with parameters to keep in mind that if the patient is bleeding he will drop his blood pressure rather quickly and may need fluid resuscitation    Hyperlipidemia- (present on admission)  Assessment & Plan  Low-fat diet  Statin  Fasting lipid panel    Dementia (HCC)- (present on admission)  Assessment & Plan  Chronic dementia continue with Aricept         VTE prophylaxis: SCDs/TEDs    I have performed a physical exam and reviewed and updated ROS and Plan today (3/23/2022). In review of yesterday's note (3/22/2022), there are no changes except as documented above.

## 2022-03-23 NOTE — PROGRESS NOTES
Pt is not sure what medications he is taking.  Called pts wife (Phoebe) @ [phone] and [phone] left message.

## 2022-03-23 NOTE — PROGRESS NOTES
Med rec updated and complete, per pts wife   Allergies reviewed, per pts wife  Interviewed pt with wife at bedside with permission from pt.  Per wife reports that pt was on CEFDINIR 300MG on 1/27/2022 for 7 day course and was on this medications again for 10 days 2 more time.   Last dose of CEFDINIR 300MG was on 3/4/2022 for 10 day course, but his doctor only had him take this medication (CEFDINIR 300MG) for 9 days.  Pts Iron and VITAMIN D was on hold because of his antibiotics, per wife.

## 2022-03-24 NOTE — ANESTHESIA TIME REPORT
Anesthesia Start and Stop Event Times     Date Time Event    3/24/2022 1247 Ready for Procedure     1256 Anesthesia Start     1342 Anesthesia Stop        Responsible Staff  03/24/22    Name Role Begin End    Duncan Sarmiento M.D. Anesth 1256 1342        Preop Diagnosis (Free Text):  Pre-op Diagnosis     GI bleed        Preop Diagnosis (Codes):    Premium Reason  Non-Premium    Comments:

## 2022-03-24 NOTE — WOUND TEAM
"[facility]  Inpatient Services  Initial Wound and Skin Care Evaluation    Admission Date: 3/22/2022     Last order of IP CONSULT TO WOUND CARE was found on 3/23/2022 from Hospital Encounter on 3/22/2022     HPI, PMH, SH: Reviewed    Past Surgical History:   Procedure Laterality Date   • FL CYSTOURETHROSCOPY N/A 10/14/2021    Procedure: CYSTOSCOPY;  Surgeon: Brad Jones M.D.;  Location: [facility];  Service: Urology   • FL CYSTOURETHROSCOPY,URETER CATHETER N/A 10/14/2021    Procedure: CYSTOSCOPY, WITH BILATERAL RETROGRADE PYELOGRAM;  Surgeon: Brad Jones M.D.;  Location: [facility];  Service: Urology   • TURBT (TRANSURETHRAL RESECTION OF BLADDER TUMOR) N/A 10/14/2021    Procedure: TURBT (TRANSURETHRAL RESECTION OF BLADDER TUMOR) - MONOPOLAR WITH INTRAVESICAL GEMCITABINE TREATMENT;  Surgeon: Brad Jones M.D.;  Location: [facility];  Service: Urology   • TURBT (TRANSURETHRAL RESECTION OF BLADDER TUMOR)  2/2/2021    Procedure: TURBT (TRANSURETHRAL RESECTION OF BLADDER TUMOR) - AND FULGURATION;  Surgeon: Alan Rain M.D.;  Location: [facility];  Service: Urology   • TURBT (TRANSURETHRAL RESECTION OF BLADDER TUMOR)  3/20/2020    Procedure: TURBT (TRANSURETHRAL RESECTION OF BLADDER TUMOR)- INTRACESICAL GEMCITABINE;  Surgeon: Brad Jones M.D.;  Location: [facility];  Service: Urology   • CYSTOSCOPY Bilateral 3/20/2020    Procedure: CYSTOSCOPY WITH RETROGRADES;  Surgeon: Brad Jones M.D.;  Location: [facility];  Service: Urology   • OTHER CARDIAC SURGERY  03/10/2020    \"Mitral Valve Clip\"   • TRANSCATHETER MITRAL VALVE REPAIR  1/7/2020    Procedure: REPAIR, MITRAL VALVE, TRANSCATHETER;  Surgeon: Gary Wilson M.D.;  Location: [facility];  Service: Cardiac   • JOSÉ  1/7/2020    Procedure: ECHOCARDIOGRAM, TRANSESOPHAGEAL- POTENTIALLY;  Surgeon: Gary Wilson M.D.;  Location: [facility];  Service: Cardiac "   • OH CYSTOURETHROSCOPY,URETER CATHETER Bilateral 11/1/2019    Procedure: CYSTOSCOPY, WITH BILATERAL RETROGRADE PYELOGRAM-AND 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;  Surgeon: Brad Jones M.D.;  Location: [facility];  Service: Urology   • ANGIOGRAM      With Stent Placement   • PROSTATECTOMY, RADIAL     • TONSILLECTOMY       Social History     Tobacco Use   • Smoking status: Never Smoker   • Smokeless tobacco: Never Used   Substance Use Topics   • Alcohol use: Not Currently     Alcohol/week: 0.6 oz     Types: 1 Glasses of wine per week     Comment: seldom     Chief Complaint   Patient presents with   • UTI     Recurrent UTI  Here several times but keeps coming back. Now weak      Diagnosis: GI bleed [K92.2]    Unit where seen by Wound Team: 3312/02     WOUND CONSULT/FOLLOW UP RELATED TO: L foot lateral, coccyx     WOUND HISTORY:  Per spouse wounds have been there for weeks and the  nurse has been treating them.    WOUND ASSESSMENT/LDA    Wound 03/23/22 Full Thickness Wound Foot Lateral Left (Active)     predebridement     postdebridement 03/23/22 1700   Site Assessment Red;Pink    Periwound Assessment Intact    Margins Defined edges    Closure Secondary intention    Drainage Amount Scant    Drainage Description Serosanguineous    Treatments Cleansed;CSWD - Conservative Sharp Wound Debridement    Wound Cleansing Normal Saline Irrigation    Periwound Protectant Not Applicable    Dressing Cleansing/Solutions Not Applicable    Dressing Options Hydrofera Blue Ready;Mepilex Heel    Dressing Changed Changed    Dressing Status Intact    Dressing Change/Treatment Frequency Every 72 hrs, and As Needed    NEXT Dressing Change/Treatment Date 03/26/22    NEXT Weekly Photo (Inpatient Only) 03/30/22    Non-staged Wound Description Full thickness 03/23/22 1700   Wound Length (cm) 1 cm 03/23/22 1700   Wound Width (cm) 1.3 cm 03/23/22 1700   Wound Surface Area (cm^2) 1.3 cm^2 03/23/22 1700    Post-Procedure Length (cm) 0.5 cm 03/23/22 1700   Post-Procedure Width (cm) 0.5 cm 03/23/22 1700   Post-Procedure Depth (cm) 0.2 cm 03/23/22 1700   Post-Procedure Surface Area (cm^2) 0.25 cm^2 03/23/22 1700   Post-Procedure Volume (cm^3) 0.05 cm^3 03/23/22 1700   Shape circular    Wound Odor None    Pulses 2+;DP    Exposed Structures None    Number of days: 0       Wound 03/23/22 Pressure Injury Coccyx Midline POA St 3 (Active)      03/23/22 1700   Site Assessment Red    Periwound Assessment Intact    Margins Defined edges    Closure Secondary intention    Drainage Amount None    Treatments Cleansed    Wound Cleansing Normal Saline Irrigation    Periwound Protectant Hydrocolloid    Dressing Cleansing/Solutions Not Applicable    Dressing Options Hydrocolloid Thin;Mepilex    Dressing Changed New    Dressing Status Intact    Dressing Change/Treatment Frequency Every 72 hrs, and As Needed    NEXT Dressing Change/Treatment Date 03/26/22    NEXT Weekly Photo (Inpatient Only) 03/30/22    Pressure Injury Stage 3 03/23/22 1700   Wound Length (cm) 0.5 cm 03/23/22 1700   Wound Width (cm) 0.5 cm 03/23/22 1700   Wound Depth (cm) 0.2 cm 03/23/22 1700   Wound Surface Area (cm^2) 0.25 cm^2 03/23/22 1700   Wound Volume (cm^3) 0.05 cm^3 03/23/22 1700   Shape circular    Wound Odor None    Exposed Structures None    Number of days: 0        Vascular:    RIVERA:   No results found.    Lab Values:    Lab Results   Component Value Date/Time    WBC 5.0 03/23/2022 01:04 AM    RBC 2.98 (L) 03/23/2022 01:04 AM    HEMOGLOBIN 9.0 (L) 03/23/2022 04:28 PM    HEMATOCRIT 25.8 (L) 03/23/2022 01:04 AM    HBA1C 6.0 (H) 10/04/2021 01:06 PM        Culture Results show:  No results found for this or any previous visit (from the past 720 hour(s)).    Pain Level/Medicated:  C/o pain with cleaning and palpating coccyx wound       INTERVENTIONS BY WOUND TEAM:  Chart and images reviewed. Discussed with bedside RN. All areas of concern (based on picture  "review, LDA review and discussion with bedside RN) have been thoroughly assessed. Documentation of areas based on significant findings. This RN in to assess patient. Performed standard wound care which includes appropriate positioning, dressing removal and non-selective debridement. Pictures and measurements obtained weekly if/when required.  Preparation for Dressing removal: Dressing from L foot removed with no trauma to skin  Non-selectively Debrided with:  NS and gauze.  Sharp debridement: Foot- using forceps and scissors to remove crust and non-viable tissue revealing a red wound bed and intact skin to periwound. No bleeding and scant pain that stopped as soon as crust removed.   Shanell wound: Cleansed with NS, dried  Primary Dressing: Foot-HFB Ready;  Coccyx- thin hydrocolloid  Secondary (Outer) Dressing: foot large half of heel mepilex;  Coccyx- Mepilex    Interdisciplinary consultation: Patient, Bedside RN, spouse    EVALUATION / RATIONALE FOR TREATMENT:  Most Recent Date:  3/23: Pt has full thickness wound to lateral L foot that is starting to resolve. Hydrofera Blue applied for the hydrophilic polyurethane foam which contains ethylene oxide used as a bactericidal, fungicidal, and sporicidal disinfectant. Hydrofera Blue also aids in maintaining a moist wound environment. The absorption properties of this dressing are important in collecting exudates and bacteria from the injured area. These harmful fluid secretions bind to the dressing removing it from the wound without the foam sticking to the wound causing more harm.   Coccyx with POA St 3 Pressure injury that wife states \"has been there for weeks.\" Hydrocolloid applied to provide occlusive drsg to help promote autolytic debridement. It will also promote a moisture-balanced environment conducive to wound healing.  Mepilex helps protect against the extrinsic forces of pressure, shear and friction and manages microclimate of wound bed.     Goals: Steady " decrease in wound area and depth weekly.    WOUND TEAM PLAN OF CARE ([X] for frequency of wound follow up,):   Nursing to follow orders written for wound care. Contact wound team if area fails to progress, deteriorates or with any questions/concerns  Dressing changes by wound team:                   Follow up 3 times weekly:                NPWT change 3 times weekly:     Follow up 1-2 times weekly:      Follow up Bi-Monthly:                   Follow up as needed:     Other (explain):     NURSING PLAN OF CARE ORDERS (X):  Dressing changes: See Dressing Care orders: x  Skin care: See Skin Care orders:   RN Prevention Protocol: x  Rectal tube care: See Rectal Tube Care orders:   Other orders:    RSKIN:   CURRENTLY IN PLACE (X), APPLIED THIS VISIT (A), ORDERED (O):   Q shift Sandeep:  X  Q shift pressure point assessments:  X    Surface/Positioning   Pressure redistribution mattress  x          Low Airloss          Bariatric foam      Bariatric ARI     Waffle cushion   a     Waffle Overlay          Reposition q 2 hours  x    TAPs Turning system     Z Mike Pillow     Offloading/Redistribution  Sacral Mepilex (Silicone dressing)   a  Heel Mepilex (Silicone dressing)         Heel float boots (Prevalon boot)             Float Heels off Bed with Pillows           Respiratory RA  Silicone O2 tubing         Gray Foam Ear protectors     Cannula fixation Device (Tender )          High flow offloading Clip    Elastic head band offloading device      Anchorfast                                                         Trach with Optifoam split foam             Containment/Moisture Prevention urinal    Rectal tube or BMS    Purwick/Condom Cath        Crenshaw Catheter    Barrier wipes           Barrier paste       Antifungal tx      Interdry        Mobilization       Up to chair   x     Ambulate      PT/OT      Nutrition    Dietician        Diabetes Education      PO x   TF     TPN     NPO   # days     Other        Anticipated  discharge plans: CRISPIN has been seeing HH  LTACH:        SNF/Rehab:                  Home Health Care:           Outpatient Wound Center:            Self/Family Care:        Other:           Vac Discharge Needs:   Not Applicable Pt not on a wound vac:  x     Regular Vac while inpatient, alternative dressing at DC:        Regular Vac in use and continued at DC:            Reg. Vac w/ Skin Sub/Biologic in use. Will need to be changed 2x wkly:      Veraflo Vac while inpatient, ok to transition to Regular Vac on Discharge:           Veraflo Vac while inpatient, will need to remain on Veraflo Vac upon discharge:

## 2022-03-24 NOTE — PROGRESS NOTES
Hospital Medicine Daily Progress Note    Date of Service  3/24/2022    Chief Complaint  Filiberto Jauregui is a 88 y.o. male admitted 3/22/2022 with black stools    Hospital Course  3/23/2022 Filiberto is an 88-year-old gentleman comes in with his wife to the emergency room complaining of black tarry stools.  Patient is admitted for evaluation of gastrointestinal bleeding.  His hemoglobin from November which was at that time 12 has dropped down to 8.1.  Gastroenterology at this point has been consulted.  We will continue to monitor his H&H over he has not needed a transfusion.  3/24/2022-patient very lethargic this morning and sleepy.  He apparently did not sleep well last night.  I was able to arouse him with thumb pressure.  Even then though he is not fully responding and his not following commands.  The patient at this point is pending EGD this afternoon.  Ongoing bleeding at this point seems to have stabilized at this point there is no emergent need for transfusion.  Continue at this point with PPI therapy.    Interval Problem Update  PPI therapy  Monitor H&H  EGD this afternoon  Monitor hematuria  Depending on recommendations by gastroenterology discharge will be determined by her outpatient needs    I have personally seen and examined the patient at bedside. I discussed the plan of care with patient, family, bedside RN, charge RN,  and pharmacy.    Consultants/Specialty  GI    Code Status  Full Code    Disposition  Patient is not medically cleared for discharge.   Anticipate discharge to to home with close outpatient follow-up.  I have placed the appropriate orders for post-discharge needs.    Review of Systems  Review of Systems   Unable to perform ROS: Acuity of condition        Physical Exam  Temp:  [36.3 °C (97.4 °F)-36.8 °C (98.2 °F)] 36.8 °C (98.2 °F)  Pulse:  [58-72] 58  Resp:  [16-18] 18  BP: (109-144)/(49-74) 113/49  SpO2:  [91 %-96 %] 95 %    Physical Exam  Vitals and nursing note reviewed.    Constitutional:       General: He is not in acute distress.     Appearance: He is normal weight. He is ill-appearing. He is not toxic-appearing.      Interventions: Nasal cannula in place.   HENT:      Head: Normocephalic and atraumatic.      Right Ear: External ear normal.      Left Ear: External ear normal.      Nose: Nose normal. No congestion or rhinorrhea.      Mouth/Throat:      Mouth: Mucous membranes are dry.      Pharynx: Oropharyngeal exudate present.   Eyes:      Extraocular Movements: Extraocular movements intact.      Conjunctiva/sclera: Conjunctivae normal.      Pupils: Pupils are equal, round, and reactive to light.   Neck:      Thyroid: No thyromegaly.      Vascular: No JVD.   Cardiovascular:      Rate and Rhythm: Normal rate. Rhythm irregular.      Heart sounds: Murmur heard.   Pulmonary:      Effort: Pulmonary effort is normal.      Breath sounds: Normal breath sounds. No wheezing or rales.   Chest:      Chest wall: No tenderness.   Abdominal:      General: Abdomen is flat. Bowel sounds are normal. There is no distension.      Palpations: Abdomen is soft. There is no mass.      Tenderness: There is no abdominal tenderness. There is no guarding or rebound.   Musculoskeletal:         General: No tenderness. Normal range of motion.      Cervical back: Normal range of motion and neck supple.      Right lower leg: No edema.      Left lower leg: No edema.   Lymphadenopathy:      Cervical: No cervical adenopathy.   Skin:     General: Skin is warm and dry.      Capillary Refill: Capillary refill takes less than 2 seconds.      Findings: Bruising present. No erythema or rash.   Neurological:      General: No focal deficit present.      Mental Status: He is lethargic and disoriented.      GCS: GCS eye subscore is 2. GCS verbal subscore is 3. GCS motor subscore is 4.      Cranial Nerves: No cranial nerve deficit.      Sensory: Sensation is intact.      Coordination: Coordination abnormal.      Gait: Gait  abnormal.      Deep Tendon Reflexes: Reflexes are normal and symmetric.   Psychiatric:         Mood and Affect: Mood normal.         Behavior: Behavior is uncooperative.         Fluids    Intake/Output Summary (Last 24 hours) at 3/24/2022 1234  Last data filed at 3/24/2022 0926  Gross per 24 hour   Intake 960 ml   Output 775 ml   Net 185 ml       Laboratory  Recent Labs     03/22/22  1900 03/23/22  0104 03/23/22  0823 03/23/22  1628 03/24/22  0051 03/24/22  0832   WBC 5.6 5.0  --   --   --   --    RBC 3.11* 2.98*  --   --   --   --    HEMOGLOBIN 8.6* 8.1*   < > 9.0* 8.6* 8.3*   HEMATOCRIT 27.6* 25.8*  --   --   --   --    MCV 88.7 86.6  --   --   --   --    MCH 27.7 27.2  --   --   --   --    MCHC 31.2* 31.4*  --   --   --   --    RDW 43.8 42.5  --   --   --   --    PLATELETCT 236 208  --   --   --   --    MPV 9.5 9.7  --   --   --   --     < > = values in this interval not displayed.     Recent Labs     03/22/22  1900 03/23/22  0104   SODIUM 136 138   POTASSIUM 4.7 4.4   CHLORIDE 102 104   CO2 24 26   GLUCOSE 104* 123*   BUN 33* 31*   CREATININE 1.43* 1.42*   CALCIUM 8.9 8.8                   Imaging  No orders to display        Assessment/Plan  * GI bleed- (present on admission)  Assessment & Plan  EGD today    Acute blood loss anemia- (present on admission)  Assessment & Plan  H&H at this point seems to have stabilized in the mid 8/26 region.  No transfusion was necessary    Hematuria- (present on admission)  Assessment & Plan  Chronic and mild  Followed by urology as an outpatient      Stage 3a chronic kidney disease (HCC)- (present on admission)  Assessment & Plan  Avoidance of nephrotoxic medications and monitoring of renal functions continued    Essential hypertension- (present on admission)  Assessment & Plan  Optimize blood pressure management    Hyperlipidemia- (present on admission)  Assessment & Plan  Low-fat diet  Statin  Lab Results   Component Value Date/Time    CHOLSTRLTOT 89 (L) 06/26/2019 10:42 AM     LDL 36 06/26/2019 10:42 AM    HDL 29 (A) 06/26/2019 10:42 AM    TRIGLYCERIDE 118 06/26/2019 10:42 AM       Dementia (HCC)- (present on admission)  Assessment & Plan  Continue with Aricept       VTE prophylaxis: SCDs/TEDs    I have performed a physical exam and reviewed and updated ROS and Plan today (3/24/2022). In review of yesterday's note (3/23/2022), there are no changes except as documented above.

## 2022-03-24 NOTE — PROGRESS NOTES
Telemetry Shift Summary     Rhythm: SR-SB with BBB  HR Range: 44-65  Ectopy: rPVCs  Measurements:           Normal Values  Rhythm SR  HR Range    Measurements 0.12-0.20 / 0.06-0.10  / 0.30-0.52

## 2022-03-24 NOTE — OR NURSING
Patient allergies and NPO status verified. Patient verbalizes understanding of pain scale, expected course of stay and plan of care. Procedure verified with patient and Pt wife as Pt is confused A/Ox2. IV access patent.

## 2022-03-24 NOTE — OP REPORT
EGD Report    Date:  3/24/2022    Surgeon: Effie Carmen D.O., [facility]    Indications: Anemia and positive FOBT    Procedure: Diagnostic EGD with biopsy and control of bleeding    Procedure duration: 23 minutes      Procedure in detail, Findings and Endoscopic Diagnosis:      -Prior to the procedure, a History and Physical was performed, and patient medications and allergies were reviewed. The patient’s tolerance of previous anesthesia was also reviewed. The risks and benefits of the procedure and the sedation options and risks were discussed with the patient. All questions were answered, and informed consent was obtained. The patient was deemed in satisfactory condition to undergo the procedure.    -Prior Anticoagulants: the patient has taken no previous anticoagulant or antiplatelet agents.    -ASA Grade Assessment: see anesthesia note     Anesthesia/medications:  see anesthesia note     -The patient was placed in the left lateral decubitus position. The scope was passed under direct vision. Continuous oxygen was provided via nasal cannula and intravenous sedation was administered in divided doses throughout the procedure. The patient’s blood pressure, pulse, and oxygen saturation were monitored continuously throughout the procedure.    -The adult gastroscope was gently advanced under direct visualization over the tongue, down the esophagus, through the stomach and into the 2nd portion of the duodenum. The color, texture, mucosa and anatomy of esophagus, stomach and duodenum were carefully examined with the scope. The scope was then withdrawn from the patient and the procedure terminated. Further details are in the finding section, based on anatomical location.  Retroflexion was performed in the stomach.    -The patient tolerated the procedure well and there were no immediate complications. The patient was then transferred to the recovery room in stable condition.    EBL: Minimal    Complications:  No immediate  complications      Findings:  Duodenum: The mucosa was pale with patchy areas of erythema in the duodenal bulb.  Multiple biopsies were obtained from the duodenal bulb using a cold biopsy forceps.  The tissue was submitted to pathology.    Stomach: There was patchy mucosal erythema with a few scattered erosions in the gastric antrum.  Multiple biopsies were obtained from the gastric antrum and body using a cold biopsy forceps.  The tissue was submitted to pathology.  There was a 1 cm area of irregular yellow appearing mucosa with associated oozing on the greater curvature of the proximal gastric body.  Multiple biopsies were obtained from the lesion using a cold biopsy forceps.  The tissue was submitted to pathology.  The areas of oozing were successfully treated with APC (40 W, flow 0.8).    Esophagus: The DH was located 42 cm from the incisors.  The EGF was located 40 cm from the incisors.  The SCJ was located 40 cm from the incisors.  There was a nonobstructing distal esophageal stricture which was easily traversable with the adult endoscope.  The esophageal mucosa was otherwise normal in appearance throughout the entire examined esophagus.      Summary of Findings:  -Pale and erythematous duodenopathy in the duodenal bulb; biopsied  -Erosive gastritis in the gastric antrum; biopsied  -1 cm area of irregular yellow appearing mucosa with oozing on the greater curvature the proximal gastric body; biopsied, treated with APC  -Small hiatal hernia  -Nonobstructing distal esophageal stricture      Recommendations:  -A letter will be sent regarding to the biopsy result in about 2-4 weeks  -Return to the hospital floor  -Resume a full liquid diet as tolerated  -Resume current medications  -Continue supportive care and IVF  -Trend hemoglobin  -STAT CBC and CMP (ordered)  -Increase omeprazole to 40 mg twice daily taken 30 minutes prior to meal  -Start sucralfate 1 g 4 times daily x14 days  -Continue ferrous sulfate 325 mg  daily  -Avoid NSAIDs  -Transfuse for goal hemoglobin > 7  -Transfuse for goal platelet count > 50  -Transfuse for goal INR <2.5         Effie Carmen DO, FACP      Thank you for inviting me to participate in the care of this patient. Please do not hesitate to call GI consultants with additional questions/concerns or changes in the patient's clinical status at [phone].

## 2022-03-24 NOTE — OR NURSING
1327 PT RECEIVED IN PACU, REPORT RECEIVED.  VSS, RESP SPONT, EVEN, NON LABORED. PT HYPOTENSIVE. FLUID BOLUS BY DR. MOSS.     1352 VSS. PT AWAKE BUT DROWSY. PT DENIES PAIN, NAUSEA. ABDOMIN SOFT TO PALPATION.     1439 VSS. PT MEETS CRITERIA FOR PACU/UMU TRANSFER TO ROOM.

## 2022-03-24 NOTE — ANESTHESIA PREPROCEDURE EVALUATION
Case: [id number] Date/Time: 03/24/22 1245    Procedure: GASTROSCOPY    Location:  ENDOSCOPIC ULTRASOUND ROOM / SURGERY [facility]    Surgeons: ZEYNEP Ahuja H&P:  PAST MEDICAL HISTORY:   88 y.o. male who presents for Procedure(s):  GASTROSCOPY.  He has current and past medical problems significant for:    Past Medical History:   Diagnosis Date   • Bladder cancer (HCC) 2019        • Bowel habit changes     Constipation   • CAD (coronary artery disease) 12/2019    PCI/NADIRA (Synergy 3.0 x 16mm) the proximal LCx   • Cataract     OU   • Dementia (HCC)    • Diabetes (HCC)      Takes Oral Medication   • Hemorrhagic disorder (HCC)      Takes Plavix   • Hyperlipidemia    • Hypertension 10/04/2021    states well controlled on meds   • Mitral regurgitation 01/2020    Status post Susanne-Clip x 2   • MVP (mitral valve prolapse)    • Pneumonia    • Prostate cancer (HCC)     S/P prostatectomy   • Sciatica    • Seasonal allergies    • Snoring     Has not had a sleep study.   • Valvular heart disease        SMOKING/ALCOHOL/RECREATIONAL DRUG USE:  Social History     Tobacco Use   • Smoking status: Never Smoker   • Smokeless tobacco: Never Used   Vaping Use   • Vaping Use: Never used   Substance Use Topics   • Alcohol use: Not Currently     Alcohol/week: 0.6 oz     Types: 1 Glasses of wine per week     Comment: seldom   • Drug use: No     Social History     Substance and Sexual Activity   Drug Use No       PAST SURGICAL HISTORY:  Past Surgical History:   Procedure Laterality Date   • NC CYSTOURETHROSCOPY N/A 10/14/2021    Procedure: CYSTOSCOPY;  Surgeon: Brad Jones M.D.;  Location: SURGERY [facility];  Service: Urology   • NC CYSTOURETHROSCOPY,URETER CATHETER N/A 10/14/2021    Procedure: CYSTOSCOPY, WITH BILATERAL RETROGRADE PYELOGRAM;  Surgeon: Brad Jones M.D.;  Location: SURGERY [facility];  Service: Urology   • TURBT (TRANSURETHRAL RESECTION OF BLADDER TUMOR) N/A 10/14/2021    Procedure: TURBT  "(TRANSURETHRAL RESECTION OF BLADDER TUMOR) - MONOPOLAR WITH INTRAVESICAL GEMCITABINE TREATMENT;  Surgeon: Brad Jones M.D.;  Location: [facility];  Service: Urology   • TURBT (TRANSURETHRAL RESECTION OF BLADDER TUMOR)  2/2/2021    Procedure: TURBT (TRANSURETHRAL RESECTION OF BLADDER TUMOR) - AND FULGURATION;  Surgeon: Alan Rain M.D.;  Location: [facility];  Service: Urology   • TURBT (TRANSURETHRAL RESECTION OF BLADDER TUMOR)  3/20/2020    Procedure: TURBT (TRANSURETHRAL RESECTION OF BLADDER TUMOR)- INTRACESICAL GEMCITABINE;  Surgeon: Brad Jones M.D.;  Location: [facility];  Service: Urology   • CYSTOSCOPY Bilateral 3/20/2020    Procedure: CYSTOSCOPY WITH RETROGRADES;  Surgeon: Brad Jones M.D.;  Location: [facility];  Service: Urology   • OTHER CARDIAC SURGERY  03/10/2020    \"Mitral Valve Clip\"   • TRANSCATHETER MITRAL VALVE REPAIR  1/7/2020    Procedure: REPAIR, MITRAL VALVE, TRANSCATHETER;  Surgeon: Gary Wilson M.D.;  Location: [facility];  Service: Cardiac   • JOSÉ  1/7/2020    Procedure: ECHOCARDIOGRAM, TRANSESOPHAGEAL- POTENTIALLY;  Surgeon: Gary Wilson M.D.;  Location: [facility];  Service: Cardiac   • MO CYSTOURETHROSCOPY,URETER CATHETER Bilateral 11/1/2019    Procedure: CYSTOSCOPY, WITH BILATERAL RETROGRADE PYELOGRAM-AND 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;  Surgeon: Brad Jones M.D.;  Location: [facility];  Service: Urology   • ANGIOGRAM      With Stent Placement   • PROSTATECTOMY, RADIAL     • TONSILLECTOMY         ALLERGIES:   Allergies   Allergen Reactions   • Contrast Media With Iodine [Iodine] Rash     RASH    • Iodide      Per pt broke out in rash   • Penicillins      .Uncertain reaction-long time ago       MEDICATIONS:  No current facility-administered medications on file prior to encounter.     Current Outpatient Medications on File Prior to Encounter   Medication Sig " Dispense Refill   • Fexofenadine HCl (ALLEGRA PO) Take 1 Tablet by mouth every day.     • cefdinir (OMNICEF) 300 MG Cap Take 300 mg by mouth 2 times a day. Pt started on 3/4/2022 for 10 day course     • fosfomycin (MONUROL) 3 GM Pack Take 3 g by mouth one time.     • gabapentin (NEURONTIN) 300 MG Cap Take 600 mg by mouth 2 times a day.     • metFORMIN (GLUCOPHAGE) 500 MG Tab Take 1,000 mg by mouth every evening.     • donepezil (ARICEPT) 10 MG tablet Take 20 mg by mouth every evening.     • ferrous sulfate 325 (65 Fe) MG tablet Take 325 mg by mouth every day.     • vitamin D (CHOLECALCIFEROL) 1000 Unit (25 mcg) Tab Take 1,000 Units by mouth every day.     • atorvastatin (LIPITOR) 40 MG Tab Take 1 tablet by mouth once daily (Patient taking differently: Take 40 mg by mouth every day.) 90 tablet 3   • aspirin EC (ECOTRIN) 81 MG Tablet Delayed Response Take 81 mg by mouth every day.     • losartan (COZAAR) 100 MG Tab Take 100 mg by mouth every day.         LABS:  Lab Results   Component Value Date/Time    HEMOGLOBIN 8.3 (L) 03/24/2022 0832    HEMATOCRIT 25.8 (L) 03/23/2022 0104    WBC 5.0 03/23/2022 0104     Lab Results   Component Value Date/Time    SODIUM 138 03/23/2022 0104    POTASSIUM 4.4 03/23/2022 0104    CHLORIDE 104 03/23/2022 0104    CO2 26 03/23/2022 0104    GLUCOSE 123 (H) 03/23/2022 0104    BUN 31 (H) 03/23/2022 0104    CALCIUM 8.8 03/23/2022 0104       SARS-CoV2 result: Negative      PREVIOUS ANESTHETICS: See EMR  __________________________________________    Relevant Problems   CARDIAC   (positive) Coronary atherosclerosis   (positive) Essential hypertension         (positive) Stage 3a chronic kidney disease (HCC)       Physical Exam    Airway   Mallampati: II  TM distance: >3 FB  Neck ROM: full       Cardiovascular - normal exam  Rhythm: regular  Rate: normal  (-) murmur     Dental - normal exam           Pulmonary - normal exam  Breath sounds clear to auscultation     Abdominal    Neurological -  normal exam                 Anesthesia Plan    ASA 3   ASA physical status 3 criteria: CAD/stents (> 3 months)    Plan - MAC               Induction: intravenous    Postoperative Plan: Postoperative administration of opioids is intended.    Pertinent diagnostic labs and testing reviewed    Informed Consent:    Anesthetic plan and risks discussed with patient.    Use of blood products discussed with: patient whom consented to blood products.

## 2022-03-24 NOTE — ANESTHESIA POSTPROCEDURE EVALUATION
Patient: Filiberto Jauregui    Procedure Summary     Date: 03/24/22 Room / Location:  ENDOSCOPIC ULTRASOUND ROOM / SURGERY [facility]    Anesthesia Start: 1256 Anesthesia Stop: 1342    Procedure: GASTROSCOPY (N/A Abdomen) Diagnosis: (pending pathology)    Surgeons: Effie Carmen D.O. Responsible Provider: Duncan Sarmiento M.D.    Anesthesia Type: MAC ASA Status: 3          Final Anesthesia Type: MAC  Last vitals  BP   Blood Pressure : (!) 75/51    Temp   36.3 °C (97.3 °F)    Pulse   74   Resp   14    SpO2   99 %      Anesthesia Post Evaluation    Patient location during evaluation: PACU  Patient participation: complete - patient participated  Level of consciousness: sleepy but conscious  Pain score: 0    Airway patency: patent  Anesthetic complications: no  Cardiovascular status: hemodynamically stable  Respiratory status: acceptable  Hydration status: euvolemic  Comments: Patient was non verbal in preop at baseline, able to follow simple commands.  VSS, on supplemental oxygen.     PONV: none          No complications documented.     Nurse Pain Score: 0 (NPRS)

## 2022-03-24 NOTE — CARE PLAN
The patient is Watcher - Medium risk of patient condition declining or worsening    Shift Goals  Clinical Goals: Monitor confusion, hematuria, and H&H  Patient Goals: DEBORAH    Progress made toward(s) clinical / shift goals:    Problem: Fall Risk  Goal: Patient will remain free from falls  Outcome: Met       Patient is not progressing towards the following goals:      Problem: Knowledge Deficit - Standard  Goal: Patient and family/care givers will demonstrate understanding of plan of care, disease process/condition, diagnostic tests and medications  Outcome: Not Met  Note: Pt remains confused     Problem: Communication  Goal: The ability to communicate needs accurately and effectively will improve  Outcome: Not Met  Flowsheets (Taken 3/24/2022 2801)  Communication:   Assessed patient's ability to understand and communicate   Reoriented patient to environment

## 2022-03-25 NOTE — DISCHARGE SUMMARY
Discharge Summary    CHIEF COMPLAINT ON ADMISSION  Chief Complaint   Patient presents with   • UTI     Recurrent UTI  Here several times but keeps coming back. Now weak        Reason for Admission  UTI,Confusion     Admission Date  3/22/2022    CODE STATUS  Full Code    HPI & HOSPITAL COURSE  Mr Filiberto Jauregui is an 88 year old male who comes in with black stools.  The patient was found to have a low hemoglobin.  He also has a history of bladder cancer and because of this was having hematuria.  The patient was initially seen by urology in the emergency room and urology had cleared him to follow-up with them in the office.  Given his black stools the patient was admitted and serial hemoglobins were evaluated.  His H&H continued to drop.  Gastroenterology was thus consulted.  Patient was placed on PPI therapy.  The a.m. gastroenterology did taken for EGD which at this point shows multiple gastric erosions as well as duodenal erosions.  The patient at this point will continue on Carafate and PPI therapy.  H&H this point has been stabilized.  Post surgery patient was noted to have residual urine in the bladder with 454 mL of urine retained.  I talked to urology again at this point they recommend placing a Crenshaw catheter with a leg bag.  The patient thus will be discharged home with a Crenshaw catheter and leg bag.  He will then follow-up with urology as an outpatient early next week.  Patient also need to follow-up with gastroenterology for biopsy results as multiple biopsies were taken from the stomach as well as duodenum.  Patient otherwise at this point is back to his baseline.  I have spoken with the wife in detail who is his primary caretaker.  At this point physical therapy and Occupational Therapy did evaluate the patient and find that the patient is much weaker than his usual baseline.  The wife agrees and at this point she is interested in the patient going to a rehab facility for ongoing therapies.  The patient  has been accepted at [facility] and thus the patient can go there for continued management and treatment.  The patient does have underlying dementia and at this point he is very weak and will need several weeks of therapy before he reaches back to his baseline.    Therefore, he is discharged in good and stable condition to skilled nursing facility.    The patient met 2-midnight criteria for an inpatient stay at the time of discharge.    Discharge Date  3/28/2022    FOLLOW UP ITEMS POST DISCHARGE  Follow-up with the primary care physician in 7 to 10 days    DISCHARGE DIAGNOSES  Principal Problem:    GI bleed POA: Yes  Active Problems:    Acute blood loss anemia POA: Yes    Essential hypertension POA: Yes    Stage 3a chronic kidney disease (HCC) POA: Yes    Hematuria POA: Yes    Dementia (HCC) POA: Yes    Hyperlipidemia POA: Yes  Resolved Problems:    * No resolved hospital problems. *      FOLLOW UP  Future Appointments   Date Time Provider Department Center   5/16/2022  1:30 PM [facility]   5/16/2022  2:00 PM [facility]   5/26/2022  1:00 PM Gilson Dempsey M.D. RADT None     Alan Rain M.D.  [address]  [facility] [address]  [phone]            MEDICATIONS ON DISCHARGE     Medication List      START taking these medications      Instructions   nitrofurantoin 100 MG Caps  Commonly known as: Macrobid   Take 1 Capsule by mouth 2 times a day for 5 days.  Dose: 100 mg     omeprazole 40 MG delayed-release capsule  Commonly known as: PRILOSEC   Take 1 Capsule by mouth 2 times a day.  Dose: 40 mg     sucralfate 1 GM/10ML Susp  Commonly known as: CARAFATE   Take 10 mL by mouth every 6 hours for 30 days.  Dose: 1 g        CONTINUE taking these medications      Instructions   ALLEGRA PO   Take 1 Tablet by mouth every day.  Dose: 1 Tablet     aspirin EC 81 MG Tbec  Commonly known as: ECOTRIN   Take 81 mg by mouth every day.  Dose: 81 mg     atorvastatin 40 MG Tabs  Commonly known as:  LIPITOR   Take 1 tablet by mouth once daily     donepezil 10 MG tablet  Commonly known as: ARICEPT   Take 20 mg by mouth every evening.  Dose: 20 mg     ferrous sulfate 325 (65 Fe) MG tablet   Take 325 mg by mouth every day.  Dose: 325 mg     fosfomycin 3 GM Pack  Commonly known as: MONUROL   Take 3 g by mouth one time.  Dose: 3 g     gabapentin 300 MG Caps  Commonly known as: NEURONTIN   Take 600 mg by mouth 2 times a day.  Dose: 600 mg     losartan 100 MG Tabs  Commonly known as: COZAAR   Take 100 mg by mouth every day.  Dose: 100 mg     metFORMIN 500 MG Tabs  Commonly known as: GLUCOPHAGE   Take 1,000 mg by mouth every evening.  Dose: 1,000 mg     vitamin D3 1000 Unit (25 mcg) Tabs  Commonly known as: cholecalciferol   Take 1,000 Units by mouth every day.  Dose: 1,000 Units        STOP taking these medications    cefdinir 300 MG Caps  Commonly known as: OMNICEF            Allergies  Allergies   Allergen Reactions   • Contrast Media With Iodine [Iodine] Rash     RASH    • Iodide      Per pt broke out in rash   • Penicillins      .Uncertain reaction-long time ago       DIET  Orders Placed This Encounter   Procedures   • Diet Order Diet: Full Liquid     Standing Status:   Standing     Number of Occurrences:   1     Order Specific Question:   Diet:     Answer:   Full Liquid [11]       ACTIVITY  As tolerated.  Weight bearing as tolerated    CONSULTATIONS  Gastroenterology  Urology    PROCEDURES  EGD with biopsies  Crenshaw catheter placement    LABORATORY  Lab Results   Component Value Date    SODIUM 140 03/24/2022    POTASSIUM 4.6 03/24/2022    CHLORIDE 107 03/24/2022    CO2 24 03/24/2022    GLUCOSE 89 03/24/2022    BUN 19 03/24/2022    CREATININE 1.13 03/24/2022        Lab Results   Component Value Date    WBC 5.0 03/24/2022    HEMOGLOBIN 9.1 (L) 03/24/2022    HEMATOCRIT 29.8 (L) 03/24/2022    PLATELETCT 198 03/24/2022        Total time of the discharge process exceeds 37 minutes.

## 2022-03-25 NOTE — PROGRESS NOTES
Patient's wife expresses concern about taking patient home & feels he is too weak. She states she is worried he will fall. Dr. Izquierdo made aware & states    Eyeglasses

## 2022-03-25 NOTE — FACE TO FACE
Face to Face Supporting Documentation - Home Health    The encounter with this patient was in whole or in part the primary reason for home health admission.    Date of encounter:   Patient:                    MRN:                       YOB: 2022  Fliiberto Jauregui  [id number]  7/29/1933     Home health to see patient for:  Skilled Nursing care for assessment, interventions & education, Wound Care, Registered dietitian consult, Medical social work consult, Home health aide, Physical Therapy evaluation and treatment and Occupational therapy evaluation and treatment    Skilled need for:  New Onset Medical Diagnosis GIB    Skilled nursing interventions to include:  Wound Care and Comment: scott care    Homebound status evidenced by:  Need the aid of supportive devices such as crutches, canes, wheelchairs or walkers. Leaving home requires a considerable and taxing effort. There is a normal inability to leave the home.    Community Physician to provide follow up care: Mac Miller M.D.     Optional Interventions? No      I certify the face to face encounter for this home health care referral meets the CMS requirements and the encounter/clinical assessment with the patient was, in whole, or in part, for the medical condition(s) listed above, which is the primary reason for home health care. Based on my clinical findings: the service(s) are medically necessary, support the need for home health care, and the homebound criteria are met.  I certify that this patient has had a face to face encounter by myself.  Ismael Izquierdo M.D. - NPI: [id number]

## 2022-03-25 NOTE — PROGRESS NOTES
Telemetry Shift Summary     Rhythm: SR   Rate: 60-85  Measurements: 0.24/0.12/0.42  Ectopy (reported by Monitor Tech): r PVC, o-f PAC      Normal Values  Rhythm: Sinus  HR:   Measurements: 0.12-0.20/0.06-0.10/0.30-0.52

## 2022-03-25 NOTE — DISCHARGE INSTRUCTIONS
Discharge Instructions    Discharged to home by car with relative. Discharged via wheelchair, hospital escort: Yes.  Special equipment needed: Not Applicable    Be sure to schedule a follow-up appointment with your primary care doctor or any specialists as instructed.     Discharge Plan:        I understand that a diet low in cholesterol, fat, and sodium is recommended for good health. Unless I have been given specific instructions below for another diet, I accept this instruction as my diet prescription.   Other diet: regular    Special Instructions: None    · Is patient discharged on Warfarin / Coumadin?   No     Depression / Suicide Risk    As you are discharged from this [facility] facility, it is important to learn how to keep safe from harming yourself.    Recognize the warning signs:  · Abrupt changes in personality, positive or negative- including increase in energy   · Giving away possessions  · Change in eating patterns- significant weight changes-  positive or negative  · Change in sleeping patterns- unable to sleep or sleeping all the time   · Unwillingness or inability to communicate  · Depression  · Unusual sadness, discouragement and loneliness  · Talk of wanting to die  · Neglect of personal appearance   · Rebelliousness- reckless behavior  · Withdrawal from people/activities they love  · Confusion- inability to concentrate     If you or a loved one observes any of these behaviors or has concerns about self-harm, here's what you can do:  · Talk about it- your feelings and reasons for harming yourself  · Remove any means that you might use to hurt yourself (examples: pills, rope, extension cords, firearm)  · Get professional help from the community (Mental Health, Substance Abuse, psychological counseling)  · Do not be alone:Call your Safe Contact- someone whom you trust who will be there for you.  · Call your local CRISIS HOTLINE 836-0034 or 198-170-5427  · Call your local Children's Mobile Crisis  Response Team [facility] (055) 814-9576 or www.[employer]  · Call the toll free National Suicide Prevention Hotlines   · National Suicide Prevention Lifeline 315-299-FSRM (7926)  · [employer] Hope Line Network 800-SUICIDE (957-9856)      Acute Urinary Retention, Male    Acute urinary retention means that you cannot pee (urinate) at all, or that you pee too little and your bladder is not emptied completely. If it is not treated, it can lead to kidney damage or other serious problems.  Follow these instructions at home:  · Take over-the-counter and prescription medicines only as told by your doctor. Ask your doctor what medicines you should stay away from. Do not take any medicine unless your doctor says it is okay to do so.  · If you were sent home with a tube that drains the bladder (catheter), take care of it as told by your doctor.  · Drink enough fluid to keep your pee clear or pale yellow.  · If you were given an antibiotic, take it as told by your doctor. Do not stop taking the antibiotic even if you start to feel better.  · Do not use any products that contain nicotine or tobacco, such as cigarettes and e-cigarettes. If you need help quitting, ask your doctor.  · Watch for changes in your symptoms. Tell your doctor about them.  · If told, track changes in your blood pressure at home. Tell your doctor about them.  · Keep all follow-up visits as told by your doctor. This is important.  Contact a doctor if:  · You have spasms or you leak pee when you have spasms.  Get help right away if:  · You have chills or a fever.  · You have a tube that drains the bladder and:  ? The tube stops draining pee.  ? The tube falls out.  · You have blood in your pee.  Summary  · Acute urinary retention means that you have problems peeing. It may mean that you cannot pee at all, or that you pee too little.  · If this condition is not treated, it can lead to kidney damage or other serious problems.  · If you were sent home  with a tube that drains the bladder, take care of it as told by your doctor.  · Monitor any changes in your symptoms. Tell your doctor about any changes.  This information is not intended to replace advice given to you by your health care provider. Make sure you discuss any questions you have with your health care provider.  Document Released: 06/05/2009 Document Revised: 03/05/2020 Document Reviewed: 01/19/2018  [employer] Patient Education © 2020 [employer]  Indwelling Urinary Catheter Care  You have been given a flexible tube (catheter) used to drain the bladder. Catheters are often used when a person has difficulty urinating due to blockage, bleeding, infection, or inability to control bladder or bowel movements (incontinence). A catheter requires daily care to prevent infection and blockage.  HOME CARE INSTRUCTIONS   Do the following to reduce the risk of infection. Antibiotic medicines cannot prevent infections.  Limit the number of bacteria entering your bladder  · Wash your hands for 2 minutes with soapy water before and after handling the catheter.  · Wash your bottom and the entire catheter twice daily, as well as after each bowel movement. Wash the tip of the penis or just above the vaginal opening with soap and warm water, rinse, and then wash the rectal area. Always wash from front to back.  · When changing from the leg bag to overnight bag or from the overnight bag to leg bag, thoroughly clean the end of the catheter where it connects to the tubing with an alcohol wipe.  · Clean the leg bag and overnight bag daily after use. Replace your drainage bags weekly.  · Always keep the tubing and bag below the level of your bladder. This allows your urine to drain properly. Lifting the bag or tubing above the level of your bladder will cause dirty urine to flow back into your bladder. If you must briefly lift the bag higher than your bladder, pinch the catheter or tubing to prevent backflow.  · Drink enough  water and fluids to keep your urine clear or pale yellow, or as directed by your caregiver. This will flush bacteria out of the bladder.  Protect tissues from injury  · Attach the catheter to your leg so there is no tension on the catheter. Use adhesive tape or a leg strap. If you are using adhesive tape, remove any sticky residue left behind by the previous tape you used.  · Place your leg bag on your lower leg. Fasten the straps securely and comfortably.  · Do not remove the catheter yourself unless you have been instructed how to do so.  Keep the urinary pathway open  · Check throughout the day to be sure your catheter is working and urine is draining freely. Make sure the tubing does not become kinked.  · Do not let the drainage bag overfill.  SEEK IMMEDIATE MEDICAL CARE IF:   · The catheter becomes blocked. Urine is not draining.  · Urine is leaking.  · You have any pain.  · You have a fever.  Document Released: 12/18/2006 Document Revised: 12/04/2013 Document Reviewed: 05/19/2011  [employer]® Patient Information ©2014 [employer].

## 2022-03-25 NOTE — CARE PLAN
The patient is Stable - Low risk of patient condition declining or worsening    Shift Goals  Clinical Goals: Monitor for hematuria, trend H&H  Patient Goals: rest, comfort  Family Goals:     Progress made toward(s) clinical / shift goals:  Patient reports no pain. Had a BM and has been urinating in the urinal. Sacrum redressed with mepilex. No signs of hematuria.       Problem: Knowledge Deficit - Standard  Goal: Patient and family/care givers will demonstrate understanding of plan of care, disease process/condition, diagnostic tests and medications  Outcome: Progressing     Problem: Psychosocial  Goal: Patient's level of anxiety will decrease  Outcome: Progressing     Problem: Hemodynamics  Goal: Patient's hemodynamics, fluid balance and neurologic status will be stable or improve  Outcome: Progressing     Problem: Fluid Volume  Goal: Fluid volume balance will be maintained  Outcome: Progressing       Patient is not progressing towards the following goals:

## 2022-03-25 NOTE — DISCHARGE PLANNING
Anticipated Discharge Disposition: Anticipate home      Action: Discussed pt during IDT rounds. 6 clicks score 24. Anticipate no needs at discharge.    Barriers to Discharge: Medical Clearance     Plan: Case management to follow up medical team with any other needs

## 2022-03-25 NOTE — PROGRESS NOTES
Gastroenterology Progress Note               Author:  Effie Carmen D.O. Date & Time Created: 3/25/2022 10:21 AM       Patient ID:  Name:             Filiberto Jauregui  YOB: 1933  Age:                 88 y.o.  male  MRN:               [id number]      Referring Provider:  Ismael Izquierdo MD      Medical Decision Making, by Problem:  Active Hospital Problems    Diagnosis    • GI bleed [K92.2]    • Hyperlipidemia [E78.5]    • Hematuria [R31.9]    • Stage 3a chronic kidney disease (HCC) [N18.31]    • Dementia (HCC) [F03.90]    • Acute blood loss anemia [D62]    • Essential hypertension [I10]              Assessment/Recommendations:  The patient is a very pleasant 88-year-old man with a history of diverticulosis, dementia, prostate cancer (status post total prostatectomy), bladder cancer (status post XRT), stage III CKD, chronic iron deficiency anemia, cataracts, diabetes, HTN/HLD, mitral valve regurgitation (status post mitral valve repair), mitral valve prolapse, and LUCIE who initially presented with recurrent UTI.  GI was consulted for a positive FOBT.     Iron deficiency anemia: Improving with slow uptrend in hemoglobin to 9.1 (baseline in the 10's).  Iron studies showed iron deficiency which is chronic in nature.  Likely multifactorial secondary to use slow intermittent blood loss from gastric oozing and urinary blood loss.  -Diet as tolerated (defer to the primary team)  -Continue supportive care and IVF  -Trend hemoglobin  -Continue omeprazole 20 mg twice daily taken 30 minutes prior to meal  -Continue ferrous sulfate 325 mg daily  -Continue sucralfate 1 g 4 times daily x14 days  -Consider IV iron infusion prior to discharge  -Avoid NSAIDs  -Transfuse for goal hemoglobin > 7  -Transfuse for goal platelet count > 50  -Transfuse for goal INR <2.5  -Continue management of hematuria and UTI (defer to the primary team)     Erosive gastritis with positive FOBT and melena:  Has melena has resolved.  He  has associated chronic iron deficiency anemia.  I suspect his current anemia is multifactorial secondary to hematuria and possible GI blood loss.  He had a colonoscopy 2 years ago (had iron deficiency anemia at that time) which demonstrated diverticulosis but was negative for colon masses or polyps.    He was noted to have erosive gastritis on the greater curvature associated with irregular appearing mucosa (successfully treated with APC, biopsies obtained).   -Diet as tolerated (defer to the primary team)  -Continue supportive care and IVF  -Trend hemoglobin  -Continue omeprazole 20 mg twice daily taken 30 minutes prior to meal  -Continue ferrous sulfate 325 mg daily  -Continue sucralfate 1 g 4 times daily x14 days  -Consider IV iron infusion prior to discharge  -Avoid NSAIDs  -Transfuse for goal hemoglobin > 7  -Transfuse for goal platelet count > 50  -Transfuse for goal INR <2.5      Effie Carmen D.O.        Thank you for inviting me to participate in the care of this patient.  GI to sign off at this time.  If the patient develops changes in clinical course/decompensation or you have any additional questions or concerns, please don't hesitate to reengage GI consultants.  The patient should follow up with [facility] in 4-6 weeks after discharge.  The patient will be called to schedule an appointment time.  If the patient does not receive a call from GI consultants scheduling or they have additional questions, recommend the patient call the clinic at [phone] to schedule an appointment.    ________________________________________________________________________________________________________________________________________________________      Presenting Chief Complaint:  Recurrent UTI with weakness      Interval History:  EGD 3/24/2022:  -Pale and erythematous duodenopathy in the duodenal bulb; biopsied  -Erosive gastritis in the gastric antrum; biopsied  -1 cm area of irregular  yellow appearing mucosa with oozing on the greater curvature the proximal gastric body; biopsied, treated with APC  -Small hiatal hernia  -Nonobstructing distal esophageal stricture    3/25/2022: There were no acute events overnight.  The patient has remained afebrile and hemodynamically stable.  He denies nausea/vomiting, abdominal pain, hematochezia or melena.  He had 1 loose bowel movement which was essentially brown in color.  He has tolerating p.o. intake.  His hemoglobin is up trending to 9.1 today.      Initial GI HPI:  This is a very pleasant 88 y.o. male with the past medical history as listed below.  No family was at bedside during my evaluation.  The majority of his history was obtained from his medical record.  The patient is a poor historian due to dementia.  He does not think he has abdominal pain or nausea/vomiting.     ER physician HPI:  Filiberto Jauregui is a 88 y.o. male who presents to the Emergency Department for acute, moderate UTI symptoms onset a month ago. Per family member, the patient was seen last week for a UTI and was placed on three courses of antibiotics which has not alleviated his symptoms. Family member notes he has had multiple recurrent UTIs and treatments have not been helping. He has associated symptoms of shakiness, dizziness, weakness, hematuria, and disorientation, but denies fever or generalized pain. No alleviating or exacerbating factors reported. Patient has a history of bladder cancer which was diagnosed in October 2019.     Interval update:  I was able to speak to the patient's wife at bedside.  At that time she reported 2-3 weeks of intermittent black stools (patient was not taking iron supplement at that time).  She denies other GI symptoms.       Review of Systems:  Review of Systems   Constitutional: Negative for chills and fever.   Respiratory: Negative for shortness of breath.    Cardiovascular: Negative for chest pain and leg swelling.   Gastrointestinal:  Negative for abdominal pain, blood in stool, constipation, diarrhea, melena, nausea and vomiting.   All other systems reviewed and are negative.        Hospital Medications:  Current Facility-Administered Medications   Medication Dose Frequency Provider Last Rate Last Admin   • omeprazole (PRILOSEC) capsule 40 mg  40 mg BID TOSHIA AhujaOBarb   40 mg at 03/25/22 0542   • sucralfate (CARAFATE) 1 GM/10ML suspension 1 g  1 g Q6HRS TOSHIA AhujaOBarb   1 g at 03/25/22 0542   • atorvastatin (LIPITOR) tablet 40 mg  40 mg DAILY TOSHIA VillarOBarb   40 mg at 03/25/22 0542   • donepezil (ARICEPT) tablet 10 mg  10 mg Nightly TOSHIA VillarO.   10 mg at 03/24/22 2012   • ferrous sulfate tablet 325 mg  325 mg DAILY TOSHIA VillarOBarb   325 mg at 03/25/22 0542   • gabapentin (NEURONTIN) capsule 600 mg  600 mg BID TOSHIA VillarOBarb   600 mg at 03/25/22 0542   • losartan (COZAAR) tablet 100 mg  100 mg DAILY Ismael Izquierdo M.D.   100 mg at 03/25/22 0542   • senna-docusate (PERICOLACE or SENOKOT S) 8.6-50 MG per tablet 2 Tablet  2 Tablet BID TOSHIA VillarO.   2 Tablet at 03/24/22 1821    And   • polyethylene glycol/lytes (MIRALAX) PACKET 1 Packet  1 Packet QDAY PRN TOSHIA VillarO.   1 Packet at 03/24/22 1822    And   • magnesium hydroxide (MILK OF MAGNESIA) suspension 30 mL  30 mL QDAY PRN Omer Anaya D.O.        And   • bisacodyl (DULCOLAX) suppository 10 mg  10 mg QDAY PRN Omer Anaya D.O.       • NS infusion   Continuous Omer Anaya D.O. 75 mL/hr at 03/24/22 2251 1,000 mL at 03/24/22 2251   • acetaminophen (Tylenol) tablet 650 mg  650 mg Q6HRS PRN TOSHIA VillarO.   650 mg at 03/23/22 0131   • hydrALAZINE (APRESOLINE) injection 10 mg  10 mg Q4HRS PRN TOSHIA VillarO.       • ondansetron (ZOFRAN) syringe/vial injection 4 mg  4 mg Q4HRS PRN TOSHIA VillarO.       • ondansetron (ZOFRAN ODT) dispertab 4 mg  4 mg Q4HRS PRN Omer Anaya D.O.       Last reviewed on  "3/24/2022 12:37 PM by Alan Castro R.N.        Vital signs:  Weight/BMI: Body mass index is 24.09 kg/m².  /98   Pulse 70   Temp 36.4 °C (97.5 °F) (Oral)   Resp 18   Ht 1.676 m (5' 6\")   Wt 67.7 kg (149 lb 4 oz)   SpO2 94%   Vitals:    03/24/22 1946 03/25/22 0105 03/25/22 0322 03/25/22 0810   BP: 150/57 127/69 121/62 133/98   Pulse: 60 60 65 70   Resp: 18 19 18 18   Temp: 36.3 °C (97.4 °F) 36.8 °C (98.3 °F) 36.6 °C (97.8 °F) 36.4 °C (97.5 °F)   TempSrc: Oral Oral Oral Oral   SpO2: 92% 93% 96% 94%   Weight:   67.7 kg (149 lb 4 oz)    Height:         Oxygen Therapy:  Pulse Oximetry: 94 %, O2 (LPM): 0, O2 Delivery Device: None - Room Air    Intake/Output Summary (Last 24 hours) at 3/25/2022 1021  Last data filed at 3/25/2022 0649  Gross per 24 hour   Intake 2400 ml   Output 1325 ml   Net 1075 ml         Physical Exam:  Physical Exam  Vitals and nursing note reviewed.   Constitutional:       General: He is not in acute distress.     Appearance: Normal appearance. He is not ill-appearing.   HENT:      Head: Normocephalic and atraumatic.      Nose: Nose normal.   Eyes:      General: No scleral icterus.     Extraocular Movements: Extraocular movements intact.      Conjunctiva/sclera: Conjunctivae normal.   Cardiovascular:      Rate and Rhythm: Normal rate and regular rhythm.      Heart sounds: Normal heart sounds. No murmur heard.    No gallop.   Pulmonary:      Effort: Pulmonary effort is normal. No respiratory distress.      Breath sounds: Normal breath sounds. No wheezing, rhonchi or rales.   Abdominal:      General: Abdomen is flat. Bowel sounds are normal. There is no distension.      Palpations: Abdomen is soft. There is no mass.      Tenderness: There is no abdominal tenderness. There is no guarding or rebound.   Musculoskeletal:         General: Normal range of motion.      Cervical back: Normal range of motion.      Right lower leg: No edema.      Left lower leg: No edema.   Skin:     General: " Skin is warm and dry.      Coloration: Skin is not jaundiced.   Neurological:      General: No focal deficit present.      Mental Status: He is alert. Mental status is at baseline.   Psychiatric:         Mood and Affect: Mood normal.             Labs:  Recent Labs     03/22/22 1900 03/23/22 0104 03/24/22 0051   SODIUM 136 138 140   POTASSIUM 4.7 4.4 4.6   CHLORIDE 102 104 107   CO2 24 26 24   BUN 33* 31* 19   CREATININE 1.43* 1.42* 1.13   CALCIUM 8.9 8.8 8.9     Recent Labs     03/22/22 1900 03/23/22 0104 03/24/22 0051   ALTSGPT 27  --  24   ASTSGOT 29  --  32   ALKPHOSPHAT 65  --  60   TBILIRUBIN 1.1  --  1.1   GLUCOSE 104* 123* 89     Recent Labs     03/22/22 1900 03/23/22 0104 03/24/22 0051 03/24/22  1631   WBC 5.6 5.0  --  5.0   NEUTSPOLYS 68.50  --   --  67.00   LYMPHOCYTES 19.80*  --   --  19.30*   MONOCYTES 9.00  --   --  10.30   EOSINOPHILS 1.80  --   --  2.20   BASOPHILS 0.50  --   --  1.00   ASTSGOT 29  --  32  --    ALTSGPT 27  --  24  --    ALKPHOSPHAT 65  --  60  --    TBILIRUBIN 1.1  --  1.1  --      Recent Labs     03/22/22 1900 03/23/22 0104 03/23/22 0823 03/24/22 0051 03/24/22  0832 03/24/22  1631   RBC 3.11* 2.98*  --   --   --  3.36*   HEMOGLOBIN 8.6* 8.1*   < > 8.6* 8.3* 9.1*   HEMATOCRIT 27.6* 25.8*  --   --   --  29.8*   PLATELETCT 236 208  --   --   --  198   IRON  --  31*  --   --   --   --    FERRITIN  --  12.6*  --   --   --   --    TOTIRONBC  --  275  --   --   --   --     < > = values in this interval not displayed.     Recent Results (from the past 24 hour(s))   Histology Request    Collection Time: 03/24/22  1:32 PM   Result Value Ref Range    Pathology Request Sent to Histo    CBC WITH DIFFERENTIAL    Collection Time: 03/24/22  4:31 PM   Result Value Ref Range    WBC 5.0 4.8 - 10.8 K/uL    RBC 3.36 (L) 4.70 - 6.10 M/uL    Hemoglobin 9.1 (L) 14.0 - 18.0 g/dL    Hematocrit 29.8 (L) 42.0 - 52.0 %    MCV 88.7 81.4 - 97.8 fL    MCH 27.1 27.0 - 33.0 pg    MCHC 30.5 (L) 33.7 -  35.3 g/dL    RDW 43.9 35.9 - 50.0 fL    Platelet Count 198 164 - 446 K/uL    MPV 9.5 9.0 - 12.9 fL    Neutrophils-Polys 67.00 44.00 - 72.00 %    Lymphocytes 19.30 (L) 22.00 - 41.00 %    Monocytes 10.30 0.00 - 13.40 %    Eosinophils 2.20 0.00 - 6.90 %    Basophils 1.00 0.00 - 1.80 %    Immature Granulocytes 0.20 0.00 - 0.90 %    Nucleated RBC 0.00 /100 WBC    Neutrophils (Absolute) 3.33 1.82 - 7.42 K/uL    Lymphs (Absolute) 0.96 (L) 1.00 - 4.80 K/uL    Monos (Absolute) 0.51 0.00 - 0.85 K/uL    Eos (Absolute) 0.11 0.00 - 0.51 K/uL    Baso (Absolute) 0.05 0.00 - 0.12 K/uL    Immature Granulocytes (abs) 0.01 0.00 - 0.11 K/uL    NRBC (Absolute) 0.00 K/uL       Radiology Review:  No orders to display         MDM (Data Review):   -Records reviewed and summarized in current documentation  -I personally reviewed and interpreted the laboratory results        Core Quality Measures   Reviewed items::  Labs and Medications reviewed

## 2022-03-25 NOTE — PROGRESS NOTES
Telemetry Shift Summary     Rhythm: SR/SB 1st degree with BBB  HR Range: 48-80  Ectopy: rPVCs  Measurements: 0.20/0.14/0.44         Normal Values  Rhythm SR  HR Range    Measurements 0.12-0.20 / 0.06-0.10  / 0.30-0.52

## 2022-03-26 PROBLEM — R53.81 DEBILITY: Status: ACTIVE | Noted: 2022-01-01

## 2022-03-26 NOTE — THERAPY
Physical Therapy   Initial Evaluation     Patient Name: Filiberto Jauregui  Age:  88 y.o., Sex:  male  Medical Record #: [id number]  Today's Date: 3/26/2022     Precautions  Precautions: (P) Fall Risk    Assessment  Patient is 88 y.o. male with a diagnosis of G I bleed Pt lives at home with wife and is mod active.Acute PT needed to improve transfers and ambulation.Wife would like him to go to rehab she feels like she would be unable to care for him at this time      Plan    Recommend Physical Therapy 5 times per week until therapy goals are met for the following treatments:  Gait Training, Neuro Re-Education / Balance, Therapeutic Activities, and Therapeutic Exercises    DC Equipment Recommendations: (P) None  Discharge Recommendations: (P) Recommend post-acute placement for additional physical therapy services prior to discharge home        03/26/22 1300   Total Time Spent   Total Time Spent (Mins) 25   Charge Group   PT Evaluation PT Evaluation Mod   Initial Contact Note    Initial Contact Note Order Received and Verified, Physical Therapy Evaluation in Progress with Full Report to Follow.   Precautions   Precautions Fall Risk   Pain 0 - 10 Group   Therapist Pain Assessment 0   Prior Living Situation   Prior Services Intermittent Physical Support for ADL Per Family   Housing / Facility 1 Story House   Equipment Owned Front-Wheel Walker   Lives with - Patient's Self Care Capacity Spouse   Prior Level of Functional Mobility   Bed Mobility Independent   Transfer Status Independent   Ambulation Independent   Distance Ambulation (Feet)   (household)   Cognition    Cognition / Consciousness WDL   Level of Consciousness Alert   Passive ROM Lower Body   Passive ROM Lower Body WDL   Active ROM Lower Body    Active ROM Lower Body  WDL   Strength Lower Body   Lower Body Strength  X   Comments general weakness   Sensation Lower Body   Lower Extremity Sensation   WDL   Coordination Lower Body    Coordination Lower Body  WDL    Balance Assessment   Sitting Balance (Static) Fair +   Sitting Balance (Dynamic) Fair +   Standing Balance (Static) Fair   Standing Balance (Dynamic) Fair -   Weight Shift Sitting Fair   Weight Shift Standing Fair   Gait Analysis   Gait Level Of Assist Contact Guard Assist   Assistive Device Front Wheel Walker   Distance (Feet) 50   # of Times Distance was Traveled 1   Deviation Bradykinetic   # of Stairs Climbed 0   Weight Bearing Status full   Functional Mobility   Sit to Stand Minimal Assist   Bed, Chair, Wheelchair Transfer Minimal Assist   Transfer Method Stand Step   Activity Tolerance   Sitting in Chair > 1hr   Standing 10   Patient / Family Goals    Patient / Family Goal #1 Acute Rehab   Short Term Goals    Short Term Goal # 1 S transfers in 6 V   Short Term Goal # 2 S ambulation x 150 feet in 6 V   Problem List    Problems Impaired Transfers;Impaired Ambulation;Functional Strength Deficit;Impaired Balance;Decreased Activity Tolerance   Anticipated Discharge Equipment and Recommendations   DC Equipment Recommendations None   Discharge Recommendations Recommend post-acute placement for additional physical therapy services prior to discharge home   Interdisciplinary Plan of Care Collaboration   IDT Collaboration with  Nursing   Session Information   Date / Session Number  3/26-1 1/5 4/1      CXR c/w mild COVID-19 PNA. As pt maintaining good room air O2 sat, will d/c home with return precautions. All results and instructions were discussed with pt using  services.

## 2022-03-26 NOTE — PROGRESS NOTES
[facility] Medicine Daily Progress Note    Date of Service  3/25/2022    Chief Complaint  Filiberto Jauregui is a 88 y.o. male admitted 3/22/2022 with black stools    Hospital Course  3/23/2022 Filiberto is an 88-year-old gentleman comes in with his wife to the emergency room complaining of black tarry stools.  Patient is admitted for evaluation of gastrointestinal bleeding.  His hemoglobin from November which was at that time 12 has dropped down to 8.1.  Gastroenterology at this point has been consulted.  We will continue to monitor his H&H over he has not needed a transfusion.  3/24/2022-patient very lethargic this morning and sleepy.  He apparently did not sleep well last night.  I was able to arouse him with thumb pressure.  Even then though he is not fully responding and his not following commands.  The patient at this point is pending EGD this afternoon.  Ongoing bleeding at this point seems to have stabilized at this point there is no emergent need for transfusion.  Continue at this point with PPI therapy.  3/25/2022-after discussion with the wife initially the wife wanted to take him home.  As the day progressed the patient revealed that he had bladder retention and 434 mL of urine were noted on bladder scan.  Crenshaw catheter was placed.  Neurology was made aware and they were going to follow-up with him in the office.  The patient upon being stood was unable to stand or even sit up with out support.  At that point after discussion with the wife she decided she is not going to take him home and she will at this point request that the patient go to a skilled facility otherwise the patient was ready for discharge.    Interval Problem Update  PT OT evaluation  Skilled referral  Will need placement  Will need outpatient follow-up with urology and with Crenshaw catheter  Bleeding at this point has been stopped and the patient will continue with PPI and Carafate therapy    I have personally seen and examined the patient at  bedside. I discussed the plan of care with patient, family, bedside RN, charge RN,  and pharmacy.    Consultants/Specialty  GI    Code Status  Full Code    Disposition  Patient is not medically cleared for discharge.   Anticipate discharge to to home with close outpatient follow-up.  I have placed the appropriate orders for post-discharge needs.    Review of Systems  Review of Systems   Unable to perform ROS: Acuity of condition        Physical Exam  Temp:  [36.9 °C (98.4 °F)-36.9 °C (98.5 °F)] 36.9 °C (98.5 °F)  Pulse:  [61-63] 63  Resp:  [16] 16  BP: (103-111)/(48-64) 111/64  SpO2:  [85 %-97 %] 95 %    Physical Exam  Vitals and nursing note reviewed.   Constitutional:       General: He is not in acute distress.     Appearance: He is well-developed and normal weight. He is ill-appearing.      Interventions: Nasal cannula in place.   HENT:      Head: Normocephalic and atraumatic.      Right Ear: External ear normal.      Left Ear: External ear normal.      Nose: Nose normal. No congestion or rhinorrhea.      Mouth/Throat:      Mouth: Mucous membranes are moist.      Pharynx: Oropharynx is clear. No oropharyngeal exudate.   Eyes:      General:         Right eye: No discharge.         Left eye: No discharge.      Extraocular Movements: Extraocular movements intact.      Conjunctiva/sclera: Conjunctivae normal.      Pupils: Pupils are equal, round, and reactive to light.   Neck:      Thyroid: No thyromegaly.      Vascular: No JVD.   Cardiovascular:      Rate and Rhythm: Normal rate. Rhythm irregular.      Heart sounds: Murmur heard.   Pulmonary:      Effort: Pulmonary effort is normal.      Breath sounds: Normal breath sounds.   Abdominal:      General: Abdomen is flat. Bowel sounds are normal.      Palpations: Abdomen is soft.   Musculoskeletal:         General: No tenderness, deformity or signs of injury. Normal range of motion.      Cervical back: Normal range of motion and neck supple.      Right lower  leg: No edema.      Left lower leg: No edema.   Lymphadenopathy:      Cervical: No cervical adenopathy.   Skin:     General: Skin is warm and dry.      Capillary Refill: Capillary refill takes less than 2 seconds.      Coloration: Skin is pale.      Findings: Bruising present. No erythema or rash.   Neurological:      General: No focal deficit present.      Mental Status: He is lethargic and disoriented.      GCS: GCS eye subscore is 2. GCS verbal subscore is 3. GCS motor subscore is 4.      Cranial Nerves: Cranial nerves are intact. No cranial nerve deficit.      Sensory: Sensation is intact.      Motor: Weakness, atrophy and abnormal muscle tone present.      Coordination: Coordination abnormal.      Gait: Gait abnormal and tandem walk abnormal.      Deep Tendon Reflexes: Reflexes are normal and symmetric.   Psychiatric:         Mood and Affect: Mood normal.         Behavior: Behavior is uncooperative.         Fluids    Intake/Output Summary (Last 24 hours) at 3/26/2022 1412  Last data filed at 3/26/2022 1000  Gross per 24 hour   Intake 120 ml   Output 1720 ml   Net -1600 ml       Laboratory  Recent Labs     03/24/22  0051 03/24/22  0832 03/24/22  1631   WBC  --   --  5.0   RBC  --   --  3.36*   HEMOGLOBIN 8.6* 8.3* 9.1*   HEMATOCRIT  --   --  29.8*   MCV  --   --  88.7   MCH  --   --  27.1   MCHC  --   --  30.5*   RDW  --   --  43.9   PLATELETCT  --   --  198   MPV  --   --  9.5     Recent Labs     03/24/22  0051   SODIUM 140   POTASSIUM 4.6   CHLORIDE 107   CO2 24   GLUCOSE 89   BUN 19   CREATININE 1.13   CALCIUM 8.9                   Imaging  No orders to display        Assessment/Plan  * GI bleed- (present on admission)  Assessment & Plan  EGD revealed erosive gastritis in the gastric antrum with a 1 cm area of irregular yellow appearing mucosa with oozing of the greater curvature of the proximal gastric body that was treated with APC  Additional small he had a hernia and nonobstructing distal esophageal  stricture found, the duodenal bulb was very pale and erythematous  Continue PPI therapy  Continue Carafate        Acute blood loss anemia- (present on admission)  Assessment & Plan  Secondary to GI bleed    Hematuria- (present on admission)  Assessment & Plan  Monitor H&H  Patient had urinary retention yesterday and Crenshaw catheter was placed  Urology aware of the situation he will follow up as an outpatient      Stage 3a chronic kidney disease (HCC)- (present on admission)  Assessment & Plan  Monitor renal functions and avoid nephrotoxic medications    Essential hypertension- (present on admission)  Assessment & Plan  Continue blood pressure management optimization  Keep systolic blood pressure less than 140 diastolic under 90  Continue losartan 100 mg daily hydralazine 10 mg IV every 4 hours as needed for elevated blood pressure as needed    Debility  Assessment & Plan  Patient is debilities so severe the wife would like to at this point have him placed to a skilled facility or rehab.    Hyperlipidemia- (present on admission)  Assessment & Plan  Low-fat diet  Statin  Lab Results   Component Value Date/Time    CHOLSTRLTOT 89 (L) 06/26/2019 10:42 AM    LDL 36 06/26/2019 10:42 AM    HDL 29 (A) 06/26/2019 10:42 AM    TRIGLYCERIDE 118 06/26/2019 10:42 AM       Dementia (HCC)- (present on admission)  Assessment & Plan  On Aricept  Baseline is very confused       VTE prophylaxis: SCDs/TEDs    I have performed a physical exam and reviewed and updated ROS and Plan today (3/25/2022). In review of yesterday's note ([id number]), there are no changes except as documented above.

## 2022-03-26 NOTE — PROGRESS NOTES
spoke to RN, pt tolerating Full liquid. Swallow eval can occur next 1-2 days. Nurs to text SLP if swallow eval needs to occur today.

## 2022-03-26 NOTE — PROGRESS NOTES
[facility] Medicine Daily Progress Note    Date of Service  3/26/2022    Chief Complaint  Filiberto Jauregui is a 88 y.o. male admitted 3/22/2022 with black stools    Hospital Course  3/23/2022 Filiberto is an 88-year-old gentleman comes in with his wife to the emergency room complaining of black tarry stools.  Patient is admitted for evaluation of gastrointestinal bleeding.  His hemoglobin from November which was at that time 12 has dropped down to 8.1.  Gastroenterology at this point has been consulted.  We will continue to monitor his H&H over he has not needed a transfusion.  3/24/2022-patient very lethargic this morning and sleepy.  He apparently did not sleep well last night.  I was able to arouse him with thumb pressure.  Even then though he is not fully responding and his not following commands.  The patient at this point is pending EGD this afternoon.  Ongoing bleeding at this point seems to have stabilized at this point there is no emergent need for transfusion.  Continue at this point with PPI therapy.  3/25/2022-after discussion with the wife initially the wife wanted to take him home.  As the day progressed the patient revealed that he had bladder retention and 434 mL of urine were noted on bladder scan.  Crenshaw catheter was placed.  Neurology was made aware and they were going to follow-up with him in the office.  The patient upon being stood was unable to stand or even sit up with out support.  At that point after discussion with the wife she decided she is not going to take him home and she will at this point request that the patient go to a skilled facility otherwise the patient was ready for discharge.  3/26/2022-I was able to have mild conversations with the patient today.  He remains confused.  He is not really sure of what is happening around him.  I was able to speak with the wife and discussed with her the plan for discharge.  I have explained to her that since he is admitted under observation  status he is really not able to go to a skilled facility.  The patient will need to go to a group home.  At this point case management informs me that the patient will need to stay at least till Monday when the  who handles group homes will be here.  Medically optimized for discharge    Interval Problem Update  Pending PT OT evaluation  Patient remains very confused and weakened  Will need to go to a group home  Continue at this point to monitor H&H  Crenshaw catheter in place  Currently no complaint of pain  Continue PPI and Carafate therapy    I have personally seen and examined the patient at bedside. I discussed the plan of care with patient, family, bedside RN, charge RN,  and pharmacy.    Consultants/Specialty  GI    Code Status  Full Code    Disposition  Patient is medically cleared for discharge.   Anticipate discharge to to home with close outpatient follow-up.  I have placed the appropriate orders for post-discharge needs.    Review of Systems  Review of Systems   Unable to perform ROS: Acuity of condition        Physical Exam  Temp:  [36.9 °C (98.4 °F)-36.9 °C (98.5 °F)] 36.9 °C (98.5 °F)  Pulse:  [61-63] 63  Resp:  [16] 16  BP: (103-111)/(48-64) 111/64  SpO2:  [85 %-97 %] 95 %    Physical Exam  Vitals and nursing note reviewed. Exam conducted with a chaperone present.   Constitutional:       General: He is sleeping.      Appearance: Normal appearance. He is well-developed and normal weight. He is ill-appearing.      Interventions: Nasal cannula in place.   HENT:      Head: Normocephalic and atraumatic.      Right Ear: External ear normal. There is no impacted cerumen.      Left Ear: External ear normal. There is no impacted cerumen.      Nose: Nose normal.      Mouth/Throat:      Mouth: Mucous membranes are moist.      Pharynx: Oropharynx is clear.   Eyes:      General: No scleral icterus.     Extraocular Movements: Extraocular movements intact.      Conjunctiva/sclera: Conjunctivae  normal.      Pupils: Pupils are equal, round, and reactive to light.   Neck:      Thyroid: No thyromegaly.      Vascular: No carotid bruit or JVD.   Cardiovascular:      Rate and Rhythm: Normal rate. Rhythm irregular.      Heart sounds: Murmur heard.   Pulmonary:      Effort: Pulmonary effort is normal.      Breath sounds: Normal breath sounds. No wheezing or rhonchi.   Abdominal:      General: Abdomen is flat. Bowel sounds are normal. There is no distension.      Palpations: Abdomen is soft.      Tenderness: There is no rebound.      Hernia: No hernia is present.   Musculoskeletal:         General: No swelling. Normal range of motion.      Cervical back: Normal range of motion and neck supple. No tenderness.      Right lower leg: No edema.      Left lower leg: No edema.   Lymphadenopathy:      Cervical: No cervical adenopathy.   Skin:     General: Skin is warm and dry.      Capillary Refill: Capillary refill takes less than 2 seconds.      Coloration: Skin is pale.      Findings: Bruising present.   Neurological:      General: No focal deficit present.      Mental Status: He is easily aroused. He is lethargic and disoriented.      GCS: GCS eye subscore is 2. GCS verbal subscore is 3. GCS motor subscore is 4.      Cranial Nerves: Cranial nerves are intact.      Sensory: Sensation is intact. No sensory deficit.      Motor: Weakness, atrophy and abnormal muscle tone present.      Coordination: Coordination abnormal.      Gait: Gait abnormal and tandem walk abnormal.      Deep Tendon Reflexes: Reflexes are normal and symmetric.   Psychiatric:         Mood and Affect: Mood normal.         Fluids    Intake/Output Summary (Last 24 hours) at 3/26/2022 1415  Last data filed at 3/26/2022 1000  Gross per 24 hour   Intake 120 ml   Output 1720 ml   Net -1600 ml       Laboratory  Recent Labs     03/24/22  0051 03/24/22  0832 03/24/22  1631   WBC  --   --  5.0   RBC  --   --  3.36*   HEMOGLOBIN 8.6* 8.3* 9.1*   HEMATOCRIT  --    --  29.8*   MCV  --   --  88.7   MCH  --   --  27.1   MCHC  --   --  30.5*   RDW  --   --  43.9   PLATELETCT  --   --  198   MPV  --   --  9.5     Recent Labs     03/24/22  0051   SODIUM 140   POTASSIUM 4.6   CHLORIDE 107   CO2 24   GLUCOSE 89   BUN 19   CREATININE 1.13   CALCIUM 8.9                   Imaging  No orders to display        Assessment/Plan  * GI bleed- (present on admission)  Assessment & Plan  EGD revealed erosive gastritis in the gastric antrum with a 1 cm area of irregular yellow appearing mucosa with oozing of the greater curvature of the proximal gastric body that was treated with APC  Additional small he had a hernia and nonobstructing distal esophageal stricture found, the duodenal bulb was very pale and erythematous  Monitor H&H  Continue with combination of Carafate and PPI orally.        Acute blood loss anemia- (present on admission)  Assessment & Plan  Anemia at this point stable, most recent hemoglobin 9.1  Source of bleeding found and stopped.    Hematuria- (present on admission)  Assessment & Plan  Continue with Crenshaw catheter and monitor H&H      Stage 3a chronic kidney disease (HCC)- (present on admission)  Assessment & Plan  Repeat labs in the morning for evaluation renal functions    Essential hypertension- (present on admission)  Assessment & Plan  Blood pressure management optimize at this point    Debility  Assessment & Plan  After discussion with  the patient is not able to go to skilled facility as he is observation status and has Medicare for insurance.  Patient will be able to be placed to a group home and I have discussed this with the wife.   states that the patient will not be able to discharge until Monday the earliest not if they have an accepting group home    Hyperlipidemia- (present on admission)  Assessment & Plan  Low-fat diet  Statin  Lab Results   Component Value Date/Time    CHOLSTRLTOT 89 (L) 06/26/2019 10:42 AM    LDL 36 06/26/2019  10:42 AM    HDL 29 (A) 06/26/2019 10:42 AM    TRIGLYCERIDE 118 06/26/2019 10:42 AM       Dementia (HCC)- (present on admission)  Assessment & Plan  Remains confused and occasionally speaks occasionally is not aware of what is going on  Continue with Aricept       VTE prophylaxis: SCDs/TEDs    I have performed a physical exam and reviewed and updated ROS and Plan today (3/26/2022). In review of yesterday's note (3/25/2022), there are no changes except as documented above.

## 2022-03-26 NOTE — CARE PLAN
The patient is Stable - Low risk of patient condition declining or worsening    Shift Goals  Clinical Goals: patient will demonstrate correct use of IS  Patient Goals: sleep  Family Goals: no family present    Progress made toward(s) clinical / shift goals:    Problem: Psychosocial  Goal: Patient's level of anxiety will decrease  Outcome: Progressing     Problem: Hemodynamics  Goal: Patient's hemodynamics, fluid balance and neurologic status will be stable or improve  Outcome: Progressing     Problem: Fluid Volume  Goal: Fluid volume balance will be maintained  Outcome: Progressing     Problem: Skin Integrity  Goal: Skin integrity is maintained or improved  Outcome: Progressing       Patient is not progressing towards the following goals:  Patient unable to perform IS correctly, will need further teaching.     Problem: Knowledge Deficit - Standard  Goal: Patient and family/care givers will demonstrate understanding of plan of care, disease process/condition, diagnostic tests and medications  Outcome: Not Progressing     Problem: Communication  Goal: The ability to communicate needs accurately and effectively will improve  Outcome: Not Progressing

## 2022-03-26 NOTE — ASSESSMENT & PLAN NOTE
[address] [facility], [address]  Phone: [phone]   Toll Free: [phone]   Fax: [phone]    RADIATION ONCOLOGY FOLLOW UP REPORT    PATIENT NAME:  Antionette Waters     : 1947  MEDICAL RECORD NO: [id number]    LOCATION: [facility] NO: [id number]      PROVIDER: Johny Trotter CNP        DATE OF SERVICE: 10/3/2017     FOLLOW UP PHYSICIANS: Dr. Destiny Cali ([facility]), Dr. Chadwick Hubbard CNP    DIAGNOSIS: C61 adenocarcinoma of the prostate, [id number], Stage III, PSA at diagnosis 11.22, Scott score 4+3=7, with extraprostatic extension, perineural invasion, positive margin and detectable and variable PSA. DATE OF DIAGNOSIS: 2015    END OF TREATMENT DATE: 2017    ECOG PERFORMANCE STATUS: 0    PAIN: denies    CHAPERONE: spouse      HPI: Elba Camejo is an active 71year old male who had an elevated PSA in  of 11.22. A biopsy was recommended and completed 2015. This showed adenocarcinoma the prostate Thomas 3+4 = 7 involving 9/12 cores including all core specimens from the left prostate and 3/6 cores from the right prostate. Elba Camejo received a recommendation for a laparoscopic robotic prostatectomy which was performed 2015. Path revealed extraprostatic extension and positive surgical margins. Four right pelvic lymph nodes and 5 left pelvic lymph nodes were sampled all which were negative for malignancy. Postoperative PSA was detectable 0.05. The PSA had been quite variable since that time, measuring as low as 0.02 and as high as 0.09. PSA from 3/15/2017 was 0.08, pretreatment PSA 2017 was 0.05. Elba Camejo received a recommendation for salvage radiation therapy. After discussion about the role of radiation therapy for his prostate cancer Elba Camejo decided to undergo treatment. He did not have any unexpected side effects related to his radiation treatment.        INTERVAL HISTORY: Elba Camejo returns to [facility] Pending discharge to skilled facility at this point   NECK: without adenopathy  LYMPH: no supraclavicular or axillary adenopathy. LUNGS: clear throughout no adventitious sounds. HEART: regular rate and rhythm  ABDOMEN: soft, nontender. Active bowel sounds x4  EXTREMITIES: without clubbing or cyanosis. NEUROLOGIC EXAMINATION: cranial nerves grossly intact. : no CVA tenderness. SKIN: skin in treated area without breakdown, erythema or dryness. MUSCULOSKELETAL: Gait steady, without bony point tenderness, Joint inflammation bilateral hands due to arthritis. ASSESSMENT AND PLAN: Arturo Garces is recuperating well from his radiation treatments. He continues on ADT treatment having considerable hot flashes, interruption of sleep and arthralgias. He is following in [place] for this. His last injection was 8/30/217. He is unsure of the length of time they plan to keep him on ADT. He is going to discuss this with them at his next visit December 1st. Most recent PSA is <0.04 testosterone level is 39 which is as expected while on ADT. These will continue to be ordered by [facility] and we will obtain results from them. In regards to joint stiffness this will be followed, the joint stiffness occurs mainly in the morning and improves throughout the day, It is not a constant issue for him. Last bone scan was 4/2017 which showed multiple areas of arthritis and no metastatic disease. He does have significant ED he has tried cialis and viagra. He is planing to follow with Urology in either [facility] or [place] he does not wish to return to [address] [place] [address] Urology for this. I plan to follow up with Arturo Garces in 6 months time since he is seeing Oncology in [facility] in December. Bates Dez is aware he can call for any questions or concerns that may arise in the interim.       Electronically signed by Sil Burns CNP on 10/3/17 at 9:03 AM

## 2022-03-26 NOTE — THERAPY
Occupational Therapy   Initial Evaluation     Patient Name: Filiberto Jauregui  Age:  88 y.o., Sex:  male  Medical Record #: [id number]  Today's Date: 3/26/2022     Precautions  Precautions: Fall Risk    Assessment  Patient is 88 y.o. male with a diagnosis of GI bleed.  Additional factors influencing patient status / progress: Pt has undergone treatment for bladder cancer over the last 2 years.  Was doing well with ADl's and mobility until he had a UTI recently and had a functional decline.  Wife states he has been making nice progress with HH therapy and she was able to care for him when he was mobile with walker at home and steady on his feet, but since this admit he is way below his baseline.  Pt currently required modA to stand, and only tolerated short distance walk with a second person following with a chair for safety. He is requiring maxA for most self care tasks.  Anticipate he will improve as he improves medically, but spouse cannot care for him on her own in his current condition.  He has been needing help with ADl's just recently since the UTI, but was mostly independent prior to that.  Ideally pt would greatly benefit from further inpt post acute therapy to regain his greatest functional potential.  If structured appropriately with graded progression and rest breaks, he may be able to soon tolerate up to 3 hours a day of therapy and wife reports he is quite motivated for therapy.  At this time pt demos need for further PT, OT and SLP for swallowing and cognition.  Strongly suggest PM&R referral if SNF is not an option for this patient.  OT will follow while in house.    Plan    Recommend Occupational Therapy 4 times per week until therapy goals are met for the following treatments:  Adaptive Equipment, Neuro Re-Education / Balance, Self Care/Activities of Daily Living, Therapeutic Activities, and Therapeutic Exercises.    DC Equipment Recommendations: Unable to determine at this time  Discharge  "Recommendations: Recommend post-acute placement for additional occupational therapy services prior to discharge home     Subjective    \"What do you want me to do now?\"     Objective       03/26/22 1338   Prior Living Situation   Prior Services Intermittent Physical Support for ADL Per Family;Skilled Home Health Services   Housing / Facility 1 Story House   Steps Into Home 3   Steps In Home 0   Bathroom Set up Walk In Shower;Grab Bars;Shower Chair   Equipment Owned Front-Wheel Walker;Tub / Shower Seat;Grab Bar(s) In Tub / Shower   Lives with - Patient's Self Care Capacity Spouse   Comments Spouse has been having to help more recently after complex UTI.  Pt was getting HH therapy   Prior Level of ADL Function   Self Feeding Requires Assist  (just recently, was indep prior to a couple weeks ago)   Grooming / Hygiene Requires Assist   Bathing Requires Assist   Dressing Requires Assist   Toileting Requires Assist   Comments wife has been providing some assist at home as he has been recovering from a UTI.  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  Spouse reports some noted memory loss after chemo and radiation for bladder cancer. Flattened affect, appears fatigued   Active ROM Upper Body   Active ROM Upper Body  WDL   Dominant Hand Right   Strength Upper Body   Upper Body Strength  X   Gross Strength Generalized Weakness, Equal Bilaterally.    Comments wife reports difficulty with hand to mouth for feeding- unable to observe as pt NPO pending swallow eval   Coordination Upper Body   Comments TBA further, appears it may be impaired 2/2 weakness   Balance Assessment   Sitting Balance (Static) Fair +   Sitting Balance (Dynamic) Fair   Standing Balance (Static) Fair -   Standing Balance (Dynamic) Fair -   Weight Shift Sitting Poor   Weight Shift Standing Poor   Comments With FWW.  Heavy posterior lean with initial sit to stand, Loses balance a bit if he stops walking and starts up again   Bed Mobility    Supine to Sit   ([facility] pre and post)   Scooting Minimal Assist   ADL Assessment   Eating   (NT- spouse reports he's been having difficulty self feeding)   Lower Body Dressing Maximal Assist   Toileting Maximal Assist  (scott with leg bag)   How much help from another person does the patient currently need...   Putting on and taking off regular lower body clothing? 2   Bathing (including washing, rinsing, and drying)? 2   Toileting, which includes using a toilet, bedpan, or urinal? 2   Putting on and taking off regular upper body clothing? 3   Taking care of personal grooming such as brushing teeth? 3   Eating meals? 3   6 Clicks Daily Activity Score 15   Functional Mobility   Sit to Stand Moderate Assist   Bed, Chair, Wheelchair Transfer Minimal Assist   Transfer Method Stand Step   Mobility from chair at bedside out into mario with FWW, needed follow with chair for safety due to fatigue   Distance (Feet) 50   # of Times Distance was Traveled 1   Activity Tolerance   Sitting in Chair  > 1 hour   Standing 6- 7min   Patient / Family Goals   Patient / Family Goal #1 Rehab or SNF   Short Term Goals   Short Term Goal # 1 Pt will stand 10 min at sink for grooming with CGA in 5 visits   Short Term Goal # 2 Pt will toilet in BR with Ilana in 6 visits   Short Term Goal # 3 pt will dress from supported sitting with Ilana in 6 visits

## 2022-03-27 NOTE — PROGRESS NOTES
Telemetry Shift Summary entered for Elda RN    Rhythm SR, BBB, 1st degree AV block  HR Range 67-87  Ectopy R-O PVC, R PAC  Measurements 0.22/0.12/0.36        Normal Values  Rhythm SR  HR Range    Measurements 0.12-0.20 / 0.06-0.10  / 0.30-0.52

## 2022-03-27 NOTE — PROGRESS NOTES
[facility] Medicine Daily Progress Note    Date of Service  3/26/2022    Chief Complaint  Filiberto Jauregui is a 88 y.o. male admitted 3/22/2022 with black stools    Hospital Course  3/23/2022 Filiberto is an 88-year-old gentleman comes in with his wife to the emergency room complaining of black tarry stools.  Patient is admitted for evaluation of gastrointestinal bleeding.  His hemoglobin from November which was at that time 12 has dropped down to 8.1.  Gastroenterology at this point has been consulted.  We will continue to monitor his H&H over he has not needed a transfusion.  3/24/2022-patient very lethargic this morning and sleepy.  He apparently did not sleep well last night.  I was able to arouse him with thumb pressure.  Even then though he is not fully responding and his not following commands.  The patient at this point is pending EGD this afternoon.  Ongoing bleeding at this point seems to have stabilized at this point there is no emergent need for transfusion.  Continue at this point with PPI therapy.  3/25/2022-after discussion with the wife initially the wife wanted to take him home.  As the day progressed the patient revealed that he had bladder retention and 434 mL of urine were noted on bladder scan.  Crenshaw catheter was placed.  Neurology was made aware and they were going to follow-up with him in the office.  The patient upon being stood was unable to stand or even sit up with out support.  At that point after discussion with the wife she decided she is not going to take him home and she will at this point request that the patient go to a skilled facility otherwise the patient was ready for discharge.  3/26/2022-I was able to have mild conversations with the patient today.  He remains confused.  He is not really sure of what is happening around him.  I was able to speak with the wife and discussed with her the plan for discharge.  I have explained to her that since he is admitted under observation  status he is really not able to go to a skilled facility.  The patient will need to go to a group home.  At this point case management informs me that the patient will need to stay at least till Monday when the  who handles group homes will be here.  Medically optimized for discharge  3/27/2022-patient today remains confused he is not able to tell me date time or place.  He is not sure what he is doing here.  He is requiring assistance with eating as well as ambulation.  GI bleed seems to be under control.  Hematuria is stable.  Swallow evaluation pending    Interval Problem Update  Clinical swallow evaluation  Monitor H&H  Continue PPI and Carafate  Placement to skilled at this point pending wife to pick several places where referrals will be made to.    I have personally seen and examined the patient at bedside. I discussed the plan of care with patient, family, bedside RN, charge RN,  and pharmacy.    Consultants/Specialty  GI    Code Status  Full Code    Disposition  Patient is medically cleared for discharge.   Anticipate discharge to to home with close outpatient follow-up.  I have placed the appropriate orders for post-discharge needs.    Review of Systems  Review of Systems   Unable to perform ROS: Acuity of condition        Physical Exam  Temp:  [36.7 °C (98 °F)-37.6 °C (99.7 °F)] 37.3 °C (99.1 °F)  Pulse:  [63-82] 68  Resp:  [16-20] 20  BP: (100-112)/(46-54) 112/53  SpO2:  [92 %-96 %] 96 %    Physical Exam  Vitals and nursing note reviewed. Exam conducted with a chaperone present.   Constitutional:       Appearance: Normal appearance. He is well-developed and normal weight. He is ill-appearing.      Interventions: Nasal cannula in place.   HENT:      Head: Normocephalic and atraumatic.      Right Ear: External ear normal. There is no impacted cerumen.      Left Ear: External ear normal. There is no impacted cerumen.      Nose: Nose normal. No congestion or rhinorrhea.       Mouth/Throat:      Mouth: Mucous membranes are moist.      Pharynx: Oropharynx is clear. No oropharyngeal exudate or posterior oropharyngeal erythema.   Eyes:      General:         Right eye: No discharge.         Left eye: No discharge.      Extraocular Movements: Extraocular movements intact.      Conjunctiva/sclera: Conjunctivae normal.      Pupils: Pupils are equal, round, and reactive to light.   Neck:      Thyroid: No thyromegaly.      Vascular: No JVD.   Cardiovascular:      Rate and Rhythm: Normal rate and regular rhythm.      Pulses: Normal pulses.      Heart sounds: Murmur heard.     No gallop.   Pulmonary:      Effort: Pulmonary effort is normal. No respiratory distress.      Breath sounds: Normal breath sounds. No stridor. No rales.   Chest:      Chest wall: No tenderness.   Abdominal:      General: Abdomen is flat. Bowel sounds are normal.      Palpations: Abdomen is soft. There is no mass.      Tenderness: There is no abdominal tenderness. There is no guarding.   Musculoskeletal:         General: No swelling. Normal range of motion.      Cervical back: Normal range of motion and neck supple. No rigidity.      Right lower leg: No edema.      Left lower leg: No edema.   Skin:     General: Skin is warm and dry.      Capillary Refill: Capillary refill takes less than 2 seconds.      Coloration: Skin is pale.      Findings: Bruising present.   Neurological:      General: No focal deficit present.      Mental Status: He is alert and easily aroused. He is disoriented.      GCS: GCS eye subscore is 2. GCS verbal subscore is 3. GCS motor subscore is 4.      Cranial Nerves: Cranial nerves are intact.      Sensory: Sensation is intact. No sensory deficit.      Motor: Weakness, atrophy and abnormal muscle tone present.      Coordination: Coordination abnormal.      Gait: Gait abnormal and tandem walk abnormal.      Deep Tendon Reflexes: Reflexes are normal and symmetric.   Psychiatric:         Attention and  Perception: He is inattentive.         Mood and Affect: Mood is depressed. Affect is flat.         Speech: Speech is delayed.         Behavior: Behavior is slowed.         Cognition and Memory: Cognition is impaired. Memory is impaired. He exhibits impaired recent memory.         Judgment: Judgment is impulsive.         Fluids    Intake/Output Summary (Last 24 hours) at 3/27/2022 1258  Last data filed at 3/27/2022 0600  Gross per 24 hour   Intake 480 ml   Output 660 ml   Net -180 ml       Laboratory  Recent Labs     03/24/22  1631 03/27/22  0246   WBC 5.0 6.5   RBC 3.36* 3.02*   HEMOGLOBIN 9.1* 8.1*   HEMATOCRIT 29.8* 26.0*   MCV 88.7 86.1   MCH 27.1 26.8*   MCHC 30.5* 31.2*   RDW 43.9 43.4   PLATELETCT 198 201   MPV 9.5 10.1     Recent Labs     03/27/22  0246   SODIUM 138   POTASSIUM 4.2   CHLORIDE 104   CO2 24   GLUCOSE 105*   BUN 16   CREATININE 1.43*   CALCIUM 8.6                   Imaging  No orders to display        Assessment/Plan  * GI bleed- (present on admission)  Assessment & Plan  Status post EGD  Oozing in the gastric antrum 1 cm area of irregular tissue, APC applied and biopsies taken  Hiatal hernia  Nonobstructing distal esophageal stricture  Erythematous duodenal bulb  Overall pale mucosa  Recommendations are to continue PPI and Carafate therapy        Acute blood loss anemia- (present on admission)  Assessment & Plan  Secondary to gastrointestinal loss  Most recent hemoglobin 8.1    Hematuria- (present on admission)  Assessment & Plan  Crenshaw catheter in place  Monitor H&H      Stage 3a chronic kidney disease (HCC)- (present on admission)  Assessment & Plan  Monitor renal functions avoid nephrotoxic medications    Essential hypertension- (present on admission)  Assessment & Plan  Monitor blood pressure and adjust medications to optimize for elevation    Debility  Assessment & Plan  Pending discharge to skilled facility at this point    Hyperlipidemia- (present on admission)  Assessment &  Plan  Low-fat diet  Statin  Lab Results   Component Value Date/Time    CHOLSTRLTOT 89 (L) 06/26/2019 10:42 AM    LDL 36 06/26/2019 10:42 AM    HDL 29 (A) 06/26/2019 10:42 AM    TRIGLYCERIDE 118 06/26/2019 10:42 AM       Dementia (HCC)- (present on admission)  Assessment & Plan  Chronic and appears to be at baseline  Continue with Aricept       VTE prophylaxis: SCDs/TEDs    I have performed a physical exam and reviewed and updated ROS and Plan today (3/26/2022). In review of yesterday's note (3/25/2022), there are no changes except as documented above.

## 2022-03-27 NOTE — CARE PLAN
The patient is Stable - Low risk of patient condition declining or worsening    Shift Goals  Clinical Goals: oob  Patient Goals: DEBORAH  Family Goals: find LTC placement    Progress made toward(s) clinical / shift goals:  oob to chair FWW 2 person assist      1800 102.6F, lethargic compared to earlier this shift, oral notified LIP Lizandro. Orders chest x-ray, lactic acid, procal.    IV access established. Labs drawn    Problem: Knowledge Deficit - Standard  Goal: Patient and family/care givers will demonstrate understanding of plan of care, disease process/condition, diagnostic tests and medications  Outcome: Progressing     Problem: Psychosocial  Goal: Patient's level of anxiety will decrease  Outcome: Progressing     Problem: Communication  Goal: The ability to communicate needs accurately and effectively will improve  Outcome: Progressing     Problem: Hemodynamics  Goal: Patient's hemodynamics, fluid balance and neurologic status will be stable or improve  Outcome: Progressing     Problem: Fluid Volume  Goal: Fluid volume balance will be maintained  Outcome: Progressing     Problem: Skin Integrity  Goal: Skin integrity is maintained or improved  Outcome: Progressing       Patient is not progressing towards the following goals:

## 2022-03-27 NOTE — THERAPY
Speech Language Pathology   Clinical Swallow Evaluation     Patient Name: Filiberto Jauregui  AGE:  88 y.o., SEX:  male  Medical Record #: [id number]  Today's Date: 3/27/2022     Precautions  Precautions: Fall Risk,Swallow Precautions ( See Comments)    Assessment  Patient is a 88 yr old male admitted 3/22/22 with black stools. He was found to have a GI bleed which has since stabilized. He has a history of bladder cancer,  recurrent UTIs, Dementia, Chronic kidney disease, HTN. EGD was completed on 3/24/22. Patient was confused and following only simple commands. Clinical swallow evaluation ordered. Received clearance from MD to upgrade from full liquids to solids since GI bleed has stablized.      Level of Consciousness: Drowsy  Affect/Behavior: Appropriate  Follows Directives: Yes - simple commands only  Orientation: Self, General place  Hearing: Functional hearing  Vision: Functional vision      Oral Mechanism Evaluation  Facial Symmetry: Equal  Facial Sensation: Equal  Labial Observations: WFL  Lingual Observations: Midline  Dentition: Good  Comments:      Voice  Quality: WFL  Resonance: WFL  Intensity: Appropriate  Cough: unable to follow direction to cough. He was able to clear his throat  Comments:      Current Method of Nutrition   Oral diet (Full Liquids)         Assessment  Positioning: Anderson's (60-90 degrees)  Bolus Administration: SLP  Oxygen Requirements: Room Air  Factor(s) Affecting Performance: Impaired mental status, Impaired command following    Swallowing Trials  Ice: Not tested  Thin Liquid (TN0): Impaired  Mildly Thick Liquid (MT2): WFL  Liquidised (LQ3): Not tested  Pureed (PU4): Not tested  Minced & Moist (MM5): WFL  Soft & Bite Sized (SB6): Not tested  Easy to Chew (EC7): Not tested  Regular (RG7): Not tested    Comments:Patient declined LQ3/PU4 trials. He consumed chopped canned peaches without difficulty given 1:1 feeding. Thins from the cup resulted in audible gulping then air release from the  lips as well as coughing. Mildly thickened liquids did not reveal signs of aspiration at bedside. Of note, he was quite tremulous and tired after having a BM and getting out of bed. CNA reported temp of 99.9 just prior to evaluation. Patient's sons reports that this patient is currently not at baseline mentally or physically.          Clinical Impressions  Patient presents with signs of oral pharyngeal dysphagia including coughing on water. His confusion is worse today than yesterday per family. I suspect that when patient's cognitive function returns to baseline so will his ability to eat and feed himself. Until then it is recommended that he have a modified diet and assistance with feeding to improve safety of swallow.        Recommendations  1.  Minced and Moist (level 5), Mildly thickened liquids (MTL2)  2.  Instrumentation: None indicated at this time  3.  Swallowing Instructions & Precautions:   Supervision: 1:1 feeding with constant supervision  Positioning: Fully upright and midline during oral intake  Medication: Whole with puree  Strategies: Small bites/sips, No straws, Reduce environmental distractions  Oral Care: Q4h       Plan    Recommend Speech Therapy 5 times per week until therapy goals are met for the following treatments:  Dysphagia Training and Patient / Family / Caregiver Education.    Discharge Recommendations: Recommend post-acute placement for additional speech therapy services prior to discharge home       Objective       03/27/22 0309   Prior Living Situation   Prior Services Intermittent Physical Support for ADL Per Family;Skilled Home Health Services   Housing / Facility 1 Story House   Steps Into Home 3   Steps In Home 0   Bathroom Set up Walk In Shower;Grab Bars;Shower Chair   Equipment Owned Front-Wheel Walker;Tub / Shower Seat;Grab Bar(s) In Tub / Shower   Lives with - Patient's Self Care Capacity Spouse   Prior Level Of Function   Communication Within Functional Limits  (communicates  basic wants/needs)   Swallow Within Functional Limits   Dentition Intact   Dentures None   Hearing Within Functional Limits   Hearing Aid None   Vision Within Functional Limits for Evaluation   Patient's Primary Language English   Education Completed College   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

## 2022-03-27 NOTE — DISCHARGE PLANNING
Received Choice form at 0438  Agency/Facility Name: [facility]  Referral sent per Choice form @ [phone]

## 2022-03-27 NOTE — PROGRESS NOTES
Pt slept well through out the night,  Has voiced no complaints and is  Pleasant,  12 hour chart check complete

## 2022-03-27 NOTE — CARE PLAN
The patient is     Shift Goals  Clinical Goals: safety  Patient Goals: safety  Family Goals: find LTC placement    Progress made toward(s) clinical / shift goals:  ongoing    Patient is not progressing towards the following goals:

## 2022-03-27 NOTE — DISCHARGE PLANNING
Care Transition Team Discharge Planning    Anticipated Discharge Disposition: DC to SNF- pending.    Action: Lsw met with patient, spouse and adult children to discuss SNF options. Lsw faxed CHOICE form ( [facility]) to DPA.    IMM faxed to DPA on 3/27/22 at 0840.    Barriers to Discharge: Awaiting medical clearance and SNF approval.    Plan: Lsw will assist medical team with DC planning.     Care Transition Team Assessment    Information Source  Orientation Level: Oriented to person,Disoriented to place  Information Given By: Patient  Informant's Name: Filiberto Jauregui  Who is responsible for making decisions for patient? : Patient    Readmission Evaluation  Is this a readmission?: Yes - unplanned readmission  Why do you think you were readmitted?: UTI  Was an appointment arranged for you prior to discharge?: No  Were there new prescriptions you were supposed to fill after you were discharged?: No  Did you understand your discharge instructions?: Yes  Did you have enough support after your last discharge?: Yes    Elopement Risk  Legal Hold: No  Ambulatory or Self Mobile in Wheelchair: No-Not an Elopement Risk  Elopement Risk: Not at Risk for Elopement    Interdisciplinary Discharge Planning  Lives with - Patient's Self Care Capacity: Spouse  Patient or legal guardian wants to designate a caregiver: No  Housing / Facility: [address]  Prior Services: Intermittent Physical Support for ADL Per Family,Skilled Home Health Services    Discharge Preparedness  What is your plan after discharge?: Home with help  What are your discharge supports?: Spouse  Prior Functional Level: Ambulatory,Independent with Activities of Daily Living,Uses Walker,Needs Assist with Medication Management  Difficulity with ADLs: Walking  Difficulty with ADLs Comment: Patient uses walker  Difficulity with IADLs: Managing medication,Cooking  Difficulity with IADL Comments: Spouse assists    Functional Assesment  Prior Functional  Level: Ambulatory,Independent with Activities of Daily Living,Uses Walker,Needs Assist with Medication Management    Finances  Financial Barriers to Discharge: No  Prescription Coverage: Yes                   Domestic Abuse  Have you ever been the victim of abuse or violence?: No  Physical Abuse or Sexual Abuse: No  Verbal Abuse or Emotional Abuse: No  Possible Abuse/Neglect Reported to:: Not Applicable    Psychological Assessment  History of Substance Abuse: None  History of Psychiatric Problems: No  Non-compliant with Treatment: No  Newly Diagnosed Illness: Yes    Discharge Risks or Barriers  Discharge risks or barriers?: No  Patient risk factors: Vulnerable adult,Readmission    Anticipated Discharge Information  Discharge Disposition: D/T to SNF with Medicare cert in anticipation of skilled care (03)  Discharge Address: [address] [place] [id number]  Discharge Contact Phone Number: [phone]

## 2022-03-28 NOTE — PROGRESS NOTES
Patient was afebrile during my shift,  Room temp was very high upon arrival so I turned it down,  Patient rested quietly throughout the night not requiring any oxygen.  He voiced no complaints during my shift and is very pleasant to care for.   12 hour chart check complete

## 2022-03-28 NOTE — PROGRESS NOTES
Patient developed fever overnight.  ST worked with patient today and c/f aspiration.  Will get labs and CXR.

## 2022-03-28 NOTE — DISCHARGE PLANNING
Agency/Facility Name: [facility]  Spoke To: María  Outcome: Per maría she will take a look at the referral and call DPA back    1142    Agency/Facility Name:[facility]  Spoke To: María  Outcome: Pt accepted and bed is available today, María will call this DPA back about transport time    1153  Agency/Facility Name: [facility]  Spoke To: maría  Outcome: per maría she set up GMT for [phone] today.       1420  Agency/Facility Name: [facility]  Spoke To: María  Outcome: Per maría they can take the pt still even if the PASRR is in manual review, She just asks that we Follow up on it

## 2022-03-28 NOTE — PROGRESS NOTES
md was contacted to let them know that the urine sample would not be accurate due to indwelling catheter that  Is not to be removed and leg bag that has been there for several days.  Did md still want the ua and response was only if cultures come back possative

## 2022-03-28 NOTE — THERAPY
"Speech Language Pathology  Daily Treatment     Patient Name: Filiberto Jauregui  Age:  88 y.o., Sex:  male  Medical Record #: [id number]  Today's Date: 3/28/2022     Precautions  Precautions: (P) Fall Risk,Swallow Precautions ( See Comments)    Assessment    Pt seen on this date for dysphagia therapy. Pt A&Ox1 and agreeable to therapy. HOB was raised and he was repositioned. He consumed trials of his minced and moist/MTL breakfast with no overt s/sx of aspiration. He also consumed trials of soft and bite size texture and thin liquids with no overt s/sx of aspiration. Mastication and swallow trigger timely. He did required some assistance with feeding, however, fed self. At this time, recommend upgrade to soft and bite size/thin liquid diet with direct supervision during meals.    Plan    1) recommend upgrade to soft and bite size/thin liquid diet with direct supervision during meals    Continue current treatment plan.    Discharge Recommendations: (P) Recommend post-acute placement for additional speech therapy services prior to discharge home       Objective       03/28/22 0936   Precautions   Precautions Fall Risk;Swallow Precautions ( See Comments)   Vitals   O2 (LPM) 0   O2 Delivery Device None - Room Air   Pain 0 - 10 Group   Therapist Pain Assessment Post Activity Pain Same as Prior to Activity;Nurse Notified;0   Patient / Family Goals   Patient / Family Goal #1 \"this is good!\"   Short Term Goals   Short Term Goal # 1 The patient will consume MM5/MTL2 without signs of aspiration given 1:1 feeding   Goal Outcome # 1 Goal met, new goal added   Short Term Goal # 1 B  NEW 3/28: Pt will consume an SB6/TN0 diet with no overt s/sx of aspiration   Education Group   Education Provided Dysphagia   Dysphagia Patient Response Patient;Acceptance;Explanation;Verbal Demonstration;Reinforcement Needed   Anticipated Discharge Needs   Discharge Recommendations Recommend post-acute placement for additional speech therapy services " prior to discharge home   Therapy Recommendations Upon DC Dysphagia Training;Community Re-Integration;Patient / Family / Caregiver Education   Interdisciplinary Plan of Care Collaboration   IDT Collaboration with  Nursing   Patient Position at End of Therapy Seated;In Bed;Call Light within Reach;Tray Table within Reach;Phone within Reach

## 2022-03-28 NOTE — DISCHARGE PLANNING
Anticipated Discharge Disposition: [facility]      Action: KATALINA RN received notification pt has a bed today. Cobra completed and signed. PASRR in manual review, wife updated about need for PASRR prior to transferring.     1441: Pt oked to transfer to [facility]. Pts wife updated.     Barriers to Discharge: PASRR completed     Plan: Case management to follow up with PASRR status

## 2022-03-29 NOTE — PROGRESS NOTES
I was notified by microbiology lab that patient's blood culture from 3/27/2022 is growing gram-positive cocci possible staph species 1/2.  I called the patient's physician at [facility] Dr. Brad Mart and relayed the information to him.  Based on the patient's clinical condition he suspects that this is likely contaminant and he will follow up on the final results of the blood cultures.

## 2022-05-03 NOTE — TELEPHONE ENCOUNTER
Pts wife left me a voicemail stating that he will be going on hospice. Appointments have been canceled.

## 2022-11-08 ENCOUNTER — PATIENT MESSAGE (OUTPATIENT)
Dept: HEALTH INFORMATION MANAGEMENT | Facility: OTHER | Age: 87
End: 2022-11-08

## 2023-08-18 NOTE — ANESTHESIA TIME REPORT
Anesthesia Start and Stop Event Times     Date Time Event    11/1/2019 1327 Ready for Procedure     1409 Anesthesia Start        Responsible Staff  11/01/19    Name Role Begin End    Chandler Fan M.D. Anesth 1409         Preop Diagnosis (Free Text):  Pre-op Diagnosis     BLADDER DISORDER        Preop Diagnosis (Codes):    Post op Diagnosis  Bladder tumor      Premium Reason  A. 3PM - 7AM    Comments:                                                                  Turbt, premium time      sickle cell crisis sickle cell crisis

## (undated) DEVICE — SLEEVE, VASO, THIGH, MED

## (undated) DEVICE — ELECTRODE ROLLERBALL 3MM 24FR (6EA/PK)

## (undated) DEVICE — SYRINGE 12 CC LUER TIP - (80/BX) OBSOLETE ITEM

## (undated) DEVICE — ELECTRODE 850 FOAM ADHESIVE - HYDROGEL RADIOTRNSPRNT (50/PK)

## (undated) DEVICE — MASK WITH FACE SHIELD (25/BX 4BX/CA)

## (undated) DEVICE — STOPCOCK 3-WAY W/SWIVEL LEVER LOCK (50EA/CA)

## (undated) DEVICE — SENSOR SPO2 NEO LNCS ADHESIVE (20/BX) SEE USER NOTES

## (undated) DEVICE — SET IRRIGATION CYSTOSCOPY Y-TYPE L81 IN (20EA/CA)

## (undated) DEVICE — CANISTER SUCTION 3000ML MECHANICAL FILTER AUTO SHUTOFF MEDI-VAC NONSTERILE LF DISP  (40EA/CA)

## (undated) DEVICE — ELECTRODE DUAL RETURN W/ CORD - (50/PK)

## (undated) DEVICE — WATER IRRIG. STER 3000 ML - (4/CA)

## (undated) DEVICE — HEAD HOLDER JUNIOR/ADULT

## (undated) DEVICE — KIT ANESTHESIA W/CIRCUIT & 3/LT BAG W/FILTER (20EA/CA)

## (undated) DEVICE — SET LEADWIRE 5 LEAD BEDSIDE DISPOSABLE ECG (1SET OF 5/EA)

## (undated) DEVICE — SYRINGE DISP. 12 CC LL - (100/BX)

## (undated) DEVICE — TUBING CLEARLINK DUO-VENT - C-FLO (48EA/CA)

## (undated) DEVICE — Device

## (undated) DEVICE — BAG DRAINAGE URINARY CLOSED 2000ML (20EA/CA)

## (undated) DEVICE — KIT ROOM DECONTAMINATION

## (undated) DEVICE — GLOVE BIOGEL INDICATOR SZ 8 SURGICAL PF LTX - (50/BX 4BX/CA)

## (undated) DEVICE — TUBE CONNECT SUCTION CLEAR 120 X 1/4" (50EA/CA)"

## (undated) DEVICE — TUBE E-T HI-LO CUFF 7.0MM (10EA/PK)

## (undated) DEVICE — GOWN SURGICAL X-LARGE ULTRA - FILM-REINFORCED (20/CA)

## (undated) DEVICE — CATHETER URETHRAL FOLEY SILICONE 20 FR 30 ML 3-WAY

## (undated) DEVICE — GOWN SURGEONS X-LARGE - DISP. (30/CA)

## (undated) DEVICE — SPONGE GAUZESTER 4 X 4 4PLY - (128PK/CA)

## (undated) DEVICE — SUCTION INSTRUMENT YANKAUER BULBOUS TIP W/O VENT (50EA/CA)

## (undated) DEVICE — LACTATED RINGERS INJ 1000 ML - (14EA/CA 60CA/PF)

## (undated) DEVICE — MASK ANESTHESIA ADULT  - (100/CA)

## (undated) DEVICE — BAG RESUSCITATION DISPOSABLE - WITH MASK (10 EA/CA)

## (undated) DEVICE — KIT ULTRASND COVER - (20EA/CA)

## (undated) DEVICE — GLOVE BIOGEL PI ULTRATOUCH SZ 7.0 SURGICAL PF LF- POWDER FREE (50/BX 4BX/CA)

## (undated) DEVICE — WATER IRRIG. STER. 1500 ML - (9/CA)

## (undated) DEVICE — GLOVE BIOGEL PI ULTRATOUCH SZ 7.5 SURGICAL PF LF -(50/BX 4BX/CA)

## (undated) DEVICE — PACK SINGLE BASIN - (6/CA)

## (undated) DEVICE — PROTECTOR ULNA NERVE - (36PR/CA)

## (undated) DEVICE — ELECTRODE ROLLERBALL 5MM 24FR (6EA/PK)

## (undated) DEVICE — GOWN WARMING STANDARD FLEX - (30/CA)

## (undated) DEVICE — KIT CUSTOM PROCEDURE SINGLE FOR ENDO  (15/CA)

## (undated) DEVICE — TRAY SKIN SCRUB PVP WET (20EA/CA) PART #DYND70356 DISCONTINUED

## (undated) DEVICE — SYRINGE SAFETY 5 ML 18 GA X 1-1/2 BLUNT LL (100/BX 4BX/CA)

## (undated) DEVICE — EVACUATOR BLADDER ELLIK - (10/BX)

## (undated) DEVICE — TUBE SUCTION YANKAUER  1/4 X 6FT (20EA/CA)"

## (undated) DEVICE — SET EXTENSION WITH 2 PORTS (48EA/CA) ***PART #2C8610 IS A SUBSTITUTE*****

## (undated) DEVICE — SOD. CHL. INJ. 0.9% 1000 ML - (14EA/CA 60CA/PF)

## (undated) DEVICE — DEVICE CLOSER PERCLOSE - (10/BX) PROGLIDE SYSTEM

## (undated) DEVICE — ZIPWIRE .038 STRAIGHT BENTSON TIP (5EA/BX)

## (undated) DEVICE — DRESSING TRANSPARENT FILM TEGADERM 4 X 4.75" (50EA/BX)"

## (undated) DEVICE — CHLORAPREP 26 ML APPLICATOR - ORANGE TINT(25/CA)

## (undated) DEVICE — PLUG CATHETER DRAIN TUBE PROTECTOR CAP

## (undated) DEVICE — GOWN SURGEONS LARGE - (32/CA)

## (undated) DEVICE — PROBE CIRCUMFERENTIAL 2.3CM X 220CM

## (undated) DEVICE — GLOVE BIOGEL PI INDICATOR SZ 7.0 SURGICAL PF LF - (50/BX 4BX/CA)

## (undated) DEVICE — SYRINGE SAFETY 10 ML 18 GA X 1 1/2 BLUNT LL (100/BX 4BX/CA)

## (undated) DEVICE — PACK CYSTOSCOPY III - (8/CA)

## (undated) DEVICE — PACK CYSTO III (2EA/CA)

## (undated) DEVICE — WATER IRRIGATION STERILE 1000ML (12EA/CA)

## (undated) DEVICE — GLOVE BIOGEL SZ 7.5 SURGICAL PF LTX - (50PR/BX 4BX/CA)

## (undated) DEVICE — BLANKET UNDERBODY FULL ACCES - (5/CA)

## (undated) DEVICE — SPONGE GAUZE NON-STERILE 4X4 - (2000/CA 10PK/CA)

## (undated) DEVICE — ELECTRODE RADIOLUCNT SOLID GEL DEFIB PADS (12EA/CA)

## (undated) DEVICE — DRAPE MAYO STAND - (30/CA)

## (undated) DEVICE — CONTAINER SPECIMEN BAG OR - STERILE 4 OZ W/LID (100EA/CA)

## (undated) DEVICE — BLOCK BITE ENDOSCOPIC 2809 - (100/BX) INTERMEDIATE

## (undated) DEVICE — DERMABOND ADVANCED - (12EA/BX)

## (undated) DEVICE — KIT NRG RF TRANSSEPTAL WITH STANDARD CURVE NEEDLE

## (undated) DEVICE — PACK TAVR (3EA/CA)

## (undated) DEVICE — CATHETER INTRO 63CM 8.5FR SL1

## (undated) DEVICE — COVER FOOT UNIVERSAL DISP. - (25EA/CA)

## (undated) DEVICE — ELECTRODE MONOPOLAR ANGLED CUTTING LOOP DIAM 0.35 YELLOW 24FR (6EA/PK)

## (undated) DEVICE — BLADE SURGICAL CLIPPER - (50EA/CA)

## (undated) DEVICE — SYRINGE TOOMEY (50EA/CA)

## (undated) DEVICE — NEPTUNE 4 PORT MANIFOLD - (20/PK)

## (undated) DEVICE — CATHETER URET RUTNR WEDGE TIP 5FR (10EA/BX)

## (undated) DEVICE — ARMBOARD  SMALL IV 9 INLONG - (25EA/CA)

## (undated) DEVICE — TOWEL STOP TIMEOUT SAFETY FLAG (40EA/CA)

## (undated) DEVICE — CONNECTOR HOSE NEPTUNE FOR CYSTO ROOM

## (undated) DEVICE — SET BIFURCATED BLOOD - (48EA/CS)

## (undated) DEVICE — JELLY SURGILUBE STERILE TUBE 4.25 OZ (1/EA)

## (undated) DEVICE — TUBING PRSS MNTR 48IN NAMIC - (25/CA)

## (undated) DEVICE — MASK, LARYNGEAL AIRWAY #4

## (undated) DEVICE — SET FLUID WARMING STANDARD FLOW - (10/CA)

## (undated) DEVICE — SODIUM CHL. IRRIGATION 0.9% 3000ML (4EA/CA 65CA/PF)

## (undated) DEVICE — BAG URODRAIN WITH TUBING - (20/CA)

## (undated) DEVICE — SYS DLV COST CLS RM TEMP - INJECTATE (CO-SET II) (10EA/CA)

## (undated) DEVICE — SYRINGE SAFETY 3 ML 18 GA X 1 1/2 BLUNT LL (100/BX 8BX/CA)

## (undated) DEVICE — IV SET, NON-VENT PLUMB PUMP

## (undated) DEVICE — CATHETER FOLEY 22FR 5CC

## (undated) DEVICE — MEDICINE CUP STERILE 2 OZ - (100/CA)

## (undated) DEVICE — TRANSDUCER BIFURCATED MONITORING KIT (10EA/CA)

## (undated) DEVICE — BAG DRAINAGE ANTIREFLUX TOWER SLIDE TAP 4000 ML (20EA/CA)

## (undated) DEVICE — TRANSDUCER ADULT DISP. SINGLE BONDED STERILE - (20EA/CA)

## (undated) DEVICE — SYRINGE DISP. 60 CC LL - (30/BX, 12BX/CA)**WHEN THESE ARE GONE ORDER #500206**

## (undated) DEVICE — PACK MINOR BASIN - (2EA/CA)

## (undated) DEVICE — DRAPE CLEAR W/ELASTIC BAND RAD CARM 40 X40" (20EA/CA)"

## (undated) DEVICE — KIT RADIAL ARTERY 20GA W/MAX BARRIER AND BIOPATCH  (5EA/CA) #10740 IS FOR THE SET RADIAL ARTERIAL

## (undated) DEVICE — CATHETER URTH FOLEY 18FR 30ML 3-WAY

## (undated) DEVICE — DRESSING NON ADHERENT 3 X 4 - STERILE (100/BX 12BX/CA)

## (undated) DEVICE — CORD ELECTROSURG. TUR DISP (50EA/CA)

## (undated) DEVICE — CATHETER URETHRAL OPEN END AXXCESS (10EA/BX)

## (undated) DEVICE — DILATOR VASC 18FR 20CM STD - JCD18.038-20

## (undated) DEVICE — GLIDEWIRE

## (undated) DEVICE — GLOVE BIOGEL INDICATOR SZ 8.5 SURGICAL PF LTX - (50/BX 4BX/CA)

## (undated) DEVICE — KIT  I.V. START (100EA/CA)

## (undated) DEVICE — STOPCOCK IV 400 PSI 3W ROT (50EA/BX)

## (undated) DEVICE — DECANTER FLD BLS - (50/CA)

## (undated) DEVICE — TOWELS CLOTH SURGICAL - (4/PK 20PK/CA)

## (undated) DEVICE — SUCTION INSTRUMENT YANKAUER OPEN TIP W/O VENT (50EA/CA)

## (undated) DEVICE — GLOVE, LITE (PAIR)

## (undated) DEVICE — SENSOR SPO2 ADULT LNCS ADTX (20/BX) ORDER ITEM #19593

## (undated) DEVICE — BLADE SURGICAL #11 - (50/BX)

## (undated) DEVICE — FORCEP RADIAL JAW 4 STANDARD CAPACITY W/NEEDLE 240CM (40EA/BX)

## (undated) DEVICE — COVER LIGHT HANDLE FLEXIBLE - SOFT (2EA/PK 80PK/CA)

## (undated) DEVICE — INTRODUCER SHEATH 6FR 2.5CM - DILATOR PROTRUDING (10/BX)

## (undated) DEVICE — CATHETER FOLEY 22 FR 30 CC (12EA/CA)

## (undated) DEVICE — CATHETER URETHRAL FOLEY SILICONE OD20 FR 10 ML (10EA/CA)

## (undated) DEVICE — MICRODRIP PRIMARY VENTED 60 (48EA/CA) THIS WAS PART #2C8428 WHICH WAS DISCONTINUED

## (undated) DEVICE — TUBING PRSS MNTR 72IN M/ M LL - (25/BX) MONIT. LINE W/MALE L/L